# Patient Record
Sex: FEMALE | Race: ASIAN | NOT HISPANIC OR LATINO | Employment: UNEMPLOYED | URBAN - METROPOLITAN AREA
[De-identification: names, ages, dates, MRNs, and addresses within clinical notes are randomized per-mention and may not be internally consistent; named-entity substitution may affect disease eponyms.]

---

## 2017-02-14 ENCOUNTER — OFFICE VISIT - HEALTHEAST (OUTPATIENT)
Dept: FAMILY MEDICINE | Facility: CLINIC | Age: 72
End: 2017-02-14

## 2017-02-14 DIAGNOSIS — M81.0 OSTEOPOROSIS: ICD-10-CM

## 2017-02-14 DIAGNOSIS — E78.00 HYPERCHOLESTEROLEMIA: ICD-10-CM

## 2017-02-14 DIAGNOSIS — R91.8 MULTIPLE LUNG NODULES ON CT: ICD-10-CM

## 2017-02-14 DIAGNOSIS — I10 ESSENTIAL HYPERTENSION WITH GOAL BLOOD PRESSURE LESS THAN 140/90: ICD-10-CM

## 2017-02-14 LAB
CHOLEST SERPL-MCNC: 280 MG/DL
FASTING STATUS PATIENT QL REPORTED: YES
HDLC SERPL-MCNC: 40 MG/DL
LDLC SERPL CALC-MCNC: 194 MG/DL
TRIGL SERPL-MCNC: 230 MG/DL

## 2017-02-14 ASSESSMENT — MIFFLIN-ST. JEOR: SCORE: 890.51

## 2017-02-17 ENCOUNTER — COMMUNICATION - HEALTHEAST (OUTPATIENT)
Dept: FAMILY MEDICINE | Facility: CLINIC | Age: 72
End: 2017-02-17

## 2017-02-17 DIAGNOSIS — R74.01 ELEVATED ALANINE AMINOTRANSFERASE (ALT) LEVEL: ICD-10-CM

## 2017-02-20 ENCOUNTER — HOSPITAL ENCOUNTER (OUTPATIENT)
Dept: CT IMAGING | Facility: HOSPITAL | Age: 72
Discharge: HOME | End: 2017-02-20
Attending: FAMILY MEDICINE

## 2017-02-20 DIAGNOSIS — R91.8 MULTIPLE LUNG NODULES ON CT: ICD-10-CM

## 2017-02-27 ENCOUNTER — HOSPITAL ENCOUNTER (OUTPATIENT)
Dept: ULTRASOUND IMAGING | Facility: HOSPITAL | Age: 72
Discharge: HOME | End: 2017-02-27
Attending: FAMILY MEDICINE

## 2017-02-27 ENCOUNTER — COMMUNICATION - HEALTHEAST (OUTPATIENT)
Dept: FAMILY MEDICINE | Facility: CLINIC | Age: 72
End: 2017-02-27

## 2017-02-27 ENCOUNTER — AMBULATORY - HEALTHEAST (OUTPATIENT)
Dept: FAMILY MEDICINE | Facility: CLINIC | Age: 72
End: 2017-02-27

## 2017-02-27 DIAGNOSIS — R74.01 ELEVATION OF LEVEL OF TRANSAMINASE AND LACTIC ACID DEHYDROGENASE (LDH): ICD-10-CM

## 2017-02-27 DIAGNOSIS — K75.9 HEPATITIS: ICD-10-CM

## 2017-02-27 DIAGNOSIS — R74.02 ELEVATION OF LEVEL OF TRANSAMINASE AND LACTIC ACID DEHYDROGENASE (LDH): ICD-10-CM

## 2017-02-27 DIAGNOSIS — R74.01 ELEVATED ALANINE AMINOTRANSFERASE (ALT) LEVEL: ICD-10-CM

## 2017-03-06 ENCOUNTER — RECORDS - HEALTHEAST (OUTPATIENT)
Dept: ADMINISTRATIVE | Facility: OTHER | Age: 72
End: 2017-03-06

## 2017-03-07 ENCOUNTER — RECORDS - HEALTHEAST (OUTPATIENT)
Dept: BONE DENSITY | Facility: CLINIC | Age: 72
End: 2017-03-07

## 2017-03-07 ENCOUNTER — RECORDS - HEALTHEAST (OUTPATIENT)
Dept: ADMINISTRATIVE | Facility: OTHER | Age: 72
End: 2017-03-07

## 2017-03-07 ENCOUNTER — OFFICE VISIT - HEALTHEAST (OUTPATIENT)
Dept: FAMILY MEDICINE | Facility: CLINIC | Age: 72
End: 2017-03-07

## 2017-03-07 DIAGNOSIS — K75.9 HEPATITIS: ICD-10-CM

## 2017-03-07 DIAGNOSIS — M81.0 OSTEOPOROSIS: ICD-10-CM

## 2017-03-07 DIAGNOSIS — E87.6 HYPOPOTASSEMIA: ICD-10-CM

## 2017-03-07 DIAGNOSIS — E78.00 HYPERCHOLESTEROLEMIA: ICD-10-CM

## 2017-03-07 DIAGNOSIS — I10 ESSENTIAL HYPERTENSION WITH GOAL BLOOD PRESSURE LESS THAN 140/90: ICD-10-CM

## 2017-03-07 DIAGNOSIS — M81.0 AGE-RELATED OSTEOPOROSIS WITHOUT CURRENT PATHOLOGICAL FRACTURE: ICD-10-CM

## 2017-03-07 DIAGNOSIS — R91.8 MULTIPLE LUNG NODULES ON CT: ICD-10-CM

## 2017-03-07 ASSESSMENT — MIFFLIN-ST. JEOR: SCORE: 896.74

## 2017-03-07 NOTE — ASSESSMENT & PLAN NOTE
This is been an issue for her off and on for years.  She is now on potassium replacement again and I rechecked a level today.

## 2017-03-07 NOTE — ASSESSMENT & PLAN NOTE
Non-viral hepatitis per labs.  Status post cholecystectomy.  She saw [facility] yesterday and had additional labs drawn.  It sounds like they did get her previous labs and studies.  She will follow-up there in 1 month.  Asymptomatic at this time.

## 2017-03-07 NOTE — ASSESSMENT & PLAN NOTE
"Fosamax initiated in 2011, switched to ibandronate in 2012 for convenience.  Ibandronate added as allergy in 2014 for \"headache and shortness of breath\" - details are unclear. Has been on calcium and Vit D only since. DXA done today and results not yet available.  Depending on results, may need to have her see endo for a non-bisphosphonate medication.      "

## 2017-03-08 ENCOUNTER — COMMUNICATION - HEALTHEAST (OUTPATIENT)
Dept: FAMILY MEDICINE | Facility: CLINIC | Age: 72
End: 2017-03-08

## 2017-03-13 ENCOUNTER — COMMUNICATION - HEALTHEAST (OUTPATIENT)
Dept: FAMILY MEDICINE | Facility: CLINIC | Age: 72
End: 2017-03-13

## 2017-03-13 DIAGNOSIS — R42 DIZZINESS: ICD-10-CM

## 2017-04-26 ENCOUNTER — OFFICE VISIT - HEALTHEAST (OUTPATIENT)
Dept: FAMILY MEDICINE | Facility: CLINIC | Age: 72
End: 2017-04-26

## 2017-04-26 DIAGNOSIS — N39.0 RECURRENT UTI: ICD-10-CM

## 2017-04-26 DIAGNOSIS — M81.0 OSTEOPOROSIS: ICD-10-CM

## 2017-04-26 DIAGNOSIS — I10 ESSENTIAL HYPERTENSION WITH GOAL BLOOD PRESSURE LESS THAN 140/90: ICD-10-CM

## 2017-04-26 DIAGNOSIS — K75.9 HEPATITIS: ICD-10-CM

## 2017-04-26 ASSESSMENT — MIFFLIN-ST. JEOR: SCORE: 917.16

## 2017-04-26 NOTE — ASSESSMENT & PLAN NOTE
"  DXA 4/28/11:  T-score -3.2  Fosamax initiated in 2011, switched to ibandronate in 2012 for convenience.   DXA 11/13/13: T-score -2.8  Ibandronate added as allergy in 2014 for \"headache and shortness of breath\" - details are unclear. Has been on calcium and Vit D only since.  DXA 3/7/17:  \"No significant difference\".  T-score -3.2, moderate bone architecture.   Will start Prolia injections q6m on 5/10/17 (med not available in clinic today).    "

## 2017-04-26 NOTE — ASSESSMENT & PLAN NOTE
Most recently diagnosed with E coli UTI on 3/24/17 in the emergency room.  She has recovered completely from this at this time.

## 2017-04-26 NOTE — ASSESSMENT & PLAN NOTE
She had cholecystectomy 8/21/15.  The surgery had been delayed from initial diagnosis of gallstones 11/23/13 due to hypokalemia and difficulty with communication.    She then admitted for elevated LFTs 10/28/15. At that time, she had gotten 3 days of Septra for UTI, and it was thought that the Septra had caused the hepatitis.     Her LFTs came down after that, but upon recheck (asympomatic) 2/14/17 have returned elevated again (AST 80, , ). Liver ultrasound on 2/17/17 was normal.  She had not had any antibiotics or medication changes inthe last several months.  She is NOT on a statin medication.  She doesn't drink any alcohol and does not frequently use Tylenol.    Was seen at [facility] 3/6/17.  LFT's normal that day.  Also had normal viral hepatitis labs, iron studies, alpha-1 antitripsan, DEEPALI, actin (smooth muscle) antibody.  LFTs were also normal in the emergency room on 3/24/17.She has remained asymptomatic throughout.  It is not clear whether she has a follow-up appointment with [facility] already scheduled; we will have her meet with specialty  in this regard today.

## 2017-04-26 NOTE — ASSESSMENT & PLAN NOTE
Meds had been decreased in September because she was feeling dizzy.  She now has good blood pressure control and no symptoms of orthostatic hypotension.  Excellent control on current regimen. No changes. Recheck in 1 year.

## 2017-05-10 ENCOUNTER — AMBULATORY - HEALTHEAST (OUTPATIENT)
Dept: NURSING | Facility: CLINIC | Age: 72
End: 2017-05-10

## 2017-05-12 ENCOUNTER — COMMUNICATION - HEALTHEAST (OUTPATIENT)
Dept: FAMILY MEDICINE | Facility: CLINIC | Age: 72
End: 2017-05-12

## 2017-06-21 ENCOUNTER — COMMUNICATION - HEALTHEAST (OUTPATIENT)
Dept: FAMILY MEDICINE | Facility: CLINIC | Age: 72
End: 2017-06-21

## 2017-06-21 ENCOUNTER — RECORDS - HEALTHEAST (OUTPATIENT)
Dept: ADMINISTRATIVE | Facility: OTHER | Age: 72
End: 2017-06-21

## 2017-06-21 DIAGNOSIS — M81.0 OSTEOPOROSIS, UNSPECIFIED: ICD-10-CM

## 2017-07-26 ENCOUNTER — OFFICE VISIT - HEALTHEAST (OUTPATIENT)
Dept: FAMILY MEDICINE | Facility: CLINIC | Age: 72
End: 2017-07-26

## 2017-07-26 DIAGNOSIS — R42 DIZZINESS: ICD-10-CM

## 2017-07-26 ASSESSMENT — MIFFLIN-ST. JEOR: SCORE: 921.69

## 2017-09-12 ENCOUNTER — COMMUNICATION - HEALTHEAST (OUTPATIENT)
Dept: FAMILY MEDICINE | Facility: CLINIC | Age: 72
End: 2017-09-12

## 2017-09-12 ENCOUNTER — OFFICE VISIT - HEALTHEAST (OUTPATIENT)
Dept: FAMILY MEDICINE | Facility: CLINIC | Age: 72
End: 2017-09-12

## 2017-09-12 DIAGNOSIS — G51.0 BELL'S PALSY: ICD-10-CM

## 2017-09-12 DIAGNOSIS — Z23 NEED FOR IMMUNIZATION AGAINST INFLUENZA: ICD-10-CM

## 2017-09-12 ASSESSMENT — MIFFLIN-ST. JEOR: SCORE: 942.11

## 2017-09-29 ENCOUNTER — OFFICE VISIT - HEALTHEAST (OUTPATIENT)
Dept: FAMILY MEDICINE | Facility: CLINIC | Age: 72
End: 2017-09-29

## 2017-09-29 DIAGNOSIS — G51.0 BELL'S PALSY: ICD-10-CM

## 2017-09-29 DIAGNOSIS — R30.0 DYSURIA: ICD-10-CM

## 2017-09-29 ASSESSMENT — MIFFLIN-ST. JEOR: SCORE: 925.44

## 2017-11-14 ENCOUNTER — OFFICE VISIT - HEALTHEAST (OUTPATIENT)
Dept: FAMILY MEDICINE | Facility: CLINIC | Age: 72
End: 2017-11-14

## 2017-11-14 DIAGNOSIS — Z23 NEED FOR VACCINATION: ICD-10-CM

## 2017-11-14 DIAGNOSIS — M81.0 AGE-RELATED OSTEOPOROSIS WITHOUT CURRENT PATHOLOGICAL FRACTURE: ICD-10-CM

## 2017-11-14 DIAGNOSIS — N39.0 RECURRENT UTI: ICD-10-CM

## 2017-11-14 DIAGNOSIS — Z71.84 COUNSELING FOR TRAVEL: ICD-10-CM

## 2017-11-14 DIAGNOSIS — E87.6 HYPOPOTASSEMIA: ICD-10-CM

## 2017-11-14 DIAGNOSIS — I10 ESSENTIAL HYPERTENSION: ICD-10-CM

## 2017-11-30 ENCOUNTER — COMMUNICATION - HEALTHEAST (OUTPATIENT)
Dept: FAMILY MEDICINE | Facility: CLINIC | Age: 72
End: 2017-11-30

## 2017-11-30 DIAGNOSIS — H10.45 CHRONIC ALLERGIC CONJUNCTIVITIS: ICD-10-CM

## 2018-03-30 ENCOUNTER — COMMUNICATION - HEALTHEAST (OUTPATIENT)
Dept: FAMILY MEDICINE | Facility: CLINIC | Age: 73
End: 2018-03-30

## 2018-03-30 DIAGNOSIS — Z71.84 COUNSELING FOR TRAVEL: ICD-10-CM

## 2018-04-05 ENCOUNTER — COMMUNICATION - HEALTHEAST (OUTPATIENT)
Dept: FAMILY MEDICINE | Facility: CLINIC | Age: 73
End: 2018-04-05

## 2018-04-11 ENCOUNTER — COMMUNICATION - HEALTHEAST (OUTPATIENT)
Dept: FAMILY MEDICINE | Facility: CLINIC | Age: 73
End: 2018-04-11

## 2018-04-11 DIAGNOSIS — Z71.84 COUNSELING FOR TRAVEL: ICD-10-CM

## 2018-04-27 ENCOUNTER — OFFICE VISIT - HEALTHEAST (OUTPATIENT)
Dept: FAMILY MEDICINE | Facility: CLINIC | Age: 73
End: 2018-04-27

## 2018-04-27 DIAGNOSIS — E87.6 HYPOPOTASSEMIA: ICD-10-CM

## 2018-04-27 DIAGNOSIS — M81.0 AGE-RELATED OSTEOPOROSIS WITHOUT CURRENT PATHOLOGICAL FRACTURE: ICD-10-CM

## 2018-04-27 DIAGNOSIS — R42 DIZZINESS: ICD-10-CM

## 2018-04-27 DIAGNOSIS — K75.9 HEPATITIS: ICD-10-CM

## 2018-04-27 DIAGNOSIS — I10 ESSENTIAL HYPERTENSION: ICD-10-CM

## 2018-04-27 DIAGNOSIS — R07.9 RIGHT-SIDED CHEST PAIN: ICD-10-CM

## 2018-04-27 DIAGNOSIS — H10.45 CHRONIC ALLERGIC CONJUNCTIVITIS: ICD-10-CM

## 2018-04-27 LAB
25(OH)D3 SERPL-MCNC: 30.8 NG/ML (ref 30–80)
ALBUMIN SERPL-MCNC: 3.4 G/DL (ref 3.5–5)
ALBUMIN SERPL-MCNC: 3.4 G/DL (ref 3.5–5)
ALP SERPL-CCNC: 50 U/L (ref 45–120)
ALP SERPL-CCNC: 50 U/L (ref 45–120)
ALT SERPL W P-5'-P-CCNC: 23 U/L (ref 0–45)
ALT SERPL W P-5'-P-CCNC: 23 U/L (ref 0–45)
ANION GAP SERPL CALCULATED.3IONS-SCNC: 15 MMOL/L (ref 5–18)
AST SERPL W P-5'-P-CCNC: 19 U/L (ref 0–40)
AST SERPL W P-5'-P-CCNC: 19 U/L (ref 0–40)
BILIRUB DIRECT SERPL-MCNC: 0.2 MG/DL
BILIRUB SERPL-MCNC: 0.5 MG/DL (ref 0–1)
BILIRUB SERPL-MCNC: 0.5 MG/DL (ref 0–1)
BUN SERPL-MCNC: 25 MG/DL (ref 8–28)
CALCIUM SERPL-MCNC: 10.2 MG/DL (ref 8.5–10.5)
CHLORIDE BLD-SCNC: 102 MMOL/L (ref 98–107)
CO2 SERPL-SCNC: 25 MMOL/L (ref 22–31)
CREAT SERPL-MCNC: 0.91 MG/DL (ref 0.6–1.1)
GFR SERPL CREATININE-BSD FRML MDRD: >60 ML/MIN/1.73M2
GLUCOSE BLD-MCNC: 81 MG/DL (ref 70–125)
POTASSIUM BLD-SCNC: 4 MMOL/L (ref 3.5–5)
PROT SERPL-MCNC: 7.4 G/DL (ref 6–8)
PROT SERPL-MCNC: 7.4 G/DL (ref 6–8)
SODIUM SERPL-SCNC: 142 MMOL/L (ref 136–145)

## 2018-04-27 ASSESSMENT — MIFFLIN-ST. JEOR: SCORE: 934.51

## 2018-04-27 NOTE — ASSESSMENT & PLAN NOTE
"DXA 4/28/11:  T-score -3.2  Fosamax initiated in 2011, switched to ibandronate in 2012 for convenience.   DXA 11/13/13: T-score -2.8  Ibandronate added as allergy in 2014 for \"headache and shortness of breath\" - details are unclear. Has been on calcium and Vit D only since.  DXA 3/7/17:  \"No significant difference\".  T-score -3.2, moderate bone architecture.   Will start Prolia injections q6m on 5/10/17.    Is about due for Prolia injection, but we do not have it in stock today.  She will return between May 7-10 for injection, which will be covered under previous prior authorization.  Will need to pursue another PA for following injection.  "

## 2018-04-27 NOTE — ASSESSMENT & PLAN NOTE
Has been out of potassium supplement recently.  Will check labs and get her restarted on potassium supplementation.

## 2018-04-30 ENCOUNTER — COMMUNICATION - HEALTHEAST (OUTPATIENT)
Dept: FAMILY MEDICINE | Facility: CLINIC | Age: 73
End: 2018-04-30

## 2018-05-08 ENCOUNTER — COMMUNICATION - HEALTHEAST (OUTPATIENT)
Dept: FAMILY MEDICINE | Facility: CLINIC | Age: 73
End: 2018-05-08

## 2018-05-17 ENCOUNTER — AMBULATORY - HEALTHEAST (OUTPATIENT)
Dept: NURSING | Facility: CLINIC | Age: 73
End: 2018-05-17

## 2018-07-16 ENCOUNTER — COMMUNICATION - HEALTHEAST (OUTPATIENT)
Dept: FAMILY MEDICINE | Facility: CLINIC | Age: 73
End: 2018-07-16

## 2018-07-16 DIAGNOSIS — M81.0 OSTEOPOROSIS: ICD-10-CM

## 2018-09-06 ENCOUNTER — COMMUNICATION - HEALTHEAST (OUTPATIENT)
Dept: FAMILY MEDICINE | Facility: CLINIC | Age: 73
End: 2018-09-06

## 2018-09-06 DIAGNOSIS — K21.9 GERD (GASTROESOPHAGEAL REFLUX DISEASE): ICD-10-CM

## 2018-09-17 ENCOUNTER — OFFICE VISIT - HEALTHEAST (OUTPATIENT)
Dept: FAMILY MEDICINE | Facility: CLINIC | Age: 73
End: 2018-09-17

## 2018-09-17 DIAGNOSIS — K21.9 GERD (GASTROESOPHAGEAL REFLUX DISEASE): ICD-10-CM

## 2018-09-17 DIAGNOSIS — R10.11 ABDOMINAL PAIN, RIGHT UPPER QUADRANT: ICD-10-CM

## 2018-09-17 DIAGNOSIS — Z12.31 VISIT FOR SCREENING MAMMOGRAM: ICD-10-CM

## 2018-09-17 LAB
ALBUMIN SERPL-MCNC: 3.4 G/DL (ref 3.5–5)
ALBUMIN UR-MCNC: NEGATIVE MG/DL
ALP SERPL-CCNC: 38 U/L (ref 45–120)
ALT SERPL W P-5'-P-CCNC: 16 U/L (ref 0–45)
ANION GAP SERPL CALCULATED.3IONS-SCNC: 10 MMOL/L (ref 5–18)
APPEARANCE UR: CLEAR
AST SERPL W P-5'-P-CCNC: 16 U/L (ref 0–40)
BILIRUB SERPL-MCNC: 0.4 MG/DL (ref 0–1)
BILIRUB UR QL STRIP: NEGATIVE
BUN SERPL-MCNC: 17 MG/DL (ref 8–28)
CALCIUM SERPL-MCNC: 9.4 MG/DL (ref 8.5–10.5)
CHLORIDE BLD-SCNC: 105 MMOL/L (ref 98–107)
CO2 SERPL-SCNC: 26 MMOL/L (ref 22–31)
COLOR UR AUTO: YELLOW
CREAT SERPL-MCNC: 0.92 MG/DL (ref 0.6–1.1)
ERYTHROCYTE [DISTWIDTH] IN BLOOD BY AUTOMATED COUNT: 12.1 % (ref 11–14.5)
GFR SERPL CREATININE-BSD FRML MDRD: 60 ML/MIN/1.73M2
GLUCOSE BLD-MCNC: 114 MG/DL (ref 70–125)
GLUCOSE UR STRIP-MCNC: NEGATIVE MG/DL
HCT VFR BLD AUTO: 41.1 % (ref 35–47)
HGB BLD-MCNC: 13.9 G/DL (ref 12–16)
HGB UR QL STRIP: NEGATIVE
KETONES UR STRIP-MCNC: NEGATIVE MG/DL
LEUKOCYTE ESTERASE UR QL STRIP: NEGATIVE
MCH RBC QN AUTO: 30.9 PG (ref 27–34)
MCHC RBC AUTO-ENTMCNC: 33.7 G/DL (ref 32–36)
MCV RBC AUTO: 92 FL (ref 80–100)
NITRATE UR QL: NEGATIVE
PH UR STRIP: 7 [PH] (ref 5–8)
PLATELET # BLD AUTO: 273 THOU/UL (ref 140–440)
PMV BLD AUTO: 7.2 FL (ref 7–10)
POTASSIUM BLD-SCNC: 3.9 MMOL/L (ref 3.5–5)
PROT SERPL-MCNC: 6.9 G/DL (ref 6–8)
RBC # BLD AUTO: 4.48 MILL/UL (ref 3.8–5.4)
SODIUM SERPL-SCNC: 141 MMOL/L (ref 136–145)
SP GR UR STRIP: 1.01 (ref 1–1.03)
UROBILINOGEN UR STRIP-ACNC: NORMAL
WBC: 7.3 THOU/UL (ref 4–11)

## 2018-09-17 ASSESSMENT — MIFFLIN-ST. JEOR: SCORE: 943.58

## 2018-10-01 ENCOUNTER — COMMUNICATION - HEALTHEAST (OUTPATIENT)
Dept: ADMINISTRATIVE | Facility: CLINIC | Age: 73
End: 2018-10-01

## 2018-10-23 ENCOUNTER — OFFICE VISIT - HEALTHEAST (OUTPATIENT)
Dept: FAMILY MEDICINE | Facility: CLINIC | Age: 73
End: 2018-10-23

## 2018-10-23 ENCOUNTER — COMMUNICATION - HEALTHEAST (OUTPATIENT)
Dept: FAMILY MEDICINE | Facility: CLINIC | Age: 73
End: 2018-10-23

## 2018-10-23 ENCOUNTER — RECORDS - HEALTHEAST (OUTPATIENT)
Dept: MAMMOGRAPHY | Facility: CLINIC | Age: 73
End: 2018-10-23

## 2018-10-23 DIAGNOSIS — N89.8 VAGINAL ITCHING: ICD-10-CM

## 2018-10-23 DIAGNOSIS — Z12.31 ENCOUNTER FOR SCREENING MAMMOGRAM FOR MALIGNANT NEOPLASM OF BREAST: ICD-10-CM

## 2018-10-23 DIAGNOSIS — R30.0 DYSURIA: ICD-10-CM

## 2018-10-23 LAB
ALBUMIN UR-MCNC: NEGATIVE MG/DL
APPEARANCE UR: CLEAR
BILIRUB UR QL STRIP: NEGATIVE
CLUE CELLS: NORMAL
COLOR UR AUTO: YELLOW
GLUCOSE UR STRIP-MCNC: NEGATIVE MG/DL
HGB UR QL STRIP: NEGATIVE
KETONES UR STRIP-MCNC: NEGATIVE MG/DL
LEUKOCYTE ESTERASE UR QL STRIP: ABNORMAL
NITRATE UR QL: NEGATIVE
PH UR STRIP: 6 [PH] (ref 5–8)
SP GR UR STRIP: 1.02 (ref 1–1.03)
TRICHOMONAS, WET PREP: NORMAL
UROBILINOGEN UR STRIP-ACNC: ABNORMAL
YEAST, WET PREP: NORMAL

## 2018-10-24 ENCOUNTER — COMMUNICATION - HEALTHEAST (OUTPATIENT)
Dept: FAMILY MEDICINE | Facility: CLINIC | Age: 73
End: 2018-10-24

## 2018-10-25 LAB — BACTERIA SPEC CULT: ABNORMAL

## 2018-10-31 ENCOUNTER — COMMUNICATION - HEALTHEAST (OUTPATIENT)
Dept: FAMILY MEDICINE | Facility: CLINIC | Age: 73
End: 2018-10-31

## 2018-11-15 ENCOUNTER — COMMUNICATION - HEALTHEAST (OUTPATIENT)
Dept: FAMILY MEDICINE | Facility: CLINIC | Age: 73
End: 2018-11-15

## 2018-11-15 ENCOUNTER — AMBULATORY - HEALTHEAST (OUTPATIENT)
Dept: FAMILY MEDICINE | Facility: CLINIC | Age: 73
End: 2018-11-15

## 2018-11-15 DIAGNOSIS — M81.0 SENILE OSTEOPOROSIS: ICD-10-CM

## 2018-11-16 ENCOUNTER — AMBULATORY - HEALTHEAST (OUTPATIENT)
Dept: NURSING | Facility: CLINIC | Age: 73
End: 2018-11-16

## 2018-11-19 ENCOUNTER — COMMUNICATION - HEALTHEAST (OUTPATIENT)
Dept: FAMILY MEDICINE | Facility: CLINIC | Age: 73
End: 2018-11-19

## 2018-11-19 DIAGNOSIS — I10 ESSENTIAL HYPERTENSION: ICD-10-CM

## 2019-04-10 ENCOUNTER — OFFICE VISIT - HEALTHEAST (OUTPATIENT)
Dept: FAMILY MEDICINE | Facility: CLINIC | Age: 74
End: 2019-04-10

## 2019-04-10 DIAGNOSIS — Z76.0 ENCOUNTER FOR MEDICATION REFILL: ICD-10-CM

## 2019-04-10 DIAGNOSIS — R30.0 DYSURIA: ICD-10-CM

## 2019-04-10 LAB
ALBUMIN UR-MCNC: ABNORMAL MG/DL
APPEARANCE UR: ABNORMAL
BACTERIA #/AREA URNS HPF: ABNORMAL HPF
BILIRUB UR QL STRIP: NEGATIVE
COLOR UR AUTO: ABNORMAL
GLUCOSE UR STRIP-MCNC: NEGATIVE MG/DL
HGB UR QL STRIP: ABNORMAL
KETONES UR STRIP-MCNC: NEGATIVE MG/DL
LEUKOCYTE ESTERASE UR QL STRIP: ABNORMAL
NITRATE UR QL: POSITIVE
PH UR STRIP: 6 [PH] (ref 5–8)
RBC #/AREA URNS AUTO: ABNORMAL HPF
SP GR UR STRIP: 1.02 (ref 1–1.03)
SQUAMOUS #/AREA URNS AUTO: ABNORMAL LPF
TRANS CELLS #/AREA URNS HPF: ABNORMAL LPF
UROBILINOGEN UR STRIP-ACNC: ABNORMAL
WBC #/AREA URNS AUTO: ABNORMAL HPF

## 2019-04-10 ASSESSMENT — MIFFLIN-ST. JEOR: SCORE: 935.75

## 2019-04-12 LAB — BACTERIA SPEC CULT: ABNORMAL

## 2019-04-24 ENCOUNTER — COMMUNICATION - HEALTHEAST (OUTPATIENT)
Dept: FAMILY MEDICINE | Facility: CLINIC | Age: 74
End: 2019-04-24

## 2019-05-15 ENCOUNTER — COMMUNICATION - HEALTHEAST (OUTPATIENT)
Dept: FAMILY MEDICINE | Facility: CLINIC | Age: 74
End: 2019-05-15

## 2019-05-15 ENCOUNTER — AMBULATORY - HEALTHEAST (OUTPATIENT)
Dept: NURSING | Facility: CLINIC | Age: 74
End: 2019-05-15

## 2019-05-28 ENCOUNTER — COMMUNICATION - HEALTHEAST (OUTPATIENT)
Dept: FAMILY MEDICINE | Facility: CLINIC | Age: 74
End: 2019-05-28

## 2019-05-28 DIAGNOSIS — K21.9 GERD (GASTROESOPHAGEAL REFLUX DISEASE): ICD-10-CM

## 2019-05-28 DIAGNOSIS — R42 DIZZINESS: ICD-10-CM

## 2019-06-12 ENCOUNTER — COMMUNICATION - HEALTHEAST (OUTPATIENT)
Dept: GERIATRIC MEDICINE | Facility: CLINIC | Age: 74
End: 2019-06-12

## 2019-06-17 ENCOUNTER — COMMUNICATION - HEALTHEAST (OUTPATIENT)
Dept: GERIATRIC MEDICINE | Facility: CLINIC | Age: 74
End: 2019-06-17

## 2019-07-08 ENCOUNTER — COMMUNICATION - HEALTHEAST (OUTPATIENT)
Dept: FAMILY MEDICINE | Facility: CLINIC | Age: 74
End: 2019-07-08

## 2019-08-15 ENCOUNTER — COMMUNICATION - HEALTHEAST (OUTPATIENT)
Dept: FAMILY MEDICINE | Facility: CLINIC | Age: 74
End: 2019-08-15

## 2019-08-15 DIAGNOSIS — M81.0 OSTEOPOROSIS: ICD-10-CM

## 2019-09-05 ENCOUNTER — OFFICE VISIT - HEALTHEAST (OUTPATIENT)
Dept: FAMILY MEDICINE | Facility: CLINIC | Age: 74
End: 2019-09-05

## 2019-09-05 DIAGNOSIS — N89.8 VAGINAL ITCHING: ICD-10-CM

## 2019-09-05 DIAGNOSIS — R30.0 DYSURIA: ICD-10-CM

## 2019-09-05 LAB
ALBUMIN UR-MCNC: NEGATIVE MG/DL
APPEARANCE UR: CLEAR
BACTERIA #/AREA URNS HPF: ABNORMAL HPF
BILIRUB UR QL STRIP: NEGATIVE
CLUE CELLS: NORMAL
COLOR UR AUTO: ABNORMAL
GLUCOSE UR STRIP-MCNC: NEGATIVE MG/DL
HGB UR QL STRIP: NEGATIVE
KETONES UR STRIP-MCNC: NEGATIVE MG/DL
LEUKOCYTE ESTERASE UR QL STRIP: ABNORMAL
NITRATE UR QL: NEGATIVE
PH UR STRIP: 6.5 [PH] (ref 5–8)
RBC #/AREA URNS AUTO: ABNORMAL HPF
SP GR UR STRIP: <=1.005 (ref 1–1.03)
SQUAMOUS #/AREA URNS AUTO: ABNORMAL LPF
TRICHOMONAS, WET PREP: NORMAL
UROBILINOGEN UR STRIP-ACNC: ABNORMAL
WBC #/AREA URNS AUTO: ABNORMAL HPF
YEAST, WET PREP: NORMAL

## 2019-09-05 ASSESSMENT — MIFFLIN-ST. JEOR: SCORE: 943.24

## 2019-09-07 LAB — BACTERIA SPEC CULT: ABNORMAL

## 2019-09-17 ENCOUNTER — COMMUNICATION - HEALTHEAST (OUTPATIENT)
Dept: FAMILY MEDICINE | Facility: CLINIC | Age: 74
End: 2019-09-17

## 2019-09-17 DIAGNOSIS — I10 ESSENTIAL HYPERTENSION: ICD-10-CM

## 2019-10-23 ENCOUNTER — COMMUNICATION - HEALTHEAST (OUTPATIENT)
Dept: FAMILY MEDICINE | Facility: CLINIC | Age: 74
End: 2019-10-23

## 2019-11-01 ENCOUNTER — AMBULATORY - HEALTHEAST (OUTPATIENT)
Dept: FAMILY MEDICINE | Facility: CLINIC | Age: 74
End: 2019-11-01

## 2019-11-13 ENCOUNTER — AMBULATORY - HEALTHEAST (OUTPATIENT)
Dept: FAMILY MEDICINE | Facility: CLINIC | Age: 74
End: 2019-11-13

## 2019-11-13 DIAGNOSIS — M81.0 OSTEOPOROSIS: ICD-10-CM

## 2019-11-20 ENCOUNTER — AMBULATORY - HEALTHEAST (OUTPATIENT)
Dept: NURSING | Facility: CLINIC | Age: 74
End: 2019-11-20

## 2019-11-26 ENCOUNTER — OFFICE VISIT - HEALTHEAST (OUTPATIENT)
Dept: FAMILY MEDICINE | Facility: CLINIC | Age: 74
End: 2019-11-26

## 2019-11-26 DIAGNOSIS — I10 ESSENTIAL HYPERTENSION: ICD-10-CM

## 2019-11-26 DIAGNOSIS — L85.3 DRY SKIN: ICD-10-CM

## 2019-11-26 DIAGNOSIS — Z23 IMMUNIZATION DUE: ICD-10-CM

## 2019-11-26 ASSESSMENT — MIFFLIN-ST. JEOR: SCORE: 946.64

## 2019-12-27 ENCOUNTER — COMMUNICATION - HEALTHEAST (OUTPATIENT)
Dept: GERIATRIC MEDICINE | Facility: CLINIC | Age: 74
End: 2019-12-27

## 2020-03-29 ENCOUNTER — COMMUNICATION - HEALTHEAST (OUTPATIENT)
Dept: FAMILY MEDICINE | Facility: CLINIC | Age: 75
End: 2020-03-29

## 2020-03-29 DIAGNOSIS — K21.9 GERD (GASTROESOPHAGEAL REFLUX DISEASE): ICD-10-CM

## 2020-05-05 ENCOUNTER — COMMUNICATION - HEALTHEAST (OUTPATIENT)
Dept: FAMILY MEDICINE | Facility: CLINIC | Age: 75
End: 2020-05-05

## 2020-05-05 DIAGNOSIS — Z76.0 ENCOUNTER FOR MEDICATION REFILL: ICD-10-CM

## 2020-05-06 RX ORDER — AZELASTINE HYDROCHLORIDE 0.5 MG/ML
SOLUTION/ DROPS OPHTHALMIC
Qty: 6 ML | Refills: 11 | Status: SHIPPED | OUTPATIENT
Start: 2020-05-06 | End: 2021-08-05

## 2020-05-12 ENCOUNTER — AMBULATORY - HEALTHEAST (OUTPATIENT)
Dept: FAMILY MEDICINE | Facility: CLINIC | Age: 75
End: 2020-05-12

## 2020-05-12 DIAGNOSIS — K21.9 GERD (GASTROESOPHAGEAL REFLUX DISEASE): ICD-10-CM

## 2020-05-13 ENCOUNTER — COMMUNICATION - HEALTHEAST (OUTPATIENT)
Dept: GERIATRIC MEDICINE | Facility: CLINIC | Age: 75
End: 2020-05-13

## 2020-05-13 ASSESSMENT — ACTIVITIES OF DAILY LIVING (ADL)
DEPENDENT_IADLS:: CLEANING;COOKING;LAUNDRY;SHOPPING;MEAL PREPARATION;MEDICATION MANAGEMENT;MONEY MANAGEMENT;TRANSPORTATION;INCONTINENCE

## 2020-05-20 ENCOUNTER — AMBULATORY - HEALTHEAST (OUTPATIENT)
Dept: NURSING | Facility: CLINIC | Age: 75
End: 2020-05-20

## 2020-05-20 ENCOUNTER — COMMUNICATION - HEALTHEAST (OUTPATIENT)
Dept: FAMILY MEDICINE | Facility: CLINIC | Age: 75
End: 2020-05-20

## 2020-05-20 ENCOUNTER — OFFICE VISIT - HEALTHEAST (OUTPATIENT)
Dept: FAMILY MEDICINE | Facility: CLINIC | Age: 75
End: 2020-05-20

## 2020-05-20 DIAGNOSIS — R07.89 CHEST WALL PAIN: ICD-10-CM

## 2020-05-20 DIAGNOSIS — N30.90 BLADDER INFECTION: ICD-10-CM

## 2020-05-20 LAB
ALBUMIN UR-MCNC: NEGATIVE MG/DL
APPEARANCE UR: CLEAR
BACTERIA #/AREA URNS HPF: ABNORMAL HPF
BILIRUB UR QL STRIP: NEGATIVE
COLOR UR AUTO: YELLOW
GLUCOSE UR STRIP-MCNC: NEGATIVE MG/DL
HGB UR QL STRIP: NEGATIVE
KETONES UR STRIP-MCNC: NEGATIVE MG/DL
LEUKOCYTE ESTERASE UR QL STRIP: ABNORMAL
NITRATE UR QL: NEGATIVE
PH UR STRIP: 6 [PH] (ref 5–8)
RBC #/AREA URNS AUTO: ABNORMAL HPF
SP GR UR STRIP: 1.01 (ref 1–1.03)
SQUAMOUS #/AREA URNS AUTO: ABNORMAL LPF
UROBILINOGEN UR STRIP-ACNC: ABNORMAL
WBC #/AREA URNS AUTO: ABNORMAL HPF

## 2020-05-20 ASSESSMENT — MIFFLIN-ST. JEOR: SCORE: 945.79

## 2020-05-21 ENCOUNTER — COMMUNICATION - HEALTHEAST (OUTPATIENT)
Dept: GERIATRIC MEDICINE | Facility: CLINIC | Age: 75
End: 2020-05-21

## 2020-05-22 LAB — BACTERIA SPEC CULT: ABNORMAL

## 2020-06-08 ENCOUNTER — COMMUNICATION - HEALTHEAST (OUTPATIENT)
Dept: FAMILY MEDICINE | Facility: CLINIC | Age: 75
End: 2020-06-08

## 2020-06-08 DIAGNOSIS — R42 DIZZINESS: ICD-10-CM

## 2020-06-10 RX ORDER — MECLIZINE HYDROCHLORIDE 25 MG/1
TABLET ORAL
Qty: 30 TABLET | Refills: 11 | Status: SHIPPED | OUTPATIENT
Start: 2020-06-10 | End: 2021-08-05

## 2020-07-09 ENCOUNTER — COMMUNICATION - HEALTHEAST (OUTPATIENT)
Dept: FAMILY MEDICINE | Facility: CLINIC | Age: 75
End: 2020-07-09

## 2020-08-06 ENCOUNTER — AMBULATORY - HEALTHEAST (OUTPATIENT)
Dept: FAMILY MEDICINE | Facility: CLINIC | Age: 75
End: 2020-08-06

## 2020-08-06 ENCOUNTER — RECORDS - HEALTHEAST (OUTPATIENT)
Dept: MAMMOGRAPHY | Facility: CLINIC | Age: 75
End: 2020-08-06

## 2020-08-06 ENCOUNTER — OFFICE VISIT - HEALTHEAST (OUTPATIENT)
Dept: FAMILY MEDICINE | Facility: CLINIC | Age: 75
End: 2020-08-06

## 2020-08-06 DIAGNOSIS — Z12.31 ENCOUNTER FOR SCREENING MAMMOGRAM FOR MALIGNANT NEOPLASM OF BREAST: ICD-10-CM

## 2020-08-06 DIAGNOSIS — I10 ESSENTIAL HYPERTENSION: ICD-10-CM

## 2020-08-06 DIAGNOSIS — M15.0 PRIMARY OSTEOARTHRITIS INVOLVING MULTIPLE JOINTS: ICD-10-CM

## 2020-08-06 DIAGNOSIS — R30.0 DYSURIA: ICD-10-CM

## 2020-08-06 DIAGNOSIS — Z00.00 ROUTINE GENERAL MEDICAL EXAMINATION AT A HEALTH CARE FACILITY: ICD-10-CM

## 2020-08-06 DIAGNOSIS — Z12.31 VISIT FOR SCREENING MAMMOGRAM: ICD-10-CM

## 2020-08-06 DIAGNOSIS — M81.0 AGE-RELATED OSTEOPOROSIS WITHOUT CURRENT PATHOLOGICAL FRACTURE: ICD-10-CM

## 2020-08-06 LAB
ALBUMIN SERPL-MCNC: 3.5 G/DL (ref 3.5–5)
ALBUMIN UR-MCNC: NEGATIVE MG/DL
ALP SERPL-CCNC: 43 U/L (ref 45–120)
ALT SERPL W P-5'-P-CCNC: 29 U/L (ref 0–45)
ANION GAP SERPL CALCULATED.3IONS-SCNC: 12 MMOL/L (ref 5–18)
APPEARANCE UR: CLEAR
AST SERPL W P-5'-P-CCNC: 21 U/L (ref 0–40)
BACTERIA #/AREA URNS HPF: ABNORMAL HPF
BILIRUB SERPL-MCNC: 0.4 MG/DL (ref 0–1)
BILIRUB UR QL STRIP: NEGATIVE
BUN SERPL-MCNC: 14 MG/DL (ref 8–28)
CALCIUM SERPL-MCNC: 9.3 MG/DL (ref 8.5–10.5)
CHLORIDE BLD-SCNC: 103 MMOL/L (ref 98–107)
CHOLEST SERPL-MCNC: 299 MG/DL
CO2 SERPL-SCNC: 27 MMOL/L (ref 22–31)
COLOR UR AUTO: YELLOW
CREAT SERPL-MCNC: 0.96 MG/DL (ref 0.6–1.1)
FASTING STATUS PATIENT QL REPORTED: NO
GFR SERPL CREATININE-BSD FRML MDRD: 57 ML/MIN/1.73M2
GLUCOSE BLD-MCNC: 81 MG/DL (ref 70–125)
GLUCOSE UR STRIP-MCNC: NEGATIVE MG/DL
HDLC SERPL-MCNC: 53 MG/DL
HGB UR QL STRIP: NEGATIVE
KETONES UR STRIP-MCNC: NEGATIVE MG/DL
LDLC SERPL CALC-MCNC: 193 MG/DL
LEUKOCYTE ESTERASE UR QL STRIP: ABNORMAL
NITRATE UR QL: NEGATIVE
PH UR STRIP: 7 [PH] (ref 5–8)
POTASSIUM BLD-SCNC: 3.1 MMOL/L (ref 3.5–5)
PROT SERPL-MCNC: 6.9 G/DL (ref 6–8)
RBC #/AREA URNS AUTO: ABNORMAL HPF
SODIUM SERPL-SCNC: 142 MMOL/L (ref 136–145)
SP GR UR STRIP: 1.02 (ref 1–1.03)
SQUAMOUS #/AREA URNS AUTO: ABNORMAL LPF
TRIGL SERPL-MCNC: 266 MG/DL
UROBILINOGEN UR STRIP-ACNC: ABNORMAL
WBC #/AREA URNS AUTO: ABNORMAL HPF

## 2020-08-06 ASSESSMENT — MIFFLIN-ST. JEOR: SCORE: 951.71

## 2020-08-07 LAB
25(OH)D3 SERPL-MCNC: 20.1 NG/ML (ref 30–80)
BACTERIA SPEC CULT: NORMAL

## 2020-09-03 ENCOUNTER — COMMUNICATION - HEALTHEAST (OUTPATIENT)
Dept: FAMILY MEDICINE | Facility: CLINIC | Age: 75
End: 2020-09-03

## 2020-09-03 DIAGNOSIS — M81.0 OSTEOPOROSIS: ICD-10-CM

## 2020-10-04 ENCOUNTER — COMMUNICATION - HEALTHEAST (OUTPATIENT)
Dept: FAMILY MEDICINE | Facility: CLINIC | Age: 75
End: 2020-10-04

## 2020-10-04 DIAGNOSIS — I10 ESSENTIAL HYPERTENSION: ICD-10-CM

## 2020-10-22 ENCOUNTER — COMMUNICATION - HEALTHEAST (OUTPATIENT)
Dept: FAMILY MEDICINE | Facility: CLINIC | Age: 75
End: 2020-10-22

## 2020-10-22 DIAGNOSIS — M81.0 AGE-RELATED OSTEOPOROSIS WITHOUT CURRENT PATHOLOGICAL FRACTURE: ICD-10-CM

## 2020-10-22 DIAGNOSIS — E55.9 VITAMIN D DEFICIENCY: ICD-10-CM

## 2020-10-22 DIAGNOSIS — E87.6 HYPOPOTASSEMIA: ICD-10-CM

## 2020-10-22 DIAGNOSIS — E78.00 HYPERCHOLESTEROLEMIA: ICD-10-CM

## 2020-10-22 RX ORDER — ERGOCALCIFEROL 1.25 MG/1
1 CAPSULE ORAL WEEKLY
Qty: 12 CAPSULE | Refills: 3 | Status: SHIPPED | OUTPATIENT
Start: 2020-10-22 | End: 2021-09-24

## 2020-10-22 RX ORDER — ATORVASTATIN CALCIUM 40 MG/1
40 TABLET, FILM COATED ORAL AT BEDTIME
Qty: 90 TABLET | Refills: 3 | Status: SHIPPED | OUTPATIENT
Start: 2020-10-22 | End: 2021-11-26

## 2020-11-12 ENCOUNTER — COMMUNICATION - HEALTHEAST (OUTPATIENT)
Dept: FAMILY MEDICINE | Facility: CLINIC | Age: 75
End: 2020-11-12

## 2020-11-12 DIAGNOSIS — M81.0 OSTEOPOROSIS: ICD-10-CM

## 2020-11-12 RX ORDER — ASPIRIN 81 MG
1 TABLET, DELAYED RELEASE (ENTERIC COATED) ORAL DAILY
Qty: 30 TABLET | Refills: 11 | Status: SHIPPED | OUTPATIENT
Start: 2020-11-12 | End: 2022-03-30

## 2020-11-17 ENCOUNTER — COMMUNICATION - HEALTHEAST (OUTPATIENT)
Dept: GERIATRIC MEDICINE | Facility: CLINIC | Age: 75
End: 2020-11-17

## 2020-11-23 ENCOUNTER — OFFICE VISIT - HEALTHEAST (OUTPATIENT)
Dept: FAMILY MEDICINE | Facility: CLINIC | Age: 75
End: 2020-11-23

## 2020-11-23 DIAGNOSIS — Z23 NEED FOR IMMUNIZATION AGAINST INFLUENZA: ICD-10-CM

## 2020-11-23 DIAGNOSIS — R30.0 DYSURIA: ICD-10-CM

## 2020-11-23 LAB
BACTERIA #/AREA URNS HPF: ABNORMAL HPF
HYALINE CASTS #/AREA URNS LPF: ABNORMAL LPF
RBC #/AREA URNS AUTO: ABNORMAL HPF
SQUAMOUS #/AREA URNS AUTO: ABNORMAL LPF
WBC #/AREA URNS AUTO: ABNORMAL HPF
WBC CLUMPS #/AREA URNS HPF: PRESENT /[HPF]

## 2020-11-23 ASSESSMENT — MIFFLIN-ST. JEOR: SCORE: 925.9

## 2020-11-24 LAB — BACTERIA SPEC CULT: NORMAL

## 2020-12-04 ENCOUNTER — COMMUNICATION - HEALTHEAST (OUTPATIENT)
Dept: SCHEDULING | Facility: CLINIC | Age: 75
End: 2020-12-04

## 2021-02-10 ENCOUNTER — AMBULATORY - HEALTHEAST (OUTPATIENT)
Dept: NURSING | Facility: CLINIC | Age: 76
End: 2021-02-10

## 2021-02-10 ENCOUNTER — COMMUNICATION - HEALTHEAST (OUTPATIENT)
Dept: FAMILY MEDICINE | Facility: CLINIC | Age: 76
End: 2021-02-10

## 2021-02-11 ASSESSMENT — MIFFLIN-ST. JEOR: SCORE: 954.15

## 2021-02-12 ENCOUNTER — OFFICE VISIT - HEALTHEAST (OUTPATIENT)
Dept: FAMILY MEDICINE | Facility: CLINIC | Age: 76
End: 2021-02-12

## 2021-02-12 DIAGNOSIS — I10 ESSENTIAL HYPERTENSION: ICD-10-CM

## 2021-02-12 DIAGNOSIS — M81.0 AGE-RELATED OSTEOPOROSIS WITHOUT CURRENT PATHOLOGICAL FRACTURE: ICD-10-CM

## 2021-02-12 DIAGNOSIS — E87.6 HYPOPOTASSEMIA: ICD-10-CM

## 2021-02-12 DIAGNOSIS — E78.00 HYPERCHOLESTEROLEMIA: ICD-10-CM

## 2021-02-12 DIAGNOSIS — Z23 NEED FOR VACCINATION: ICD-10-CM

## 2021-02-12 DIAGNOSIS — E55.9 VITAMIN D DEFICIENCY: ICD-10-CM

## 2021-02-12 LAB
ALBUMIN SERPL-MCNC: 3.6 G/DL (ref 3.5–5)
ALP SERPL-CCNC: 52 U/L (ref 45–120)
ALT SERPL W P-5'-P-CCNC: 19 U/L (ref 0–45)
ANION GAP SERPL CALCULATED.3IONS-SCNC: 11 MMOL/L (ref 5–18)
AST SERPL W P-5'-P-CCNC: 19 U/L (ref 0–40)
BILIRUB SERPL-MCNC: 0.6 MG/DL (ref 0–1)
BUN SERPL-MCNC: 24 MG/DL (ref 8–28)
CALCIUM SERPL-MCNC: 9.8 MG/DL (ref 8.5–10.5)
CHLORIDE BLD-SCNC: 101 MMOL/L (ref 98–107)
CHOLEST SERPL-MCNC: 256 MG/DL
CO2 SERPL-SCNC: 26 MMOL/L (ref 22–31)
CREAT SERPL-MCNC: 0.93 MG/DL (ref 0.6–1.1)
FASTING STATUS PATIENT QL REPORTED: NO
GFR SERPL CREATININE-BSD FRML MDRD: 59 ML/MIN/1.73M2
GLUCOSE BLD-MCNC: 95 MG/DL (ref 70–125)
HDLC SERPL-MCNC: 55 MG/DL
LDLC SERPL CALC-MCNC: 163 MG/DL
POTASSIUM BLD-SCNC: 3.7 MMOL/L (ref 3.5–5)
PROT SERPL-MCNC: 6.8 G/DL (ref 6–8)
SODIUM SERPL-SCNC: 138 MMOL/L (ref 136–145)
TRIGL SERPL-MCNC: 188 MG/DL

## 2021-02-12 RX ORDER — IBUPROFEN 600 MG/1
TABLET, FILM COATED ORAL
Status: SHIPPED | COMMUNITY
Start: 2021-02-05 | End: 2021-09-24

## 2021-02-12 RX ORDER — LISINOPRIL/HYDROCHLOROTHIAZIDE 10-12.5 MG
1 TABLET ORAL DAILY
Qty: 90 TABLET | Refills: 3 | Status: SHIPPED | OUTPATIENT
Start: 2021-02-12 | End: 2021-08-05

## 2021-02-12 RX ORDER — BLOOD PRESSURE TEST KIT
KIT MISCELLANEOUS
Qty: 1 EACH | Refills: 0 | Status: SHIPPED | OUTPATIENT
Start: 2021-02-12 | End: 2024-01-26

## 2021-02-15 LAB — 25(OH)D3 SERPL-MCNC: 36 NG/ML (ref 30–80)

## 2021-03-18 ENCOUNTER — COMMUNICATION - HEALTHEAST (OUTPATIENT)
Dept: FAMILY MEDICINE | Facility: CLINIC | Age: 76
End: 2021-03-18

## 2021-04-08 ENCOUNTER — COMMUNICATION - HEALTHEAST (OUTPATIENT)
Dept: GERIATRIC MEDICINE | Facility: CLINIC | Age: 76
End: 2021-04-08

## 2021-04-16 ENCOUNTER — COMMUNICATION - HEALTHEAST (OUTPATIENT)
Dept: GERIATRIC MEDICINE | Facility: CLINIC | Age: 76
End: 2021-04-16

## 2021-04-30 ENCOUNTER — RECORDS - HEALTHEAST (OUTPATIENT)
Dept: ADMINISTRATIVE | Facility: OTHER | Age: 76
End: 2021-04-30

## 2021-05-26 VITALS — TEMPERATURE: 98.6 F

## 2021-05-27 NOTE — PROGRESS NOTES
"ASSESMENT AND PLAN:  1. Dysuria  - 4 days of Dysuria; Denies vaginal itching/discharge, fevers/chills, n/v, hematuria, flank pain, frequency.  - Decline  exam.   - Encourage drinking more water, washing and voiding after intercourse, may drink cranberry juice.   - No evidence of Pyelonephritis.   - UA-C: POSITIVE; Pt notified; Rx sent.    Orders:   - Urinalysis-UC if Indicated   - Culture, Urine   - cephalexin (KEFLEX) 500 MG capsule; Take 1 capsule (500 mg total) by mouth 2 (two) times a day for 10 days.  Dispense: 20 capsule; Refill: 0    2. Encounter for medication refill   Refill:   - azelastine (OPTIVAR) 0.05 % ophthalmic solution; INSTILL 1 DROP TO BOTH EYES DAILY.  Dispense: 6 mL; Refill: 11   - ibuprofen (ADVIL,MOTRIN) 600 MG tablet; Take 1 tablet (600 mg total) by mouth 3 (three) times a day with meals.  Dispense: 30 tablet; Refill: 11   - white petrolatum-mineral oil (SOOTHE NIGHT TIME) 80-20 % ophthalmic ointment; Apply to left eye at night  Dispense: 1 Tube; Refill: 1     3. Health Maintenance  -  Overdue for Annual physical.  -  F/u in 1 month.    Reviewed Medical/Social history and Medications.  See new changes above.   Discussed indications for emergent medical attention and routine F/u.  Patient/Parent/Guardian engaged in decision making process and verbalized understanding of the options discussed and agreed with the final treatment plan.     SUBJECTIVE:  Jennifer Huggins is a 74 y.o. female who presents  for evaluation of her Dysuria x 4 days.     Pt has \"burning and itching\" at urethra opening and some suprapubic pain.  Denies vaginal itching/discharge, fevers/chills, n/v, hematuria, flank pain, frequency.  Pt declines  exam.     ROS:  Comprehensive Review of Systems Negative except stated in HPI.     Past Medical History:   Diagnosis Date     Benign adenomatous polyp of large intestine      Biliary colic      Cervicalgia      Cholelithiasis      Chronic allergic conjunctivitis      Cystitis      " Dyspepsia      Gastritis      GERD (gastroesophageal reflux disease)      HLD (hyperlipidemia)      Hypertension      Hypokalemia      Meningioma (H)      Multiple lung nodules on CT     CT ABD 11/23/1013, mult nodules < 5mm in size, per Fleischner Society recommendations  repeat CT in 12 months CT Chest 2/20/17:  Stable pulmonary nodules suggesting incidental findings given long-term stability.      Osteoporosis      Positional vertigo      Pulmonary nodules      Recurrent UTI      Transaminitis 10/29/2015     Urinary incontinence      Xerosis cutis      Patient Active Problem List   Diagnosis     Neuritis     Meningioma     Joint Pain, Localized In The Shoulder     Cervicalgia     Hypercholesterolemia     Chronic Allergic Conjunctivitis     Osteoporosis     Dyspepsia     Benign Paroxysmal Positional Vertigo     Essential hypertension     Hypokalemia     Urinary Incontinence     Benign Adenomatous Polyp Of The Large Intestine     Xerosis Cutis     Recurrent UTI     GERD (gastroesophageal reflux disease)     Constipation     Hepatitis (non-viral, sporadic elevation of LFT's)     Dizziness     Current Outpatient Medications   Medication Sig Dispense Refill     azelastine (OPTIVAR) 0.05 % ophthalmic solution INSTILL 1 DROP TO BOTH EYES DAILY. 6 mL 11     blood pressure monitor Kit Use to measure blood pressure daily.  Contact clinic if persistently greater than 140/90 1 each 0     cephalexin (KEFLEX) 500 MG capsule Take 1 capsule (500 mg total) by mouth 2 (two) times a day for 10 days. 20 capsule 0     CERTA PLUS 18-0.4-250 mg-mg-mcg Tab TAKE ONE TABLET BY MOUTH ONCE DAILY 30 tablet 11     ibuprofen (ADVIL,MOTRIN) 600 MG tablet Take 1 tablet (600 mg total) by mouth every 8 (eight) hours as needed for pain. Take with food. 30 tablet 6     ibuprofen (ADVIL,MOTRIN) 600 MG tablet Take 1 tablet (600 mg total) by mouth 3 (three) times a day with meals. 30 tablet 11     lisinopril-hydrochlorothiazide (PRINZIDE,ZESTORETIC)  "10-12.5 mg per tablet TAKE 1 TABLET BY MOUTH DAILY. 90 tablet 2     meclizine (ANTIVERT) 25 mg tablet TAKE 1 TABLET BY MOUTH 3 TIMES A DAY AS NEEDED FOR DIZZINESS OR NAUSEA. 30 tablet 11     omeprazole (PRILOSEC) 20 MG capsule TAKE 1 CAPSULE BY MOUTH DAILY AS NEEDED FOR STOMACH PAIN 90 capsule 1     pantoprazole (PROTONIX) 20 MG tablet Take 1 tablet (20 mg total) by mouth daily. 20 tablet 0     phenazopyridine (PYRIDIUM) 100 MG tablet TAKE 1 TABLET TWICE DAILY AS NEEDED FOR PAINFUL URINATION. 8 tablet 0     potassium chloride (MICRO-K) 10 mEq CR capsule Take 1 capsule (10 mEq total) by mouth 2 (two) times a day. 60 capsule 11     white petrolatum-mineral oil (SOOTHE NIGHT TIME) 80-20 % ophthalmic ointment Apply to left eye at night 1 Tube 1     Current Facility-Administered Medications   Medication Dose Route Frequency Provider Last Rate Last Dose     denosumab 60 mg (PROLIA 60 mg/ml)  60 mg Subcutaneous Q6 Months Barb Elias MD   60 mg at 11/16/18 1151     denosumab 60 mg (PROLIA 60 mg/ml)  60 mg Subcutaneous Once Carlos Dean PA-C         Social History     Tobacco Use   Smoking Status Never Smoker   Smokeless Tobacco Never Used     OBJECTIVE: /68   Pulse 93   Temp 98  F (36.7  C) (Oral)   Resp 14   Ht 4' 9.24\" (1.454 m)   Wt 124 lb 1.9 oz (56.3 kg)   SpO2 98%   BMI 26.63 kg/m     Recent Results (from the past 24 hour(s))   Urinalysis-UC if Indicated    Collection Time: 04/10/19  9:45 AM   Result Value Ref Range    Color, UA Other (!) Colorless, Yellow, Straw, Light Yellow    Clarity, UA Cloudy (!) Clear    Glucose, UA Negative Negative    Bilirubin, UA Negative Negative    Ketones, UA Negative Negative    Specific Gravity, UA 1.020 1.005 - 1.030    Blood, UA Trace (!) Negative    pH, UA 6.0 5.0 - 8.0    Protein, UA Trace (!) Negative mg/dL    Urobilinogen, UA 0.2 E.U./dL 0.2 E.U./dL, 1.0 E.U./dL    Nitrite, UA Positive (!) Negative    Leukocytes, UA Moderate (!) Negative    Bacteria, UA Many " (!) None Seen hpf    RBC, UA 5-10 (!) None Seen, 0-2 hpf    WBC, UA 5-10 (!) None Seen, 0-5 hpf    Squam Epithel, UA 5-10 (!) None Seen, 0-5 lpf    Trans Epithel, UA 0-5 (!) None Seen lpf       PHYSICAL:  General Alert, awake, not in acute distress.   CV Normal S1 & S2. No murmurs.   RESP Non-labored, RRR, CTAB. No wheezes or crackles.    ABDOMEN Soft,non-tender. No rigidity, guarding.  Normal bowel sounds.    BACK No CVA tenderness.     Decline.        Carlos Dean PA-C

## 2021-05-28 NOTE — TELEPHONE ENCOUNTER
Called but pt was not home, daughter will call us back to make an AWV appt once pt is home.     Please assist with scheduling when call back. Thanks.

## 2021-05-28 NOTE — TELEPHONE ENCOUNTER
Please call patient and her family: I received a request today for prior authorization for Ensure (high-calorie canned milk), which she has been using for quite a while..  I do not want her to take it anymore because she has gained enough weight on it already, may be even a little bit too much.  She can go ahead and just stop it for now.

## 2021-05-29 NOTE — TELEPHONE ENCOUNTER
RN cannot approve Refill Request    RN can NOT refill this medication med is not covered by policy/route to provider       Olga Guajardo, Care Connection Triage/Med Refill 5/29/2019    Requested Prescriptions   Pending Prescriptions Disp Refills     meclizine (ANTIVERT) 25 mg tablet [Pharmacy Med Name: MECLIZINE 25 MG TABLET] 30 tablet 10     Sig: TAKE 1 TABLET BY MOUTH 3 TIMES A DAY AS NEEDED FOR DIZZINESS OR NAUSEA.       There is no refill protocol information for this order      Signed Prescriptions Disp Refills    omeprazole (PRILOSEC) 20 MG capsule 90 capsule 2     Sig: TAKE 1 CAPSULE BY MOUTH DAILY AS NEEDED FOR STOMACH PAIN       GI Medications Refill Protocol Passed - 5/28/2019  1:15 PM        Passed - PCP or prescribing provider visit in last 12 or next 3 months.     Last office visit with prescriber/PCP: 4/27/2018 Barb Elias MD OR same dept: 4/10/2019 Carlos Dean PA-C OR same specialty: 4/10/2019 Carlos Dean PA-C  Last physical: Visit date not found Last MTM visit: Visit date not found   Next visit within 3 mo: Visit date not found  Next physical within 3 mo: Visit date not found  Prescriber OR PCP: Barb Elias MD  Last diagnosis associated with med order: 1. Dizziness  - meclizine (ANTIVERT) 25 mg tablet [Pharmacy Med Name: MECLIZINE 25 MG TABLET]; TAKE 1 TABLET BY MOUTH 3 TIMES A DAY AS NEEDED FOR DIZZINESS OR NAUSEA.  Dispense: 30 tablet; Refill: 0    2. GERD (gastroesophageal reflux disease)  - omeprazole (PRILOSEC) 20 MG capsule; TAKE 1 CAPSULE BY MOUTH DAILY AS NEEDED FOR STOMACH PAIN  Dispense: 90 capsule; Refill: 2    If protocol passes may refill for 12 months if within 3 months of last provider visit (or a total of 15 months).

## 2021-05-29 NOTE — TELEPHONE ENCOUNTER
Refill Approved    Rx renewed per Medication Renewal Policy. Medication was last renewed on 9/17/18.    Olga Guajardo, Care Connection Triage/Med Refill 5/29/2019     Requested Prescriptions   Pending Prescriptions Disp Refills     meclizine (ANTIVERT) 25 mg tablet [Pharmacy Med Name: MECLIZINE 25 MG TABLET] 30 tablet 0     Sig: TAKE 1 TABLET BY MOUTH 3 TIMES A DAY AS NEEDED FOR DIZZINESS OR NAUSEA.       There is no refill protocol information for this order        omeprazole (PRILOSEC) 20 MG capsule 90 capsule 1     Sig: TAKE 1 CAPSULE BY MOUTH DAILY AS NEEDED FOR STOMACH PAIN       GI Medications Refill Protocol Passed - 5/28/2019  1:15 PM        Passed - PCP or prescribing provider visit in last 12 or next 3 months.     Last office visit with prescriber/PCP: 4/27/2018 Barb Elias MD OR same dept: 4/10/2019 Carlos Dean PA-C OR same specialty: 4/10/2019 Carlos Dean PA-C  Last physical: Visit date not found Last MTM visit: Visit date not found   Next visit within 3 mo: Visit date not found  Next physical within 3 mo: Visit date not found  Prescriber OR PCP: Barb Elias MD  Last diagnosis associated with med order: 1. Dizziness  - meclizine (ANTIVERT) 25 mg tablet [Pharmacy Med Name: MECLIZINE 25 MG TABLET]; TAKE 1 TABLET BY MOUTH 3 TIMES A DAY AS NEEDED FOR DIZZINESS OR NAUSEA.  Dispense: 30 tablet; Refill: 0    2. GERD (gastroesophageal reflux disease)  - omeprazole (PRILOSEC) 20 MG capsule; TAKE 1 CAPSULE BY MOUTH DAILY AS NEEDED FOR STOMACH PAIN  Dispense: 90 capsule; Refill: 1    If protocol passes may refill for 12 months if within 3 months of last provider visit (or a total of 15 months).

## 2021-05-29 NOTE — PROGRESS NOTES
[facility] Care Coordination Contact    Received after visit chart from care coordinator.  Completed following tasks: Mailed copy of care plan to client        [facility]:  Emailed completed PCA assessment to [facility].  Faxed copy of PCA assessment to PCA Agency and mailed copy to member.  Faxed MD Communication to PCP.     Leandro Hayward  Care Management Specialist  [facility]  [phone]

## 2021-05-29 NOTE — PROGRESS NOTES
[facility] Care Coordination Contact  [facility] Home Visit Assessment     Home visit for Health Risk Assessment with Jennifer Huggins completed on 6/12/2019    Type of residence:: Private home - no stairs  Current living arrangement:: I live in a private home with family     Assessment completed with:: Patient, Children,     Current Care Plan  Member currently receiving the following home care services:  none  Member currently receiving the following community resources: PCA      Medication Review  Medication reconciliation completed in Epic: YES  Medication set-up & administration: Family/informal caregiver sets up weekly  Family caregiver administers medications  Medication Risk Assessment Medication (1 or more, place referral to MTM):  N/A: No risk factors identified  MTM Referral Placed: No: No risk factors idenified    Mental/Behavioral Health   Depression Screening: See PHQ assessment flowsheet.   Mental health DX:: No      Mental Health Diagnosis: No  Mental Health Services: None: No further intervention needed at this time.    Falls Assessment:   Fallen 2 or more times in the past year?: No   Any fall with injury in the past year?: No    ADL/IADL Dependencies:   Dependent ADLs:: Ambulation-walker, Ambulation-cane, Incontinence, Dressing, Bathing, Toileting  Dependent IADLs:: Cleaning, Cooking, Laundry, Shopping, Meal Preparation, Incontinence, Transportation, Money Management, Medication Management    [facility] Plan sponsored benefits: Shared information re: Silver Sneakers/gym memberships, ASA, Calcium +D.    PCA Assessment completed at visit: Yes    Elderly Waiver Eligibility: No longer meet criteria - will close EW    Care Plan & Recommendations: will close EW; no EW services in place.  Will continue to reside with family.    [facility] for detailed assessment information.    Follow-Up Plan: Member informed of future contact, plan to f/u with member with a 6 month telephone  assessment.  Contact information shared with member and family, encouraged member to call with any questions or concerns at any time.    [facility] continuum providers: Please refer to Health Care Home on the [facility] Problem List to view this patient's [facility] Care Plan Summary.    Soraya Khoury RN  [facility]  [phone]

## 2021-05-29 NOTE — PROGRESS NOTES
Received a request to submit a DTR for the termination  of elderly waiver. Documentation completed and faxed to the health plan. Care Coordinator aware.    Candy Monzon RN  [facility]  [phone] fax  [email or web address]

## 2021-05-30 VITALS — HEIGHT: 58 IN | WEIGHT: 113.25 LBS | BODY MASS INDEX: 23.77 KG/M2

## 2021-05-30 VITALS — WEIGHT: 115.5 LBS | BODY MASS INDEX: 24.92 KG/M2 | HEIGHT: 57 IN

## 2021-05-30 VITALS — BODY MASS INDEX: 25.89 KG/M2 | HEIGHT: 57 IN | WEIGHT: 120 LBS

## 2021-05-30 NOTE — TELEPHONE ENCOUNTER
Central PA team  [phone]  Pool: HE PA MED ([id number])          PA has been initiated.       PA form completed and faxed insurance via Cover My Meds     Key:  LANIE BAI (Key: [id number])      Medication:  azelastine (OPTIVAR) 0.05 % ophthalmic solution    Insurance:  [facility]        Response will be received via fax and may take up to 5-10 business days depending on plan

## 2021-05-30 NOTE — TELEPHONE ENCOUNTER
Fax received from [facility] pharmacy requesting Prior Authorization    Medication Name:   azelastine (OPTIVAR) 0.05 % ophthalmic solution 6 mL 11 4/10/2019     Sig: INSTILL 1 DROP TO BOTH EYES DAILY.    Sent to pharmacy as: azelastine (OPTIVAR) 0.05 % ophthalmic solution    E-Prescribing Status: Receipt confirmed by pharmacy (4/10/2019  9:58 AM CDT)          Insurance Plan:    PBM: [employer]   Patient ID Number: [id number]    Please start PA process

## 2021-05-31 VITALS — WEIGHT: 121 LBS | HEIGHT: 57 IN | BODY MASS INDEX: 26.1 KG/M2

## 2021-05-31 VITALS — HEIGHT: 58 IN | BODY MASS INDEX: 25.98 KG/M2 | WEIGHT: 123.75 LBS

## 2021-05-31 VITALS — WEIGHT: 122 LBS | HEIGHT: 57 IN | BODY MASS INDEX: 26.32 KG/M2

## 2021-05-31 NOTE — TELEPHONE ENCOUNTER
Patient is out of medication . Please process as soon as possible.  Refill Request  Did you contact pharmacy: No  Medication name:   Requested Prescriptions     Pending Prescriptions Disp Refills     multivit-iron-min-FA-lutein (CERTA PLUS) 18-0.4-250 mg-mg-mcg Tab 30 tablet 11     Sig: Take 1 tablet by mouth daily.   Who prescribed the medication: Barb Elias MD  Pharmacy Name and Location: [facility]  Is patient out of medication: Yes  Patient notified refills processed in 72 hours:  yes  Okay to leave a detailed message: no

## 2021-05-31 NOTE — TELEPHONE ENCOUNTER
RN cannot approve Refill Request    RN can NOT refill this medication med is not covered by policy/route to provider     . Last office visit: 4/27/2018 Barb Elias MD Last Physical: Visit date not found Last MTM visit: Visit date not found Last visit same specialty: 4/10/2019 Carlos Dean PA-C.  Next visit within 3 mo: Visit date not found  Next physical within 3 mo: Visit date not found      Olga Guajardo, Care Connection Triage/Med Refill 8/15/2019    Requested Prescriptions   Pending Prescriptions Disp Refills     multivit-iron-min-FA-lutein (CERTA PLUS) 18-0.4-250 mg-mg-mcg Tab 30 tablet 11     Sig: Take 1 tablet by mouth daily.       There is no refill protocol information for this order

## 2021-06-01 ENCOUNTER — COMMUNICATION - HEALTHEAST (OUTPATIENT)
Dept: FAMILY MEDICINE | Facility: CLINIC | Age: 76
End: 2021-06-01

## 2021-06-01 VITALS — BODY MASS INDEX: 26.75 KG/M2 | WEIGHT: 124 LBS | HEIGHT: 57 IN

## 2021-06-01 DIAGNOSIS — K21.9 GERD (GASTROESOPHAGEAL REFLUX DISEASE): ICD-10-CM

## 2021-06-01 NOTE — TELEPHONE ENCOUNTER
Refill Request  Did you contact pharmacy: Yes  Medication name:   Requested Prescriptions     Pending Prescriptions Disp Refills     lisinopril-hydrochlorothiazide (PRINZIDE,ZESTORETIC) 10-12.5 mg per tablet 90 tablet 2     Sig: Take 1 tablet by mouth daily.     Who prescribed the medication: Barb Elias MD   Pharmacy Name and Location: [facility]  Is patient out of medication: Yes  Patient notified refills processed in 72 hours:  yes  Okay to leave a detailed message: no

## 2021-06-01 NOTE — TELEPHONE ENCOUNTER
Due for labs    Rx renewed per Medication Renewal Policy. Medication was last renewed on 11/21//18.    Olga Guajardo, Care Connection Triage/Med Refill 9/17/2019     Requested Prescriptions   Pending Prescriptions Disp Refills     lisinopril-hydrochlorothiazide (PRINZIDE,ZESTORETIC) 10-12.5 mg per tablet 90 tablet 2     Sig: Take 1 tablet by mouth daily.       Diuretics/Combination Diuretics Refill Protocol  Passed - 9/17/2019  3:55 PM        Passed - Visit with PCP or prescribing provider visit in past 12 months     Last office visit with prescriber/PCP: 4/27/2018 Barb Elias MD OR same dept: 9/5/2019 Carlos Dean PA-C OR same specialty: 9/5/2019 Carlos Dean PA-C  Last physical: Visit date not found Last MTM visit: Visit date not found   Next visit within 3 mo: Visit date not found  Next physical within 3 mo: Visit date not found  Prescriber OR PCP: Barb Elias MD  Last diagnosis associated with med order: 1. Essential hypertension  - lisinopril-hydrochlorothiazide (PRINZIDE,ZESTORETIC) 10-12.5 mg per tablet; Take 1 tablet by mouth daily.  Dispense: 90 tablet; Refill: 2    If protocol passes may refill for 12 months if within 3 months of last provider visit (or a total of 15 months).             Passed - Serum Potassium in past 12 months      Lab Results   Component Value Date    Potassium 3.9 09/17/2018             Passed - Serum Sodium in past 12 months      Lab Results   Component Value Date    Sodium 141 09/17/2018             Passed - Blood pressure on file in past 12 months     BP Readings from Last 1 Encounters:   09/05/19 148/80             Passed - Serum Creatinine in past 12 months      Creatinine   Date Value Ref Range Status   09/17/2018 0.92 0.60 - 1.10 mg/dL Final

## 2021-06-01 NOTE — PROGRESS NOTES
ASSESMENT AND PLAN:  1. Dysuria  -  Dysuria and right flank pain x 3 days.   -  Denies fevers/chills, n/v, hematuria.  Will treat empirically given Pt is symptomatic.  -  Pending Culture.    Orders:   - Urinalysis-UC if Indicated   - Culture, Urine   - ciprofloxacin HCl (CIPRO) 500 MG tablet; Take 1 tablet (500 mg total) by mouth 2 (two) times a day for 10 days.  Dispense: 20 tablet; Refill: 0    2. Vaginal itching  -  3 days of vaginal itching. Denies discharge.  -  Tx pending results.    Orders:   - Wet Prep, Vaginal     Pt is present with professional , Benito.   Reviewed Medical/Social history and Medications.  See new changes above.   Discussed indications for emergent medical attention and routine F/u.  Patient/Parent/Guardian engaged in decision making process and verbalized understanding of the options discussed and agreed with the final treatment plan.     SUBJECTIVE:  Jennifer Huggins is a 74 y.o. female who presents  for evaluation of her Dysuria and Right flank pain x 3 days.      Pt also reports vaginal itching x 3 days.  Denies fever/chills, vaginal discharge. n/v, flank pain, abdominal pain, diarrhea/constipation, hematuria.     ROS:  Comprehensive Review of Systems Negative except stated in HPI.     Past Medical History:   Diagnosis Date     Benign adenomatous polyp of large intestine      Biliary colic      Cervicalgia      Cholelithiasis      Chronic allergic conjunctivitis      Cystitis      Dyspepsia      Gastritis      GERD (gastroesophageal reflux disease)      HLD (hyperlipidemia)      Hypertension      Hypokalemia      Meningioma (H)      Multiple lung nodules on CT     CT ABD 11/23/1013, mult nodules < 5mm in size, per Fleischner Society recommendations  repeat CT in 12 months CT Chest 2/20/17:  Stable pulmonary nodules suggesting incidental findings given long-term stability.      Osteoporosis      Positional vertigo      Pulmonary nodules      Recurrent UTI      Transaminitis  10/29/2015     Urinary incontinence      Xerosis cutis      Patient Active Problem List   Diagnosis     Neuritis     Meningioma     Joint Pain, Localized In The Shoulder     Cervicalgia     Hypercholesterolemia     Chronic Allergic Conjunctivitis     Osteoporosis     Dyspepsia     Benign Paroxysmal Positional Vertigo     Essential hypertension     Hypokalemia     Urinary Incontinence     Benign Adenomatous Polyp Of The Large Intestine     Xerosis Cutis     Recurrent UTI     GERD (gastroesophageal reflux disease)     Constipation     Hepatitis (non-viral, sporadic elevation of LFT's)     Dizziness     Current Outpatient Medications   Medication Sig Dispense Refill     azelastine (OPTIVAR) 0.05 % ophthalmic solution INSTILL 1 DROP TO BOTH EYES DAILY. 6 mL 11     blood pressure monitor Kit Use to measure blood pressure daily.  Contact clinic if persistently greater than 140/90 1 each 0     ibuprofen (ADVIL,MOTRIN) 600 MG tablet Take 1 tablet (600 mg total) by mouth every 8 (eight) hours as needed for pain. Take with food. 30 tablet 6     ibuprofen (ADVIL,MOTRIN) 600 MG tablet Take 1 tablet (600 mg total) by mouth 3 (three) times a day with meals. 30 tablet 11     lisinopril-hydrochlorothiazide (PRINZIDE,ZESTORETIC) 10-12.5 mg per tablet TAKE 1 TABLET BY MOUTH DAILY. 90 tablet 2     meclizine (ANTIVERT) 25 mg tablet TAKE 1 TABLET BY MOUTH 3 TIMES A DAY AS NEEDED FOR DIZZINESS OR NAUSEA. 30 tablet 10     multivit-iron-min-FA-lutein (CERTA PLUS) 18-0.4-250 mg-mg-mcg Tab Take 1 tablet by mouth daily. 30 tablet 11     omeprazole (PRILOSEC) 20 MG capsule TAKE 1 CAPSULE BY MOUTH DAILY AS NEEDED FOR STOMACH PAIN 90 capsule 2     pantoprazole (PROTONIX) 20 MG tablet Take 1 tablet (20 mg total) by mouth daily. 20 tablet 0     potassium chloride (MICRO-K) 10 mEq CR capsule Take 1 capsule (10 mEq total) by mouth 2 (two) times a day. 60 capsule 11     white petrolatum-mineral oil (SOOTHE NIGHT TIME) 80-20 % ophthalmic ointment  "Apply to left eye at night 1 Tube 1     ciprofloxacin HCl (CIPRO) 500 MG tablet Take 1 tablet (500 mg total) by mouth 2 (two) times a day for 10 days. 20 tablet 0     phenazopyridine (PYRIDIUM) 100 MG tablet TAKE 1 TABLET TWICE DAILY AS NEEDED FOR PAINFUL URINATION. (Patient not taking: Reported on 9/5/2019      ) 8 tablet 0     Current Facility-Administered Medications   Medication Dose Route Frequency Provider Last Rate Last Dose     denosumab 60 mg (PROLIA 60 mg/ml)  60 mg Subcutaneous Q6 Months Barb Elias MD   60 mg at 05/15/19 1006     Social History     Tobacco Use   Smoking Status Never Smoker   Smokeless Tobacco Never Used     OBJECTIVE: /80   Pulse 66   Temp 98.6  F (37  C) (Other)   Resp 18   Ht 4' 9.25\" (1.454 m)   Wt 125 lb 12 oz (57 kg)   SpO2 99%   BMI 26.97 kg/m     Recent Results (from the past 24 hour(s))   Urinalysis-UC if Indicated    Collection Time: 09/05/19 11:12 AM   Result Value Ref Range    Color, UA Light Yellow Colorless, Yellow, Straw, Light Yellow    Clarity, UA Clear Clear    Glucose, UA Negative Negative    Bilirubin, UA Negative Negative    Ketones, UA Negative Negative    Specific Gravity, UA <=1.005 1.005 - 1.030    Blood, UA Negative Negative    pH, UA 6.5 5.0 - 8.0    Protein, UA Negative Negative mg/dL    Urobilinogen, UA 0.2 E.U./dL 0.2 E.U./dL, 1.0 E.U./dL    Nitrite, UA Negative Negative    Leukocytes, UA Moderate (!) Negative    Bacteria, UA None Seen None Seen hpf    RBC, UA None Seen None Seen, 0-2 hpf    WBC, UA 5-10 (!) None Seen, 0-5 hpf    Squam Epithel, UA 5-10 (!) None Seen, 0-5 lpf   Wet Prep, Vaginal    Collection Time: 09/05/19 11:35 AM   Result Value Ref Range    Yeast Result No yeast seen No yeast seen    Trichomonas No Trichomonas seen No Trichomonas seen    Clue Cells, Wet Prep No Clue cells seen No Clue cells seen     PHYSICAL:  General Alert, awake, not in acute distress.   CV Normal S1 & S2. No murmurs.   RESP Non-labored, RRR, CTAB. No " wheezes or crackles.    ABDOMEN Soft,non-tender. No rigidity, guarding.  Normal bowel sounds.    BACK No flank pain.    No lesions, masses, discharge.        Carlso Dean PA-C

## 2021-06-02 VITALS — BODY MASS INDEX: 26.78 KG/M2 | WEIGHT: 124.12 LBS | HEIGHT: 57 IN

## 2021-06-02 VITALS — BODY MASS INDEX: 27.18 KG/M2 | HEIGHT: 57 IN | WEIGHT: 126 LBS

## 2021-06-02 VITALS — BODY MASS INDEX: 26.65 KG/M2 | WEIGHT: 124 LBS

## 2021-06-02 NOTE — TELEPHONE ENCOUNTER
Central PA team  [phone]  Pool: HE PA MED ([id number])          PA has been initiated.       PA form completed and faxed insurance via Cover My Meds     Key:   [id number] - PA Case ID: [id number]     Medication:  Azelastine HCl 0.05% solution    Insurance:  [facility]        Response will be received via fax and may take up to 5-10 business days depending on plan

## 2021-06-02 NOTE — TELEPHONE ENCOUNTER
Prior Authorization Request  Who s requesting:  Pharmacy  Pharmacy Name and Location: [facility]   Medication Name: azelastine   Insurance Plan: [employer]  Insurance Member ID Number:  [id number]  Informed patient that prior authorizations can take up to 10 business days for response:   No  Okay to leave a detailed message: No        Previous Prior Auth  on 10/09/2019

## 2021-06-03 VITALS
HEART RATE: 66 BPM | DIASTOLIC BLOOD PRESSURE: 80 MMHG | BODY MASS INDEX: 27.13 KG/M2 | HEIGHT: 57 IN | TEMPERATURE: 98.6 F | SYSTOLIC BLOOD PRESSURE: 148 MMHG | OXYGEN SATURATION: 99 % | WEIGHT: 125.75 LBS | RESPIRATION RATE: 18 BRPM

## 2021-06-03 NOTE — PROGRESS NOTES
ASSESMENT AND PLAN:  1. Dry skin  - Dry skin with mild erythema on face, neck,and upper back. No edema, papules, hives, lesions.   Most consistent with dry skin dermatitis.  - Denies fevers/chills.   - Encourage bathing/showering daily and apply moisturizing cream immediately after.  Use moisturizing cream 3x/day, and avoid using too much soap which can wash and strip away natural oils from skin.     - Follow up if symptoms not better or worsens.    Orders:   - white petrolatum (AQUAPHOR ORIGINAL) 41 % Oint; Apply on affected area three times a day, as needed.  Dispense: 85 g; Refill: 3    2. HTN  - Controlled on current tx.  Endorses med compliance and denies SE.    - F/u in 3 months.  BP Readings from Last 3 Encounters:   11/26/19 124/62   09/05/19 148/80   04/10/19 120/68     3. Immunization due  - Discussed risks and benefits.    Orders:   - Influenza High Dose, Seasonal 65+ yrs     Pt is present with professional , Kirill.  Reviewed Medical/Social history and Medications.  See new changes above.   Discussed indications for emergent medical attention and routine F/u.  Patient/Parent/Guardian engaged in decision making process and verbalized understanding of the options discussed and agreed with the final treatment plan.     SUBJECTIVE:  Jennifer Huggins is a 74 y.o. female who presents  for evaluation of her Rash.     Pt complaining of itchy skin on face, neck, and upper back x 1 week.  Exam shows no papules, hives, or eczematous lesions.  Skin is dry and most likely cause.  No one is family has this rash. Denies fever/chills, recent illness, abx.    ROS:  Comprehensive Review of Systems Negative except stated in HPI.     Past Medical History:   Diagnosis Date     Benign adenomatous polyp of large intestine      Biliary colic      Cervicalgia      Cholelithiasis      Chronic allergic conjunctivitis      Cystitis      Dyspepsia      Gastritis      GERD (gastroesophageal reflux disease)      HLD  (hyperlipidemia)      Hypertension      Hypokalemia      Meningioma (H)      Multiple lung nodules on CT     CT ABD 11/23/1013, mult nodules < 5mm in size, per Fleischner Society recommendations  repeat CT in 12 months CT Chest 2/20/17:  Stable pulmonary nodules suggesting incidental findings given long-term stability.      Osteoporosis      Positional vertigo      Pulmonary nodules      Recurrent UTI      Transaminitis 10/29/2015     Urinary incontinence      Xerosis cutis      Patient Active Problem List   Diagnosis     Neuritis     Meningioma     Joint Pain, Localized In The Shoulder     Cervicalgia     Hypercholesterolemia     Chronic Allergic Conjunctivitis     Osteoporosis     Dyspepsia     Benign Paroxysmal Positional Vertigo     Essential hypertension     Hypokalemia     Urinary Incontinence     Benign Adenomatous Polyp Of The Large Intestine     Xerosis Cutis     Recurrent UTI     GERD (gastroesophageal reflux disease)     Constipation     Hepatitis (non-viral, sporadic elevation of LFT's)     Dizziness     Current Outpatient Medications   Medication Sig Dispense Refill     azelastine (OPTIVAR) 0.05 % ophthalmic solution INSTILL 1 DROP TO BOTH EYES DAILY. 6 mL 11     blood pressure monitor Kit Use to measure blood pressure daily.  Contact clinic if persistently greater than 140/90 1 each 0     ibuprofen (ADVIL,MOTRIN) 600 MG tablet Take 1 tablet (600 mg total) by mouth every 8 (eight) hours as needed for pain. Take with food. 30 tablet 6     ibuprofen (ADVIL,MOTRIN) 600 MG tablet Take 1 tablet (600 mg total) by mouth 3 (three) times a day with meals. 30 tablet 11     lisinopril-hydrochlorothiazide (PRINZIDE,ZESTORETIC) 10-12.5 mg per tablet Take 1 tablet by mouth daily. 90 tablet 3     meclizine (ANTIVERT) 25 mg tablet TAKE 1 TABLET BY MOUTH 3 TIMES A DAY AS NEEDED FOR DIZZINESS OR NAUSEA. 30 tablet 10     multivit-iron-min-FA-lutein (CERTA PLUS) 18-0.4-250 mg-mg-mcg Tab Take 1 tablet by mouth daily. 30  "tablet 11     omeprazole (PRILOSEC) 20 MG capsule TAKE 1 CAPSULE BY MOUTH DAILY AS NEEDED FOR STOMACH PAIN 90 capsule 2     potassium chloride (MICRO-K) 10 mEq CR capsule Take 1 capsule (10 mEq total) by mouth 2 (two) times a day. 60 capsule 11     white petrolatum-mineral oil (SOOTHE NIGHT TIME) 80-20 % ophthalmic ointment Apply to left eye at night 1 Tube 1     pantoprazole (PROTONIX) 20 MG tablet Take 1 tablet (20 mg total) by mouth daily. 20 tablet 0     phenazopyridine (PYRIDIUM) 100 MG tablet TAKE 1 TABLET TWICE DAILY AS NEEDED FOR PAINFUL URINATION. (Patient not taking: Reported on 9/5/2019      ) 8 tablet 0     white petrolatum (AQUAPHOR ORIGINAL) 41 % Oint Apply on affected area three times a day, as needed. 85 g 3     Current Facility-Administered Medications   Medication Dose Route Frequency Provider Last Rate Last Dose     denosumab 60 mg (PROLIA 60 mg/ml)  60 mg Subcutaneous Q6 Months Barb Elias MD   60 mg at 11/20/19 1039     Social History     Tobacco Use   Smoking Status Never Smoker   Smokeless Tobacco Never Used     OBJECTIVE: /62   Pulse 80   Temp 98.4  F (36.9  C) (Oral)   Resp 24   Ht 4' 9.25\" (1.454 m)   Wt 126 lb 8 oz (57.4 kg)   SpO2 97%   BMI 27.14 kg/m     No results found for this or any previous visit (from the past 24 hour(s)).    PHYSICAL:  General Alert, awake, not in acute distress.   HEENT:             -Head Atraumatic, normocephalic.            -Eyes PERRL, no erythema, discharge, conjunctiva clear.             -Ears TMs intact, no drainage, no erythema or edema.            -Nose    Nostrils patent,  no edema.            -Throat Oropharynx without edema, erythema.  Uvula midline, no deviation.             -Neck Neck FROM, no adenopathy.  Thyroid not visibly enlarged.   CV Normal S1 & S2. No murmurs.   RESP Non-labored, RRR, CTAB. No wheezes or crackles.    SKIN Mild erythema on face, neck, and upper back. No hives, papules, lesions.    EXTREMITY No pedal edema. "        Carlos Dean PA-C

## 2021-06-03 NOTE — PROGRESS NOTES
Patient here for Prolia injection which she received.    Requesting medication for itchy face.  Informed her I am unable to prescribe, but that if she wishes to schedule Provider appt, she can do so when at  scheduling next Prolia injection (q6mos).

## 2021-06-04 VITALS
WEIGHT: 126.5 LBS | OXYGEN SATURATION: 97 % | RESPIRATION RATE: 24 BRPM | HEART RATE: 80 BPM | TEMPERATURE: 98.4 F | DIASTOLIC BLOOD PRESSURE: 62 MMHG | HEIGHT: 57 IN | BODY MASS INDEX: 27.29 KG/M2 | SYSTOLIC BLOOD PRESSURE: 124 MMHG

## 2021-06-04 VITALS
BODY MASS INDEX: 27.51 KG/M2 | RESPIRATION RATE: 20 BRPM | DIASTOLIC BLOOD PRESSURE: 68 MMHG | HEART RATE: 72 BPM | SYSTOLIC BLOOD PRESSURE: 130 MMHG | OXYGEN SATURATION: 98 % | TEMPERATURE: 97.8 F | WEIGHT: 127.5 LBS | HEIGHT: 57 IN

## 2021-06-04 VITALS
RESPIRATION RATE: 20 BRPM | OXYGEN SATURATION: 98 % | DIASTOLIC BLOOD PRESSURE: 86 MMHG | HEIGHT: 57 IN | SYSTOLIC BLOOD PRESSURE: 130 MMHG | BODY MASS INDEX: 27.25 KG/M2 | WEIGHT: 126.31 LBS | TEMPERATURE: 98.1 F | HEART RATE: 89 BPM

## 2021-06-04 NOTE — PROGRESS NOTES
[facility] Care Coordination Contact      [facility] Six-Month Telephone Assessment    6 month telephone assessment completed on 12/27/19..    ER visits: No  Hospitalizations: No  TCU stays: No  Significant health status changes: no changes.  Falls/Injuries: No falls reported.  ADL/IADL changes: No, continues with formal PCA services provided by family.  Changes in services: No    Caregiver Assessment follow up:  Daughter continues to provide formal PCA services.  No changes; is managing well.    Goals: See POC in chart for goal progress documentation.  Goals are on track.    Will see member in 6 months for an annual health risk assessment.   Encouraged member to call CC with any questions or concerns in the meantime.     Soraya Khoury RN  [facility]  [phone]

## 2021-06-05 VITALS
WEIGHT: 127 LBS | SYSTOLIC BLOOD PRESSURE: 130 MMHG | RESPIRATION RATE: 20 BRPM | HEART RATE: 80 BPM | HEIGHT: 58 IN | OXYGEN SATURATION: 99 % | BODY MASS INDEX: 26.66 KG/M2 | TEMPERATURE: 97.4 F | DIASTOLIC BLOOD PRESSURE: 72 MMHG

## 2021-06-05 VITALS
HEART RATE: 106 BPM | DIASTOLIC BLOOD PRESSURE: 80 MMHG | RESPIRATION RATE: 16 BRPM | TEMPERATURE: 98.1 F | HEIGHT: 57 IN | SYSTOLIC BLOOD PRESSURE: 132 MMHG | WEIGHT: 121.12 LBS | BODY MASS INDEX: 26.13 KG/M2 | OXYGEN SATURATION: 97 %

## 2021-06-08 NOTE — TELEPHONE ENCOUNTER
RN cannot approve Refill Request    RN can NOT refill this medication med is not covered by policy/route to provider     . Last office visit: 4/27/2018 Barb Elias MD Last Physical: Visit date not found Last MTM visit: Visit date not found Last visit same specialty: 5/20/2020 Azeem Lema MD.  Next visit within 3 mo: Visit date not found  Next physical within 3 mo: Visit date not found      Olga Guajardo, [facility] Triage/Med Refill 6/10/2020    Requested Prescriptions   Pending Prescriptions Disp Refills     meclizine (ANTIVERT) 25 mg tablet [Pharmacy Med Name: MECLIZINE 25 MG TABLET] 30 tablet 11     Sig: TAKE 1 TABLET BY MOUTH 3 TIMES A DAY AS NEEDED FOR DIZZINESS OR NAUSEA.       There is no refill protocol information for this order

## 2021-06-08 NOTE — PROGRESS NOTES
Patient reports 4 days of vaginal itching mamadou when urinating.   Dr Lema agreed to see today.

## 2021-06-08 NOTE — PROGRESS NOTES
[facility] Care Coordination Contact    Received after visit chart from care coordinator.  Completed following tasks: Mailed copy of care plan to client       [facility]:  Emailed completed PCA assessment to [facility].  Faxed copy of PCA assessment to [facility] Agency and mailed copy to member.  Faxed MD Communication to PCP.     Mailed POC signature page and  PCA signature page to member to sign and return asap.    Leandro Hayward  Care Management Specialist  [facility]  [phone]

## 2021-06-08 NOTE — PROGRESS NOTES
Chief Complaint   Patient presents with     Dysuria     X 2 weeks      Vaginal Itching     X 2 weeks          HPI:   Jennifer Huggins is a 75 y.o. female with phone  c/o burning with urination for over a week.  No fever. No stomach pain.    No external itching.  No vaginal discharge.  Last UTI 9/2020    She also c/o pain in her right side--upper part of abdomen and right side of chest. This started about 8 days ago.  Somewhat improved.  Has been using heat on it.  No coughing, shortness of breath or fever.  No other upper respiratory symptoms.  Has had pain like this in the past.  No h/o trauma.    ROS:  A 10 point comprehensive review of systems was negative except as noted.     Medications:  Current Outpatient Medications on File Prior to Visit   Medication Sig Dispense Refill     azelastine (OPTIVAR) 0.05 % ophthalmic solution INSTILL 1 DROP TO BOTH EYES DAILY. 6 mL 11     blood pressure monitor Kit Use to measure blood pressure daily.  Contact clinic if persistently greater than 140/90 1 each 0     ibuprofen (ADVIL,MOTRIN) 600 MG tablet Take 1 tablet (600 mg total) by mouth every 8 (eight) hours as needed for pain. Take with food. 30 tablet 6     ibuprofen (ADVIL,MOTRIN) 600 MG tablet Take 1 tablet (600 mg total) by mouth 3 (three) times a day with meals. 30 tablet 11     lisinopril-hydrochlorothiazide (PRINZIDE,ZESTORETIC) 10-12.5 mg per tablet Take 1 tablet by mouth daily. 90 tablet 3     meclizine (ANTIVERT) 25 mg tablet TAKE 1 TABLET BY MOUTH 3 TIMES A DAY AS NEEDED FOR DIZZINESS OR NAUSEA. 30 tablet 10     omeprazole (PRILOSEC) 20 MG capsule Take 1 capsule (20 mg total) by mouth daily before breakfast. 90 capsule 2     multivit-iron-min-FA-lutein (CERTA PLUS) 18-0.4-250 mg-mg-mcg Tab Take 1 tablet by mouth daily. 30 tablet 11     pantoprazole (PROTONIX) 20 MG tablet Take 1 tablet (20 mg total) by mouth daily. 20 tablet 0     phenazopyridine (PYRIDIUM) 100 MG tablet TAKE 1 TABLET TWICE DAILY AS  "NEEDED FOR PAINFUL URINATION. (Patient not taking: Reported on 9/5/2019      ) 8 tablet 0     potassium chloride (MICRO-K) 10 mEq CR capsule Take 1 capsule (10 mEq total) by mouth 2 (two) times a day. 60 capsule 11     white petrolatum (AQUAPHOR ORIGINAL) 41 % Oint Apply on affected area three times a day, as needed. (Patient not taking: Reported on 5/20/2020) 85 g 3     white petrolatum-mineral oil (SOOTHE NIGHT TIME) 80-20 % ophthalmic ointment Apply to left eye at night (Patient not taking: Reported on 5/20/2020) 1 Tube 1     Current Facility-Administered Medications on File Prior to Visit   Medication Dose Route Frequency Provider Last Rate Last Dose     denosumab 60 mg (PROLIA 60 mg/ml)  60 mg Subcutaneous Q6 Months Barb Elias MD   60 mg at 05/20/20 1057         Social History:  Social History     Tobacco Use     Smoking status: Never Smoker     Smokeless tobacco: Never Used   Substance Use Topics     Alcohol use: No         Physical Exam:   Vitals:    05/20/20 1109   BP: 130/86   Pulse: 89   Resp: 20   Temp: 98.1  F (36.7  C)   TempSrc: Oral   SpO2: 98%   Weight: 126 lb 5 oz (57.3 kg)   Height: 4' 9.25\" (1.454 m)       GEN:  NAD  LUNGS:  Clear to auscultation without wheezing.  Normal effort.  HEART:  RRR without murmur, rub or gallop   MS:  Mild tenderness to palpation over side of right rib cage and anterior right rib cage.  ABDOMEN:  +BS. No tenderness. Soft, no guarding, rebound, rigidity,mass, or organomegaly. No CVA tenderness      LABS:  Results for orders placed or performed in visit on 05/20/20   Urinalysis-UC if Indicated   Result Value Ref Range    Color, UA Yellow Colorless, Yellow, Straw, Light Yellow    Clarity, UA Clear Clear    Glucose, UA Negative Negative    Bilirubin, UA Negative Negative    Ketones, UA Negative Negative    Specific Gravity, UA 1.015 1.005 - 1.030    Blood, UA Negative Negative    pH, UA 6.0 5.0 - 8.0    Protein, UA Negative Negative mg/dL    Urobilinogen, UA 0.2 " E.U./dL 0.2 E.U./dL, 1.0 E.U./dL    Nitrite, UA Negative Negative    Leukocytes, UA Small (!) Negative    Bacteria, UA Few (!) None Seen hpf    RBC, UA None Seen None Seen, 0-2 hpf    WBC, UA 0-5 None Seen, 0-5 hpf    Squam Epithel, UA 0-5 None Seen, 0-5 lpf        Assessment/Plan:    1. Bladder infection  She states symptoms are similar to what she experienced last September--wet prep at that time was negative.  Symptoms not really consistent with vaginitis.  Send urine for culture.  Treat with nitrofurantoin as noted.  Recheck if symptoms don't resolve.  Will need pelvic exam in clinic.  - Urinalysis-UC if Indicated  - nitrofurantoin, macrocrystal-monohydrate, (MACROBID) 100 MG capsule; Take 1 capsule (100 mg total) by mouth 2 (two) times a day for 5 days. Take 1 capsule po two times a day until gone.  Dispense: 10 capsule; Refill: 0  - Culture, Urine    2. Chest wall pain  Likely musculoskeletal.  Advised trying some ice.  Stretching.  Recheck for problems.           Azeem Lema MD      5/20/2020    The following portions of the patient's history were reviewed and updated as appropriate: allergies, current medications, past family history, past medical history, past social history, past surgical history and problem list.

## 2021-06-08 NOTE — PROGRESS NOTES
ASSESSMENT/PLAN:    Diagnoses and all orders for this visit:    Essential hypertension with goal blood pressure less than 140/90  Excellent control on current medications (lisinopril plus hydrochlorothiazide).  Continue current medications.  Follow-up in 6 months.  -     Basic Metabolic Panel  -     Hepatic Profile    Hypercholesterolemia  Not currently on any active cholesterol treatment.  Will check levels.  -     Lipid Profile  -     Hepatic Profile    Multiple lung nodules on CT (in 2013) overdue for follow-up to ensure stability; ordered today  -     CT Chest With and Without Contrast; Future; Expected date: 2/14/17    Osteoporosis  -     HM2(CBC w/o Differential)  -     Parathyroid Hormone Intact  -     DXA Bone Density Scan; Future; Expected date: 2/14/17  -     Ca-D3-mag#11-zinc-cupr-man-bor (CALTRATE 600+D PLUS MINERALS) 600 mg calcium- 800 unit-50 mg Tab; Take 1 tablet by mouth 2 (two) times a day.  Dispense: 60 tablet; Refill: 11  Depending on results of DEXA, may need to consider polio or other active treatments.  Would avoid bisphosphonates given history of allergy to ibandronate    Other orders  -     ibuprofen (ADVIL,MOTRIN) 600 MG tablet; Take 1 tablet (600 mg total) by mouth 3 (three) times a day with meals.  Dispense: 30 tablet; Refill: 11          SUBJECTIVE:  Jennifer Huggins is a 72 y.o. female here for that indication follow-up.    Patient is doing well.  She has a history of hypertension and is taking her medications regularly.  No chest pain or shortness of breath.  No abdominal pain.  She is a history of recurrent urinary tract infections, but no recent dysuria or urinary problems.  She requests ibuprofen for intermittent musculoskeletal pain.  No concerns or complaints.    Per chart review, she is overdue for repeat CT scan for several pulmonary nodules seen coincidentally on chest CT in 2013.    She has osteoporosis and is not currently on any treatment for this.  She been on ibandronate in the  "past and had a mild allergic reaction.  She is not currently taking any calcium or vitamin D either.    PHQ-2 Total Score: 0 (11/11/2016 10:00 AM)     The following portions of the patient's history were reviewed and updated as appropriate: allergies, current medications and problem list  Current Outpatient Prescriptions on File Prior to Visit   Medication Sig Dispense Refill     azelastine (OPTIVAR) 0.05 % ophthalmic solution Administer 1 drop to both eyes daily. 6 mL 11     blood pressure monitor Kit Use to measure blood pressure daily.  Contact clinic if persistently greater than 140/90 1 each 0     CERTA PLUS 18-0.4-250 mg-mg-mcg Tab TAKE ONE TABLET BY MOUTH DAILY 30 tablet 11     lisinopril-hydrochlorothiazide (ZESTORETIC) 10-12.5 mg per tablet Take 1 tablet by mouth daily. 30 tablet 11     meclizine (ANTIVERT) 25 mg tablet Take 1 tablet (25 mg total) by mouth 3 (three) times a day as needed for dizziness or nausea. 30 tablet 1     omeprazole (PRILOSEC) 20 MG capsule TAKE 1 CAPSULE ORALLY DAILY AS NEEDED FOR STOMACH PAIN 90 capsule 3     phenazopyridine (PYRIDIUM) 100 MG tablet Take 1 tablet (100 mg total) by mouth 2 (two) times a day as needed (painful urination). 10 tablet 5     [DISCONTINUED] ibuprofen (ADVIL,MOTRIN) 600 MG tablet Take 1 tablet (600 mg total) by mouth 3 (three) times a day with meals. 30 tablet 11     No current facility-administered medications on file prior to visit.          History   Smoking Status     Never Smoker   Smokeless Tobacco     Never Used       OBJECTIVE:  :    Visit Vitals     /76 (Patient Site: Right Arm, Patient Position: Sitting, Cuff Size: Adult Regular)     Pulse 82     Temp 98.3  F (36.8  C) (Oral)     Resp 20     Ht 4' 9.5\" (1.461 m)     Wt 113 lb 4 oz (51.4 kg)     BMI 24.08 kg/m2     Wt Readings from Last 3 Encounters:   02/14/17 113 lb 4 oz (51.4 kg)   12/05/16 115 lb (52.2 kg)   11/11/16 115 lb 8 oz (52.4 kg)         Gen:  A&A, NAD  CV:  HRRR, no M/R/G  Resp: "  CTAB  Abd:  S&NT, no mass  Ext:  W&D, no edema

## 2021-06-08 NOTE — PROGRESS NOTES
[facility] Care Coordination Contact  [facility] Home Visit Assessment     Health Risk Assessment with Jennifer Verona Huggins completed on 5/13/2020 via phone due to covid-19.    Type of residence:: Private home - no stairs  Current living arrangement:: I live in a private home with family     Assessment completed with:: Children,  Jonathan Ceballos.    Current Care Plan  Member currently receiving the following home care services:     Member currently receiving the following community resources: PCA    Medication Review  Medication reconciliation completed in Epic: IF NO, PLEASE EXPLAIN due to phone assessment and unable to view medications.  Medication set-up & administration: Family/informal caregiver sets up weekly  Family caregiver administers medications  Medication Risk Assessment Medication (1 or more, place referral to MTM):  N/A: No risk factors identified  MTM Referral Placed: No: No risk factors idenified    Mental/Behavioral Health   Depression Screening:  Unable due to member being [facility].      Mental health DX:: No        Falls Assessment:   Fallen 2 or more times in the past year?: No   Any fall with injury in the past year?: No    ADL/IADL Dependencies:   Dependent ADLs:: Ambulation-cane, Ambulation-walker, Bathing, Dressing, Incontinence, Transfers, Toileting    Dependent IADLs:: Cleaning, Cooking, Laundry, Shopping, Meal Preparation, Medication Management, Money Management, Transportation, Incontinence    [facility] sponsored benefits: Shared information re: Silver Sneakers/gym memberships, ASA, Calcium +D.    PCA Assessment completed at visit:  Yes, resulted in 5.5 hrs/day; this is the same as her last assessment.    Elderly Waiver Eligibility: Yes, but member declines EW service; will not open to EW    Care Plan & Recommendations: will continue to have assistance with all mobility to prevent falling.    See CC for detailed assessment information.    Member reports that she is staying  home to prevent getting covid-19.    Follow-Up Plan: Member informed of future contact, plan to f/u with member with a 6 month telephone assessment.  Contact information shared with member and family, encouraged member to call with any questions or concerns at any time.    Soraya Khoury RN  [facility]  [phone]

## 2021-06-08 NOTE — PROGRESS NOTES
When was last injection? 11/20/19    Has insurance for this injection been verified?  Unknown    Did you experience any new onset achiness or rashes that lasted for over a month with your previous Prolia injection?  No    Do you have a fever over 101F or a new deep cough that is unusual for you today?    No cough. Temp 98.6 oral.    Have you started any new medications in the last 6 months that you were told could affect your immune system?  These may have been prescribed by oncologist, transplant, rheumatology, or dematology.  No    In the last 6 months have you had gastric bypass or parathyroid surgery?  No    Do you plan dental work requiring drilling into the bone such as implants/extractions or oral surgery in the next 2-3 months?  No

## 2021-06-08 NOTE — PROGRESS NOTES
[facility] Care Coordination Contact    Faxed signed pca sig page to [employer] and provider.     Leandro Hayward  Care Management Specialist  [facility]  [phone]

## 2021-06-09 NOTE — PROGRESS NOTES
"ASSESSMENT/PLAN:    Problem List Items Addressed This Visit     Hypercholesterolemia     Unable to initiate statin at this time due to elevated LFT's.         Osteoporosis     Fosamax initiated in 2011, switched to ibandronate in 2012 for convenience.  Ibandronate added as allergy in 2014 for \"headache and shortness of breath\" - details are unclear. Has been on calcium and Vit D only since. DXA done today and results not yet available.  Depending on results, may need to have her see endo for a non-bisphosphonate medication.             Essential hypertension     Good control on current regimen.  Follow-up yearly.         Hypokalemia - Primary     This is been an issue for her off and on for years.  She is now on potassium replacement again and I rechecked a level today.         Relevant Orders    Potassium    Hepatitis     Non-viral hepatitis per labs.  Status post cholecystectomy.  She saw [facility] yesterday and had additional labs drawn.  It sounds like they did get her previous labs and studies.  She will follow-up there in 1 month.  Asymptomatic at this time.         RESOLVED: Multiple lung nodules on CT     CT ABD 11/23/1013, mult nodules < 5mm in size, per Fleischner Society recommendations   repeat CT in 12 months  CT Chest 2/20/17:  Stable pulmonary nodules suggesting incidental findings given long-term stability.  NO FURTHER F/U needed                Of note, repeat LFTs were obtained at [facility] today and so I did not recheck them here.  Just checking potassium today, as that was not checked yesterday.    SUBJECTIVE:  Jennifer Huggins is a 72 y.o. female here for follow-up of multiple issues.    #1 hypokalemia.  This is been a problem off and on for the last few years.  Last potassium was low, so potassium supplementation restarted.  At first when they went to the pharmacy to pick it up, the co-pay was high and they did not take it.  Then a friend went to go get it and there " was no co-pay so now she is taking it.    #2 elevated liver function tests   Were quite high last visit.  Saw [facility] yesterday. They jeanette more blood and said to come back in 1 month.  She does not have any right upper quadrant pain or jaundice or any other problems.  She has been ruled out for viral hepatitis with previous labs and does not have a gallbladder.  Recent right upper quadrant ultrasound was negative for obstruction.    #3 hypercholesterolemia  She had been on a statin in the past, but that has been held recently due to elevated LFTs    #4 osteoporosis.  I meant to follow up with her on her bone density testing, but she just had it today and results are not yet available.    #5 hypertension.  At one point recently, blood pressure had been overly aggressively controlled and she was dizzy.  This continues to be good on her current regimen and she has no complaints.        The following portions of the patient's history were reviewed and updated as appropriate: allergies, current medications and problem list  Current Outpatient Prescriptions on File Prior to Visit   Medication Sig Dispense Refill     azelastine (OPTIVAR) 0.05 % ophthalmic solution Administer 1 drop to both eyes daily. 6 mL 11     blood pressure monitor Kit Use to measure blood pressure daily.  Contact clinic if persistently greater than 140/90 1 each 0     CERTA PLUS 18-0.4-250 mg-mg-mcg Tab TAKE ONE TABLET BY MOUTH DAILY 30 tablet 11     ibuprofen (ADVIL,MOTRIN) 600 MG tablet Take 1 tablet (600 mg total) by mouth 3 (three) times a day with meals. 30 tablet 11     lisinopril-hydrochlorothiazide (ZESTORETIC) 10-12.5 mg per tablet Take 1 tablet by mouth daily. 30 tablet 11     meclizine (ANTIVERT) 25 mg tablet Take 1 tablet (25 mg total) by mouth 3 (three) times a day as needed for dizziness or nausea. 30 tablet 1     omeprazole (PRILOSEC) 20 MG capsule TAKE 1 CAPSULE ORALLY DAILY AS NEEDED FOR STOMACH PAIN 90 capsule 3     potassium chloride  "(MICRO-K) 10 mEq CR capsule Take 1 capsule (10 mEq total) by mouth 2 (two) times a day. 60 capsule 11     Ca-D3-mag#11-zinc-cupr-man-bor (CALTRATE 600+D PLUS MINERALS) 600 mg calcium- 800 unit-50 mg Tab Take 1 tablet by mouth 2 (two) times a day. 60 tablet 11     phenazopyridine (PYRIDIUM) 100 MG tablet Take 1 tablet (100 mg total) by mouth 2 (two) times a day as needed (painful urination). 10 tablet 5     No current facility-administered medications on file prior to visit.         History   Smoking Status     Never Smoker   Smokeless Tobacco     Never Used       OBJECTIVE:  :    Visit Vitals     /72 (Patient Site: Right Arm, Patient Position: Sitting, Cuff Size: Adult Regular)     Pulse 84     Temp 97.7  F (36.5  C) (Oral)     Resp 20     Ht 4' 9.25\" (1.454 m)     Wt 115 lb 8 oz (52.4 kg)     BMI 24.78 kg/m2       Gen:  A&A, NAD  Neck:  supple, no sig LAD or thyromegally  CV:  HRRR, no M/R/G  Resp:  CTAB  Abd:  S&NT, no mass  Ext:  W&D, no edema      Lab results:    Results for orders placed or performed in visit on 02/14/17   Lipid Profile   Result Value Ref Range    Triglycerides 230 (H) <=149 mg/dL    Cholesterol 280 (H) <=199 mg/dL    LDL Calculated 194 (H) <=129 mg/dL    HDL Cholesterol 40 (L) >=50 mg/dL    Patient Fasting > 8hrs? Yes    Basic Metabolic Panel   Result Value Ref Range    Sodium 143 136 - 145 mmol/L    Potassium 3.0 (L) 3.5 - 5.0 mmol/L    Chloride 104 98 - 107 mmol/L    CO2 28 22 - 31 mmol/L    Anion Gap, Calculation 11 5 - 18 mmol/L    Glucose 107 70 - 125 mg/dL    Calcium 9.4 8.5 - 10.5 mg/dL    BUN 19 8 - 28 mg/dL    Creatinine 0.83 0.60 - 1.10 mg/dL    GFR MDRD Af Amer >60 >60 mL/min/1.73m2    GFR MDRD Non Af Amer >60 >60 mL/min/1.73m2   Hepatic Profile   Result Value Ref Range    Bilirubin, Total 0.4 0.0 - 1.0 mg/dL    Bilirubin, Direct 0.2 <=0.5 mg/dL    Protein, Total 7.5 6.0 - 8.0 g/dL    Albumin 3.5 3.5 - 5.0 g/dL    Alkaline Phosphatase 160 (H) 45 - 120 U/L    AST 80 (H) 0 - 40 " U/L     (H) 0 - 45 U/L   HM2(CBC w/o Differential)   Result Value Ref Range    WBC 4.8 4.0 - 11.0 thou/uL    RBC 4.90 3.80 - 5.40 mill/uL    Hemoglobin 15.5 12.0 - 16.0 g/dL    Hematocrit 44.6 35.0 - 47.0 %    MCV 91 80 - 100 fL    MCH 31.6 27.0 - 34.0 pg    MCHC 34.7 32.0 - 36.0 g/dL    RDW 12.1 11.0 - 14.5 %    Platelets 299 140 - 440 thou/uL    MPV 7.3 7.0 - 10.0 fL   Parathyroid Hormone Intact   Result Value Ref Range    PTH 58 10 - 86 pg/mL

## 2021-06-10 NOTE — PROGRESS NOTES
"ASSESSMENT/PLAN:    Problem List Items Addressed This Visit     Osteoporosis - Primary       DXA 4/28/11:  T-score -3.2  Fosamax initiated in 2011, switched to ibandronate in 2012 for convenience.   DXA 11/13/13: T-score -2.8  Ibandronate added as allergy in 2014 for \"headache and shortness of breath\" - details are unclear. Has been on calcium and Vit D only since.  DXA 3/7/17:  \"No significant difference\".  T-score -3.2, moderate bone architecture.   Will start Prolia injections q6m on 5/10/17 (med not available in clinic today).           Relevant Medications    denosumab 60 mg (PROLIA 60 mg/ml)    Essential hypertension     Meds had been decreased in September because she was feeling dizzy.  She now has good blood pressure control and no symptoms of orthostatic hypotension.  Excellent control on current regimen. No changes. Recheck in 1 year.         Recurrent UTI     Most recently diagnosed with E coli UTI on 3/24/17 in the emergency room.  She has recovered completely from this at this time.         Hepatitis (non-viral, sporadic elevation of LFT's)     She had cholecystectomy 8/21/15.  The surgery had been delayed from initial diagnosis of gallstones 11/23/13 due to hypokalemia and difficulty with communication.    She then admitted for elevated LFTs 10/28/15. At that time, she had gotten 3 days of Septra for UTI, and it was thought that the Septra had caused the hepatitis.     Her LFTs came down after that, but upon recheck (asympomatic) 2/14/17 have returned elevated again (AST 80, , ). Liver ultrasound on 2/17/17 was normal.  She had not had any antibiotics or medication changes in the last several months.  She is NOT on a statin medication.  She doesn't drink any alcohol and does not frequently use Tylenol.    Was seen at [facility] 3/6/17.  LFT's normal that day.  Also had normal viral hepatitis labs, iron studies, alpha-1 antitripsan, DEEPALI, actin (smooth muscle) antibody.  LFTs were also normal in " the emergency room on 3/24/17.  She has remained asymptomatic throughout.  It is not clear whether she has a follow-up appointment with [facility] already scheduled; we will have her meet with specialty  in this regard today.                      SUBJECTIVE:  Jennifer Huggins is a 72 y.o. female here scheduled for HTN, LFT, and DXA.  She also happened to have had an emergency room visit between the last appointment and today.    Osteoporosis.  Had DEXA after last visit which showed T score of -3.2.  She is taking calcium and vitamin D.  Had been on bisphosphonates in the past but it had bisphosphonate allergy and has not been on them now for several years.  No insufficiency fractures.    BP: She had her medications decreased back in September due to dizziness and has been doing well since that time.  He denies any symptoms of orthostatic hypotension this time.  No problems with her medications.    Non-viral hepatitis: saw [facility] x1 and labs were rechecked and ultimately found to be normal.  They are not sure if she needs to go back for follow-up.  She had LFTs done in the emergency room as well recently and those were normal too.  No right upper quadrant pain or jaundice.    ER March 24.  Dx'd with pyelonephritis nOw totally back normal after taking antibiotics.        The following portions of the patient's history were reviewed and updated as appropriate: allergies, current medications and problem list  Current Outpatient Prescriptions on File Prior to Visit   Medication Sig Dispense Refill     azelastine (OPTIVAR) 0.05 % ophthalmic solution Administer 1 drop to both eyes daily. 6 mL 11     CERTA PLUS 18-0.4-250 mg-mg-mcg Tab TAKE ONE TABLET BY MOUTH DAILY 30 tablet 11     ibuprofen (ADVIL,MOTRIN) 600 MG tablet Take 1 tablet (600 mg total) by mouth 3 (three) times a day with meals. 30 tablet 11     lisinopril-hydrochlorothiazide (ZESTORETIC) 10-12.5 mg per tablet Take 1 tablet by mouth daily. 30  "tablet 11     meclizine (ANTIVERT) 25 mg tablet TAKE 1 TABLET BY MOUTH 3 TIMES A DAY AS NEEDED FOR DIZZINESS OR NAUSEA. 30 tablet 11     omeprazole (PRILOSEC) 20 MG capsule TAKE 1 CAPSULE ORALLY DAILY AS NEEDED FOR STOMACH PAIN 90 capsule 3     potassium chloride (MICRO-K) 10 mEq CR capsule Take 1 capsule (10 mEq total) by mouth 2 (two) times a day. 60 capsule 11     blood pressure monitor Kit Use to measure blood pressure daily.  Contact clinic if persistently greater than 140/90 1 each 0     Ca-D3-mag#11-zinc-cupr-man-bor (CALTRATE 600+D PLUS MINERALS) 600 mg calcium- 800 unit-50 mg Tab Take 1 tablet by mouth 2 (two) times a day. 60 tablet 11     HYDROcodone-acetaminophen 5-325 mg per tablet Take 1 tablet by mouth every 6 (six) hours as needed for pain. 12 tablet 0     phenazopyridine (PYRIDIUM) 100 MG tablet Take 1 tablet (100 mg total) by mouth 2 (two) times a day as needed (painful urination). 10 tablet 5     No current facility-administered medications on file prior to visit.         History   Smoking Status     Never Smoker   Smokeless Tobacco     Never Used       OBJECTIVE:  :  /72 (Patient Site: Right Arm, Patient Position: Sitting, Cuff Size: Adult Regular)  Pulse 76  Temp 98.1  F (36.7  C) (Oral)   Resp 20  Ht 4' 9.25\" (1.454 m)  Wt 120 lb (54.4 kg)  BMI 25.74 kg/m2    Gen:  A&A, NAD  Neck:  supple, no sig LAD or thyromegally  CV:  HRRR, no M/R/G  Resp:  CTAB  Abd:  S&NT, no mass  Ext:  W&D, no edema    Lab results:       RECENT LFT's  ALT   Date Value   03/24/2017 28 U/L   02/14/2017 374 U/L (H)   11/18/2015 20 U/L   11/04/2015 149 U/L (H)   10/29/2015 588 U/L (H)   10/28/2015 742 U/L (H)   10/25/2015 14 U/L   08/22/2015 65 U/L (H)   08/22/2015 65 U/L (H)   08/21/2015 116 U/L (H)   12/30/2013 19 IU/L   11/24/2013 15 IU/L   11/23/2013 15 IU/L   04/01/2013 32 IU/L   01/07/2011 34 IU/L    AST   Date Value   03/24/2017 27 U/L   02/14/2017 80 U/L (H)   11/18/2015 18 U/L   11/04/2015 32 U/L "   10/29/2015 250 U/L (H)   10/28/2015 359 U/L (H)   10/25/2015 19 U/L   08/22/2015 23 U/L   08/22/2015 23 U/L   08/21/2015 49 U/L (H)   12/30/2013 18 IU/L   11/24/2013 18 IU/L   11/23/2013 20 IU/L   04/01/2013 22 IU/L   01/07/2011 18 IU/L     Alkaline Phosphatase   Date Value   03/24/2017 71 U/L   02/14/2017 160 U/L (H)   11/18/2015 87 U/L   11/04/2015 141 U/L (H)   10/29/2015 130 U/L (H)   10/28/2015 132 U/L (H)   10/25/2015 60 U/L   08/22/2015 83 U/L   08/22/2015 83 U/L   08/21/2015 115 U/L   12/30/2013 54 IU/L   11/24/2013 45 IU/L   11/23/2013 45 IU/L   04/01/2013 58 IU/L   01/07/2011 47 IU/L

## 2021-06-10 NOTE — PROGRESS NOTES
Assessment and Plan:     1. Routine general medical examination at a health care facility  Declines shingrix    2. Visit for screening mammogram  - Mammo Screening Bilateral; Future    3. Dysuria  UA was positive only for trace leukocyte esterase today, but given that she is had multiple UTIs in the past I did send urine for culture.  No treatment initiated culture available.  - Urinalysis-UC if Indicated  - Culture, Urine    4. Primary osteoarthritis involving multiple joints  Patient had steroid injection into the right shoulder back in 2012 that was helpful, but the pain is not too bad now and is more diffuse into multiple joints and so I do not think joint injection currently makes sense.  Will try naproxen.  If she has more focal joint pain in a particular joint it may be an option to pursue a steroid injection in the future.  - naproxen (NAPROSYN) 375 MG tablet; Take 1 tablet (375 mg total) by mouth 2 (two) times a day as needed (pain. Take with food.).  Dispense: 60 tablet; Refill: 3    5. Age-related osteoporosis without current pathological fracture  Is currently on Prolia injections.  Will check bone density.  - DXA Bone Density Scan; Future  - Vitamin D, Total (25-Hydroxy)    6. Essential hypertension  Adequate control on current regimen; no changes  - Lipid Cascade RANDOM  - Comprehensive Metabolic Panel     The patient's current medical problems were reviewed.    I have had an Advance Directives discussion with the patient.   Declines Honoring Choices document.      The following health maintenance schedule was reviewed with the patient and provided in printed form in the after visit summary:   Health Maintenance   Topic Date Due     HEPATITIS B VACCINES (2 of 3 - Risk 3-dose series) 09/21/2006     ZOSTER VACCINES (2 of 3) 10/29/2008     ADVANCE CARE PLANNING  12/04/2017     DXA SCAN  03/07/2019     INFLUENZA VACCINE RULE BASED (1) 08/01/2020     MAMMOGRAM  10/23/2020     FALL RISK ASSESSMENT   08/05/2021     MEDICARE ANNUAL WELLNESS VISIT  08/06/2021     LIPID  02/14/2022     COLORECTAL CANCER SCREENING  04/17/2024     TD 18+ HE  11/14/2027     HEPATITIS C SCREENING  Completed     PNEUMOCOCCAL IMMUNIZATION 65+ LOW/MEDIUM RISK  Completed        Subjective:   Chief Complaint: Jennifer Huggins is an 75 y.o. female here for an Annual Wellness visit.   HPI:      Concentrated urine. Some dysuria.  Side stomach pain sometimes  Leg pain sometimes  Numbness sometimes.  Going on for awhile.  Hand/finger    Had shots for it x3 in past. (review or records; steroid injection right shoulder x2 around 2012).  Got rx for ibuprofen in May.    Review of Systems: Please see above.  The rest of the review of systems are negative for all systems.    Patient Care Team:  Barb Elias MD as PCP - Soraya Bey RN as Lead Care Coordinator (Primary Care - CC)  Babr Elias MD as Assigned PCP     Patient Active Problem List   Diagnosis     Neuritis     Meningioma     Joint Pain, Localized In The Shoulder     Cervicalgia     Hypercholesterolemia     Chronic Allergic Conjunctivitis     Osteoporosis     Dyspepsia     Benign Paroxysmal Positional Vertigo     Essential hypertension     Hypokalemia     Urinary Incontinence     Benign Adenomatous Polyp Of The Large Intestine     Xerosis Cutis     Recurrent UTI     GERD (gastroesophageal reflux disease)     Constipation     Hepatitis (non-viral, sporadic elevation of LFT's)     Dizziness     Osteoarthritis of multiple joints     Past Medical History:   Diagnosis Date     Benign adenomatous polyp of large intestine      Biliary colic      Cervicalgia      Cholelithiasis      Chronic allergic conjunctivitis      Cystitis      Dyspepsia      Gastritis      GERD (gastroesophageal reflux disease)      HLD (hyperlipidemia)      Hypertension      Hypokalemia      Meningioma (H)      Multiple lung nodules on CT     CT ABD 11/23/1013, mult nodules < 5mm in size, per [employer] recommendations  repeat CT in 12 months CT Chest 2/20/17:  Stable pulmonary nodules suggesting incidental findings given long-term stability.      Osteoporosis      Positional vertigo      Pulmonary nodules      Recurrent UTI      Transaminitis 10/29/2015     Urinary incontinence      Xerosis cutis       Past Surgical History:   Procedure Laterality Date     CHOLECYSTECTOMY  08/21/2015      Family History   Problem Relation Age of Onset     No Medical Problems Mother      No Medical Problems Father       Social History     Socioeconomic History     Marital status:      Spouse name: Not on file     Number of children: Not on file     Years of education: Not on file     Highest education level: Not on file   Occupational History     Not on file   Social Needs     Financial resource strain: Not on file     Food insecurity     Worry: Not on file     Inability: Not on file     Transportation needs     Medical: Not on file     Non-medical: Not on file   Tobacco Use     Smoking status: Never Smoker     Smokeless tobacco: Never Used   Substance and Sexual Activity     Alcohol use: No     Drug use: No     Sexual activity: Never     Birth control/protection: Post-menopausal   Lifestyle     Physical activity     Days per week: Not on file     Minutes per session: Not on file     Stress: Not on file   Relationships     Social connections     Talks on phone: Not on file     Gets together: Not on file     Attends Orthodoxy service: Not on file     Active member of club or organization: Not on file     Attends meetings of clubs or organizations: Not on file     Relationship status: Not on file     Intimate partner violence     Fear of current or ex partner: Not on file     Emotionally abused: Not on file     Physically abused: Not on file     Forced sexual activity: Not on file   Other Topics Concern     Not on file   Social History Narrative     Not on file      Medication Sig     azelastine (OPTIVAR) 0.05 %  "ophthalmic solution INSTILL 1 DROP TO BOTH EYES DAILY.     blood pressure monitor Kit Use to measure blood pressure daily.  Contact clinic if persistently greater than 140/90     lisinopril-hydrochlorothiazide (PRINZIDE,ZESTORETIC) 10-12.5 mg per tablet Take 1 tablet by mouth daily.     meclizine (ANTIVERT) 25 mg tablet TAKE 1 TABLET BY MOUTH 3 TIMES A DAY AS NEEDED FOR DIZZINESS OR NAUSEA.     multivit-iron-min-FA-lutein (CERTA PLUS) 18-0.4-250 mg-mg-mcg Tab Take 1 tablet by mouth daily.     omeprazole (PRILOSEC) 20 MG capsule Take 1 capsule (20 mg total) by mouth daily before breakfast.     potassium chloride (MICRO-K) 10 mEq CR capsule Take 1 capsule (10 mEq total) by mouth 2 (two) times a day.     naproxen (NAPROSYN) 375 MG tablet Take 1 tablet (375 mg total) by mouth 2 (two) times a day as needed (pain. Take with food.).     pantoprazole (PROTONIX) 20 MG tablet Take 1 tablet (20 mg total) by mouth daily.     white petrolatum (AQUAPHOR ORIGINAL) 41 % Oint Apply on affected area three times a day, as needed. (Patient not taking: Reported on 5/20/2020)     white petrolatum-mineral oil (SOOTHE NIGHT TIME) 80-20 % ophthalmic ointment Apply to left eye at night (Patient not taking: Reported on 5/20/2020)     Current Facility-Administered Medications   Medication Dose Route Frequency Provider Last Rate Last Dose     denosumab 60 mg (PROLIA 60 mg/ml)  60 mg Subcutaneous Q6 Months Barb Elias MD   60 mg at 05/20/20 1057      Objective:   Vital Signs:   Visit Vitals  /68   Pulse 72   Temp 97.8  F (36.6  C) (Oral)   Resp 20   Ht 4' 9.28\" (1.455 m)   Wt 127 lb 8 oz (57.8 kg)   SpO2 98%   BMI 27.32 kg/m           VisionScreening:  No exam data present     PHYSICAL EXAM  General Appearance: Alert, cooperative, no distress, appears stated age  Head: Normocephalic, without obvious abnormality, atraumatic  Eyes: PERRL, conjunctiva/corneas clear, EOM's intact  Ears: Normal TM's and external ear canals, both " ears  Throat: Lips, mucosa, and tongue normal; teeth and gums normal  Neck: Supple, symmetrical, trachea midline, no adenopathy;  thyroid: not enlarged, symmetric, no tenderness/mass/nodules  Back: Symmetric, no curvature, ROM normal, no CVA tenderness  Lungs: Clear to auscultation bilaterally, respirations unlabored  Breasts: breasts appear normal, no suspicious masses, no skin or nipple changes or axillary nodes  Heart: Regular rate and rhythm, S1 and S2 normal, no murmur, rub, or gallop,   Abdomen: Soft, non-tender, no masses, no organomegaly  Pelvic:  not indicated; post-menopausal, no abnormal Pap smears in past  Extremities: Extremities normal, atraumatic, no cyanosis or edema  Skin: Skin color, texture, turgor normal, no rashes or lesions  Lymph nodes: Cervical and axillary nodes normal  Neurologic: Normal patellar DTR's, normal gait       Assessment Results 8/5/2020   Activities of Daily Living 2-4 - Moderate impairment   Instrumental Activities of Daily Living 5-6 - Severe functional impairment   Mini Cog Total Score 2   Some recent data might be hidden     A Mini-Cog score of 0-2 suggests the possibility of dementia, score of 3-5 suggests no dementia        Identified Health Risks:     She is at risk for lack of exercise and has been provided with information to increase physical activity for the benefit of her well-being.  The patient reports that she has difficulty with activities of daily living. I have asked that the patient make a follow up appointment in n/a weeks where this issue will be further evaluated and addressed.  The patient reports that she has difficulty with instrumental activities of daily living.  She was provided with a list of local organizations that provide support services and advised to make a follow up appointment in n/a weeks to address this further.   Information on urinary incontinence and treatment options given to patient.  She is at risk for falling and has been provided  with information to reduce the risk of falling at home.  Information regarding advance directives (living hernadez), including where she can download the appropriate form, was provided to the patient via the AVS.       The patient was provided with appropriate referrals to address her memory problem.

## 2021-06-11 NOTE — TELEPHONE ENCOUNTER
RN cannot approve Refill Request    RN can NOT refill this medication med is not covered by policy/route to provider. Last office visit: 4/27/2018 Barb Elias MD Last Physical: 8/6/2020 Last MTM visit: Visit date not found Last visit same specialty: 5/20/2020 Azeem Lema MD.  Next visit within 3 mo: Visit date not found  Next physical within 3 mo: Visit date not found      Olga Guajardo, [facility] Triage/Med Refill 9/4/2020    Requested Prescriptions   Pending Prescriptions Disp Refills     multivit-iron-min-FA-lutein (CERTA PLUS) 18-0.4-250 mg-mg-mcg Tab 30 tablet 11     Sig: Take 1 tablet by mouth daily.       There is no refill protocol information for this order

## 2021-06-12 NOTE — TELEPHONE ENCOUNTER
Please let pt know that her labs back in August showed a few minor abnormalities for which she needs to take medications/supplements:  1. Vitamin D was low.  She should start a prescription Vitamin D capsule once per week.  2.  Her potassium was low.  Should restart  potassium pill 2 per day.  3.  Her cholesterol was high.  Should start a cholesterol medication.    She should have her labs rechecked in February when she comes in for her osteoporosis shot.

## 2021-06-12 NOTE — TELEPHONE ENCOUNTER
CMT attempted to reach the patient x 3. CMT unable to leave voicemail, unable to reach the patient. Letter sent.

## 2021-06-12 NOTE — TELEPHONE ENCOUNTER
Refill Approved    Rx renewed per Medication Renewal Policy. Medication was last renewed on 9/17/119.    Olga Guajardo, [facility] Triage/Med Refill 10/6/2020     Requested Prescriptions   Pending Prescriptions Disp Refills     lisinopriL-hydrochlorothiazide (PRINZIDE,ZESTORETIC) 10-12.5 mg per tablet [Pharmacy Med Name: LISINOPRIL-HCTZ 10-12.5 MG 10-12.5 Tablet] 30 tablet 11     Sig: TAKE 1 TABLET BY MOUTH DAILY.       Diuretics/Combination Diuretics Refill Protocol  Passed - 10/4/2020  1:32 PM        Passed - Visit with PCP or prescribing provider visit in past 12 months     Last office visit with prescriber/PCP: 4/27/2018 Barb Elias MD OR same dept: 5/20/2020 Azeem Lema MD OR same specialty: 5/20/2020 Azeem Lema MD  Last physical: 8/6/2020 Last MTM visit: Visit date not found   Next visit within 3 mo: Visit date not found  Next physical within 3 mo: Visit date not found  Prescriber OR PCP: Barb Elias MD  Last diagnosis associated with med order: 1. Essential hypertension  - lisinopriL-hydrochlorothiazide (PRINZIDE,ZESTORETIC) 10-12.5 mg per tablet [Pharmacy Med Name: LISINOPRIL-HCTZ 10-12.5 MG 10-12.5 Tablet]; Take 1 tablet by mouth daily.  Dispense: 30 tablet; Refill: 11    If protocol passes may refill for 12 months if within 3 months of last provider visit (or a total of 15 months).             Passed - Serum Potassium in past 12 months      Lab Results   Component Value Date    Potassium 3.1 (L) 08/06/2020             Passed - Serum Sodium in past 12 months      Lab Results   Component Value Date    Sodium 142 08/06/2020             Passed - Blood pressure on file in past 12 months     BP Readings from Last 1 Encounters:   08/06/20 130/68             Passed - Serum Creatinine in past 12 months      Creatinine   Date Value Ref Range Status   08/06/2020 0.96 0.60 - 1.10 mg/dL Final

## 2021-06-12 NOTE — TELEPHONE ENCOUNTER
CMT attempted to reach the patient x 1. CMT unable to leave voicemail, unable to reach the patient. Please review message thread below and advise the patient as indicated. Please schedule if necessary or indicated in message thread.    CMT could not leave message because the call never forwarded to voicemail but only played music.

## 2021-06-12 NOTE — PROGRESS NOTES
Assessment: /    Plan:    1. Dizziness         Continue meclizine, avoid rapid head turning.  Recheck if not improving.  Patient was seen with Kelli , Allan Cronin.      Subjective:    HPI:  Jennifer Huggins is a 72-year-old female presenting for follow-up of ER visit 7/24/17 at [facility].  She had dizziness.  When she turns her head, her symptoms are worse.  She had been taking meclizine once per day, is now taking it twice per day.  This helps.  CT of the head demonstrated stable meningioma.  Hemogram and BMP were normal.      Review of Systems: No fever, vomiting, cough.      Current Outpatient Prescriptions   Medication Sig Dispense Refill     azelastine (OPTIVAR) 0.05 % ophthalmic solution Administer 1 drop to both eyes daily. 6 mL 11     blood pressure monitor Kit Use to measure blood pressure daily.  Contact clinic if persistently greater than 140/90 1 each 0     Ca-D3-mag#11-zinc-cupr-man-bor (CALTRATE 600+D PLUS MINERALS) 600 mg calcium- 800 unit-50 mg Tab Take 1 tablet by mouth 2 (two) times a day. 60 tablet 11     CERTA PLUS 18-0.4-250 mg-mg-mcg Tab TAKE ONE TABLET BY MOUTH DAILY 90 tablet 3     HYDROcodone-acetaminophen 5-325 mg per tablet Take 1 tablet by mouth every 6 (six) hours as needed for pain. 12 tablet 0     ibuprofen (ADVIL,MOTRIN) 600 MG tablet Take 1 tablet (600 mg total) by mouth 3 (three) times a day with meals. 30 tablet 11     lisinopril-hydrochlorothiazide (ZESTORETIC) 10-12.5 mg per tablet Take 1 tablet by mouth daily. 30 tablet 11     meclizine (ANTIVERT) 25 mg tablet TAKE 1 TABLET BY MOUTH 3 TIMES A DAY AS NEEDED FOR DIZZINESS OR NAUSEA. 30 tablet 11     omeprazole (PRILOSEC) 20 MG capsule TAKE 1 CAPSULE ORALLY DAILY AS NEEDED FOR STOMACH PAIN 90 capsule 3     phenazopyridine (PYRIDIUM) 100 MG tablet Take 1 tablet (100 mg total) by mouth 2 (two) times a day as needed (painful urination). 10 tablet 5     potassium chloride (MICRO-K) 10 mEq CR capsule Take 1 capsule (10 mEq total) by  mouth 2 (two) times a day. 60 capsule 11     Current Facility-Administered Medications   Medication Dose Route Frequency Provider Last Rate Last Dose     denosumab 60 mg (PROLIA 60 mg/ml)  60 mg Subcutaneous Q6 Months Barb Elias MD   60 mg at 05/10/17 1045         Objective:    Vitals:    07/26/17 1108   BP: 124/84   Pulse: 94   Resp: 16   Temp: 97.7  F (36.5  C)   SpO2: 97%       Gen:  NAD, VSS.  She is using a cane  Ears normal  Lungs:  normal  Heart:  normal  Abdomen:  No HSM, mass or tenderness        ADDITIONAL HISTORY SUMMARIZED (2): Reviewed ER note.  DECISION TO OBTAIN EXTRA INFORMATION (1): None.   RADIOLOGY TESTS (1): Reviewed head CT.  LABS (1): Reviewed hemogram and BMP.  MEDICINE TESTS (1): None.  INDEPENDENT REVIEW (2 each): None.     Total Data Points: 4

## 2021-06-13 NOTE — TELEPHONE ENCOUNTER
Called and inform daughter that we ([facility]) did not send anybody to collect blood. Per PCP's advise asked pt not to open the door if he comes back. If we do send anybody in the future, we will call and let them know ahead of time. Daughter understood and noted. Thanks.

## 2021-06-13 NOTE — PROGRESS NOTES
ASSESSMENT/PLAN:    1. Bell's palsy  Etiology is uncertain.  Given the severity of her symptoms, will have her take prednisone and Valtrex, in addition to lubricating eyedrops and eye ointment  - predniSONE (DELTASONE) 20 MG tablet; Take 1 tablet (20 mg total) by mouth 3 (three) times a day for 7 days. Take two tablets po now and then two tablets po q am until gone  Dispense: 21 tablet; Refill: 0  - valACYclovir (VALTREX) 1000 MG tablet; Take 1 tablet (1,000 mg total) by mouth 3 (three) times a day for 7 days.  Dispense: 21 tablet; Refill: 0    2. Need for immunization against influenza  - Influenza High Dose, Seasonal 65+ yrs         SUBJECTIVE:  Jennifer Huggins is a 72 y.o. female here with her adult daughter and grandson seen as an add-on patient for left facial drooping.  This is been ongoing for the last several days.  Had never had it before.  Is not having any pain.  Her eye is irritated and she does not think it is closing all the way.  She notes when she eats that some of the food is not sticking in her mouth well.  She had not been ill recently.  No tick bites recently.  She is not having any symptoms in her extremities.    PHQ-2 Total Score: 0 (11/11/2016 10:00 AM)     The following portions of the patient's history were reviewed and updated as appropriate: allergies, current medications and problem list  Current Outpatient Prescriptions on File Prior to Visit   Medication Sig Dispense Refill     azelastine (OPTIVAR) 0.05 % ophthalmic solution Administer 1 drop to both eyes daily. 6 mL 11     CERTA PLUS 18-0.4-250 mg-mg-mcg Tab TAKE ONE TABLET BY MOUTH DAILY 90 tablet 3     ibuprofen (ADVIL,MOTRIN) 600 MG tablet Take 1 tablet (600 mg total) by mouth 3 (three) times a day with meals. 30 tablet 11     lisinopril-hydrochlorothiazide (ZESTORETIC) 10-12.5 mg per tablet Take 1 tablet by mouth daily. 30 tablet 11     potassium chloride (MICRO-K) 10 mEq CR capsule Take 1 capsule (10 mEq total) by mouth 2 (two) times a  "day. 60 capsule 11     blood pressure monitor Kit Use to measure blood pressure daily.  Contact clinic if persistently greater than 140/90 1 each 0     Ca-D3-mag#11-zinc-cupr-man-bor (CALTRATE 600+D PLUS MINERALS) 600 mg calcium- 800 unit-50 mg Tab Take 1 tablet by mouth 2 (two) times a day. 60 tablet 11     HYDROcodone-acetaminophen 5-325 mg per tablet Take 1 tablet by mouth every 6 (six) hours as needed for pain. 12 tablet 0     meclizine (ANTIVERT) 25 mg tablet TAKE 1 TABLET BY MOUTH 3 TIMES A DAY AS NEEDED FOR DIZZINESS OR NAUSEA. 30 tablet 11     Current Facility-Administered Medications on File Prior to Visit   Medication Dose Route Frequency Provider Last Rate Last Dose     denosumab 60 mg (PROLIA 60 mg/ml)  60 mg Subcutaneous Q6 Months Barb Elias MD   60 mg at 05/10/17 1045         History   Smoking Status     Never Smoker   Smokeless Tobacco     Never Used       OBJECTIVE:  :  /82 (Patient Site: Right Arm, Patient Position: Sitting, Cuff Size: Adult Regular)  Pulse 92  Temp 98.2  F (36.8  C) (Oral)   Resp 20  Ht 4' 9.75\" (1.467 m)  Wt 123 lb 12 oz (56.1 kg)  BMI 26.09 kg/m2  Wt Readings from Last 3 Encounters:   09/29/17 122 lb (55.3 kg)   09/12/17 123 lb 12 oz (56.1 kg)   07/26/17 121 lb (54.9 kg)         Gen:  A&A, NAD  Neck:  supple, no sig LAD or thyromegally  CV:  HRRR, no M/R/G  Resp:  CTAB  Abd:  S&NT, no mass  Ext:  W&D, no edema  Neuro: She is unable to raise her eyebrow on the left or smile.  Eye closure is weak on the left.  Deep tendon reflexes the extremities are symmetric 2+ bilaterally.  Gait is normal.  Good strength and sensation throughout the upper and lower extremities.      "

## 2021-06-13 NOTE — PROGRESS NOTES
ASSESSMENT/PLAN:    Problem List Items Addressed This Visit     None      Visit Diagnoses     Dysuria    -  Primary    Likely recurrent UTI.  Will treat with Keflex, as last UTI was sensitive to cephalosporins.    Relevant Orders    Urinalysis-UC if Indicated (Completed)    Culture, Urine    Cat's palsy        Improving in general.  I did refill eyedrops and eye ointment to be used as needed.    Relevant Medications    hypromellose (ARTIFICIAL TEARS,[id number],) Drop    white petrolatum-mineral oil 80-20 % Oint               SUBJECTIVE:  Jennifer Huggins is a 72 y.o. female here for bladder problems for the last 3-4 days.  Dysuria, frequency, nocturia x3-4 days.  No nausea, no fever, no hematuria, possibly a little back pain.  Has history of recurrent UTIs, last was in March of this year.    Also of note, patient recently had Bell's palsy, most about 3 weeks ago.  It is in general getting better.  She has been trying to use the eyedrops every hour while awake and the eye ointment at night for eye lubrication.  Her eye is closing better lately and she is not sure she needs to keep using it.    The following portions of the patient's history were reviewed and updated as appropriate: allergies, current medications and problem list  Current Outpatient Prescriptions on File Prior to Visit   Medication Sig Dispense Refill     azelastine (OPTIVAR) 0.05 % ophthalmic solution Administer 1 drop to both eyes daily. 6 mL 11     Ca-D3-mag#11-zinc-cupr-man-bor (CALTRATE 600+D PLUS MINERALS) 600 mg calcium- 800 unit-50 mg Tab Take 1 tablet by mouth 2 (two) times a day. 60 tablet 11     CERTA PLUS 18-0.4-250 mg-mg-mcg Tab TAKE ONE TABLET BY MOUTH DAILY 90 tablet 3     ibuprofen (ADVIL,MOTRIN) 600 MG tablet Take 1 tablet (600 mg total) by mouth 3 (three) times a day with meals. 30 tablet 11     lisinopril-hydrochlorothiazide (ZESTORETIC) 10-12.5 mg per tablet Take 1 tablet by mouth daily. 30 tablet 11     meclizine (ANTIVERT) 25 mg  "tablet TAKE 1 TABLET BY MOUTH 3 TIMES A DAY AS NEEDED FOR DIZZINESS OR NAUSEA. 30 tablet 11     omeprazole (PRILOSEC) 20 MG capsule TAKE 1 CAPSULE ORALLY DAILY AS NEEDED FOR STOMACH PAIN 30 capsule 0     phenazopyridine (PYRIDIUM) 100 MG tablet TAKE 1 TABLET TWICE DAILY AS NEEDED FOR PAINFUL URINATION. 10 tablet 0     blood pressure monitor Kit Use to measure blood pressure daily.  Contact clinic if persistently greater than 140/90 1 each 0     HYDROcodone-acetaminophen 5-325 mg per tablet Take 1 tablet by mouth every 6 (six) hours as needed for pain. 12 tablet 0     potassium chloride (MICRO-K) 10 mEq CR capsule Take 1 capsule (10 mEq total) by mouth 2 (two) times a day. 60 capsule 11     [DISCONTINUED] hypromellose (ARTIFICIAL TEARS,[id number],) Drop Administer 2 drops to both eyes every hour while awake. 30 mL 1     [DISCONTINUED] phenazopyridine (PYRIDIUM) 100 MG tablet Take 1 tablet (100 mg total) by mouth 2 (two) times a day as needed (painful urination). 10 tablet 5     [DISCONTINUED] white petrolatum-mineral oil 80-20 % Oint Apply to left eye at night 1 Tube 1     Current Facility-Administered Medications on File Prior to Visit   Medication Dose Route Frequency Provider Last Rate Last Dose     denosumab 60 mg (PROLIA 60 mg/ml)  60 mg Subcutaneous Q6 Months Barb Elias MD   60 mg at 05/10/17 1045        History   Smoking Status     Never Smoker   Smokeless Tobacco     Never Used       OBJECTIVE:  :  /66 (Patient Site: Left Arm, Patient Position: Sitting, Cuff Size: Adult Regular)  Pulse 88  Temp 98.3  F (36.8  C) (Oral)   Resp 20  Ht 4' 9.2\" (1.453 m)  Wt 122 lb (55.3 kg)  BMI 26.22 kg/m2    Gen:  A&A, NAD  HEENT: There is some left-sided facial droop but she is able to close her eye fully.  No obvious scleral injury.  CV:  HRRR, no M/R/G  Resp:  CTAB  Abd:  S&NT, no mass  Ext:  W&D, no edema  Back: Without flank tenderness to percussion    Lab results:    Results for orders placed or " performed in visit on 09/29/17   Urinalysis-UC if Indicated   Result Value Ref Range    Color, UA Yellow Colorless, Yellow, Straw, Light Yellow    Clarity, UA Clear Clear    Glucose, UA Negative Negative    Bilirubin, UA Negative Negative    Ketones, UA Negative Negative    Specific Gravity, UA 1.015 1.005 - 1.030    Blood, UA Trace (!) Negative    pH, UA 7.0 5.0 - 8.0    Protein, UA Negative Negative mg/dL    Urobilinogen, UA 0.2 E.U./dL 0.2 E.U./dL, 1.0 E.U./dL    Nitrite, UA Negative Negative    Leukocytes, UA Moderate (!) Negative    Bacteria, UA Few (!) None Seen hpf    RBC, UA 3-5 (!) None Seen, 0-2 hpf    WBC, UA 10-25 (!) None Seen, 0-5 hpf    Squam Epithel, UA 0-5 None Seen, 0-5 lpf    WBC Clumps Present (!) None Seen    Mucus, UA Moderate (!) None Seen lpf

## 2021-06-13 NOTE — TELEPHONE ENCOUNTER
Who is calling:  Granddaughter Baylee, patient gave a one time authorizatio to speak with the granddaughter, as patient's daughter is not home.  Reason for Call:  Caller states that a  arrived at their home yesterday to  blood work.  Family is confused as no one was there for any lab draws.  Please advise.  Writer does see future lab orders I patient's chart, was this to have been arranged for a home draw?  Date of last appointment with primary care: 11/23/20  Okay to leave a detailed message: Yes

## 2021-06-13 NOTE — PROGRESS NOTES
[facility] Care Coordination Contact      [facility] Six-Month Telephone Assessment    6 month telephone assessment completed on 11/17/20.    ER visits: No  Hospitalizations: No  TCU stays: No  Significant health status changes: none  Falls/Injuries: No  ADL/IADL changes: No  Changes in services: No, will continue with formal PCA services provided by daughter.    Caregiver Assessment follow up:  na    Goals: See POC in chart for goal progress documentation.  No falls reported at this time.    Will see member in 6 months for an annual health risk assessment.   Encouraged member to call CC with any questions or concerns in the meantime.     Soraya Khoury RN  [facility]  [phone]

## 2021-06-13 NOTE — PROGRESS NOTES
ASSESMENT AND PLAN:  Diagnoses and all orders for this visit:    Dysuria  UA could not be done because of the coloration of her urine.  Based on the microscopic results detailed below we are sending for culture and starting her on nitrofurantoin and as prescribed below.  -     nitrofurantoin, macrocrystal-monohydrate, (MACROBID) 100 MG capsule; Take 1 capsule (100 mg total) by mouth 2 (two) times a day for 5 days.  Dispense: 10 capsule; Refill: 0  -     Urine,Microscopic shows elevated white cells and clumped white cells  -     Culture, Urine    Need for immunization against influenza  -     Influenza,Quad,High Dose,PF 65 YR+        Reviewed the risks and benefits of the treatment plan with the patient and/or caregiver and we discussed indications for routine and emergent follow-up.        SUBJECTIVE: 75-year-old female with a 3-day history of increased frequency of urination and burning with urination.  No visible blood in the urine.  She has had some mild right flank discomfort-achy pain.  No fevers or chills or nausea or vomiting.    Past Medical History:   Diagnosis Date     Benign adenomatous polyp of large intestine      Biliary colic      Cervicalgia      Cholelithiasis      Chronic allergic conjunctivitis      Cystitis      Dyspepsia      Gastritis      GERD (gastroesophageal reflux disease)      HLD (hyperlipidemia)      Hypertension      Hypokalemia      Meningioma (H)      Multiple lung nodules on CT     CT ABD 11/23/1013, mult nodules < 5mm in size, per Fleischner Society recommendations  repeat CT in 12 months CT Chest 2/20/17:  Stable pulmonary nodules suggesting incidental findings given long-term stability.      Osteoporosis      Positional vertigo      Pulmonary nodules      Recurrent UTI      Transaminitis 10/29/2015     Urinary incontinence      Xerosis cutis      Patient Active Problem List   Diagnosis     Neuritis     Meningioma     Joint Pain, Localized In The Shoulder     Cervicalgia      Hypercholesterolemia     Chronic Allergic Conjunctivitis     Osteoporosis     Dyspepsia     Benign Paroxysmal Positional Vertigo     Essential hypertension     Hypokalemia     Urinary Incontinence     Benign Adenomatous Polyp Of The Large Intestine     Xerosis Cutis     Recurrent UTI     GERD (gastroesophageal reflux disease)     Constipation     Hepatitis (non-viral, sporadic elevation of LFT's)     Dizziness     Osteoarthritis of multiple joints     Current Outpatient Medications   Medication Sig Dispense Refill     atorvastatin (LIPITOR) 40 MG tablet Take 1 tablet (40 mg total) by mouth at bedtime. 90 tablet 3     azelastine (OPTIVAR) 0.05 % ophthalmic solution INSTILL 1 DROP TO BOTH EYES DAILY. 6 mL 11     blood pressure monitor Kit Use to measure blood pressure daily.  Contact clinic if persistently greater than 140/90 1 each 0     ergocalciferol (ERGOCALCIFEROL) 1,250 mcg (50,000 unit) capsule Take 1 capsule (50,000 Units total) by mouth once a week. 12 capsule 3     lisinopriL-hydrochlorothiazide (PRINZIDE,ZESTORETIC) 10-12.5 mg per tablet TAKE 1 TABLET BY MOUTH DAILY. 90 tablet 3     meclizine (ANTIVERT) 25 mg tablet TAKE 1 TABLET BY MOUTH 3 TIMES A DAY AS NEEDED FOR DIZZINESS OR NAUSEA. 30 tablet 11     multivit-iron-min-FA-lutein (CERTA PLUS) 18-0.4-250 mg-mg-mcg Tab Take 1 tablet by mouth daily. 30 tablet 11     naproxen (NAPROSYN) 375 MG tablet Take 1 tablet (375 mg total) by mouth 2 (two) times a day as needed (pain. Take with food.). 60 tablet 3     nitrofurantoin, macrocrystal-monohydrate, (MACROBID) 100 MG capsule Take 1 capsule (100 mg total) by mouth 2 (two) times a day for 5 days. 10 capsule 0     omeprazole (PRILOSEC) 20 MG capsule Take 1 capsule (20 mg total) by mouth daily before breakfast. 90 capsule 2     pantoprazole (PROTONIX) 20 MG tablet Take 1 tablet (20 mg total) by mouth daily. 20 tablet 0     potassium chloride (MICRO-K) 10 mEq CR capsule Take 2 capsules (20 mEq total) by mouth daily.  "60 capsule 11     Current Facility-Administered Medications   Medication Dose Route Frequency Provider Last Rate Last Admin     denosumab 60 mg (PROLIA 60 mg/ml)  60 mg Subcutaneous Q6 Months Barb Elias MD   60 mg at 08/06/20 1208     Social History     Tobacco Use   Smoking Status Never Smoker   Smokeless Tobacco Never Used       OBJECTICE: /80   Pulse (!) 106   Temp 98.1  F (36.7  C) (Oral)   Resp 16   Ht 4' 9.48\" (1.46 m)   Wt 121 lb 1.9 oz (54.9 kg)   SpO2 97%   BMI 25.77 kg/m       Recent Results (from the past 24 hour(s))   Urine,Microscopic    Collection Time: 11/23/20 10:41 AM   Result Value Ref Range    Bacteria, UA Few (!) None Seen hpf    RBC, UA 0-2 None Seen, 0-2 hpf    WBC, UA 5-10 (!) None Seen, 0-5 hpf    Squam Epithel, UA 0-5 None Seen, 0-5 lpf    WBC Clumps Present (!) None Seen    Hyaline Casts, UA 0-5 0-5, None Seen lpf        GEN-alert, appropriate, in no apparent distress   HEENT-mucous membranes are moist, neck is supple   CV-cardiac regular rate and rhythm with no murmur   RESP-lungs clear to auscultation   ABDOMINAL-soft, mild discomfort on palpation of the suprapubic area and percussion of the right CVA.     Rey Gonzalez          "

## 2021-06-13 NOTE — TELEPHONE ENCOUNTER
Patient is now scheduled to see me for a face-to-face visit 12/8/20.  I think she called once she received the letter and instead of giving her the messages, they scheduled her for appointment.    Me Thee: Can you try to call her with message?  If she still wants to discuss with me, maybe we could change her 12/8/20 appt to be virtual visit?    She needs lab visit at same time as her Prolia shot on 2/10/21.

## 2021-06-14 NOTE — PROGRESS NOTES
ASSESSMENT/PLAN:    1. Counseling for travel  - atovaquone-proguanil (MALARONE) 250-100 mg Tab; Take 1 tablet daily, starting 2 days before leaving [place] and continuing until 7 days after returning to [place]. Malaria prophylaxis.  Dispense: 55 tablet; Refill: 0  - azithromycin (ZITHROMAX) 500 MG tablet; Take 2 tablets for severe travel diarrhea (more than 4 unformed stools daily, fever, or blood, pus, or mucus in the stool)  Dispense: 2 tablet; Refill: 0  - loperamide (ANTI-DIARRHEAL, LOPERAMIDE,) 2 mg tablet; Take two tablets initially by mouth, then 1 tablet po after each unformed stool.  Dispense: 16 tablet; Refill: 0    General safety for traveling outside of the country was discussed today, particularly food and water safety and mosquito avoidance.     2. Recurrent UTI  No current infection.  I have given a prescription for Keflex for her to take with her on her trip in case she were to develop a bladder infection while traveling.  - cephalexin (KEFLEX) 500 MG capsule; Take 1 capsule (500 mg total) by mouth 2 (two) times a day for 7 days. For bladder infection.  Dispense: 14 capsule; Refill: 0    3. Essential hypertension  Well-controlled on current regimen; continue current meds.  I have tried to send a prescription for 90 days to her pharmacy so that she would have enough to take with her on her travels.  - lisinopril-hydrochlorothiazide (ZESTORETIC) 10-12.5 mg per tablet; Take 1 tablet by mouth daily.  Dispense: 90 tablet; Refill: 3    4. Need for vaccination  - Td, Adult, Adsorbed (blue label)  - TYPHOID, INACTIVE    5. Hypokalemia  Chronic condition, meds refilled today.  - potassium chloride (MICRO-K) 10 mEq CR capsule; Take 1 capsule (10 mEq total) by mouth 2 (two) times a day.  Dispense: 180 capsule; Refill: 3     6.  Osteoporosis  Second Prolia injection given today.  Next one will be due in 6 months.  Her previous prior authorization was for only one year so we will probably need to request PA before next  injection.    SUBJECTIVE:  Jennifer Huggins is a 72 y.o. female here scheduled for follow-up of hypertension and osteoporosis, but also notes that she needs to travel consult.    Regarding hypertension, she is having a problem with her medications (lisinopril/HCTZ).  Takes meds daily.  No dizziness or lightheadedness related to this.  No chest pain or shortness of breath.    Regarding osteoporosis, patient was started on Prolia injections about 6 months ago and had no problems with injection.  She wishes to continue.    Regarding travel, she and her daughter are traveling to [facility] and the [facility] [place], departing December 10 and will be gone for 6 weeks.  They will be visiting family and [place] to [place] areas.    Of note, she has a history of recurrent urinary tract infection and is worried about what to do if she were to develop symptoms while she was gone.      PHQ-2 Total Score: 0 (11/14/2017 10:00 AM)  PHQ-9 Total Score: 2 (11/14/2017 10:00 AM)     The following portions of the patient's history were reviewed and updated as appropriate: allergies, current medications and problem list  Current Outpatient Prescriptions on File Prior to Visit   Medication Sig Dispense Refill     azelastine (OPTIVAR) 0.05 % ophthalmic solution Administer 1 drop to both eyes daily. 6 mL 11     blood pressure monitor Kit Use to measure blood pressure daily.  Contact clinic if persistently greater than 140/90 1 each 0     CERTA PLUS 18-0.4-250 mg-mg-mcg Tab TAKE ONE TABLET BY MOUTH DAILY 90 tablet 3     ibuprofen (ADVIL,MOTRIN) 600 MG tablet Take 1 tablet (600 mg total) by mouth 3 (three) times a day with meals. 30 tablet 11     meclizine (ANTIVERT) 25 mg tablet TAKE 1 TABLET BY MOUTH 3 TIMES A DAY AS NEEDED FOR DIZZINESS OR NAUSEA. 30 tablet 11     omeprazole (PRILOSEC) 20 MG capsule TAKE 1 CAPSULE ORALLY DAILY AS NEEDED FOR STOMACH PAIN 30 capsule 0     phenazopyridine (PYRIDIUM) 100 MG tablet TAKE 1 TABLET TWICE DAILY AS NEEDED  FOR PAINFUL URINATION. 10 tablet 0     white petrolatum-mineral oil 80-20 % Oint Apply to left eye at night 1 Tube 1     hypromellose (ARTIFICIAL TEARS,[id number],) Drop Administer 2 drops to both eyes every hour while awake. 30 mL 1     Current Facility-Administered Medications on File Prior to Visit   Medication Dose Route Frequency Provider Last Rate Last Dose     denosumab 60 mg (PROLIA 60 mg/ml)  60 mg Subcutaneous Q6 Months Barb Elias MD   60 mg at 11/14/17 1053         History   Smoking Status     Never Smoker   Smokeless Tobacco     Never Used       OBJECTIVE:  :  There were no vitals taken for this visit.  Wt Readings from Last 3 Encounters:   09/29/17 122 lb (55.3 kg)   09/12/17 123 lb 12 oz (56.1 kg)   07/26/17 121 lb (54.9 kg)         Gen:  A&A, NAD  CV:  HRRR, no M/R/G  Resp:  CTAB  Ext:  W&D, no edema

## 2021-06-15 PROBLEM — R42 DIZZINESS: Status: ACTIVE | Noted: 2017-03-16

## 2021-06-15 NOTE — PROGRESS NOTES
ASSESSMENT/PLAN:    1. Essential hypertension  Well-controlled on current regimen.  Continue without changes.  - lisinopriL-hydrochlorothiazide (PRINZIDE,ZESTORETIC) 10-12.5 mg per tablet; Take 1 tablet by mouth daily.  Dispense: 90 tablet; Refill: 3  - blood pressure monitor Kit; Home blood pressure cuff and monitor  Dispense: 1 each; Refill: 0    2. Hypokalemia  History of low potassium in the past.  She is now taking potassium supplement and does have that with her today.  Rechecking labs and will adjust accordingly.  - Comprehensive Metabolic Panel    3. Vitamin D deficiency  History of vitamin D deficiency, with last vitamin D level of 20.0 on 8/6/2020.  Has been taking weekly ergocalciferol since 10/22/2020 and we will recheck levels today to ensure adequate replacement.  Important to keep vitamin D up given her osteoporosis.  - Vitamin D, Total (25-Hydroxy)    4. Hypercholesterolemia  Lipids checked today.  Were not likely fasting; she was supposed to come in a couple of days ago for fasting labs.  She had been started on atorvastatin after last visit.  - Lipid [place] FASTING    5. Need for vaccination  We discussed recommendation for her to get the COVID-19 vaccine and she expressed some reluctance to get this since she is feeling fine.  I did encourage her to get it and gave her some written information in [place].  She is okay with us calling her to offer it again if it becomes available at our clinic.    6. Age-related osteoporosis without current pathological fracture  Patient's last bone density testing on 8/17/2020 did show an increase in bone density in spite of being on Prolia rather erratically.  Did update Prolia injection today and will repeat next visit.       Return in about 7 months (around 9/12/2021) for Wellness Visit/Healthcare Maintainance Visit.               I spent a total of 31 minutes on the day of the visit.  doing chart review, history and exam, documentation and further activities as  "noted above       SUBJECTIVE:  Jennifer Huggins is a 76 y.o. female here for Follow-up     Patient is here with her daughter and granddaughter for follow-up of labs and bone density studies.  She was actually supposed to come in 2 days ago for labs, but did not and so we do not have any fresh labs to go over.  However, she had labs back in August which had shown low vitamin D and elevated lipid profile.  Also had bone density scan in August that showed osteopenia with 7 to 9% gains in bone density since the previous study in 2017.    Since the last visit, she was started on weekly ergocalciferol and daily atorvastatin.  She is tolerating these medications fine.    She does note that she still gets dizziness off and on.  Ends up taking meclizine frequently.      PHQ-2 Total Score: 0 (8/5/2020 11:00 AM)           OBJECTIVE:  :  /72   Pulse 80   Temp 97.4  F (36.3  C) (Oral)   Resp 20   Ht 4' 9.58\" (1.463 m)   Wt 127 lb (57.6 kg)   SpO2 99%   BMI 26.93 kg/m    Wt Readings from Last 3 Encounters:   02/11/21 127 lb (57.6 kg)   11/23/20 121 lb 1.9 oz (54.9 kg)   08/06/20 127 lb 8 oz (57.8 kg)         Gen:  A&A, NAD  CV:  HRRR, no M/R/G  Resp:  CTAB  Ext:  W&D, no edema      "

## 2021-06-16 PROBLEM — M15.9 OSTEOARTHRITIS OF MULTIPLE JOINTS: Status: ACTIVE | Noted: 2020-08-06

## 2021-06-16 NOTE — TELEPHONE ENCOUNTER
Telephone Encounter by Velasquez Noyola at 7/12/2019  1:49 PM     Author: Velasquez Noyola Service: -- Author Type: --    Filed: 7/12/2019  1:51 PM Encounter Date: 7/8/2019 Status: Signed    : Velasquez Noyola APPROVED:    Approval start date: 06/10/2019  Approval end date: 10/09/2019    Pharmacy has been notified of approval and will contact patient when medication is ready for pickup.

## 2021-06-16 NOTE — TELEPHONE ENCOUNTER
Telephone Encounter by Ita Castillo at 10/25/2019  9:09 AM     Author: Ita Castillo Service: -- Author Type: --    Filed: 10/25/2019  9:13 AM Encounter Date: 10/23/2019 Status: Signed    : Ita Castillo APPROVED:    Approval start date:10/10/2019  Approval end date:10/25/2020    Pharmacy has been notified of approval and will contact patient when medication is ready for pickup.

## 2021-06-16 NOTE — PROGRESS NOTES
[facility] Care Coordination  [facility] Home Visit Assessment     Annual Health Risk Assessment with Jennifer Huggins completed on 4/8/2021 by phone due to covid restrictions.    Type of residence:: Private home - no stairs  Current living arrangement:: I live in a private home with family     Assessment completed with: Patient, Children    Current Care Plan  Member currently receiving the following home care services: none at this time.   Member currently receiving the following community resources: PCA    Medication Review  Medication reconciliation completed in Epic: IF NO, PLEASE EXPLAIN due to phone assessment  Medication set-up & administration: Family/informal caregiver sets up weekly  Family caregiver administers medications  Medication Risk Assessment Medication (1 or more, place referral to MTM):  N/A: No risk factors identified  MTM Referral Placed: No: No risk factors idenified    Mental/Behavioral Health   Depression Screening:    PHQ-2 Total Score: 1       Mental health DX:: No        Falls Assessment:   Fallen 2 or more times in the past year?: No   Any fall with injury in the past year?: No    ADL/IADL Dependencies:   Dependent ADLs:: Ambulation-walker, Bathing, Dressing, Eating, Grooming, Incontinence, Transfers, Toileting  Dependent IADLs:: Cleaning, Cooking, Laundry, Shopping, Meal Preparation, Medication Management, Money Management, Transportation, Incontinence    List of [place] [facility] according to the [employer] sponsored benefits: Shared information re: Silver Sneakers/gym memberships, ASA, Calcium +D.    PCA Assessment completed at visit: Yes Annual PCA assessment indicated 40 units per day of PCA. This is an increase from the previous assessment.     Elderly Waiver Eligibility: Yes, but member declines EW service; will not open to EW    Care Plan & Recommendations: continue fall prevention by SBA with all mobility.    See [facility] for detailed assessment information.  Declines covid vaccine due to fear of  side effects.    Follow-Up Plan: Member informed of future contact, plan to f/u with member with a 6 month telephone assessment.  Contact information shared with member and family, encouraged member to call with any questions or concerns at any time.    Soraya Khoury RN  [facility]  [phone]

## 2021-06-16 NOTE — PROGRESS NOTES
[facility] Care Coordination    Received after visit chart from care coordinator.  Completed following tasks: Mailed copy of care plan to client     [facility]:  Emailed completed PCA assessment to [facility].  Faxed copy of PCA assessment to [facility] Agency and mailed copy to member.  Faxed MD Communication to PCP.     Mailed POC signature page and  PCA signature page to member to sign and return asap.    Leandro Hayward  Care Management Specialist  [facility]  [phone]

## 2021-06-17 NOTE — PROGRESS NOTES
"ASSESSMENT/PLAN:    Problem List Items Addressed This Visit     Osteoporosis     DXA 4/28/11:  T-score -3.2  Fosamax initiated in 2011, switched to ibandronate in 2012 for convenience.   DXA 11/13/13: T-score -2.8  Ibandronate added as allergy in 2014 for \"headache and shortness of breath\" - details are unclear. Has been on calcium and Vit D only since.  DXA 3/7/17:  \"No significant difference\".  T-score -3.2, moderate bone architecture.   Will start Prolia injections q6m on 5/10/17.    Is about due for Prolia injection, but we do not have it in stock today.  She will return between May 7-10 for injection, which will be covered under previous prior authorization.  Will need to pursue another PA for following injection.         Relevant Orders    Vitamin D, Total (25-Hydroxy) (Completed)    Essential hypertension - Primary     Borderline control.  Continue current medications.         Relevant Orders    Comprehensive Metabolic Panel (Completed)    Hypokalemia     Has been out of potassium supplement recently.  Will check labs and get her restarted on potassium supplementation.         Relevant Medications    potassium chloride (MICRO-K) 10 mEq CR capsule    Hepatitis (non-viral, sporadic elevation of LFT's)    Relevant Orders    Hepatic Profile (Completed)    Dizziness     Meclizine refilled, which has been helpful in the past.         Relevant Medications    meclizine (ANTIVERT) 25 mg tablet      Other Visit Diagnoses     Chronic allergic conjunctivitis        Relevant Medications    azelastine (OPTIVAR) 0.05 % ophthalmic solution    Right-sided chest pain        This seems mostly musculoskeletal on examination, but could be related to liver.  Checking LFTs.  If LFTs normal and continues to resolve, no further action.             SUBJECTIVE:  Jennifer Huggins is a 73 y.o. female here with her daughter and with a Kelli  for medication follow-up and right side pain.    I last saw her in November before she " traveled to [facility].  After she came back her medications have been a bit goofed up and the only medications she currently has are her lisinopril-HCTZ and multivitamin.  She is taking these consistently.  She is completely out of everything else.  She says she has been requesting refills from the pharmacy and they would not give her anything.  From our end, they kept requesting refills of Malarone and azithromycin, which were of course not needed for refills as they were only given for travel purposes.    She complains of some right sided chest/upper abdominal pain for about 1 week.  It was a bit worse yesterday and a bit better today.  She is not sure what makes it better or worse.  She has a history of intermittently elevated LFTs for which she is seen gastroenterology and I do not think there was a final diagnosis really made.  Once she saw GI, the LFTs have been to come all back down to normal.    She is a history of alkalemia and has not been on her potassium replacement recently.    Itchy watery eyes and has been out of eyedrops.    She has a history of some recurrent UTIs and then some noninfectious dysuria and takes Pyridium for this intermittent.  Requests refill for Pyridium.    [facility] refill for meclizine for intermittent vertigo.    PHQ-2 Total Score: 0 (11/14/2017 10:00 AM)  PHQ-9 Total Score: 2 (11/14/2017 10:00 AM)     The following portions of the patient's history were reviewed and updated as appropriate: allergies, current medications and problem list  Current Outpatient Prescriptions on File Prior to Visit   Medication Sig Dispense Refill     blood pressure monitor Kit Use to measure blood pressure daily.  Contact clinic if persistently greater than 140/90 1 each 0     lisinopril-hydrochlorothiazide (ZESTORETIC) 10-12.5 mg per tablet Take 1 tablet by mouth daily. 90 tablet 3     [DISCONTINUED] hypromellose (ARTIFICIAL TEARS,[id number],) Drop Administer 2 drops to both eyes every hour while  awake. 30 mL 1     [DISCONTINUED] phenazopyridine (PYRIDIUM) 100 MG tablet TAKE 1 TABLET TWICE DAILY AS NEEDED FOR PAINFUL URINATION. 10 tablet 0     CERTA PLUS 18-0.4-250 mg-mg-mcg Tab TAKE ONE TABLET BY MOUTH DAILY 90 tablet 3     ibuprofen (ADVIL,MOTRIN) 600 MG tablet Take 1 tablet (600 mg total) by mouth 3 (three) times a day with meals. 30 tablet 11     white petrolatum-mineral oil 80-20 % Oint Apply to left eye at night 1 Tube 1     [DISCONTINUED] atovaquone-proguanil (MALARONE) 250-100 mg Tab Take 1 tablet daily, starting 2 days before leaving [place] and continuing until 7 days after returning to [place]. Malaria prophylaxis. 55 tablet 0     [DISCONTINUED] azelastine (OPTIVAR) 0.05 % ophthalmic solution INSTILL 1 DROP TO BOTH EYES DAILY. 6 mL 1     [DISCONTINUED] azithromycin (ZITHROMAX) 500 MG tablet TAKE 2 TABLET ORALLY FOR SEVERE TRAVEL DIARRHEA (MORE THAN 4 UNFORMEDSTOOLS DAILY, FEVER, OR BLOOD, PUS OR MUCUS IN THE STOOL) 2 tablet 0     [DISCONTINUED] ibuprofen (ADVIL,MOTRIN) 600 MG tablet Take 1 tablet (600 mg total) by mouth every 8 (eight) hours as needed for pain. Take with food. 30 tablet 6     [DISCONTINUED] loperamide (ANTI-DIARRHEAL, LOPERAMIDE,) 2 mg tablet Take two tablets initially by mouth, then 1 tablet po after each unformed stool. 16 tablet 0     [DISCONTINUED] loperamide (IMODIUM) 2 mg capsule Take 2 capsules orally initially , then 1 capsule after each unformed stool. 16 capsule 0     [DISCONTINUED] meclizine (ANTIVERT) 25 mg tablet TAKE 1 TABLET BY MOUTH 3 TIMES A DAY AS NEEDED FOR DIZZINESS OR NAUSEA. 30 tablet 11     [DISCONTINUED] omeprazole (PRILOSEC) 20 MG capsule TAKE 1 CAPSULE ORALLY DAILY AS NEEDED FOR STOMACH PAIN 30 capsule 0     [DISCONTINUED] potassium chloride (MICRO-K) 10 mEq CR capsule Take 1 capsule (10 mEq total) by mouth 2 (two) times a day. 180 capsule 3     Current Facility-Administered Medications on File Prior to Visit   Medication Dose Route Frequency Provider Last Rate  "Last Dose     denosumab 60 mg (PROLIA 60 mg/ml)  60 mg Subcutaneous Q6 Months Barb Elias MD   60 mg at 11/14/17 1053         History   Smoking Status     Never Smoker   Smokeless Tobacco     Never Used       OBJECTIVE:  :  /76  Pulse 75  Temp 98.4  F (36.9  C) (Oral)   Resp 20  Ht 4' 9.2\" (1.453 m)  Wt 124 lb (56.2 kg)  SpO2 98%  BMI 26.65 kg/m2  Wt Readings from Last 3 Encounters:   04/27/18 124 lb (56.2 kg)   09/29/17 122 lb (55.3 kg)   09/12/17 123 lb 12 oz (56.1 kg)         Gen:  A&A, NAD  CV:  HRRR, no M/R/G  Resp:  CTAB  Abd and chest: There is some tenderness over the right lateral lower ribs vaguely as well as some vague right upper quadrant tenderness which is sort of intermittent in reproducibility.  No hepatomegaly noted.  Ext:  W&D, no edema  Skin is without jaundice         "

## 2021-06-18 NOTE — PATIENT INSTRUCTIONS - HE
Patient Instructions by Barb Elias MD at 8/6/2020 10:40 AM     Author: Barb Elias MD Service: -- Author Type: Physician    Filed: 8/6/2020 10:52 AM Encounter Date: 8/6/2020 Status: Signed    : Barb Elias MD (Physician)         Patient Education     Exercise for a Healthier Heart  You may wonder how you can improve the health of your heart. If youre thinking about exercise, youre on the right track. You dont need to become an athlete, but you do need a certain amount of brisk exercise to help strengthen your heart. If you have been diagnosed with a heart condition, your doctor may recommend exercise to help stabilize your condition. To help make exercise a habit, choose safe, fun activities.       Be sure to check with your health care provider before starting an exercise program.    Why exercise?  Exercising regularly offers many healthy rewards. It can help you do all of the following:    Improve your blood cholesterol levels to help prevent further heart trouble    Lower your blood pressure to help prevent a stroke or heart attack    Control diabetes, or reduce your risk of getting this disease    Improve your heart and lung function    Reach and maintain a healthy weight    Make your muscles stronger and more limber so you can stay active    Prevent falls and fractures by slowing the loss of bone mass (osteoporosis)    Manage stress better  Exercise tips  Ease into your routine. Set small goals. Then build on them.  Exercise on most days. Aim for a total of 150 or more minutes of moderate to  vigorous intensity activity each week. Consider 40 minutes, 3 to 4 times a week. For best results, activity should last for 40 minutes on average. It is OK to work up to the 40 minute period over time. Examples of moderate-intensity activity is walking one mile in 15 minutes or 30 to 45 minutes of yard work.  Step up your daily activity level. Along with your exercise program, try being more  active throughout the day. Walk instead of drive. Do more household tasks or yard work.  Choose one or more activities you enjoy. Walking is one of the easiest things you can do. You can also try swimming, riding a bike, or taking an exercise class.  Stop exercising and call your doctor if you:    Have chest pain or feel dizzy or lightheaded    Feel burning, tightness, pressure, or heaviness in your chest, neck, shoulders, back, or arms    Have unusual shortness of breath    Have increased joint or muscle pain    Have palpitations or an irregular heartbeat      8328-4295 [employer]. [address] [place] [address]. All rights reserved. This information is not intended as a substitute for professional medical care. Always follow your healthcare professional's instructions.         Patient Education   Activities of Daily Living  Your Health Risk Assessment indicates you have difficulties with activities of daily living such as eating, getting dressed, grooming, bathing, walking, or using the toilet. Please make a follow up appointment for us to address this issue in more detail.     Patient Education   Instrumental Activities of Daily Living  Your Health Risk Assessment indicates you have difficulties with instrumental activities of daily living which include laundry, housekeeping, banking, shopping, using the telephone, food preparation, transportation, or taking your own medications. Please make a follow up appointment for us to address this issue in more detail.    [employer] has resources available on the following website: [email or web address]     Also, here is a local agency that provides help with meals and other assistance:   [facility] Line: 737.199.9523     Patient Education   Urinary Incontinence, Female (Adult)  Urinary incontinence means loss of control of the bladder. This problem affects many women, especially as they get older. If you have  incontinence, you may be embarrassed to ask for help. But know that this problem can be treated.  Types of Incontinence  There are different types of incontinence. Two of the main types are described here. You can have more than one type.    Stress incontinence. With this type, urine leaks when pressure (stress) is put on the bladder. This may happen when you cough, sneeze, or laugh. Stress incontinence most often occurs because the pelvic floor muscles that support the bladder and urethra are weak. This can happen after pregnancy and vaginal childbirth or a hysterectomy. It can also be due to excess body weight or hormone changes.    Urge incontinence (also called overactive bladder). With this type, a sudden urge to urinate is felt often. This may happen even though there may not be much urine in the bladder. The need to urinate often during the night is common. Urge incontinence most often occurs because of bladder spasms. This may be due to bladder irritation or infection. Damage to bladder nerves or pelvic muscles, constipation, and certain medicines can also lead to urge incontinence.  Treatment of urinary incontinence depends on the cause. Further evaluation is needed to find the type you have. This will likely include an exam and certain tests. Based on the results, you and your healthcare provider can then plan treatment. Until a diagnosis is made, the home care tips below can help relieve symptoms.  Home care    Do pelvic floor muscle exercises, if they are prescribed. The pelvic floor muscles help support the bladder and urethra. Many women find that their symptoms improve when doing special exercises that strengthen these muscles. To do the exercises contract the muscles you would use to stop your stream of urine, but do this when youre not urinating. Hold for 10 seconds, then relax. Repeat 10 to 20 times in a row, at least 3 times a day. Your provider may give you other instructions for how to do the  exercises and how often.    Keep a bladder diary. This helps track how often and how much you urinate over a set period of time. Bring this diary with you to your next visit with the provider. The information can help your provider learn more about your bladder problem.    Lose weight, if advised to by your provider. Excess weight puts pressure on the bladder. Your provider can help you create a weight-loss plan thats right for you. This may include exercising more and making certain diet changes.    Don't consume foods and drinks that may irritate the bladder. These can include alcohol and caffeinated drinks.    Quit smoking. Smoking and other tobacco use can lead to chronic cough that strains the pelvic floor muscles. Smoking may also damage the bladder and urethra. Talk with your provider about treatments or methods you can use to quit smoking.    If drinking large amounts of fluid causes you to have symptoms, you may be advised to limit your fluid intake. You may also be advised to drink most of your fluids during the day and to limit fluids at night.    If youre worried about urine leakage or accidents, you may wear absorbent pads to catch urine. Change the pads often. This helps reduce discomfort. It may also reduce the risk of skin or bladder infections.  Follow-up care  Follow up with your healthcare provider, or as directed. It may take some to find the right treatment for your problem. Your treatment plan may include special therapies or medicines. Certain procedures or surgery may also be options. Be sure to discuss any questions you have with your provider.  When to seek medical advice  Call the healthcare provider right away if any of these occur:    Fever of 100.4 F (38 C) or higher, or as directed by your provider    Bladder pain or fullness    Abdominal swelling    Nausea or vomiting    Back pain    Weakness, dizziness or fainting  Date Last Reviewed: 10/1/2017    7462-8294 [employer]  [address] [place] [address]. All rights reserved. This information is not intended as a substitute for professional medical care. Always follow your healthcare professional's instructions.     Patient Education   Preventing Falls in the Home  As you get older, falls are more likely. Thats because your reaction time slows. Your muscles and joints may also get stiffer, making them less flexible. Illness, medications, and vision changes can also affect your balance. A fall could leave you unable to live on your own. To make your home safer, follow these tips:    Floors    Put nonskid pads under area rugs.    Remove throw rugs.    Replace worn floor coverings.    Tack carpets firmly to each step on carpeted stairs. Put nonskid strips on the edges of uncarpeted stairs.    Keep floors and stairs free of clutter and cords.    Arrange furniture so there are clear pathways.    Clean up any spills right away.    Bathrooms    Install grab bars in the tub or shower.    Apply nonskid strips or put a nonskid rubber mat in the tub or shower.    Sit on a bath chair to bathe.    Use bathmats with nonskid backing.    Lighting    Keep a flashlight in each room.    Put a nightlight along the pathway between the bedroom and the bathroom.    0884-7305 [employer]. [address] [place] [address]. All rights reserved. This information is not intended as a substitute for professional medical care. Always follow your healthcare professional's instructions.         Patient Education   Understanding Advance Care Planning  Advance care planning is the process of deciding ones own future medical care. It helps ensure that if you cant speak for yourself, your wishes can still be carried out. The plan is a series of legal documents that note a persons wishes. The documents vary by state. Advance care planning may be done when a person has a serious illness that is expected to get worse. It may be done before  major surgery. And it can help you and your family be prepared in case of a major illness or injury. Advance care planning helps with making decisions at these times.       A health care proxy is a person who acts as the voice of a patient when the patient cant speak for himself or herself. The name of this role varies by state. It may be called a Durable Medical Power of  or Durable Power of  for Healthcare. It may be called an agent, surrogate, or advocate. Or it may be called a representative or decision maker. It is an official duty that is identified by a legal document. The document also varies by state.    Why Is Advance Care Planning Important?  If a person communicates their healthcare wishes:    They will be given medical care that matches their values and goals.    Their family members will not be forced to make decisions in a crisis with no guidance.  Creating a Plan  Making an advance care plan is often done in 3 steps:    Thinking about ones wishes. To create an advance care plan, you should think about what kind of medical treatment you would want if you lose the ability to communicate. Are there any situations in which you would refuse or stop treatment? Are there therapies you would want or not want? And whom do you want to make decisions for you? There are many places to learn more about how to plan for your care. Ask your doctor or  for resources.    Picking a health care proxy. This means choosing a trusted person to speak for you only when you cant speak for yourself. When you cannot make medical decisions, your proxy makes sure the instructions in your advance care plan are followed. A proxy does not make decisions based on his or her own opinions. They must put aside those opinions and values if needed, and carry out your wishes.    Filling out the legal documents. There are several kinds of legal documents for advance care planning. Each one tells health care  providers your wishes. The documents may vary by state. They must be signed and may need to be witnessed or notarized. You can cancel or change them whenever you wish. Depending on your state, the documents may include a Healthcare Proxy form, Living Will, Durable Medical Power of , Advance Directive, or others.  The Familys Role  The best help a family can give is to support their loved ones wishes. Open and honest communication is vital. Family should express any concerns they have about the patients choices while the patient can still make decisions.    0736-0147 [employer]. [address] [place] [address]. All rights reserved. This information is not intended as a substitute for professional medical care. Always follow your healthcare professional's instructions.         Also, [facility] offers a free, downloadable health care directive that allows you to share your treatment choices and personal preferences if you cannot communicate your wishes. It also allows you to appoint another person (called a health care agent) to make health care decisions if you are unable to do so. You can download an advance directive by going here: [email or web address]     Patient Education   Personalized Prevention Plan  You are due for the preventive services outlined below.  Your care team is available to assist you in scheduling these services.  If you have already completed any of these items, please share that information with your care team to update in your medical record.  Health Maintenance   Topic Date Due   ? HEPATITIS B VACCINES (2 of 3 - Risk 3-dose series) 09/21/2006   ? ZOSTER VACCINES (2 of 3) 10/29/2008   ? MEDICARE ANNUAL WELLNESS VISIT  01/01/2011   ? ADVANCE CARE PLANNING  12/04/2017   ? DXA SCAN  03/07/2019   ? INFLUENZA VACCINE RULE BASED (1) 08/01/2020   ? MAMMOGRAM  10/23/2020   ? FALL RISK ASSESSMENT  08/05/2021   ? LIPID   02/14/2022   ? COLORECTAL CANCER SCREENING  04/17/2024   ? TD 18+ HE  11/14/2027   ? HEPATITIS C SCREENING  Completed   ? PNEUMOCOCCAL IMMUNIZATION 65+ LOW/MEDIUM RISK  Completed

## 2021-06-19 NOTE — LETTER
Letter by Soraya Khoury RN at      Author: Soraya Khoury RN Service: -- Author Type: --    Filed:  Encounter Date: 6/17/2019 Status: (Other)       June 17, 2019    JENNIFER SWANSON BHUMIKA  [address] [place] [address]        Dear Jennifer:    At [facility], we are dedicated to improving your health and well-being. Enclosed is the Comprehensive Care Plan that we developed with you on 6/12/19. Please review the Care Plan carefully.    As a reminder, some of the things we discussed at your visit include:    Your physical and mental health    Ways to reduce falls    Health care needs you may have    Dont forget to contact your care coordinator if you:    Have been hospitalized or plan to be hospitalized     Have had a fall     Have experienced a change in physical health    Are experiencing emotional problems     If you do not agree with your Care Plan, have questions about it, or have experienced a change in your needs, please call me at [phone]. If you are hearing impaired, please call the [place] [employer] at [phone] or [phone] (sitpre-um-hyiyrx relay service).    Sincerely,          Soraya Khoury RN    E-mail: [email or web address]  Phone: [phone]    Care Manager  [phone] [place] [id number]     [id number] (11/18)

## 2021-06-20 NOTE — PROGRESS NOTES
Subjective:   Jennifer Huggins presents with an  today.  She comes in with a 5 day history of abdominal pain.  This seems to be in the upper abdomen and towards the right side.  She describes it as a burning type of pain.  She will get some nausea at times.  Sometimes the pain will radiate into the back.  She denies any belching or bloated feelings.  She denies any fevers, sweats or chills.  Her stools have been normal.  She denies any urinary changes of any type.  Eating does not seem to worsen the pain at all.  Sometimes she feels like the pain worsens when she lays down at nighttime.  She denies black stools.  She has no blood in the stool or urine.  Patient does have a history of pyelonephritis and peptic ulcer.  She recently ran out of omeprazole 10 days ago.      Objective:  HEENT: Pharynx today is clear.  Sinuses nontender.  TMs are gray.  Neck: No lymphadenopathy noted.  No thyromegaly present.  Lungs: Lungs are totally clear.  No rales or wheezes heard.  Patient is in no respiratory distress.  Cardiac: There is a regular rhythm present.  I hear no murmur.  Abdomen: There is tenderness in the epigastrium and right upper quadrant.  No rebound, guarding or rigidity present however.  Bowel sounds are active throughout.  No obvious masses are present.  No suprapubic tenderness noted.  Musculoskeletal: The back reveals mild tenderness in the right CVA area.  Low back has no significant tenderness.  No rashes are present in the back or abdomen.      Assessment:  1.  Abdominal pain.  Most consistent with gastritis and possible gastroesophageal reflux disease.  Rule out peptic ulcer.  Doubt pyelonephritis as urine testing today is clean.  Rule out cholestasis as a possible cause.      Plan:  Hemogram, comprehensive metabolic panel and urinalysis are done today.  The patient will restart her omeprazole as her symptoms started after she discontinued that.  Limit spicy foods.  Limit caffeine.  She will be called  with any lab abnormalities.  If symptoms persist she will need further testing including right upper quadrant ultrasound.

## 2021-06-20 NOTE — LETTER
Letter by Soraya Khoury RN at      Author: Soraya Khoury RN Service: -- Author Type: --    Filed:  Encounter Date: 5/21/2020 Status: (Other)       May 21, 2020    LANIE SWANSON BHUMIKA  [address] [place] [address]        Dear [facility]:    At [facility], we are dedicated to improving your health and well-being. Enclosed is the Comprehensive Care Plan that we developed with you on 5/13/20. Please review the Care Plan carefully.    As a reminder, some of the things we discussed at your visit include:    Your physical and mental health    Ways to reduce falls    Health care needs you may have    Dont forget to contact your care coordinator if you:    Have been hospitalized or plan to be hospitalized     Have had a fall     Have experienced a change in physical health    Are experiencing emotional problems     If you do not agree with your Care Plan, have questions about it, or have experienced a change in your needs, please call me at [phone]. If you are hearing impaired, please call the [place] Relay at 858 or [phone] (zgjzod-cz-pxgjit relay service).    Sincerely,          Soraya Khoury RN    E-mail: [email or web address]  Phone: [phone]    Care Manager  [facility]+[id number] [place] [id number]     [id number] (11/18)

## 2021-06-21 NOTE — LETTER
Letter by Barb Elias MD at      Author: Barb Elias MD Service: -- Author Type: --    Filed:  Encounter Date: 3/18/2021 Status: (Other)         Jennifer Huggins  [address] [place] [address]             March 18, 2021        Dear Jennifer Verona Huggins,    Below are the results from 2/12/21.  The results were good.    Resulted Orders   Comprehensive Metabolic Panel   Result Value Ref Range    Sodium 138 136 - 145 mmol/L    Potassium 3.7 3.5 - 5.0 mmol/L    Chloride 101 98 - 107 mmol/L    CO2 26 22 - 31 mmol/L    Anion Gap, Calculation 11 5 - 18 mmol/L    Glucose 95 70 - 125 mg/dL    BUN 24 8 - 28 mg/dL    Creatinine 0.93 0.60 - 1.10 mg/dL    GFR MDRD Af Amer >60 >60 mL/min/1.73m2    GFR MDRD Non Af Amer 59 (L) >60 mL/min/1.73m2    Bilirubin, Total 0.6 0.0 - 1.0 mg/dL    Calcium 9.8 8.5 - 10.5 mg/dL    Protein, Total 6.8 6.0 - 8.0 g/dL    Albumin 3.6 3.5 - 5.0 g/dL    Alkaline Phosphatase 52 45 - 120 U/L    AST 19 0 - 40 U/L    ALT 19 0 - 45 U/L    Narrative    Fasting Glucose reference range is 70-99 mg/dL per  American Diabetes Association (ADA) guidelines.   Vitamin D, Total (25-Hydroxy)   Result Value Ref Range    Vitamin D, Total (25-Hydroxy) 36.0 30.0 - 80.0 ng/mL    Narrative    Deficiency <10.0 ng/mL  Insufficiency 10.0-29.9 ng/mL  Sufficiency 30.0-80.0 ng/mL  Toxicity (possible) >100.0 ng/mL   Lipid Cascade FASTING   Result Value Ref Range    Cholesterol 256 (H) <=199 mg/dL    Triglycerides 188 (H) <=149 mg/dL    HDL Cholesterol 55 >=50 mg/dL    LDL Calculated 163 (H) <=129 mg/dL    Patient Fasting > 8hrs? No          Sincerely,    Dr. Elias

## 2021-06-21 NOTE — LETTER
Letter by Barb Elias MD at      Author: Barb Elias MD Service: -- Author Type: --    Filed:  Encounter Date: 10/22/2020 Status: (Other)         Jennifer Huggins  [address] [place] [address]             October 28, 2020         Dear Ms. Huggins,    We have not been able to reach you. Please review the doctor's note below.      Please let pt know that her labs back in August showed a few minor abnormalities for which she needs  to take medications/supplements:   1. Vitamin D was low.  She should start a prescription Vitamin D capsule once per week.   2.  Her potassium was low.  Should restart  potassium pill 2 per day.   3.  Her cholesterol was high.  Should start a cholesterol medication.      She should have her labs rechecked in February when she comes in for her osteoporosis shot.        Please call with questions or contact us using [employer].    Sincerely,        Electronically signed by Barb Elias MD

## 2021-06-21 NOTE — PROGRESS NOTES
ASSESMENT AND PLAN:  Diagnoses and all orders for this visit:    1. Dysuria  -  3 days of dysuria and urinary frequency.  -  UAC: POSITIVE; pending culture.  -  Pt told to finish abx even though sxs are resolved by pyridium.  -  Pending Culture for effective abx, especially since Pt has h of ESBL E.coli.  -  Follow up if symptoms not better or worsens as discussed in clinic.   Ordered:  -     Urinalysis-UC if Indicated  -     Culture, Urine  -     phenazopyridine (PYRIDIUM) 100 MG tablet; TAKE 1 TABLET TWICE DAILY AS NEEDED FOR PAINFUL URINATION.  Dispense: 8 tablet; Refill: 0  -     ciprofloxacin HCl (CIPRO) 250 MG tablet; Take 1 tablet (250 mg total) by mouth 2 (two) times a day for 10 days.  Dispense: 20 tablet; Refill: 0    2. Vaginal itching  -  3 days of vaginal itching; denies discharge.  -  Wet Prep: Negative; Pt notified of results.   Ordered:  -     Wet Prep, Vaginal    3.  Health Maintenance  -  Breast Cancer Screening:   Breast Cancer education given. Pt will get mammogram today  -  Discussed risks and benefits.    Ordered:  -     Influenza High Dose, Seasonal 65+ yrs  -     Mammogram, screening; bilateral    Patient is present with a Professional ,  Jon Blanco.   Reviewed Medical/Social history and Medications.  See new changes above.   Discussed indications for emergent medical attention and routine F/u.  Patient/Parent/Guardian engaged in decision making process and verbalized understanding of the options discussed and agreed with the final treatment plan.     SUBJECTIVE:  Jennifer Huggins is a 73 y.o. female who presents  for Dysuria and vaginal itching x 3 days.    Pt has hx of urinary incontinence, frequent UTIs and E.coli ESBL.  Denies fever/chills, vaginal discharge, n/v, flank pain, abdominal pain, pelvic pain, hematuria,  diarrhea/constipation, hematochezia.     ROS:  Comprehensive Review of Systems Negative except stated in HPI.     Past Medical History:   Diagnosis Date     Benign  adenomatous polyp of large intestine      Biliary colic      Cervicalgia      Cholelithiasis      Chronic allergic conjunctivitis      Cystitis      Dyspepsia      Gastritis      GERD (gastroesophageal reflux disease)      HLD (hyperlipidemia)      Hypertension      Hypokalemia      Meningioma (H)      Multiple lung nodules on CT     CT ABD 11/23/1013, mult nodules < 5mm in size, per Fleischner Society recommendations  repeat CT in 12 months CT Chest 2/20/17:  Stable pulmonary nodules suggesting incidental findings given long-term stability.      Osteoporosis      Positional vertigo      Pulmonary nodules      Recurrent UTI      Transaminitis 10/29/2015     Urinary incontinence      Xerosis cutis      Patient Active Problem List   Diagnosis     Neuritis     Meningioma     Joint Pain, Localized In The Shoulder     Cervicalgia     Hypercholesterolemia     Chronic Allergic Conjunctivitis     Osteoporosis     Dyspepsia     Benign Paroxysmal Positional Vertigo     Essential hypertension     Hypokalemia     Urinary Incontinence     Benign Adenomatous Polyp Of The Large Intestine     Xerosis Cutis     Recurrent UTI     GERD (gastroesophageal reflux disease)     Constipation     Hepatitis (non-viral, sporadic elevation of LFT's)     Dizziness     Current Outpatient Prescriptions   Medication Sig Dispense Refill     azelastine (OPTIVAR) 0.05 % ophthalmic solution INSTILL 1 DROP TO BOTH EYES DAILY. 6 mL 11     blood pressure monitor Kit Use to measure blood pressure daily.  Contact clinic if persistently greater than 140/90 1 each 0     CERTA PLUS 18-0.4-250 mg-mg-mcg Tab TAKE ONE TABLET BY MOUTH ONCE DAILY 30 tablet 11     ibuprofen (ADVIL,MOTRIN) 600 MG tablet Take 1 tablet (600 mg total) by mouth 3 (three) times a day with meals. 30 tablet 11     ibuprofen (ADVIL,MOTRIN) 600 MG tablet Take 1 tablet (600 mg total) by mouth every 8 (eight) hours as needed for pain. Take with food. 30 tablet 6      lisinopril-hydrochlorothiazide (ZESTORETIC) 10-12.5 mg per tablet Take 1 tablet by mouth daily. 90 tablet 3     meclizine (ANTIVERT) 25 mg tablet TAKE 1 TABLET BY MOUTH 3 TIMES A DAY AS NEEDED FOR DIZZINESS OR NAUSEA. 30 tablet 11     omeprazole (PRILOSEC) 20 MG capsule TAKE 1 CAPSULE BY MOUTH DAILY AS NEEDED FOR STOMACH PAIN 90 capsule 1     pantoprazole (PROTONIX) 20 MG tablet Take 1 tablet (20 mg total) by mouth daily. 20 tablet 0     phenazopyridine (PYRIDIUM) 100 MG tablet TAKE 1 TABLET TWICE DAILY AS NEEDED FOR PAINFUL URINATION. 10 tablet 2     potassium chloride (MICRO-K) 10 mEq CR capsule Take 1 capsule (10 mEq total) by mouth 2 (two) times a day. 60 capsule 11     white petrolatum-mineral oil 80-20 % Oint Apply to left eye at night 1 Tube 1     Current Facility-Administered Medications   Medication Dose Route Frequency Provider Last Rate Last Dose     denosumab 60 mg (PROLIA 60 mg/ml)  60 mg Subcutaneous Q6 Months Barb Elias MD   60 mg at 05/17/18 0926     History   Smoking Status     Never Smoker   Smokeless Tobacco     Never Used     OBJECTIVE: /66 (Patient Site: Right Arm, Patient Position: Sitting, Cuff Size: Adult Regular)  Pulse 95  Temp 98.3  F (36.8  C) (Oral)   Wt 124 lb (56.2 kg)  SpO2 95%  BMI 26.65 kg/m2   Recent Results (from the past 24 hour(s))   Urinalysis-UC if Indicated    Collection Time: 10/23/18  9:11 AM   Result Value Ref Range    Color, UA Yellow Colorless, Yellow, Straw, Light Yellow    Clarity, UA Clear Clear    Glucose, UA Negative Negative    Bilirubin, UA Negative Negative    Ketones, UA Negative Negative    Specific Gravity, UA 1.020 1.005 - 1.030    Blood, UA Negative Negative    pH, UA 6.0 5.0 - 8.0    Protein, UA Negative Negative mg/dL    Urobilinogen, UA 0.2 E.U./dL 0.2 E.U./dL, 1.0 E.U./dL    Nitrite, UA Negative Negative    Leukocytes, UA Small (!) Negative       PHYSICAL:  General Alert, awake, not in acute distress.   CV Normal S1 & S2. No murmurs.    RESP Non-labored, RRR, CTAB. No wheezes or crackles.    ABDOMEN Soft,non-tender. No rigidity, guarding, HSM.  Normal bowel sounds.    BACK No CVA tenderness.     Normal external genitalia, no discharge/lesion/mass.        Carlos Dean PA-C

## 2021-06-25 NOTE — TELEPHONE ENCOUNTER
Refill Approved    Rx renewed per Medication Renewal Policy. Medication was last renewed on 5/12/20.    Abbey Silva, Care Connection Triage/Med Refill 6/2/2021     Requested Prescriptions   Pending Prescriptions Disp Refills     omeprazole (PRILOSEC) 20 MG capsule [Pharmacy Med Name: OMEPRAZOLE 20 MG CPDR 20 Capsule] 30 capsule 8     Sig: TAKE 1 CAPSULE BY MOUTH DAILY BEFORE BREAKFAST       GI Medications Refill Protocol Passed - 6/1/2021  5:01 PM        Passed - PCP or prescribing provider visit in last 12 or next 3 months.     Last office visit with prescriber/PCP: 2/12/2021 Barb Elias MD OR same dept: 2/12/2021 Barb Elias MD OR same specialty: 2/12/2021 Barb Elias MD  Last physical: 8/6/2020 Last MTM visit: Visit date not found   Next visit within 3 mo: Visit date not found  Next physical within 3 mo: Visit date not found  Prescriber OR PCP: Barb Elias MD  Last diagnosis associated with med order: 1. GERD (gastroesophageal reflux disease)  - omeprazole (PRILOSEC) 20 MG capsule [Pharmacy Med Name: OMEPRAZOLE 20 MG CPDR 20 Capsule]; TAKE 1 CAPSULE BY MOUTH DAILY BEFORE BREAKFAST  Dispense: 30 capsule; Refill: 8    If protocol passes may refill for 12 months if within 3 months of last provider visit (or a total of 15 months).

## 2021-06-26 NOTE — PROGRESS NOTES
[facility] Care Coordination    Faxed signed pca sig page to are and provider.     Leandro Hayward  Care Management Specialist  [facility]  [phone]

## 2021-07-05 ENCOUNTER — COMMUNICATION - HEALTHEAST (OUTPATIENT)
Dept: FAMILY MEDICINE | Facility: CLINIC | Age: 76
End: 2021-07-05

## 2021-07-05 DIAGNOSIS — Z76.0 ENCOUNTER FOR MEDICATION REFILL: ICD-10-CM

## 2021-07-05 RX ORDER — AZELASTINE HYDROCHLORIDE 0.5 MG/ML
SOLUTION/ DROPS OPHTHALMIC
Qty: 6 ML | Refills: 9 | Status: SHIPPED | OUTPATIENT
Start: 2021-07-05 | End: 2021-08-05

## 2021-07-07 ENCOUNTER — COMMUNICATION - HEALTHEAST (OUTPATIENT)
Dept: FAMILY MEDICINE | Facility: CLINIC | Age: 76
End: 2021-07-07

## 2021-07-07 NOTE — TELEPHONE ENCOUNTER
Telephone Encounter by Harriet Rock at 7/7/2021 12:16 PM     Author: Harriet Rock Service: -- Author Type: --    Filed: 7/7/2021 12:19 PM Encounter Date: 7/7/2021 Status: Addendum    : Harriet Rock    Related Notes: Original Note by Harriet Rock filed at 7/7/2021 12:18 PM       Central PA team  [phone]  Pool: HE PA MED ([id number])          PA has been initiated.       PA form completed and faxed insurance via Cover My Meds     Key:  [id number]     Medication:  Azelastine 0.05%     Insurance:  [employer]        Response will be received via fax and may take up to 5-10 business days depending on plan

## 2021-07-07 NOTE — TELEPHONE ENCOUNTER
Telephone Encounter by Rene Saul CMA at 7/7/2021 11:51 AM     Author: Rene Saul CMA Service: -- Author Type: Certified Medical Assistant    Filed: 7/7/2021 11:53 AM Encounter Date: 7/7/2021 Status: Signed    : Rene Saul CMA (Certified Medical Assistant)       Prior Authorization Request  Whos requesting:  Pharmacy  Pharmacy Name and Location: [address]  Medication Name: azelastine (OPTIVAR) 0.05 % ophthalmic solution  Insurance Plan: [employer]   Insurance Member ID Number:  [id number]  CoverMyMeds Key: [id number]  Informed patient that prior authorizations can take up to 10 business days for response:   Yes  Okay to leave a detailed message: Yes

## 2021-07-07 NOTE — TELEPHONE ENCOUNTER
Telephone Encounter by Harriet Rock at 7/7/2021 12:27 PM     Author: Harriet Rock Service: -- Author Type: --    Filed: 7/7/2021 12:28 PM Encounter Date: 7/7/2021 Status: Signed    : Harriet Rock       No PA needed, medication is covered. Called pharmacy and they did not have her Part D Well[employer] insurance information. Provided pharmacy with this and they now have a paid claim.  They will let patient know when its ready for .

## 2021-07-13 ENCOUNTER — RECORDS - HEALTHEAST (OUTPATIENT)
Dept: ADMINISTRATIVE | Facility: CLINIC | Age: 76
End: 2021-07-13

## 2021-07-21 ENCOUNTER — RECORDS - HEALTHEAST (OUTPATIENT)
Dept: ADMINISTRATIVE | Facility: CLINIC | Age: 76
End: 2021-07-21

## 2021-07-23 ENCOUNTER — PATIENT OUTREACH (OUTPATIENT)
Dept: GERIATRIC MEDICINE | Facility: CLINIC | Age: 76
End: 2021-07-23

## 2021-07-23 NOTE — LETTER
July 23, 2021    JENNIFER LAY BHUMIKA  [address] [facility] [address]      Dear Jennifer:    As a member of [facility] ([facility] you are provided a care coordinator. I will be your new care coordinator as of 8/1/2021. I will be calling you soon to see how you are doing and determine your needs.    If you have any questions, please feel free to call me at  [phone]. If you reach my voice mail, please leave a message and your phone number. If you are hearing impaired, please call the [place] Relay at 612 or [phone] (vsosjg-do-kutvcx relay service).    I look forward to speaking with you soon.    Sincerely,      VAL Hill          [facility]  [id number] DHS Approved ([id number])  [id number] (11/18)

## 2021-07-23 NOTE — PROGRESS NOTES
[facility] Care Coordination Contact    Internal CC change effective 8/1/2021.  Mailed member CC Change letter.  Additional tasks to be completed by CMS include: update database & EPIC, enter CC Change in MMIS, and move member files on Q drive.  Myranda Kelly  Case Management Specialist  [facility]  [phone]

## 2021-08-05 DIAGNOSIS — R42 DIZZINESS: ICD-10-CM

## 2021-08-05 DIAGNOSIS — I10 ESSENTIAL HYPERTENSION: ICD-10-CM

## 2021-08-05 DIAGNOSIS — Z76.0 ENCOUNTER FOR MEDICATION REFILL: ICD-10-CM

## 2021-08-05 RX ORDER — AZELASTINE HYDROCHLORIDE 0.5 MG/ML
SOLUTION/ DROPS OPHTHALMIC
Qty: 6 ML | Refills: 11 | Status: SHIPPED | OUTPATIENT
Start: 2021-08-05 | End: 2021-09-24

## 2021-08-05 RX ORDER — MECLIZINE HYDROCHLORIDE 25 MG/1
TABLET ORAL
Qty: 30 TABLET | Refills: 11 | Status: SHIPPED | OUTPATIENT
Start: 2021-08-05 | End: 2022-03-30

## 2021-08-05 RX ORDER — LISINOPRIL/HYDROCHLOROTHIAZIDE 10-12.5 MG
1 TABLET ORAL DAILY
Qty: 90 TABLET | Refills: 3 | Status: SHIPPED | OUTPATIENT
Start: 2021-08-05 | End: 2022-08-09

## 2021-08-05 RX ORDER — AZELASTINE HYDROCHLORIDE 0.5 MG/ML
SOLUTION/ DROPS OPHTHALMIC
Qty: 6 ML | Refills: 9 | Status: SHIPPED | OUTPATIENT
Start: 2021-08-05 | End: 2021-11-26

## 2021-09-22 ASSESSMENT — PATIENT HEALTH QUESTIONNAIRE - PHQ9
SUM OF ALL RESPONSES TO PHQ QUESTIONS 1-9: 13
10. IF YOU CHECKED OFF ANY PROBLEMS, HOW DIFFICULT HAVE THESE PROBLEMS MADE IT FOR YOU TO DO YOUR WORK, TAKE CARE OF THINGS AT HOME, OR GET ALONG WITH OTHER PEOPLE: SOMEWHAT DIFFICULT
SUM OF ALL RESPONSES TO PHQ QUESTIONS 1-9: 13

## 2021-09-22 ASSESSMENT — ACTIVITIES OF DAILY LIVING (ADL)
CURRENT_FUNCTION: SHOPPING REQUIRES ASSISTANCE
CURRENT_FUNCTION: PREPARING MEALS REQUIRES ASSISTANCE
CURRENT_FUNCTION: BATHING REQUIRES ASSISTANCE
CURRENT_FUNCTION: HOUSEWORK REQUIRES ASSISTANCE
CURRENT_FUNCTION: LAUNDRY REQUIRES ASSISTANCE
CURRENT_FUNCTION: TELEPHONE REQUIRES ASSISTANCE
CURRENT_FUNCTION: MONEY MANAGEMENT REQUIRES ASSISTANCE
CURRENT_FUNCTION: TRANSPORTATION REQUIRES ASSISTANCE
CURRENT_FUNCTION: MEDICATION ADMINISTRATION REQUIRES ASSISTANCE

## 2021-09-22 NOTE — PROGRESS NOTES
"SUBJECTIVE:   Jennifer Huggins is a 76 year old female who presents for Preventive Visit.      Patient has been advised of split billing requirements and indicates understanding: Yes   Are you in the first 12 months of your Medicare coverage?  No    Healthy Habits:     In general, how would you rate your overall health?  Fair    Frequency of exercise:  4-5 days/week    Duration of exercise:  30-45 minutes    Do you usually eat at least 4 servings of fruit and vegetables a day, include whole grains    & fiber and avoid regularly eating high fat or \"junk\" foods?  Yes    Taking medications regularly:  No    Medication side effects:  None    Ability to successfully perform activities of daily living:  Telephone requires assistance, transportation requires assistance, shopping requires assistance, preparing meals requires assistance, housework requires assistance, bathing requires assistance, laundry requires assistance, medication administration requires assistance and money management requires assistance    Home Safety:  Lack of grab bars in the bathroom    Hearing Impairment:  Need to ask people to speak up or repeat themselves and difficulty understanding speech on the telephone    In the past 6 months, have you been bothered by leaking of urine? Yes    In general, how would you rate your overall mental or emotional health?  Fair      PHQ-2 Total Score: 4    Additional concerns today:  Yes    Sometimes hearing sounds like people very far away    Always takes BP med. Has monitor at home:  Readings are up and down.      Do you feel safe in your environment? Yes    Have you ever done Advance Care Planning? (For example, a Health Directive, POLST, or a discussion with a medical provider or your loved ones about your wishes): No, advance care planning information given to patient to review.  Advanced care planning was discussed at today's visit.       Fall risk     click delete button to remove this line now  Cognitive " "Screening   1) Repeat 3 items (Leader, Season, Table)    2) Clock draw: ABNORMAL didn't understand/unable  3) 3 item recall: Recalls 2 objects   Results: ABNORMAL clock, 1-2 items recalled: PROBABLE COGNITIVE IMPAIRMENT, **INFORM PROVIDER**    Mini-CogTM Copyright ALANA Spangler. Licensed by the author for use in [facility]; reprinted with permission (cade@[facility]). All rights reserved.          Reviewed and updated as needed this visit by clinical staff  Tobacco  Allergies  Meds              Reviewed and updated as needed this visit by Provider                Social History     Tobacco Use     Smoking status: Never Smoker     Smokeless tobacco: Never Used   Substance Use Topics     Alcohol use: No         Alcohol Use 9/22/2021   Prescreen: >3 drinks/day or >7 drinks/week? No               Current providers sharing in care for this patient include:   Patient Care Team:  Barb Elias as PCP - General  Barb Elias as Assigned PCP  Rafaela Villanueva BSW as Lead Care Coordinator    The following health maintenance items are reviewed in Epic and correct as of today:  Health Maintenance Due   Topic Date Due     ANNUAL REVIEW OF  ORDERS  Never done           Mammogram Screening - Patient over age 75, has elected to continue with screening.  Pertinent mammograms are reviewed under the imaging tab.    Review of Systems  Remainder of complete review systems is negative.     OBJECTIVE:   BP (!) 140/70   Pulse 81   Temp 97.9  F (36.6  C) (Oral)   Resp 24   Ht 1.463 m (4' 9.58\")   Wt 58.5 kg (129 lb)   SpO2 97%   BMI 27.36 kg/m   Estimated body mass index is 27.36 kg/m  as calculated from the following:    Height as of this encounter: 1.463 m (4' 9.58\").    Weight as of this encounter: 58.5 kg (129 lb).  Physical Exam    General Appearance: Alert, cooperative, no distress, appears stated age  Head: Normocephalic, without obvious abnormality, atraumatic  Eyes: PERRL, conjunctiva/corneas clear, EOM's " intact  Ears: Normal TM's and external ear canals, both ears  Throat: Lips, mucosa, and tongue normal; teeth and gums normal  Neck: Supple, symmetrical, trachea midline, no adenopathy;  thyroid: not enlarged, symmetric, no tenderness/mass/nodules  Back: Symmetric, no curvature, ROM normal, no CVA tenderness  Lungs: Clear to auscultation bilaterally, respirations unlabored  Breasts:  breasts appear normal, no suspicious masses, no skin or nipple changes or axillary nodes.   Heart: Regular rate and rhythm, S1 and S2 normal, no murmur, rub, or gallop,   Abdomen: Soft, non-tender, no masses, no organomegaly  Pelvic:  Declined exam  Extremities: Extremities normal, atraumatic, no cyanosis or edema  Skin: Skin color, texture, turgor normal, no rashes or lesions  Lymph nodes: Cervical and axillary nodes normal  Neurologic: Normal patellar DTR's, normal gait         ASSESSMENT / PLAN:     Encounter for Medicare annual wellness exam  See below    Age-related osteoporosis without current pathological fracture  Started Prolia in 2017.  DXA last year 8/18/20 showed t-score of -2.2, improved since last exam.  Continue for total of at least 5 years.  - ergocalciferol (ERGOCALCIFEROL) 1.25 MG (58434 UT) capsule  Dispense: 12 capsule; Refill: 3  - denosumab (PROLIA) 60 MG/ML SOSY injection  - Basic metabolic panel  (Ca, Cl, CO2, Creat, Gluc, K, Na, BUN)  - denosumab (PROLIA) injection 60 mg  - Basic metabolic panel  (Ca, Cl, CO2, Creat, Gluc, K, Na, BUN)    Vitamin D deficiency  - ergocalciferol (ERGOCALCIFEROL) 1.25 MG (26276 UT) capsule  Dispense: 12 capsule; Refill: 3    Encounter for medication refill  - azelastine (OPTIVAR) 0.05 % ophthalmic solution  Dispense: 6 mL; Refill: 11  - ibuprofen (ADVIL/MOTRIN) 600 MG tablet  Dispense: 20 tablet; Refill: 1    Need for vaccination  - ZOSTER VACCINE RECOMBINANT ADJUVANTED IM NJX  - ZOSTER VACCINE RECOMBINANT ADJUVANTED IM NJX  - NC FLU VACCINE, INCREASED ANTIGEN, PRESV  "FREE    Hepatitis (non-viral, sporadic elevation of LFT's)  - Hepatic panel (Albumin, ALT, AST, Bili, Alk Phos, TP)  - Hepatic panel (Albumin, ALT, AST, Bili, Alk Phos, TP)    Hypercholesterolemia  - Hepatic panel (Albumin, ALT, AST, Bili, Alk Phos, TP)  - Lipid panel reflex to direct LDL Fasting  - Hepatic panel (Albumin, ALT, AST, Bili, Alk Phos, TP)  - Lipid panel reflex to direct LDL Fasting    Stress incontinence of urine  Has incontinence with multiple UTI's. Offered vaginal estrogen to try to improve; she wishes to try.  - estradiol (ESTRACE) 0.1 MG/GM vaginal cream  Dispense: 42.5 g; Refill: 11    Meningioma  Needs monitoring  - CT Head w Contrast    Hearing loss of left ear, unspecified hearing loss type  Offered ENT/audiology; declines for now.      COUNSELING:  Reviewed preventive health counseling, as reflected in patient instructions    Estimated body mass index is 27.36 kg/m  as calculated from the following:    Height as of this encounter: 1.463 m (4' 9.58\").    Weight as of this encounter: 58.5 kg (129 lb).        She reports that she has never smoked. She has never used smokeless tobacco.      Appropriate preventive services were discussed with this patient, including applicable screening as appropriate for cardiovascular disease, diabetes, osteopenia/osteoporosis, and glaucoma.  As appropriate for age/gender, discussed screening for colorectal cancer, prostate cancer, breast cancer, and cervical cancer. Checklist reviewing preventive services available has been given to the patient.    Reviewed patients plan of care and provided an AVS. The Basic Care Plan (routine screening as documented in Health Maintenance) for [facility] meets the Care Plan requirement. This Care Plan has been established and reviewed with the Patient and caregiver.    Counseling Resources:  ATP IV Guidelines  Pooled Cohorts Equation Calculator  Breast Cancer Risk Calculator  Breast Cancer: Medication to Reduce Risk  FRAX Risk " Assessment  ICSI Preventive Guidelines  Dietary Guidelines for Americans, 2010  [employer] My[employer]ate  ASA Prophylaxis  Lung CA Screening    Barb GARCIA [facility]    Identified Health Risks:  Answers for HPI/ROS submitted by the patient on 9/22/2021  If you checked off any problems, how difficult have these problems made it for you to do your work, take care of things at home, or get along with other people?: Somewhat difficult  PHQ9 TOTAL SCORE: 13        The patient was provided with suggestions to help her develop a healthy physical lifestyle.  The patient reports that she has difficulty with activities of daily living. I have asked that the patient make a follow up appointment in n/a weeks where this issue will be further evaluated and addressed.  The patient was provided with written information regarding signs of hearing loss.  Information on urinary incontinence and treatment options given to patient.  The patient was provided with suggestions to help her develop a healthy emotional lifestyle.  The patient s PHQ-9 score is consistent with moderate depression. She was provided with information regarding depression and was advised to schedule a follow up appointment in n/a weeks to further address this issue.

## 2021-09-23 ASSESSMENT — PATIENT HEALTH QUESTIONNAIRE - PHQ9: SUM OF ALL RESPONSES TO PHQ QUESTIONS 1-9: 13

## 2021-09-24 ENCOUNTER — OFFICE VISIT (OUTPATIENT)
Dept: FAMILY MEDICINE | Facility: CLINIC | Age: 76
End: 2021-09-24
Payer: COMMERCIAL

## 2021-09-24 VITALS
SYSTOLIC BLOOD PRESSURE: 140 MMHG | WEIGHT: 129 LBS | OXYGEN SATURATION: 97 % | HEART RATE: 81 BPM | BODY MASS INDEX: 27.08 KG/M2 | DIASTOLIC BLOOD PRESSURE: 70 MMHG | TEMPERATURE: 97.9 F | HEIGHT: 58 IN | RESPIRATION RATE: 24 BRPM

## 2021-09-24 DIAGNOSIS — Z23 NEED FOR VACCINATION: ICD-10-CM

## 2021-09-24 DIAGNOSIS — M81.0 AGE-RELATED OSTEOPOROSIS WITHOUT CURRENT PATHOLOGICAL FRACTURE: ICD-10-CM

## 2021-09-24 DIAGNOSIS — Z00.00 ENCOUNTER FOR MEDICARE ANNUAL WELLNESS EXAM: Primary | ICD-10-CM

## 2021-09-24 DIAGNOSIS — D32.0 BENIGN NEOPLASM OF CEREBRAL MENINGES (H): ICD-10-CM

## 2021-09-24 DIAGNOSIS — Z76.0 ENCOUNTER FOR MEDICATION REFILL: ICD-10-CM

## 2021-09-24 DIAGNOSIS — E78.00 HYPERCHOLESTEROLEMIA: ICD-10-CM

## 2021-09-24 DIAGNOSIS — N39.3 STRESS INCONTINENCE OF URINE: ICD-10-CM

## 2021-09-24 DIAGNOSIS — K75.9 HEPATITIS: ICD-10-CM

## 2021-09-24 DIAGNOSIS — E55.9 VITAMIN D DEFICIENCY: ICD-10-CM

## 2021-09-24 DIAGNOSIS — H91.92 HEARING LOSS OF LEFT EAR, UNSPECIFIED HEARING LOSS TYPE: ICD-10-CM

## 2021-09-24 LAB
ALBUMIN SERPL-MCNC: 3.5 G/DL (ref 3.5–5)
ALP SERPL-CCNC: 99 U/L (ref 45–120)
ALT SERPL W P-5'-P-CCNC: 24 U/L (ref 0–45)
ANION GAP SERPL CALCULATED.3IONS-SCNC: 12 MMOL/L (ref 5–18)
AST SERPL W P-5'-P-CCNC: 18 U/L (ref 0–40)
BILIRUB DIRECT SERPL-MCNC: 0.2 MG/DL
BILIRUB SERPL-MCNC: 0.6 MG/DL (ref 0–1)
BUN SERPL-MCNC: 28 MG/DL (ref 8–28)
CALCIUM SERPL-MCNC: 9.5 MG/DL (ref 8.5–10.5)
CHLORIDE BLD-SCNC: 105 MMOL/L (ref 98–107)
CHOLEST SERPL-MCNC: 201 MG/DL
CO2 SERPL-SCNC: 25 MMOL/L (ref 22–31)
CREAT SERPL-MCNC: 0.93 MG/DL (ref 0.6–1.1)
FASTING STATUS PATIENT QL REPORTED: NO
GFR SERPL CREATININE-BSD FRML MDRD: 60 ML/MIN/1.73M2
GLUCOSE BLD-MCNC: 116 MG/DL (ref 70–125)
HDLC SERPL-MCNC: 59 MG/DL
LDLC SERPL CALC-MCNC: 108 MG/DL
POTASSIUM BLD-SCNC: 4 MMOL/L (ref 3.5–5)
PROT SERPL-MCNC: 7.2 G/DL (ref 6–8)
SODIUM SERPL-SCNC: 142 MMOL/L (ref 136–145)
TRIGL SERPL-MCNC: 169 MG/DL

## 2021-09-24 PROCEDURE — 90472 IMMUNIZATION ADMIN EACH ADD: CPT | Performed by: FAMILY MEDICINE

## 2021-09-24 PROCEDURE — 82248 BILIRUBIN DIRECT: CPT | Performed by: FAMILY MEDICINE

## 2021-09-24 PROCEDURE — 90471 IMMUNIZATION ADMIN: CPT | Performed by: FAMILY MEDICINE

## 2021-09-24 PROCEDURE — 80061 LIPID PANEL: CPT | Performed by: FAMILY MEDICINE

## 2021-09-24 PROCEDURE — 99397 PER PM REEVAL EST PAT 65+ YR: CPT | Mod: 25 | Performed by: FAMILY MEDICINE

## 2021-09-24 PROCEDURE — 90662 IIV NO PRSV INCREASED AG IM: CPT | Performed by: FAMILY MEDICINE

## 2021-09-24 PROCEDURE — 96372 THER/PROPH/DIAG INJ SC/IM: CPT | Performed by: FAMILY MEDICINE

## 2021-09-24 PROCEDURE — 36415 COLL VENOUS BLD VENIPUNCTURE: CPT | Performed by: FAMILY MEDICINE

## 2021-09-24 PROCEDURE — 80053 COMPREHEN METABOLIC PANEL: CPT | Performed by: FAMILY MEDICINE

## 2021-09-24 PROCEDURE — 90750 HZV VACC RECOMBINANT IM: CPT | Performed by: FAMILY MEDICINE

## 2021-09-24 RX ORDER — IBUPROFEN 600 MG/1
TABLET, FILM COATED ORAL
Status: CANCELLED | OUTPATIENT
Start: 2021-09-24

## 2021-09-24 RX ORDER — ERGOCALCIFEROL 1.25 MG/1
1 CAPSULE ORAL WEEKLY
Qty: 12 CAPSULE | Refills: 3 | Status: SHIPPED | OUTPATIENT
Start: 2021-09-24 | End: 2022-03-30

## 2021-09-24 RX ORDER — AZELASTINE HYDROCHLORIDE 0.5 MG/ML
SOLUTION/ DROPS OPHTHALMIC
Qty: 6 ML | Refills: 11 | Status: SHIPPED | OUTPATIENT
Start: 2021-09-24 | End: 2021-11-26

## 2021-09-24 RX ORDER — ESTRADIOL 0.1 MG/G
CREAM VAGINAL
Qty: 42.5 G | Refills: 11 | Status: SHIPPED | OUTPATIENT
Start: 2021-09-24 | End: 2022-06-18

## 2021-09-24 RX ORDER — IBUPROFEN 600 MG/1
600 TABLET, FILM COATED ORAL 2 TIMES DAILY PRN
Qty: 20 TABLET | Refills: 1 | Status: SHIPPED | OUTPATIENT
Start: 2021-09-24 | End: 2021-11-08

## 2021-09-24 ASSESSMENT — ACTIVITIES OF DAILY LIVING (ADL)
CURRENT_FUNCTION: BATHING REQUIRES ASSISTANCE
CURRENT_FUNCTION: HOUSEWORK REQUIRES ASSISTANCE
CURRENT_FUNCTION: MEDICATION ADMINISTRATION REQUIRES ASSISTANCE
CURRENT_FUNCTION: TRANSPORTATION REQUIRES ASSISTANCE
CURRENT_FUNCTION: PREPARING MEALS REQUIRES ASSISTANCE
CURRENT_FUNCTION: TELEPHONE REQUIRES ASSISTANCE
CURRENT_FUNCTION: LAUNDRY REQUIRES ASSISTANCE
CURRENT_FUNCTION: SHOPPING REQUIRES ASSISTANCE
CURRENT_FUNCTION: MONEY MANAGEMENT REQUIRES ASSISTANCE

## 2021-09-24 ASSESSMENT — MIFFLIN-ST. JEOR: SCORE: 963.22

## 2021-09-24 NOTE — PATIENT INSTRUCTIONS
Patient Education   Personalized Prevention Plan  You are due for the preventive services outlined below.  Your care team is available to assist you in scheduling these services.  If you have already completed any of these items, please share that information with your care team to update in your medical record.  Health Maintenance Due   Topic Date Due     ANNUAL REVIEW OF  ORDERS  Never done     Zoster (Shingles) Vaccine (2 of 3) 10/29/2008     Flu Vaccine (1) 09/01/2021     Your Health Risk Assessment indicates you feel you are not in good health    A healthy lifestyle helps keep the body fit and the mind alert. It helps protect you from disease, helps you fight disease, and helps prevent chronic disease (disease that doesn't go away) from getting worse. This is important as you get older and begin to notice twinges in muscles and joints and a decline in the strength and stamina you once took for granted. A healthy lifestyle includes good healthcare, good nutrition, weight control, recreation, and regular exercise. Avoid harmful substances and do what you can to keep safe. Another part of a healthy lifestyle is stay mentally active and socially involved.    Good healthcare     Have a wellness visit every year.     If you have new symptoms, let us know right away. Don't wait until the next checkup.     Take medicines exactly as prescribed and keep your medicines in a safe place. Tell us if your medicine causes problems.   Healthy diet and weight control     Eat 3 or 4 small, nutritious, low-fat, high-fiber meals a day. Include a variety of fruits, vegetables, and whole-grain foods.     Make sure you get enough calcium in your diet. Calcium, vitamin D, and exercise help prevent osteoporosis (bone thinning).     If you live alone, try eating with others when you can. That way you get a good meal and have company while you eat it.     Try to keep a healthy weight. If you eat more calories than your body uses for  energy, it will be stored as fat and you will gain weight.     Recreation   Recreation is not limited to sports and team events. It includes any activity that provides relaxation, interest, enjoyment, and exercise. Recreation provides an outlet for physical, mental, and social energy. It can give a sense of worth and achievement. It can help you stay healthy.    Mental Exercise and Social Involvement  Mental and emotional health is as important as physical health. Keep in touch with friends and family. Stay as active as possible. Continue to learn and challenge yourself.   Things you can do to stay mentally active are:    Learn something new, like a foreign language or musical instrument.     Play SCRABBLE or do crossword puzzles. If you cannot find people to play these games with you at home, you can play them with others on your computer through the Internet.     Join a games club--anything from card games to chess or checkers or lawn bowling.     Start a new hobby.     Go back to school.     Volunteer.     Read.   Keep up with world events.  Activities of Daily Living    Your Health Risk Assessment indicates you have difficulties with activities of daily living such as housework, bathing, preparing meals, taking medication, etc. Please make a follow up appointment for us to address this issue in more detail.    Signs of Hearing Loss      Hearing much better with one ear can be a sign of hearing loss.   Hearing loss is a problem shared by many people. In fact, it is one of the most common health problems, particularly as people age. Most people age 65 and older have some hearing loss. By age 80, almost everyone does. Hearing loss often occurs slowly over the years. So you may not realize your hearing has gotten worse.  Have your hearing checked  Call your healthcare provider if you:    Have to strain to hear normal conversation    Have to watch other people s faces very carefully to follow what they re  saying    Need to ask people to repeat what they ve said    Often misunderstand what people are saying    Turn the volume of the television or radio up so high that others complain    Feel that people are mumbling when they re talking to you    Find that the effort to hear leaves you feeling tired and irritated    Notice, when using the phone, that you hear better with one ear than the other  [place] last reviewed this educational content on 1/1/2020 2000-2021 [employer]. All rights reserved. This information is not intended as a substitute for professional medical care. Always follow your healthcare professional's instructions.          Urinary Incontinence, Female (Adult)   Urinary incontinence means loss of bladder control. This problem affects many women, especially as they get older. If you have incontinence, you may be embarrassed to ask for help. But know that this problem can be treated.   Types of Incontinence  There are different types of incontinence. Two of the main types are described here. You can have more than one type.     Stress incontinence. With this type, urine leaks when pressure (stress) is put on the bladder. This may happen when you cough, sneeze, or laugh. Stress incontinence most often occurs because the pelvic floor muscles that support the bladder and urethra are weak. This can happen after pregnancy and vaginal childbirth or a hysterectomy. It can also be due to excess body weight or hormone changes.    Urge incontinence (also called overactive bladder). With this type, a sudden urge to urinate is felt often. This may happen even though there may not be much urine in the bladder. The need to urinate often during the night is common. Urge incontinence most often occurs because of bladder spasms. This may be due to bladder irritation or infection. Damage to bladder nerves or pelvic muscles, constipation, and certain medicines can also lead to urge  incontinence.  Treatment depends on the cause. Further evaluation is needed to find the type you have. This will likely include an exam and certain tests. Based on the results, you and your healthcare provider can then plan treatment. Until a diagnosis is made, the home care tips below can help ease symptoms.   Home care    Do pelvic floor muscle exercises, if they are prescribed. The pelvic floor muscles help support the bladder and urethra. Many women find that their symptoms improve when doing special exercises that strengthen these muscles. To do the exercises, contract the muscles you would use to stop your stream of urine. But do this when you re not urinating. Hold for 10 seconds, then relax. Repeat 10 to 20 times in a row, at least 3 times a day. Your healthcare provider may give you other instructions for how to do the exercises and how often.    Keep a bladder diary. This helps track how often and how much you urinate over a set period of time. Bring this diary with you to your next visit with the provider. The information can help your provider learn more about your bladder problem.    Lose weight, if advised to by your provider. Extra weight puts pressure on the bladder. Your provider can help you create a weight-loss plan that s right for you. This may include exercising more and making certain diet changes.    Don't have foods and drinks that may irritate the bladder. These can include alcohol and caffeinated drinks.    Quit smoking. Smoking and other tobacco use can lead to a long-term (chronic) cough that strains the pelvic floor muscles. Smoking may also damage the bladder and urethra. Talk with your provider about treatments or methods you can use to quit smoking.    If drinking large amounts of fluid makes you have symptoms, you may be advised to limit your fluid intake. You may also be advised to drink most of your fluids during the day and to limit fluids at night.    If you re worried about  urine leakage or accidents, you may wear absorbent pads to catch urine. Change the pads often. This helps reduce discomfort. It may also reduce the risk of skin or bladder infections.    Follow-up care  Follow up with your healthcare provider, or as directed. It may take some to find the right treatment for your problem. But healthy lifestyle changes can be made right away. These include such things as exercising on a regular basis, eating a healthy diet, losing weight (if needed), and quitting smoking. Your treatment plan may include special therapies or medicines. Certain procedures or surgery may also be options. Talk about any questions you have with your provider.   When to seek medical advice  Call the healthcare provider right away if any of these occur:    Fever of 100.4 F (38 C) or higher, or as directed by your provider    Bladder pain or fullness    Belly swelling    Nausea or vomiting    Back pain    Weakness, dizziness, or fainting  Kendy last reviewed this educational content on 1/1/2020 2000-2021 [employer]. All rights reserved. This information is not intended as a substitute for professional medical care. Always follow your healthcare professional's instructions.        Your Health Risk Assessment indicates you feel you are not in good emotional health.    Recreation   Recreation is not limited to sports and team events. It includes any activity that provides relaxation, interest, enjoyment, and exercise. Recreation provides an outlet for physical, mental, and social energy. It can give a sense of worth and achievement. It can help you stay healthy.    Mental Exercise and Social Involvement  Mental and emotional health is as important as physical health. Keep in touch with friends and family. Stay as active as possible. Continue to learn and challenge yourself.   Things you can do to stay mentally active are:    Learn something new, like a foreign language or musical instrument.  "    Play SCRABBLE or do crossword puzzles. If you cannot find people to play these games with you at home, you can play them with others on your computer through the Internet.     Join a games club--anything from card games to chess or checkers or lawn bowling.     Start a new hobby.     Go back to school.     Volunteer.     Read.   Keep up with world events.    Depression and Suicide in Older Adults    Nearly 2 million older Americans have some type of depression. Some of them even take their own lives. Yet depression among older adults is often ignored. Learn the warning signs. You may help spare a loved one needless pain. You may also save a life.   What is depression?  Depression is a common and serious illness that affects the way you think and feel. It is not a normal part of aging, nor is it a sign of weakness, a character flaw, or something you can snap out of. Most people with depression need treatment to get better. The most common symptom is a feeling of deep sadness. People who are depressed also may seem tired and listless. And nothing seems to give them pleasure. It s normal to grieve or be sad sometimes. But sadness lessens or passes with time. Depression rarely goes away or improves on its own. A person with clinical depression can't \"snap out of it.\" Other symptoms of depression are:     Sleeping more or less than normal    Eating more or less than normal    Having headaches, stomachaches, or other pains that don t go away    Feeling nervous,  empty,  or worthless    Crying a great deal    Thinking or talking about suicide or death    Loss of interest in activities previously enjoyed    Social isolation    Feeling confused or forgetful  What causes it?  The causes of depression aren t fully known. But it is thought to result from a complex blend of these factors:     Biochemistry. Certain chemicals in the brain play a role.    Genes. Depression does run in families.    Life stress. Life stresses can " also trigger depression in some people. Older adults often face many stressors, such as death of friends or a spouse, health problems, and financial concerns.    Chronic conditions. This includes conditions such as diabetes, heart disease, or cancer. These can cause symptoms of depression. Medicine side effects can cause changes in thoughts and behaviors.  How you can help  Often, depressed people may not want to ask for help. When they do, they may be ignored. Or, they may receive the wrong treatment. You can help by showing parents and older friends love and support. If they seem depressed, don t lecture the person, ignore the symptoms, or discount the symptoms as a  normal  part of aging -which they are not. Get involved, listen, and show interest and support.   Help them understand that depression is a treatable illness. Tell them you can help them find the right treatment. Offer to go to their healthcare provider's appointment with them for support when the symptoms are discussed. With their approval, contact a local mental health center, social service agency, or hospital about services.   You can be an advocate for him or her at healthcare appointments. Many older adults have chronic illnesses that can cause symptoms of depression. Medicine side effects can change thoughts and behaviors. You can help make sure that the healthcare provider looks at all of these factors. He or she should refer your family member or friend to a mental healthcare provider when needed. in some cases, untreated depression can lead to a misdiagnosis. A person may be diagnosed with a brain disorder such as dementia. If the healthcare provider does not take the issue of depression seriously, help your family member or friend to find another provider.   Don't be afraid to ask  If you think an older person you care about could be suicidal, ask,  Have you thought about suicide?  Most people will tell you the truth. If they say  yes,   they may already have a plan for how and when they will attempt it. Find out as much as you can. The more detailed the plan, and the easier it is to carry out, the more danger the person is in right now. Tell the person you are there for them and do not want them to harm him or herself. Don't wait to get help for the person. Call the person's healthcare provider, local hospital, or emergency services.   To learn more    National Suicide Prevention Lifeline (crisis hotline) 328-682-VAAE (245-095-8652)    National [place] of Mental Rxmteh623-088-7056jyc.[facility].nih.gov    National [place] on Mental Azulevo156-252-1817uks.nichole.org    Mental Health Zebybqi391-577-7031wqd.[facility].org    National Suicide Vqnsgtl841-YSWLHVI (614-281-6403)    Call 911  Never leave the person alone. A person who is actively suicidal needs psychiatric care right away. They will need constant supervision. Never leave the person out of sight. Call 911 or the national 24-hour suicide crisis hotline at 892-965-EHKX (922-340-8282). You can also take the person to the closest emergency room.   StayWell last reviewed this educational content on 5/1/2020 2000-2021 [employer]. All rights reserved. This information is not intended as a substitute for professional medical care. Always follow your healthcare professional's instructions.

## 2021-10-11 ENCOUNTER — PATIENT OUTREACH (OUTPATIENT)
Dept: GERIATRIC MEDICINE | Facility: CLINIC | Age: 76
End: 2021-10-11

## 2021-10-11 NOTE — PROGRESS NOTES
[facility] Care Coordination Contact    Called member to complete six month assessment and left a message requesting a return call.  VAL Hill  [facility]  Cell: [phone]

## 2021-10-27 ENCOUNTER — PATIENT OUTREACH (OUTPATIENT)
Dept: GERIATRIC MEDICINE | Facility: CLINIC | Age: 76
End: 2021-10-27

## 2021-10-27 NOTE — PROGRESS NOTES
[facility] Care Coordination Contact      [facility] Six-Month Telephone Assessment    6 month telephone assessment completed on 10/27/2021    ER visits: No  Hospitalizations: No  TCU stays: No  Significant health status changes: None to report. Member reports her health is stable at this time.   Falls/Injuries: No  ADL/IADL changes: No  Changes in services: No    Caregiver Assessment follow up:  NA    Goals: See POC in chart for goal progress documentation.  Member reports she continues to receive daily PCA services and family continues to assist her with mobility. Goals continued at this time.     Will see member in 6 months for an annual health risk assessment.   Encouraged member to call CC with any questions or concerns in the meantime.   VAL Hill  [facility]  Cell: [phone]

## 2021-11-06 DIAGNOSIS — Z76.0 ENCOUNTER FOR MEDICATION REFILL: ICD-10-CM

## 2021-11-08 RX ORDER — IBUPROFEN 600 MG/1
600 TABLET, FILM COATED ORAL 2 TIMES DAILY PRN
Qty: 20 TABLET | Refills: 1 | Status: SHIPPED | OUTPATIENT
Start: 2021-11-08 | End: 2022-03-30

## 2021-11-26 ENCOUNTER — OFFICE VISIT (OUTPATIENT)
Dept: FAMILY MEDICINE | Facility: CLINIC | Age: 76
End: 2021-11-26
Payer: MEDICARE

## 2021-11-26 VITALS
WEIGHT: 130.5 LBS | RESPIRATION RATE: 20 BRPM | TEMPERATURE: 97.9 F | HEART RATE: 91 BPM | BODY MASS INDEX: 28.15 KG/M2 | SYSTOLIC BLOOD PRESSURE: 138 MMHG | HEIGHT: 57 IN | DIASTOLIC BLOOD PRESSURE: 78 MMHG | OXYGEN SATURATION: 98 %

## 2021-11-26 DIAGNOSIS — Z23 NEED FOR VACCINATION: ICD-10-CM

## 2021-11-26 DIAGNOSIS — I10 ESSENTIAL HYPERTENSION: Primary | ICD-10-CM

## 2021-11-26 DIAGNOSIS — H57.9 PRURITUS OF BOTH EYES: ICD-10-CM

## 2021-11-26 DIAGNOSIS — E78.00 HYPERCHOLESTEROLEMIA: ICD-10-CM

## 2021-11-26 PROCEDURE — 90750 HZV VACC RECOMBINANT IM: CPT | Performed by: FAMILY MEDICINE

## 2021-11-26 PROCEDURE — 90471 IMMUNIZATION ADMIN: CPT | Performed by: FAMILY MEDICINE

## 2021-11-26 PROCEDURE — 99214 OFFICE O/P EST MOD 30 MIN: CPT | Mod: 25 | Performed by: FAMILY MEDICINE

## 2021-11-26 RX ORDER — AZELASTINE HYDROCHLORIDE 0.5 MG/ML
SOLUTION/ DROPS OPHTHALMIC
Qty: 6 ML | Refills: 11 | Status: SHIPPED | OUTPATIENT
Start: 2021-11-26 | End: 2022-03-30 | Stop reason: ALTCHOICE

## 2021-11-26 RX ORDER — ATORVASTATIN CALCIUM 40 MG/1
40 TABLET, FILM COATED ORAL AT BEDTIME
Qty: 90 TABLET | Refills: 3 | Status: SHIPPED | OUTPATIENT
Start: 2021-11-26 | End: 2022-03-30

## 2021-11-26 RX ORDER — GLIPIZIDE 10 MG/1
2 TABLET ORAL 4 TIMES DAILY PRN
Qty: 15 ML | Refills: 11 | Status: SHIPPED | OUTPATIENT
Start: 2021-11-26 | End: 2022-03-30

## 2021-11-26 ASSESSMENT — MIFFLIN-ST. JEOR: SCORE: 960.82

## 2021-11-26 NOTE — PROGRESS NOTES
"ASSESSMENT/PLAN:    Essential hypertension  Borderline control on current regimen.  Blood pressure here was normal after setting it sounds like most blood pressure readings are normal at home.  She is having some systolic readings down to 98, some reluctant to increase her medications for fear of causing syncope.  Will continue to keep her on her current regimen and she should call if having persistently higher than 140/90.    Hypercholesterolemia  Refilled atorvastatin  - atorvastatin (LIPITOR) 40 MG tablet  Dispense: 90 tablet; Refill: 3    Need for vaccination  Second Shingrix shot given  - ZOSTER VACCINE RECOMBINANT ADJUVANTED IM NJX    Pruritus of both eyes  Okay as needed eyedrops  - azelastine (OPTIVAR) 0.05 % ophthalmic solution  Dispense: 6 mL; Refill: 11  - artificial tears (GENTEAL) 0.1-0.2-0.3 % ophthalmic solution  Dispense: 15 mL; Refill: 11       Return in about 6 months (around 5/26/2022) for blood pressure follow-up.          SUBJECTIVE:  Jennifer Huggins is a 76 year old female here for Hypertension and Imm/Inj (shingrix #2)    Records reviewed:  Her last clinic visit was 2 months ago for annual wellness visit and her blood pressure was borderline at that visit.  Asked her to check some home blood pressure readings and bring them with her.  She also needs her second Shingrix shot.    Checks home blood pressures:  Some high, some low.  Nothing higher than 150.  Lowest .  Confirmed that she means systolic.  Feels dizzy, unbalanced like it's hard to walk when it's 90.      OBJECTIVE:  :  /78   Pulse 91   Temp 97.9  F (36.6  C) (Oral)   Resp 20   Ht 1.448 m (4' 9\")   Wt 59.2 kg (130 lb 8 oz)   SpO2 98%   BMI 28.24 kg/m    Wt Readings from Last 3 Encounters:   11/26/21 59.2 kg (130 lb 8 oz)   09/24/21 58.5 kg (129 lb)   02/11/21 57.6 kg (127 lb)         Gen:  A&A, NAD  CV:  HRRR, no M/R/G  Resp:  CTAB  Ext:  W&D, no edema          "

## 2021-12-21 DIAGNOSIS — Z76.0 ENCOUNTER FOR MEDICATION REFILL: Primary | ICD-10-CM

## 2021-12-21 DIAGNOSIS — E87.6 HYPOPOTASSEMIA: ICD-10-CM

## 2021-12-21 RX ORDER — POTASSIUM CHLORIDE 750 MG/1
CAPSULE, EXTENDED RELEASE ORAL
Qty: 60 CAPSULE | Refills: 11 | Status: SHIPPED | OUTPATIENT
Start: 2021-12-21 | End: 2022-06-18

## 2021-12-21 RX ORDER — MULTIVITAMIN
TABLET ORAL
Qty: 30 TABLET | Refills: 11 | Status: SHIPPED | OUTPATIENT
Start: 2021-12-21 | End: 2022-03-30

## 2022-03-09 ENCOUNTER — PATIENT OUTREACH (OUTPATIENT)
Dept: GERIATRIC MEDICINE | Facility: CLINIC | Age: 77
End: 2022-03-09
Payer: MEDICARE

## 2022-03-09 NOTE — PROGRESS NOTES
[facility] Care Coordination Contact    Called  Jonathan Ceballos to schedule annual HRA home visit. Left a message requesting a return call to schedule HRA.   VAL Hill  [facility]  Cell: [phone]

## 2022-03-11 ENCOUNTER — PATIENT OUTREACH (OUTPATIENT)
Dept: GERIATRIC MEDICINE | Facility: CLINIC | Age: 77
End: 2022-03-11
Payer: MEDICARE

## 2022-03-11 NOTE — PROGRESS NOTES
[facility] Care Coordination Contact    [facility] Home Visit Assessment     Home visit for Health Risk Assessment with Jennifer Huggins completed on March 11, 2022    Type of residence:: Private home - no stairs  Current living arrangement:: I live in a private home with family      Assessment completed with: Patient, Children     Current Care Plan  Member currently receiving the following home care services: none at this time.   Member currently receiving the following community resources: PCA     Medication Review  Medication reconciliation completed in Epic: IF NO, PLEASE EXPLAIN due to phone assessment  Medication set-up & administration: Family/informal caregiver sets up weekly  Family caregiver administers medications  Medication Risk Assessment Medication (1 or more, place referral to MTM):  N/A: No risk factors identified  MTM Referral Placed: No: No risk factors idenified     Mental/Behavioral Health   Depression Screening:    PHQ-2 Total Score: 1        Mental health DX:: No         Falls Assessment:   Fallen 2 or more times in the past year?: No   Any fall with injury in the past year?: No     ADL/IADL Dependencies:   Dependent ADLs:: Ambulation-walker, Bathing, Dressing, Eating, Grooming, Incontinence, Transfers, Toileting  Dependent IADLs:: Cleaning, Cooking, Laundry, Shopping, Meal Preparation, Medication Management, Money Management, Transportation, Incontinence     [facility] sponsored benefits: Shared information re: Silver Sneakers/gym memberships, ASA, Calcium +D.     PCA Assessment completed at visit: Yes Annual PCA assessment indicated 40 units per day of PCA. This is the same as previous assessment.      Elderly Waiver Eligibility: Yes, but member declines EW service; will not open to EW     Care Plan & Recommendations: continue fall prevention by SBA with all mobility.     See [facility] for detailed assessment information.  Declines covid vaccine due to fear of side  Problem: Patient Care Overview  Goal: Plan of Care Review  Outcome: Ongoing (interventions implemented as appropriate)  Ambulated in hallway , once with pt , twice with RN and with mom, tolerated well c/o fatigue, no symptoms noted. Currently 02 sat is 97% on room air. No fall related injury. Denies c/o pain. IS with encouragement, reaches goal of 1000       effects.     Follow-Up Plan: Member informed of future contact, plan to f/u with member with a 6 month telephone assessment.  Contact information shared with member and family, encouraged member to call with any questions or concerns at any time.  VAL Hill  [facility]  Cell: [phone]

## 2022-03-17 ENCOUNTER — PATIENT OUTREACH (OUTPATIENT)
Dept: GERIATRIC MEDICINE | Facility: CLINIC | Age: 77
End: 2022-03-17
Payer: MEDICARE

## 2022-03-17 ASSESSMENT — LIFESTYLE VARIABLES
HOW OFTEN DO YOU HAVE A DRINK CONTAINING ALCOHOL: NEVER
HOW OFTEN DO YOU HAVE SIX OR MORE DRINKS ON ONE OCCASION: NEVER

## 2022-03-17 NOTE — PROGRESS NOTES
[facility] Care Coordination Contact    Received after visit chart from care coordinator.  Completed following tasks: Mailed copy of care plan to client and Updated services in access  , Provider Signature - No POC Shared:  Member indicates that they do not want their POC shared with any EW providers.  [facility]:  Emailed completed PCA assessment to [facility].  Faxed copy of PCA assessment to PCA Agency and mailed copy to member.  Faxed MD Communication to PCP.   Mailed POC & PCA sig pages to member to sign and return in SASE.     Ellyn Sanchez  Care Management Specialist   [facility]   [phone]

## 2022-03-17 NOTE — LETTER
March 17, 2022    JENNIFER SWANSON BHUMIKA  [address] [address]        Dear Jennifer:    At [facility], we are dedicated to improving your health and well-being. Enclosed is the Comprehensive Care Plan that we developed with you on 3/11/2022. Please review the Care Plan carefully.    As a reminder, some of the things we discussed at your visit include:    Your physical and mental health    Ways to reduce falls    Health care needs you may have    Don t forget to contact your care coordinator if you:    Have been hospitalized or plan to be hospitalized     Have had a fall     Have experienced a change in physical health    Are experiencing emotional problems     If you do not agree with your Care Plan, have questions about it, or have experienced a change in your needs, please call me at [phone]. If you are hearing impaired, please call the [place] [employer]ay at 472 or [phone] (asglme-ot-aqygvp relay service).    Sincerely,        VAL Hill    E-mail: [email or web address]  Phone: [phone]      [id number] [phone] ([id number])     [id number] (11/18)

## 2022-03-30 DIAGNOSIS — E78.00 HYPERCHOLESTEROLEMIA: ICD-10-CM

## 2022-03-30 DIAGNOSIS — M81.0 AGE-RELATED OSTEOPOROSIS WITHOUT CURRENT PATHOLOGICAL FRACTURE: ICD-10-CM

## 2022-03-30 DIAGNOSIS — K21.9 GERD (GASTROESOPHAGEAL REFLUX DISEASE): ICD-10-CM

## 2022-03-30 DIAGNOSIS — M81.0 OSTEOPOROSIS: ICD-10-CM

## 2022-03-30 DIAGNOSIS — H57.9 PRURITUS OF BOTH EYES: ICD-10-CM

## 2022-03-30 DIAGNOSIS — E55.9 VITAMIN D DEFICIENCY: ICD-10-CM

## 2022-03-30 DIAGNOSIS — Z76.0 ENCOUNTER FOR MEDICATION REFILL: ICD-10-CM

## 2022-03-30 DIAGNOSIS — R42 DIZZINESS: ICD-10-CM

## 2022-03-30 RX ORDER — ASPIRIN 81 MG
1 TABLET, DELAYED RELEASE (ENTERIC COATED) ORAL DAILY
Qty: 30 TABLET | Refills: 11 | Status: CANCELLED | OUTPATIENT
Start: 2022-03-30

## 2022-03-30 RX ORDER — ERGOCALCIFEROL 1.25 MG/1
1 CAPSULE ORAL WEEKLY
Qty: 12 CAPSULE | Refills: 3 | Status: SHIPPED | OUTPATIENT
Start: 2022-03-30 | End: 2023-01-10

## 2022-03-30 RX ORDER — MULTIVITAMIN
1 TABLET ORAL DAILY
Qty: 30 TABLET | Refills: 11 | Status: ON HOLD | OUTPATIENT
Start: 2022-03-30 | End: 2022-11-11

## 2022-03-30 RX ORDER — MECLIZINE HYDROCHLORIDE 25 MG/1
TABLET ORAL
Qty: 30 TABLET | Refills: 11 | Status: SHIPPED | OUTPATIENT
Start: 2022-03-30 | End: 2023-04-17

## 2022-03-30 RX ORDER — MULTIVITAMIN
1 TABLET ORAL DAILY
Qty: 30 TABLET | Refills: 11 | Status: CANCELLED | OUTPATIENT
Start: 2022-03-30

## 2022-03-30 RX ORDER — GLIPIZIDE 10 MG/1
2 TABLET ORAL 4 TIMES DAILY PRN
Qty: 15 ML | Refills: 11 | Status: SHIPPED | OUTPATIENT
Start: 2022-03-30 | End: 2023-07-18

## 2022-03-30 RX ORDER — AZELASTINE HYDROCHLORIDE 0.5 MG/ML
SOLUTION/ DROPS OPHTHALMIC
Qty: 6 ML | Refills: 11 | Status: CANCELLED | OUTPATIENT
Start: 2022-03-30

## 2022-03-30 RX ORDER — IBUPROFEN 600 MG/1
600 TABLET, FILM COATED ORAL 2 TIMES DAILY PRN
Qty: 20 TABLET | Refills: 1 | Status: ON HOLD | OUTPATIENT
Start: 2022-03-30 | End: 2022-11-11

## 2022-03-30 RX ORDER — ATORVASTATIN CALCIUM 40 MG/1
40 TABLET, FILM COATED ORAL AT BEDTIME
Qty: 90 TABLET | Refills: 3 | Status: SHIPPED | OUTPATIENT
Start: 2022-03-30 | End: 2022-05-27

## 2022-03-31 ENCOUNTER — TELEPHONE (OUTPATIENT)
Dept: FAMILY MEDICINE | Facility: CLINIC | Age: 77
End: 2022-03-31
Payer: MEDICARE

## 2022-03-31 ENCOUNTER — ALLIED HEALTH/NURSE VISIT (OUTPATIENT)
Dept: FAMILY MEDICINE | Facility: CLINIC | Age: 77
End: 2022-03-31
Payer: MEDICARE

## 2022-03-31 DIAGNOSIS — M81.0 OSTEOPOROSIS: Primary | ICD-10-CM

## 2022-03-31 PROCEDURE — 99207 PR NO CHARGE NURSE ONLY: CPT

## 2022-03-31 PROCEDURE — 96372 THER/PROPH/DIAG INJ SC/IM: CPT | Performed by: FAMILY MEDICINE

## 2022-03-31 NOTE — PROGRESS NOTES
The following steps were completed to comply with the REMS program for Prolia:  1. Ordering provider has previously reviewed information in the Medication Guide and Patient Counseling Chart, including the serious risks of Prolia  and the symptoms of each risk and have been advised to seek prompt medical attention if they have signs or symptoms of any of the serious risks.  2. Provided each patient a copy of the Medication Guide and Patient Brochure.  See MAR for administration details.   Indication: Prolia  (denosumab) is a prescription medicine used to treat osteoporosis in patients who:   Are at high risk for fracture, meaning patients who have had a fracture related to osteoporosis, or who have multiple risk factors for fracture; Cannot use another osteoporosis medicine or other osteoporosis medicines did not work well.   The timeline for early/late injections would be 4 weeks early and any time after the 6 month lynette. If a patient receives their injection late, then the subsequent injection would be 6 months from the date that they actually received the injection    Have the screening questions been asked prior to this administration? Yes

## 2022-03-31 NOTE — TELEPHONE ENCOUNTER
"Prolia Injection Phone Screen      Screening questions have been asked 2-3 days prior to administration visit for Prolia. If any questions are answered with \"Yes,\" this phone encounter were will routed to ordering provider for further evaluation.     1.  When was the last injection?  09/24/2021    2.  Has insurance for this injection been verified?  Patient unsure but believes it is covered.    3.  Did you experience any new onset achiness or rashes that lasted for over a month with your previous Prolia injection?   No    4.  Do you have a fever over 101?F or a new deep cough that is unusual for you today? No    5.  Have you started any new medications in the last 6 months that you were told could affect your immune system? These may have been prescribed by oncologist, transplant, rheumatology, or dermatology.   No    6.  In the last 6 months have you have gastric bypass or parathyroid surgery?   No    7.  Do you plan dental work requiring drilling into the bone such as implants/extractions or oral surgery in the next 2-3 months?   No    Patient informed if symptoms discussed above present prior to their administration appointment, they are to notify clinic immediately.     Confirmed appointment date and time with patient.    SOLIS PINEDA            "

## 2022-05-27 ENCOUNTER — ANCILLARY PROCEDURE (OUTPATIENT)
Dept: GENERAL RADIOLOGY | Facility: CLINIC | Age: 77
End: 2022-05-27
Attending: FAMILY MEDICINE
Payer: MEDICARE

## 2022-05-27 ENCOUNTER — OFFICE VISIT (OUTPATIENT)
Dept: FAMILY MEDICINE | Facility: CLINIC | Age: 77
End: 2022-05-27

## 2022-05-27 VITALS
HEIGHT: 57 IN | HEART RATE: 91 BPM | RESPIRATION RATE: 12 BRPM | WEIGHT: 129.75 LBS | DIASTOLIC BLOOD PRESSURE: 74 MMHG | BODY MASS INDEX: 27.99 KG/M2 | OXYGEN SATURATION: 95 % | TEMPERATURE: 98.5 F | SYSTOLIC BLOOD PRESSURE: 110 MMHG

## 2022-05-27 DIAGNOSIS — G89.29 CHRONIC PAIN OF BOTH KNEES: ICD-10-CM

## 2022-05-27 DIAGNOSIS — M25.561 CHRONIC PAIN OF BOTH KNEES: ICD-10-CM

## 2022-05-27 DIAGNOSIS — I10 ESSENTIAL HYPERTENSION: Primary | ICD-10-CM

## 2022-05-27 DIAGNOSIS — E78.00 HYPERCHOLESTEROLEMIA: ICD-10-CM

## 2022-05-27 DIAGNOSIS — Z28.21 COVID-19 VACCINATION DECLINED: ICD-10-CM

## 2022-05-27 DIAGNOSIS — M25.562 CHRONIC PAIN OF BOTH KNEES: ICD-10-CM

## 2022-05-27 PROCEDURE — 99214 OFFICE O/P EST MOD 30 MIN: CPT | Performed by: FAMILY MEDICINE

## 2022-05-27 PROCEDURE — 73560 X-RAY EXAM OF KNEE 1 OR 2: CPT | Mod: TC | Performed by: RADIOLOGY

## 2022-05-27 RX ORDER — SENNOSIDES 8.6 MG
650 CAPSULE ORAL EVERY 8 HOURS PRN
Qty: 50 TABLET | Refills: 4 | Status: SHIPPED | OUTPATIENT
Start: 2022-05-27 | End: 2024-01-26

## 2022-05-27 RX ORDER — ATORVASTATIN CALCIUM 80 MG/1
80 TABLET, FILM COATED ORAL AT BEDTIME
Qty: 90 TABLET | Refills: 3 | Status: SHIPPED | OUTPATIENT
Start: 2022-05-27 | End: 2023-04-27

## 2022-05-27 ASSESSMENT — PATIENT HEALTH QUESTIONNAIRE - PHQ9: SUM OF ALL RESPONSES TO PHQ QUESTIONS 1-9: 3

## 2022-05-27 NOTE — PROGRESS NOTES
"ASSESSMENT/PLAN:    Essential hypertension  Well-controlled, continue medications without changes.    Hypercholesterolemia  Continue atorvastatin.  She did not have this with her again today so I refilled that with a year of refills.  - atorvastatin (LIPITOR) 80 MG tablet  Dispense: 90 tablet; Refill: 3    Chronic pain of both knees  X-ray was benign today per my read and radiology over read.  Will try symptomatic care as noted.  Depending on clinical course, could consider steroid injection and/or referral to sports medicine.  - XR Knee Bilateral 1/2 Views  - acetaminophen (TYLENOL) 650 MG CR tablet  Dispense: 50 tablet; Refill: 4  - diclofenac (VOLTAREN) 1 % topical gel  Dispense: 150 g; Refill: 3    COVID-19 vaccination declined  Recommended COVID-19 vaccine booster today but she declined it.       Return in about 4 months (around 9/27/2022) for blood pressure follow-up.          SUBJECTIVE:  Jennifer Huggins is a 77 year old female here for F/U BP     HPI     BP Check.  Confirms taking Zestoretic without any problems.  Last time she been here her blood pressure was a bit borderline.    Last Prolia 3/31/22    \"joint pain\" Knee pain x1m.  Bilateral.  Applied tiger balm, which helps temporarily.  No swelling.  More pain with standing/moving.  Sometimes feels like knees could give out  No trauma  No swelling    Right more than left        OBJECTIVE:  :  /74   Pulse 91   Temp 98.5  F (36.9  C) (Oral)   Resp 12   Ht 1.448 m (4' 9\")   Wt 58.9 kg (129 lb 12 oz)   SpO2 95%   BMI 28.08 kg/m    Wt Readings from Last 3 Encounters:   05/27/22 58.9 kg (129 lb 12 oz)   11/26/21 59.2 kg (130 lb 8 oz)   09/24/21 58.5 kg (129 lb)         Gen:  A&A, NAD  CV:  HRRR, no M/R/G  Resp:  CTAB  Ext:  W&D, no edema.  Some vague tenderness over the medial joint line bilaterally.  Range of motion is full.  No joint laxity noted.    Radiology:  Images reviewed by me personally and interpreted prior to radiology overread.     XR Knee " Bilateral 1/2 Views    Result Date: 5/27/2022  EXAM: XR KNEE BILATERAL 1/2 VW LOCATION: [facility] DATE/TIME: 5/27/2022 5:04 PM INDICATION:  Chronic pain of both knees, Chronic pain of both knees, Chronic pain of both knees COMPARISON: None.     IMPRESSION: RIGHT KNEE: Normal joint spaces and alignment. No fracture or joint effusion. LEFT KNEE: Normal joint spaces and alignment. No fracture or joint effusion.

## 2022-06-30 ENCOUNTER — TELEPHONE (OUTPATIENT)
Dept: FAMILY MEDICINE | Facility: CLINIC | Age: 77
End: 2022-06-30

## 2022-06-30 NOTE — TELEPHONE ENCOUNTER
Reviewed as covering provider- patient is not due for prolia as she had this last in March. Will call to cancel appointment. Not due for any other HM except Covid-19 booster.

## 2022-06-30 NOTE — TELEPHONE ENCOUNTER
Calling to inform patient that since last Prolia injection was 03/31/2022 and she is scheduled to receive it on 7/1/22.  It is usually given q6m so will check with covering Provider.  Daughter reports she helps mother with appointments and is authorized to answer on her behalf.    Patient not available to answer questions now.

## 2022-08-09 DIAGNOSIS — Z76.0 ENCOUNTER FOR MEDICATION REFILL: Primary | ICD-10-CM

## 2022-08-09 DIAGNOSIS — I10 ESSENTIAL HYPERTENSION: ICD-10-CM

## 2022-08-09 RX ORDER — LISINOPRIL/HYDROCHLOROTHIAZIDE 10-12.5 MG
1 TABLET ORAL DAILY
Qty: 90 TABLET | Refills: 3 | Status: ON HOLD | OUTPATIENT
Start: 2022-08-09 | End: 2022-11-11

## 2022-08-23 ENCOUNTER — PATIENT OUTREACH (OUTPATIENT)
Dept: GERIATRIC MEDICINE | Facility: CLINIC | Age: 77
End: 2022-08-23

## 2022-08-23 NOTE — PROGRESS NOTES
CC updated program tasks and targets for [facility] Michelle launch.  VAL Hill  [facility]  Cell: [phone]

## 2022-09-27 ENCOUNTER — PATIENT OUTREACH (OUTPATIENT)
Dept: GERIATRIC MEDICINE | Facility: CLINIC | Age: 77
End: 2022-09-27

## 2022-09-27 NOTE — PROGRESS NOTES
[facility] Care Coordination Contact      [facility] Six-Month Telephone Assessment    6 month telephone assessment completed on 9/27/2022.    ER visits: No  Hospitalizations: No  TCU stays: No  Significant health status changes: No changes to report.   Falls/Injuries: No  ADL/IADL changes: No  Changes in services: No    Caregiver Assessment follow up:  NA    Goals: See POC in chart for goal progress documentation. Goals continued.     Will see member in 6 months for an annual health risk assessment.   Encouraged member to call CC with any questions or concerns in the meantime.   VAL Hill  [facility]  Cell: [phone]

## 2022-09-30 ENCOUNTER — OFFICE VISIT (OUTPATIENT)
Dept: FAMILY MEDICINE | Facility: CLINIC | Age: 77
End: 2022-09-30
Payer: MEDICARE

## 2022-09-30 VITALS
SYSTOLIC BLOOD PRESSURE: 134 MMHG | TEMPERATURE: 97.8 F | DIASTOLIC BLOOD PRESSURE: 68 MMHG | WEIGHT: 132 LBS | OXYGEN SATURATION: 98 % | HEART RATE: 84 BPM | BODY MASS INDEX: 28.48 KG/M2 | HEIGHT: 57 IN

## 2022-09-30 DIAGNOSIS — K21.9 GASTROESOPHAGEAL REFLUX DISEASE WITHOUT ESOPHAGITIS: ICD-10-CM

## 2022-09-30 DIAGNOSIS — M81.0 AGE-RELATED OSTEOPOROSIS WITHOUT CURRENT PATHOLOGICAL FRACTURE: ICD-10-CM

## 2022-09-30 DIAGNOSIS — I10 ESSENTIAL HYPERTENSION: Primary | ICD-10-CM

## 2022-09-30 DIAGNOSIS — E78.00 HYPERCHOLESTEROLEMIA: ICD-10-CM

## 2022-09-30 DIAGNOSIS — N18.2 STAGE 2 CHRONIC KIDNEY DISEASE: ICD-10-CM

## 2022-09-30 LAB
ALBUMIN SERPL BCG-MCNC: 3.8 G/DL (ref 3.5–5.2)
ALP SERPL-CCNC: 46 U/L (ref 35–104)
ALT SERPL W P-5'-P-CCNC: 18 U/L (ref 10–35)
ANION GAP SERPL CALCULATED.3IONS-SCNC: 12 MMOL/L (ref 7–15)
AST SERPL W P-5'-P-CCNC: 28 U/L (ref 10–35)
BILIRUB SERPL-MCNC: 0.4 MG/DL
BUN SERPL-MCNC: 13.8 MG/DL (ref 8–23)
CALCIUM SERPL-MCNC: 9.6 MG/DL (ref 8.8–10.2)
CHLORIDE SERPL-SCNC: 105 MMOL/L (ref 98–107)
CHOLEST SERPL-MCNC: 196 MG/DL
CREAT SERPL-MCNC: 1.05 MG/DL (ref 0.51–0.95)
DEPRECATED HCO3 PLAS-SCNC: 27 MMOL/L (ref 22–29)
GFR SERPL CREATININE-BSD FRML MDRD: 55 ML/MIN/1.73M2
GLUCOSE SERPL-MCNC: 146 MG/DL (ref 70–99)
HDLC SERPL-MCNC: 49 MG/DL
LDLC SERPL CALC-MCNC: 119 MG/DL
NONHDLC SERPL-MCNC: 147 MG/DL
POTASSIUM SERPL-SCNC: 2.9 MMOL/L (ref 3.4–5.3)
PROT SERPL-MCNC: 6.7 G/DL (ref 6.4–8.3)
SODIUM SERPL-SCNC: 144 MMOL/L (ref 136–145)
TRIGL SERPL-MCNC: 139 MG/DL

## 2022-09-30 PROCEDURE — 90662 IIV NO PRSV INCREASED AG IM: CPT | Performed by: FAMILY MEDICINE

## 2022-09-30 PROCEDURE — 36415 COLL VENOUS BLD VENIPUNCTURE: CPT | Performed by: FAMILY MEDICINE

## 2022-09-30 PROCEDURE — G0008 ADMIN INFLUENZA VIRUS VAC: HCPCS | Performed by: FAMILY MEDICINE

## 2022-09-30 PROCEDURE — 96372 THER/PROPH/DIAG INJ SC/IM: CPT | Performed by: FAMILY MEDICINE

## 2022-09-30 PROCEDURE — 80061 LIPID PANEL: CPT | Performed by: FAMILY MEDICINE

## 2022-09-30 PROCEDURE — 99214 OFFICE O/P EST MOD 30 MIN: CPT | Mod: 25 | Performed by: FAMILY MEDICINE

## 2022-09-30 PROCEDURE — 80053 COMPREHEN METABOLIC PANEL: CPT | Performed by: FAMILY MEDICINE

## 2022-09-30 NOTE — PROGRESS NOTES
"ASSESSMENT/PLAN:    Jennifer was seen today for hypertension.    Diagnoses and all orders for this visit:    Essential hypertension  Well-controlled on lisinopril-hydrochlorothiazide.  Continue unless sig lab abnl  -     Comprehensive metabolic panel (BMP + Alb, Alk Phos, ALT, AST, Total. Bili, TP)    Hypercholesterolemia  -     Lipid panel reflex to direct LDL Non-fasting    Age-related osteoporosis without current pathological fracture  Stage 2 chronic kidney disease  Gastroesophageal reflux disease without esophagitis  Unable to tolerate alendronate due to CKD and GERD  Continue Prolia.  Has been about 2 years since last DXA; will update.  -     denosumab (PROLIA) injection 60 mg    Other orders  -     INFLUENZA, QUAD, HIGH DOSE, PF, 65YR + (FLUZONE HD)          Return in about 6 months (around 3/30/2023) for Annual Wellness Visit AND Prolia.          SUBJECTIVE:  Jennifer Huggins is a 77 year old female here for Hypertension    BP check:  130's.  NO probs with medicine.    History of Present Illness       CKD: She is not using over the counter pain medicine.     Hypertension: She presents for follow up of hypertension.  She does check blood pressure  regularly outside of the clinic. Outpatient blood pressures have not been over 140/90. She does not follow a low salt diet.         Has known osteoporosis and has been getting Prolia shots; last injection 6 months ago. Has been on since 2017, but was only getting sporadically for first few years due to misunderstanding with scheduling.    OBJECTIVE:  :  /68 (BP Location: Left arm, Patient Position: Sitting, Cuff Size: Adult Regular)   Pulse 84   Temp 97.8  F (36.6  C) (Oral)   Ht 1.448 m (4' 9\")   Wt 59.9 kg (132 lb)   SpO2 98%   BMI 28.56 kg/m    Wt Readings from Last 3 Encounters:   09/30/22 59.9 kg (132 lb)   05/27/22 58.9 kg (129 lb 12 oz)   11/26/21 59.2 kg (130 lb 8 oz)         Gen:  A&A, NAD  Neck:  supple, no sig LAD or thyromegally  CV:  HRRR, no " "M/R/G  Resp:  CTAB  Ext:  W&D, no edema          Prolia Injection Phone Screen (Done in person during office visit)    Screening questions have been asked 2-3 days prior to administration visit for Prolia. If any questions are answered with \"Yes,\" this phone encounter were will routed to ordering provider for further evaluation.     1.  When was the last injection?  Within the last 6 months    2.  Has insurance for this injection been verified?  Yes    3.  Did you experience any new onset achiness or rashes that lasted for over a month with your previous Prolia injection?   No    4.  Do you have a fever over 101?F or a new deep cough that is unusual for you today? No    5.  Have you started any new medications in the last 6 months that you were told could affect your immune system? These may have been prescribed by oncologist, transplant, rheumatology, or dermatology.   No    6.  In the last 6 months have you have gastric bypass or parathyroid surgery?   No    7.  Do you plan dental work requiring drilling into the bone such as implants/extractions or oral surgery in the next 2-3 months?   No    Patient informed if symptoms discussed above present prior to their administration appointment, they are to notify clinic immediately.  The following steps were completed to comply with the REMS program for Prolia:  1. Ordering provider has previously reviewed information in the Medication Guide and Patient Counseling Chart, including the serious risks of Prolia  and the symptoms of each risk and have been advised to seek prompt medical attention if they have signs or symptoms of any of the serious risks.  2. Provided each patient a copy of the Medication Guide and Patient Brochure.  See MAR for administration details.   Indication: Prolia  (denosumab) is a prescription medicine used to treat osteoporosis in patients who:   Are at high risk for fracture, meaning patients who have had a fracture related to osteoporosis, or " who have multiple risk factors for fracture; Cannot use another osteoporosis medicine or other osteoporosis medicines did not work well.   The timeline for early/late injections would be 4 weeks early and any time after the 6 month lynette. If a patient receives their injection late, then the subsequent injection would be 6 months from the date that they actually received the injection    Have the screening questions been asked prior to this administration? Yes  Results for orders placed or performed in visit on 09/30/22   Comprehensive metabolic panel (BMP + Alb, Alk Phos, ALT, AST, Total. Bili, TP)   Result Value Ref Range    Sodium 144 136 - 145 mmol/L    Potassium 2.9 (L) 3.4 - 5.3 mmol/L    Chloride 105 98 - 107 mmol/L    Carbon Dioxide (CO2) 27 22 - 29 mmol/L    Anion Gap 12 7 - 15 mmol/L    Urea Nitrogen 13.8 8.0 - 23.0 mg/dL    Creatinine 1.05 (H) 0.51 - 0.95 mg/dL    Calcium 9.6 8.8 - 10.2 mg/dL    Glucose 146 (H) 70 - 99 mg/dL    Alkaline Phosphatase 46 35 - 104 U/L    AST 28 10 - 35 U/L    ALT 18 10 - 35 U/L    Protein Total 6.7 6.4 - 8.3 g/dL    Albumin 3.8 3.5 - 5.2 g/dL    Bilirubin Total 0.4 <=1.2 mg/dL    GFR Estimate 55 (L) >60 mL/min/1.73m2   Lipid panel reflex to direct LDL Non-fasting   Result Value Ref Range    Cholesterol 196 <200 mg/dL    Triglycerides 139 <150 mg/dL    Direct Measure HDL 49 (L) >=50 mg/dL    LDL Cholesterol Calculated 119 (H) <=100 mg/dL    Non HDL Cholesterol 147 (H) <130 mg/dL

## 2022-11-10 ENCOUNTER — APPOINTMENT (OUTPATIENT)
Dept: MRI IMAGING | Facility: HOSPITAL | Age: 77
End: 2022-11-10
Attending: STUDENT IN AN ORGANIZED HEALTH CARE EDUCATION/TRAINING PROGRAM
Payer: MEDICARE

## 2022-11-10 ENCOUNTER — APPOINTMENT (OUTPATIENT)
Dept: CT IMAGING | Facility: HOSPITAL | Age: 77
End: 2022-11-10
Attending: STUDENT IN AN ORGANIZED HEALTH CARE EDUCATION/TRAINING PROGRAM
Payer: MEDICARE

## 2022-11-10 ENCOUNTER — HOSPITAL ENCOUNTER (OUTPATIENT)
Facility: HOSPITAL | Age: 77
Setting detail: OBSERVATION
Discharge: HOME | End: 2022-11-11
Attending: STUDENT IN AN ORGANIZED HEALTH CARE EDUCATION/TRAINING PROGRAM | Admitting: STUDENT IN AN ORGANIZED HEALTH CARE EDUCATION/TRAINING PROGRAM
Payer: MEDICARE

## 2022-11-10 DIAGNOSIS — I48.91 NEW ONSET A-FIB (H): ICD-10-CM

## 2022-11-10 DIAGNOSIS — I48.0 PAROXYSMAL ATRIAL FIBRILLATION (H): Primary | ICD-10-CM

## 2022-11-10 LAB
ALBUMIN SERPL BCG-MCNC: 4 G/DL (ref 3.5–5.2)
ALP SERPL-CCNC: 64 U/L (ref 35–104)
ALT SERPL W P-5'-P-CCNC: 25 U/L (ref 10–35)
ANION GAP SERPL CALCULATED.3IONS-SCNC: 12 MMOL/L (ref 7–15)
APTT PPP: 33 SECONDS (ref 22–38)
AST SERPL W P-5'-P-CCNC: 27 U/L (ref 10–35)
BASOPHILS # BLD AUTO: 0 10E3/UL (ref 0–0.2)
BASOPHILS NFR BLD AUTO: 0 %
BILIRUB SERPL-MCNC: 0.6 MG/DL
BUN SERPL-MCNC: 7.2 MG/DL (ref 8–23)
CALCIUM SERPL-MCNC: 8.9 MG/DL (ref 8.8–10.2)
CHLORIDE SERPL-SCNC: 104 MMOL/L (ref 98–107)
CREAT SERPL-MCNC: 0.83 MG/DL (ref 0.51–0.95)
DEPRECATED HCO3 PLAS-SCNC: 27 MMOL/L (ref 22–29)
EOSINOPHIL # BLD AUTO: 0.4 10E3/UL (ref 0–0.7)
EOSINOPHIL NFR BLD AUTO: 4 %
ERYTHROCYTE [DISTWIDTH] IN BLOOD BY AUTOMATED COUNT: 12.8 % (ref 10–15)
GFR SERPL CREATININE-BSD FRML MDRD: 73 ML/MIN/1.73M2
GLUCOSE SERPL-MCNC: 102 MG/DL (ref 70–99)
HCT VFR BLD AUTO: 45.5 % (ref 35–47)
HGB BLD-MCNC: 15.3 G/DL (ref 11.7–15.7)
HOLD SPECIMEN: NORMAL
HOLD SPECIMEN: NORMAL
IMM GRANULOCYTES # BLD: 0 10E3/UL
IMM GRANULOCYTES NFR BLD: 0 %
INR PPP: 0.94 (ref 0.85–1.15)
LYMPHOCYTES # BLD AUTO: 2.7 10E3/UL (ref 0.8–5.3)
LYMPHOCYTES NFR BLD AUTO: 30 %
MAGNESIUM SERPL-MCNC: 1.9 MG/DL (ref 1.7–2.3)
MCH RBC QN AUTO: 31 PG (ref 26.5–33)
MCHC RBC AUTO-ENTMCNC: 33.6 G/DL (ref 31.5–36.5)
MCV RBC AUTO: 92 FL (ref 78–100)
MONOCYTES # BLD AUTO: 0.6 10E3/UL (ref 0–1.3)
MONOCYTES NFR BLD AUTO: 7 %
NEUTROPHILS # BLD AUTO: 5.2 10E3/UL (ref 1.6–8.3)
NEUTROPHILS NFR BLD AUTO: 59 %
NRBC # BLD AUTO: 0 10E3/UL
NRBC BLD AUTO-RTO: 0 /100
NT-PROBNP SERPL-MCNC: 576 PG/ML (ref 0–1800)
PLATELET # BLD AUTO: 309 10E3/UL (ref 150–450)
POTASSIUM SERPL-SCNC: 2.8 MMOL/L (ref 3.4–5.3)
POTASSIUM SERPL-SCNC: 4.1 MMOL/L (ref 3.4–5.3)
PROT SERPL-MCNC: 7.2 G/DL (ref 6.4–8.3)
RBC # BLD AUTO: 4.94 10E6/UL (ref 3.8–5.2)
SARS-COV-2 RNA RESP QL NAA+PROBE: NEGATIVE
SODIUM SERPL-SCNC: 143 MMOL/L (ref 136–145)
TROPONIN T SERPL HS-MCNC: 7 NG/L
TROPONIN T SERPL HS-MCNC: 8 NG/L
TSH SERPL DL<=0.005 MIU/L-ACNC: 3.51 UIU/ML (ref 0.3–4.2)
WBC # BLD AUTO: 9 10E3/UL (ref 4–11)

## 2022-11-10 PROCEDURE — 93005 ELECTROCARDIOGRAM TRACING: CPT

## 2022-11-10 PROCEDURE — 250N000011 HC RX IP 250 OP 636: Performed by: STUDENT IN AN ORGANIZED HEALTH CARE EDUCATION/TRAINING PROGRAM

## 2022-11-10 PROCEDURE — U0003 INFECTIOUS AGENT DETECTION BY NUCLEIC ACID (DNA OR RNA); SEVERE ACUTE RESPIRATORY SYNDROME CORONAVIRUS 2 (SARS-COV-2) (CORONAVIRUS DISEASE [COVID-19]), AMPLIFIED PROBE TECHNIQUE, MAKING USE OF HIGH THROUGHPUT TECHNOLOGIES AS DESCRIBED BY CMS-2020-01-R: HCPCS | Performed by: STUDENT IN AN ORGANIZED HEALTH CARE EDUCATION/TRAINING PROGRAM

## 2022-11-10 PROCEDURE — G1010 CDSM STANSON: HCPCS

## 2022-11-10 PROCEDURE — 85610 PROTHROMBIN TIME: CPT | Performed by: STUDENT IN AN ORGANIZED HEALTH CARE EDUCATION/TRAINING PROGRAM

## 2022-11-10 PROCEDURE — 99285 EMERGENCY DEPT VISIT HI MDM: CPT | Mod: 25

## 2022-11-10 PROCEDURE — 83880 ASSAY OF NATRIURETIC PEPTIDE: CPT | Performed by: STUDENT IN AN ORGANIZED HEALTH CARE EDUCATION/TRAINING PROGRAM

## 2022-11-10 PROCEDURE — 84484 ASSAY OF TROPONIN QUANT: CPT | Performed by: FAMILY MEDICINE

## 2022-11-10 PROCEDURE — 255N000002 HC RX 255 OP 636: Performed by: STUDENT IN AN ORGANIZED HEALTH CARE EDUCATION/TRAINING PROGRAM

## 2022-11-10 PROCEDURE — 96365 THER/PROPH/DIAG IV INF INIT: CPT | Mod: 59

## 2022-11-10 PROCEDURE — 258N000003 HC RX IP 258 OP 636: Performed by: STUDENT IN AN ORGANIZED HEALTH CARE EDUCATION/TRAINING PROGRAM

## 2022-11-10 PROCEDURE — 250N000013 HC RX MED GY IP 250 OP 250 PS 637: Performed by: STUDENT IN AN ORGANIZED HEALTH CARE EDUCATION/TRAINING PROGRAM

## 2022-11-10 PROCEDURE — 84484 ASSAY OF TROPONIN QUANT: CPT | Performed by: STUDENT IN AN ORGANIZED HEALTH CARE EDUCATION/TRAINING PROGRAM

## 2022-11-10 PROCEDURE — G0378 HOSPITAL OBSERVATION PER HR: HCPCS

## 2022-11-10 PROCEDURE — 99204 OFFICE O/P NEW MOD 45 MIN: CPT | Performed by: INTERNAL MEDICINE

## 2022-11-10 PROCEDURE — A9585 GADOBUTROL INJECTION: HCPCS | Performed by: STUDENT IN AN ORGANIZED HEALTH CARE EDUCATION/TRAINING PROGRAM

## 2022-11-10 PROCEDURE — 93010 ELECTROCARDIOGRAM REPORT: CPT | Mod: HOP | Performed by: GENERAL ACUTE CARE HOSPITAL

## 2022-11-10 PROCEDURE — 80053 COMPREHEN METABOLIC PANEL: CPT | Performed by: STUDENT IN AN ORGANIZED HEALTH CARE EDUCATION/TRAINING PROGRAM

## 2022-11-10 PROCEDURE — 36415 COLL VENOUS BLD VENIPUNCTURE: CPT | Performed by: STUDENT IN AN ORGANIZED HEALTH CARE EDUCATION/TRAINING PROGRAM

## 2022-11-10 PROCEDURE — 99220 PR INITIAL OBSERVATION CARE,LEVEL III: CPT | Performed by: FAMILY MEDICINE

## 2022-11-10 PROCEDURE — 36415 COLL VENOUS BLD VENIPUNCTURE: CPT | Performed by: FAMILY MEDICINE

## 2022-11-10 PROCEDURE — 85730 THROMBOPLASTIN TIME PARTIAL: CPT | Performed by: STUDENT IN AN ORGANIZED HEALTH CARE EDUCATION/TRAINING PROGRAM

## 2022-11-10 PROCEDURE — 84443 ASSAY THYROID STIM HORMONE: CPT | Performed by: STUDENT IN AN ORGANIZED HEALTH CARE EDUCATION/TRAINING PROGRAM

## 2022-11-10 PROCEDURE — 83735 ASSAY OF MAGNESIUM: CPT | Performed by: STUDENT IN AN ORGANIZED HEALTH CARE EDUCATION/TRAINING PROGRAM

## 2022-11-10 PROCEDURE — 96375 TX/PRO/DX INJ NEW DRUG ADDON: CPT

## 2022-11-10 PROCEDURE — C9803 HOPD COVID-19 SPEC COLLECT: HCPCS

## 2022-11-10 PROCEDURE — 85025 COMPLETE CBC W/AUTO DIFF WBC: CPT | Performed by: STUDENT IN AN ORGANIZED HEALTH CARE EDUCATION/TRAINING PROGRAM

## 2022-11-10 PROCEDURE — 250N000013 HC RX MED GY IP 250 OP 250 PS 637: Performed by: FAMILY MEDICINE

## 2022-11-10 PROCEDURE — 84132 ASSAY OF SERUM POTASSIUM: CPT | Mod: 91 | Performed by: FAMILY MEDICINE

## 2022-11-10 RX ORDER — PANTOPRAZOLE SODIUM 40 MG/1
40 TABLET, DELAYED RELEASE ORAL
Status: DISCONTINUED | OUTPATIENT
Start: 2022-11-10 | End: 2022-11-11 | Stop reason: HOSPADM

## 2022-11-10 RX ORDER — ONDANSETRON 4 MG/1
4 TABLET, ORALLY DISINTEGRATING ORAL EVERY 6 HOURS PRN
Status: DISCONTINUED | OUTPATIENT
Start: 2022-11-10 | End: 2022-11-11 | Stop reason: HOSPADM

## 2022-11-10 RX ORDER — POTASSIUM CHLORIDE 1500 MG/1
40 TABLET, EXTENDED RELEASE ORAL ONCE
Status: COMPLETED | OUTPATIENT
Start: 2022-11-10 | End: 2022-11-10

## 2022-11-10 RX ORDER — POTASSIUM CHLORIDE 7.45 MG/ML
10 INJECTION INTRAVENOUS ONCE
Status: COMPLETED | OUTPATIENT
Start: 2022-11-10 | End: 2022-11-10

## 2022-11-10 RX ORDER — LISINOPRIL/HYDROCHLOROTHIAZIDE 10-12.5 MG
1 TABLET ORAL DAILY
Status: DISCONTINUED | OUTPATIENT
Start: 2022-11-11 | End: 2022-11-10

## 2022-11-10 RX ORDER — GADOBUTROL 604.72 MG/ML
6 INJECTION INTRAVENOUS ONCE
Status: COMPLETED | OUTPATIENT
Start: 2022-11-10 | End: 2022-11-10

## 2022-11-10 RX ORDER — ONDANSETRON 2 MG/ML
4 INJECTION INTRAMUSCULAR; INTRAVENOUS EVERY 6 HOURS PRN
Status: DISCONTINUED | OUTPATIENT
Start: 2022-11-10 | End: 2022-11-11 | Stop reason: HOSPADM

## 2022-11-10 RX ORDER — ATORVASTATIN CALCIUM 40 MG/1
80 TABLET, FILM COATED ORAL AT BEDTIME
Status: DISCONTINUED | OUTPATIENT
Start: 2022-11-10 | End: 2022-11-11 | Stop reason: HOSPADM

## 2022-11-10 RX ORDER — DILTIAZEM HYDROCHLORIDE 30 MG/1
30 TABLET, FILM COATED ORAL EVERY 6 HOURS SCHEDULED
Status: DISCONTINUED | OUTPATIENT
Start: 2022-11-10 | End: 2022-11-11 | Stop reason: HOSPADM

## 2022-11-10 RX ORDER — SENNOSIDES 8.6 MG
650 CAPSULE ORAL EVERY 8 HOURS PRN
Status: DISCONTINUED | OUTPATIENT
Start: 2022-11-10 | End: 2022-11-11 | Stop reason: HOSPADM

## 2022-11-10 RX ORDER — IOPAMIDOL 755 MG/ML
100 INJECTION, SOLUTION INTRAVASCULAR ONCE
Status: COMPLETED | OUTPATIENT
Start: 2022-11-10 | End: 2022-11-10

## 2022-11-10 RX ORDER — DILTIAZEM HYDROCHLORIDE 5 MG/ML
10 INJECTION INTRAVENOUS ONCE
Status: COMPLETED | OUTPATIENT
Start: 2022-11-10 | End: 2022-11-10

## 2022-11-10 RX ORDER — ONDANSETRON 2 MG/ML
4 INJECTION INTRAMUSCULAR; INTRAVENOUS ONCE
Status: COMPLETED | OUTPATIENT
Start: 2022-11-10 | End: 2022-11-10

## 2022-11-10 RX ORDER — MECLIZINE HYDROCHLORIDE 25 MG/1
25 TABLET ORAL DAILY
Status: DISCONTINUED | OUTPATIENT
Start: 2022-11-10 | End: 2022-11-11 | Stop reason: HOSPADM

## 2022-11-10 RX ORDER — GLIPIZIDE 10 MG/1
2 TABLET ORAL 4 TIMES DAILY PRN
Status: DISCONTINUED | OUTPATIENT
Start: 2022-11-10 | End: 2022-11-11 | Stop reason: HOSPADM

## 2022-11-10 RX ORDER — DILTIAZEM HYDROCHLORIDE 5 MG/ML
15 INJECTION INTRAVENOUS ONCE
Status: COMPLETED | OUTPATIENT
Start: 2022-11-10 | End: 2022-11-10

## 2022-11-10 RX ADMIN — MECLIZINE HYDROCHLORIDE 25 MG: 25 TABLET ORAL at 18:22

## 2022-11-10 RX ADMIN — ATORVASTATIN CALCIUM 80 MG: 40 TABLET, FILM COATED ORAL at 21:22

## 2022-11-10 RX ADMIN — ONDANSETRON 4 MG: 2 INJECTION INTRAMUSCULAR; INTRAVENOUS at 14:45

## 2022-11-10 RX ADMIN — DILTIAZEM HYDROCHLORIDE 15 MG: 5 INJECTION INTRAVENOUS at 13:35

## 2022-11-10 RX ADMIN — GADOBUTROL 6 ML: 604.72 INJECTION INTRAVENOUS at 14:51

## 2022-11-10 RX ADMIN — POTASSIUM CHLORIDE 10 MEQ: 7.46 INJECTION, SOLUTION INTRAVENOUS at 11:26

## 2022-11-10 RX ADMIN — PANTOPRAZOLE SODIUM 40 MG: 40 TABLET, DELAYED RELEASE ORAL at 18:25

## 2022-11-10 RX ADMIN — KETOTIFEN FUMARATE 1 DROP: 0.35 SOLUTION/ DROPS OPHTHALMIC at 21:22

## 2022-11-10 RX ADMIN — DILTIAZEM HYDROCHLORIDE 10 MG: 5 INJECTION INTRAVENOUS at 11:22

## 2022-11-10 RX ADMIN — SODIUM CHLORIDE, POTASSIUM CHLORIDE, SODIUM LACTATE AND CALCIUM CHLORIDE 1000 ML: 600; 310; 30; 20 INJECTION, SOLUTION INTRAVENOUS at 11:25

## 2022-11-10 RX ADMIN — IOPAMIDOL 100 ML: 755 INJECTION, SOLUTION INTRAVENOUS at 12:47

## 2022-11-10 RX ADMIN — POTASSIUM CHLORIDE 40 MEQ: 1500 TABLET, EXTENDED RELEASE ORAL at 11:22

## 2022-11-10 ASSESSMENT — ACTIVITIES OF DAILY LIVING (ADL)
ADLS_ACUITY_SCORE: 35
ADLS_ACUITY_SCORE: 33
ADLS_ACUITY_SCORE: 33
ADLS_ACUITY_SCORE: 35
ADLS_ACUITY_SCORE: 35
ADLS_ACUITY_SCORE: 33
ADLS_ACUITY_SCORE: 33

## 2022-11-10 NOTE — ED NOTES
Nursing assessment--  Patient brought in by her granddaughter. Using language line to speak with patient.  She says that she has been having high BP at home, reports 170s systolic, despite taking hr medication.  She also says she is having a headache.  On arrival ECG showing afib with HR up to 150s. Patient caden any chest pain or SOB.  Is also not having palpitations. Does not have a hx of afib or other cardiac hx.

## 2022-11-10 NOTE — PHARMACY-ADMISSION MEDICATION HISTORY
Pharmacy Note - Admission Medication History    Pertinent Provider Information: Spoke to patient and patient's grand daughter.     Ergocalciferol is scheduled to be taken every Monday, but patient's last fill date is 8/8 for 28 days, multivit also last filled on 8/8 for 30 days. Patients stated these two vitamins have been out for awhile.     Patient stated that she only uses Ketotifen not artificial tears.     ______________________________________________________________________    Prior To Admission (PTA) med list completed and updated in EMR.       Current Facility-Administered Medications for the 11/10/22 encounter (Hospital Encounter)   Medication     denosumab (PROLIA) injection 60 mg     denosumab (PROLIA) injection 60 mg     PTA Med List   Medication Sig Note Last Dose     acetaminophen (TYLENOL) 650 MG CR tablet Take 1 tablet (650 mg) by mouth every 8 hours as needed for pain  More than a month     atorvastatin (LIPITOR) 80 MG tablet Take 1 tablet (80 mg) by mouth At Bedtime  11/9/2022     diclofenac (VOLTAREN) 1 % topical gel Apply 2 g topically 4 times daily  More than a month     ergocalciferol (ERGOCALCIFEROL) 1.25 MG (20239 UT) capsule Take 1 capsule (50,000 Units) by mouth once a week 11/10/2022: Patient is supposed to be taking every monday Unknown     ibuprofen (ADVIL/MOTRIN) 600 MG tablet Take 1 tablet (600 mg) by mouth 2 times daily as needed for moderate pain (Patient taking differently: Take 600 mg by mouth daily)  11/9/2022     ketotifen (ZADITOR) 0.025 % ophthalmic solution Place 1 drop into both eyes 2 times daily  11/9/2022     lisinopril-hydrochlorothiazide (ZESTORETIC) 10-12.5 MG tablet Take 1 tablet by mouth daily  11/10/2022 at am     meclizine (ANTIVERT) 25 MG tablet [MECLIZINE (ANTIVERT) 25 MG TABLET] TAKE 1 TABLET BY MOUTH 3 TIMES A DAY AS NEEDED FOR DIZZINESS OR NAUSEA. (Patient taking differently: Take 25 mg by mouth daily [MECLIZINE (ANTIVERT) 25 MG TABLET] TAKE 1 TABLET BY MOUTH  3 TIMES A DAY AS NEEDED FOR DIZZINESS OR NAUSEA.)  11/9/2022     Multiple Vitamin (ONE-DAILY MULTI-VITAMIN) TABS Take 1 tablet by mouth daily  Unknown     omeprazole (PRILOSEC) 20 MG DR capsule [OMEPRAZOLE (PRILOSEC) 20 MG CAPSULE] TAKE 1 CAPSULE BY MOUTH DAILY BEFORE BREAKFAST  11/9/2022       Information source(s): Patient, Family member and CareEverywhere/SureScripts  Method of interview communication: in-person    Summary of Changes to PTA Med List  New: None      Patient was asked about OTC/herbal products specifically.  PTA med list reflects this.    In the past week, patient estimated taking medication this percent of the time:  50-90% due to running out.    Allergies were reviewed, assessed, and updated with the patient.      Patient did not bring any medications to the hospital and can't retrieve from home. No multi-dose medications are available for use during hospital stay.     The information provided in this note is only as accurate as the sources available at the time of the update(s).    Thank you for the opportunity to participate in the care of this patient.    Sade peoples RPH  11/10/2022 1:21 PM

## 2022-11-10 NOTE — ED TRIAGE NOTES
Pt complaining of hypertension 's/90's.  Constant dizziness x 3 days and head discomfort.  Denies CP and SOB     Triage Assessment     Row Name 11/10/22 1003       Triage Assessment (Adult)    Airway WDL WDL       Respiratory WDL    Respiratory WDL WDL       Skin Circulation/Temperature WDL    Skin Circulation/Temperature WDL WDL       Cardiac WDL    Cardiac WDL WDL;rhythm    Cardiac Rhythm Atrial fibrillation       Peripheral/Neurovascular WDL    Peripheral Neurovascular WDL WDL       Cognitive/Neuro/Behavioral WDL    Cognitive/Neuro/Behavioral WDL WDL

## 2022-11-10 NOTE — CONSULTS
[facility] Heart Care team is asked to see Jennifer Huggins in consultation to evaluate new atrial fibrillation with rapid ventricular response.      Assessment:     1. Paroxysmal atrial fibrillation with rapid ventricular response.  New onset, associated with hypokalemia.  Potassium now supplemented, responded to 2 doses of IV diltiazem.  With [id number] score of 3, would suggest long-term anticoagulation unless contraindications present.  Will start Eliquis 5 mg twice daily.  Check echocardiogram  2. History of hypertension with hypokalemia on lisinopril HCTZ.  Will discontinue this agent, substituting diltiazem 30 mg p.o. every 6 hours.  Would suggest switching to once daily dosing, likely diltiazem 120 mg CD preparation at hospital discharge.       Plan:     1. Diltiazem 30 mg p.o. every 6 hours  2. Discontinue lisinopril/HCTZ  3. Eliquis 5 mg twice daily  4. Echocardiogram in a.m.  5. Progressive ambulation with likely discharge in a.m.  Suggest follow-up 2 to 4 weeks in A. fib clinic.       Current History:     Jennifer Huggins is a 77-year-old woman who speaks Kelli with a history of hypertension, hyperlipidemia, meningioma.  She presents to the emergency room with a 3-day history of shortness of breath and dizziness, along with a sense of chest heaviness, found to be in atrial fibrillation with rapid ventricular response.  Initial blood pressure is markedly elevated, potassium on admission is 2.8.  She noted the racing heartbeat mostly at rest, but not necessarily with activities.  She had no syncope or falls.  In the emergency room she was given 2 boluses of diltiazem with slowing of the heart rate.  Upon arrival in her telemetry bed, she is in sinus rhythm at 72 bpm.  She feels slightly improved.    Grand daughter interprets  She has no history of stroke or lower extremity edema.  He has had no bleeding or bruising problems.  She has never had similar symptoms before.    [id number] score is  at least 3, suggesting long-term benefit from anticoagulation.    Past Medical History:     Past Medical History:   Diagnosis Date     Benign adenomatous polyp of large intestine      Biliary colic      Cervicalgia      Cholelithiasis      Chronic allergic conjunctivitis      Cystitis      Dyspepsia      Gastritis      GERD (gastroesophageal reflux disease)      HLD (hyperlipidemia)      Hypertension      Hypokalemia      Meningioma (H)      Multiple lung nodules on CT     CT ABD 11/23/1013, mult nodules < 5mm in size, per Fleischner Society recommendations  repeat CT in 12 months CT Chest 2/20/17:  Stable pulmonary nodules suggesting incidental findings given long-term stability.      Osteoporosis      Positional vertigo      Pulmonary nodules      Recurrent UTI      Transaminitis 10/29/2015     Urinary incontinence      Xerosis cutis      Sleep history: Comfortable supine    Past Surgical History:     Past Surgical History:   Procedure Laterality Date     CHOLECYSTECTOMY  08/21/2015       Family History:     Family History   Problem Relation Age of Onset     No Known Problems Mother      No Known Problems Father      Family history reviewed and is not pertinent to the chief complaint or presenting problem    Social History:    reports that she has never smoked. She has never used smokeless tobacco. She reports that she does not drink alcohol and does not use drugs.  Exercise history:    Meds:     Current Outpatient Medications   Medication Sig Dispense Refill     acetaminophen (TYLENOL) 650 MG CR tablet Take 1 tablet (650 mg) by mouth every 8 hours as needed for pain 50 tablet 4     atorvastatin (LIPITOR) 80 MG tablet Take 1 tablet (80 mg) by mouth At Bedtime 90 tablet 3     diclofenac (VOLTAREN) 1 % topical gel Apply 2 g topically 4 times daily 150 g 3     ergocalciferol (ERGOCALCIFEROL) 1.25 MG (41822 UT) capsule Take 1 capsule (50,000 Units) by mouth once a week 12 capsule 3     ibuprofen (ADVIL/MOTRIN) 600 MG  "tablet Take 1 tablet (600 mg) by mouth 2 times daily as needed for moderate pain (Patient taking differently: Take 600 mg by mouth daily) 20 tablet 1     ketotifen (ZADITOR) 0.025 % ophthalmic solution Place 1 drop into both eyes 2 times daily 10 mL 11     lisinopril-hydrochlorothiazide (ZESTORETIC) 10-12.5 MG tablet Take 1 tablet by mouth daily 90 tablet 3     meclizine (ANTIVERT) 25 MG tablet [MECLIZINE (ANTIVERT) 25 MG TABLET] TAKE 1 TABLET BY MOUTH 3 TIMES A DAY AS NEEDED FOR DIZZINESS OR NAUSEA. (Patient taking differently: Take 25 mg by mouth daily [MECLIZINE (ANTIVERT) 25 MG TABLET] TAKE 1 TABLET BY MOUTH 3 TIMES A DAY AS NEEDED FOR DIZZINESS OR NAUSEA.) 30 tablet 11     Multiple Vitamin (ONE-DAILY MULTI-VITAMIN) TABS Take 1 tablet by mouth daily 30 tablet 11     omeprazole (PRILOSEC) 20 MG DR capsule [OMEPRAZOLE (PRILOSEC) 20 MG CAPSULE] TAKE 1 CAPSULE BY MOUTH DAILY BEFORE BREAKFAST 90 capsule 2     artificial tears (GENTEAL) 0.1-0.2-0.3 % ophthalmic solution Place 2 drops into both eyes 4 times daily as needed (dry or irritated eyes) 15 mL 11     blood pressure monitor Kit [BLOOD PRESSURE MONITOR KIT] Home blood pressure cuff and monitor 1 each 0     denosumab (PROLIA) 60 MG/ML SOSY injection Inject 60 mg Subcutaneous         Allergies:   Ibandronate [ibandronic acid] and Septra [sulfamethoxazole w/trimethoprim]    Review of Systems:   A 12 point comprehensive review of systems was negative except as noted in the current history.    Objective:      Physical Exam  Wt Readings from Last 3 Encounters:   11/10/22 56.7 kg (125 lb)   09/30/22 59.9 kg (132 lb)   05/27/22 58.9 kg (129 lb 12 oz)     Body mass index is 26.13 kg/m .  /70   Pulse 77   Temp 98.2  F (36.8  C) (Oral)   Resp 20   Ht 1.473 m (4' 10\")   Wt 56.7 kg (125 lb)   SpO2 98%   BMI 26.13 kg/m      General Appearance:  No apparent distress.  Comfortable supine  HEENT: No scleral icterus; the mucous membranes were pink and moist.  " Conjunctiva not injected  Neck:  No cervical bruits, jugular venous distention, or thyromegaly.  Chest:The spine was straight. The chest was symmetric.  Lungs:  Respirations unlabored; the lungs are clear to auscultation.  Cardiovascular:    Normal point of maximal impulse. Auscultation reveals regular first and second heart sounds with no murmurs, rubs, or gallops. Carotid, radial, and dorsalis pedal pulses are intact and symmetric.  Abdomen:   No organomegaly, masses, bruits, or tenderness. Bowels sounds are present  Extremities: No cyanosis, clubbing, or edema.  Skin:  No xanthelasma.  Neurologic: Mood and affect appear appropriate.       EKG (personally reviewed): Atrial fibrillation at 151 bpm with intermittent aberrant conduction.  New versus 2017  Follow-up study shows sinus rhythm 72 bpm.  Anterior T wave inversions.    Imaging   CT chest PE run today:CORONARY ARTERY CALCIFICATION: Mild.  1.  No pulmonary artery embolism.  2.  Findings suggesting mild air trapping which may reflect bronchiolitis. No pneumonic infiltrate or pleural effusion.  3.  Stable benign pulmonary nodules. No follow-up is indicated.      Cardiac diagnostics    Echocardiogram pending        Lab Review     Lab Results   Component Value Date    HGB 15.3 11/10/2022     Lab Results   Component Value Date     11/10/2022     Lab Results   Component Value Date    POTASSIUM 2.8 11/10/2022    POTASSIUM 4.0 09/24/2021     Lab Results   Component Value Date    BUN 7.2 11/10/2022    BUN 28 09/24/2021     Lab Results   Component Value Date    CR 0.83 11/10/2022     Lab Results   Component Value Date    CHOL 196 09/30/2022    HDL 49 09/30/2022     09/30/2022     01/07/2011    TRIG 139 09/30/2022           Frederic Rivera MD  [facility]  11/10/2022    Note created using Dragon voice recognition software.  Sound alike errors may have escaped editing.    Clinically Significant Risk Factors Present on Admission        # Hypokalemia:  Lowest K = 2.8 mmol/L (Ref range: 3.4-5.3) in last 2 days, will replace as needed

## 2022-11-10 NOTE — ED NOTES
"[facility] ED Handoff Report    ED Chief Complaint: High blood pressure and dizziness    ED Diagnosis:  (I48.91) New onset a-fib (H)  Comment:   Plan:        PMH:    Past Medical History:   Diagnosis Date    Benign adenomatous polyp of large intestine     Biliary colic     Cervicalgia     Cholelithiasis     Chronic allergic conjunctivitis     Cystitis     Dyspepsia     Gastritis     GERD (gastroesophageal reflux disease)     HLD (hyperlipidemia)     Hypertension     Hypokalemia     Meningioma (H)     Multiple lung nodules on CT     CT ABD 11/23/1013, mult nodules < 5mm in size, per Fleischner Society recommendations  repeat CT in 12 months CT Chest 2/20/17:  Stable pulmonary nodules suggesting incidental findings given long-term stability.     Osteoporosis     Positional vertigo     Pulmonary nodules     Recurrent UTI     Transaminitis 10/29/2015    Urinary incontinence     Xerosis cutis         Code Status:  No Order     Falls Risk: No Band: Not applicable    Current Living Situation/Residence: live with family      Elimination Status: Continent: purewhick in place      Activity Level: SBA    Patients Preferred Language:  Other: Kelli- family at bedside speak english     Needed: Yes    Vital Signs:  /70   Pulse 77   Temp 98.2  F (36.8  C) (Oral)   Resp 20   SpO2 98%      Cardiac Rhythm: Afib with RVR    Pain Score: 0/10    Is the Patient Confused:  No    Last Food or Drink: 11/10/22 at breakfast    Focused Assessment:  Patient presented to the ER with complaints of \"not feeling right\" for the past couple days, in addition to a headache and increased BP. Found to have new onset of Afib with RVR. K was low at 2.8 and was replaced orally and IV. There will need to be a recheck. Dilt was given and rate controlled around 70's. CT there was a meningoma found and MRI was recently done. Results pending.     Tests Performed: Done: Labs and Imaging    Treatments Provided:  labs and imaging and K " replaced    Family Dynamics/Concerns: No    Family Updated On Visitor Policy: Yes    Plan of Care Communicated to Family: Yes    Who Was Updated about Plan of Care: family at bedside     Belongings Checklist Done and Signed by Patient: No    Medications sent with patient: none    Covid: asymptomatic , pending    Additional Information:     RN: Haylie Gann RN  11/10/2022 3:44 PM

## 2022-11-10 NOTE — ED PROVIDER NOTES
Emergency Department Encounter         FINAL IMPRESSION:  afib RVR        ED COURSE AND MEDICAL DECISION MAKING       ED Course as of 11/10/22 1203   Thu Nov 10, 2022   1052 77-year-old female with no chronic medical problems, here with tachycardia, pretension, headache for the past few days.  No syncope.  mild dizziness.  No fevers or vomiting.  No abdominal pain.  Used bedside .  Evaluation otherwise unremarkable other than tachycardic heart rhythm.  Lungs are clear.  No leg swelling.  Abdomen benign.  Neurologically grossly intact.  Labs showing hypokalemia.  Pleated p.o. and IV.  BNP normal.  Troponin normal.  Plan for diltiazem as well as imaging of patient's head and chest for PE     -Labs overall reassuring, meningioma found on CT of recommending MRI.  CT PE otherwise normal.  Plan for second round of diltiazem as patient still is mildly tachycardic.  - Repeat evaluation showing improved heart rate.  Plan for admission      Critical Care     Performed by: Mainor Miller or    Authorized by: Mainor Miller  Total critical care time: 60 minutes  Critical care was necessary to treat or prevent imminent or life-threatening deterioration of the following conditions: afib with RVR  Critical care was time spent personally by me on the following activities: development of treatment plan with patient or surrogate, discussions with consultants, examination of patient, evaluation of patient's response to treatment, obtaining history from patient or surrogate, ordering and performing treatments and interventions, ordering and review of laboratory studies, ordering and review of radiographic studies, re-evaluation of patient's condition and monitoring for potential decompensation.  Critical care time was exclusive of separately billable procedures and treating other patients.      10:00 AM I performed my initial interview and exam.    Medical Decision Making    Supplemental history from: Caregiver    External Record(s)  Reviewed: Inpatient Record    Differential Diagnosis: See MDM charting for differential considered.     I performed an independent interpretation of the: EKG: See my documentation.    Discussed with radiology regarding test interpretation: Other: see ed course    Discussion of management with another provider: See ED Course    The following testing was considered but ultimately not selected: None    I considered prescription management with: N/A    The patient's care impacted: None    Consideration of Admission/Observation: Yes    Care significantly affected by Social Determinants of Health including: N/A      At the conclusion of the encounter I discussed the results of all the tests and the disposition. The questions were answered. The patient or family acknowledged understanding and was agreeable with the care plan.      MEDICATIONS GIVEN IN THE EMERGENCY DEPARTMENT:  Medications - No data to display    NEW PRESCRIPTIONS STARTED AT TODAY'S ED VISIT:  New Prescriptions    No medications on file       HPI     Patient information obtained from: Patient    Use of Interpretor: Yes (In Person) - Language - Kelli Huggins is a 77 year old female with a pertinent history of neuritis, meningioma, cervicalgia, dyspepsia, essential hypertension, GERD, and recurrent UTI, who presents to this ED by walk in for evaluation of chest pain.    Patient complains of hypertension with pressures of 170s/90s. Patient also endorses shortness of breath and 3 days of dizziness. Patient denies chest pain, fever, vomiting, abdominal pain, or any other concerns at this time.    REVIEW OF SYSTEMS:  Review of Systems   Constitutional: Negative for fever, malaise  HEENT: Negative runny nose, sore throat, ear pain, neck pain  Respiratory: Positive for shortness of breath. Negative for cough, congestion  Cardiovascular: Negative for chest pain, leg edema  Gastrointestinal: Negative for abdominal distention, abdominal pain, constipation,  vomiting, nausea, diarrhea  Genitourinary: Negative for dysuria and hematuria.   Integument: Negative for rash, skin breakdown  Neurological: Positive for dizziness. Negative for paresthesias, weakness, headache.  Musculoskeletal: Negative for joint pain, joint swelling      All other systems reviewed and are negative.      MEDICAL HISTORY     Past Medical History:   Diagnosis Date     Benign adenomatous polyp of large intestine      Biliary colic      Cervicalgia      Cholelithiasis      Chronic allergic conjunctivitis      Cystitis      Dyspepsia      Gastritis      GERD (gastroesophageal reflux disease)      HLD (hyperlipidemia)      Hypertension      Hypokalemia      Meningioma (H)      Multiple lung nodules on CT      Osteoporosis      Positional vertigo      Pulmonary nodules      Recurrent UTI      Transaminitis 10/29/2015     Urinary incontinence      Xerosis cutis        Past Surgical History:   Procedure Laterality Date     CHOLECYSTECTOMY  08/21/2015       Social History     Tobacco Use     Smoking status: Never     Smokeless tobacco: Never   Substance Use Topics     Alcohol use: No     Drug use: No       acetaminophen (TYLENOL) 650 MG CR tablet  artificial tears (GENTEAL) 0.1-0.2-0.3 % ophthalmic solution  atorvastatin (LIPITOR) 80 MG tablet  blood pressure monitor Kit  denosumab (PROLIA) 60 MG/ML SOSY injection  diclofenac (VOLTAREN) 1 % topical gel  ergocalciferol (ERGOCALCIFEROL) 1.25 MG (17271 UT) capsule  ibuprofen (ADVIL/MOTRIN) 600 MG tablet  ketotifen (ZADITOR) 0.025 % ophthalmic solution  lisinopril-hydrochlorothiazide (ZESTORETIC) 10-12.5 MG tablet  meclizine (ANTIVERT) 25 MG tablet  Multiple Vitamin (ONE-DAILY MULTI-VITAMIN) TABS  omeprazole (PRILOSEC) 20 MG DR capsule        PHYSICAL EXAM     There were no vitals taken for this visit.      PHYSICAL EXAM:     General: Patient appears well, nontoxic, comfortable  HEENT: Moist mucous membranes,  No head trauma.  No midline neck  pain.  Cardiovascular: Tachycardic rate, normal rhythm, no extremity edema.  No appreciable murmur.  Respiratory: No signs of respiratory distress, lungs are clear to auscultation bilaterally with no wheezes rhonchi or rales.  Abdominal: Soft, nontender, nondistended, no palpable masses, no guarding, no rebound  Musculoskeletal: Full range of motion of joints, no deformities appreciated.  Neurological: Alert and oriented, grossly neurologically intact.  Psychological: Normal affect and mood.  Integument: No rashes appreciated        RESULTS       Labs Ordered and Resulted from Time of ED Arrival to Time of ED Departure - No data to display    No orders to display       PROCEDURES:  Procedures:  Procedures       IPierre am serving as a scribe to document services personally performed by Mainor Miller DO, based on my observations and the provider's statements to me.  I, Mainor Miller DO, attest that Pierre Nuno is acting in a scribe capacity, has observed my performance of the services and has documented them in accordance with my direction.    Mainor Miller DO  Emergency Medicine  [facility] EMERGENCY DEPARTMENT       Mainor Miller DO  11/10/22 [id number]

## 2022-11-10 NOTE — H&P
"[facility]    History and Physical - Hospitalist Service       Date of Admission:  11/10/2022    Assessment & Plan      Jennifer Huggins is a 77 year old female admitted on 11/10/2022. She has hx HTN, vertigo, meningioma, HLD, hypokalemia who presented with not feeling well.  Noted elevated BP with Afib with RVR.    Afib with RVR  --diltiazem IV bolus dose x 2, now appears might be in NSR with HR 70s----obtain repeat EKG  --no prior hx, ECHO, second troponin and cardiology consultation  --metoprolol 12.5mg BID hold for SBP<110 HR<60 unless cards prefers diltiazem---ECHO pending to assess EF    Profound hypokalemia  -- Initial potassium 2.8 she is given a 10 mill equivalent IV dose and a 40 mill equivalent oral dose with repeat potassium now at 4.1    HTN  --given marked elevation on presentation and reports not missing dose, discharge home Lisinopril/HCTZ and add of metoprolol and follow    Suspected meningioma  --noted on imaging 2015 and relatively stable 2017 but now larger in imaging today  --MRI pending  --neurosurgery consultation  --reports \"head heavy\" but no \"headache\"  --known vertigo and takes Meclizine    HLD  --continue statin    COVID screening pending--has receive 2 doses of Moderna with last 8/21.       Diet: Combination Diet Low Saturated Fat Na <2400mg Diet, No Caffeine Diet    DVT Prophylaxis: hold as may need for Afib but also need to sort head mass  Mei Catheter: Not present  Central Lines: None  Cardiac Monitoring: ACTIVE order. Indication: Tachyarrhythmias, acute (48 hours)  Code Status: Full Code      Clinically Significant Risk Factors Present on Admission        # Hypokalemia: Lowest K = 2.8 mmol/L (Ref range: 3.4-5.3) in last 2 days, will replace as needed           # Hypertension: home medication list includes antihypertensive(s)     # Overweight: Estimated body mass index is 26.13 kg/m  as calculated from the following:    Height as of this encounter: 1.473 m (4' " "10\").    Weight as of this encounter: 56.7 kg (125 lb).           Disposition Plan      Expected Discharge Date: 11/11/2022    Discharge Delays: Echo Result Pending (enter specific test & time in comments)  Specialist Consult (enter specialist & decision needed in comments)            The patient's care was discussed with the Patient and Patient's Family.    Alondra Arauz MD  Hospitalist Service  [facility]  Securely message with the Vocera Web Console (learn more here)  Text page via [employer] Paging/Directory         ______________________________________________________________________    Chief Complaint   General unwell with weakness and head heaviness with chest heaviness.    History is obtained from the patient with use of [employer] .    History of Present Illness   Jennifer Huggins is a 77 year old female who has HTN, HLD, meningioma, vertigo who presented to ED with chest pain and general feeling of fatigue and unwell.  Noted some mild SOB and dizziness but about same as baseline and head heaviness but not necessarily a headache.  Has been taking her BP meds.     Found to A. fib with rapid ventricular response.  She was treated with diltiazem x2 with finally eventually heart rate slowing to the 70s and what looks like conversion to normal sinus rhythm based on monitor in the ED.  She also had marked hypertension which improved with the 2 doses of the diltiazem.    She currently denies any further chest pain with me no palpitations no fever or vomiting no abdominal pain and reports that her dizziness is baseline.  She denies any headache.  Denies any focal numbness or tingling or weakness.  Is mention during my visit she appears to be in sinus rhythm on the telemetry monitor but no formal EKG has been repeated    Review of Systems    The 10 point Review of Systems is negative other than noted in the HPI or here.     Past Medical History    I have reviewed this patient's " medical history and updated it with pertinent information if needed.   Past Medical History:   Diagnosis Date     Benign adenomatous polyp of large intestine      Biliary colic      Cervicalgia      Cholelithiasis      Chronic allergic conjunctivitis      Cystitis      Dyspepsia      Gastritis      GERD (gastroesophageal reflux disease)      HLD (hyperlipidemia)      Hypertension      Hypokalemia      Meningioma (H)      Multiple lung nodules on CT     CT ABD 11/23/1013, mult nodules < 5mm in size, per Fleischner Society recommendations  repeat CT in 12 months CT Chest 2/20/17:  Stable pulmonary nodules suggesting incidental findings given long-term stability.      Osteoporosis      Positional vertigo      Pulmonary nodules      Recurrent UTI      Transaminitis 10/29/2015     Urinary incontinence      Xerosis cutis        Past Surgical History   I have reviewed this patient's surgical history and updated it with pertinent information if needed.  Past Surgical History:   Procedure Laterality Date     CHOLECYSTECTOMY  08/21/2015       Social History   I have reviewed this patient's social history and updated it with pertinent information if needed.  Social History     Tobacco Use     Smoking status: Never     Smokeless tobacco: Never   Substance Use Topics     Alcohol use: No     Drug use: No       Family History   I have reviewed this patient's family history and updated it with pertinent information if needed.  Family History   Problem Relation Age of Onset     No Known Problems Mother      No Known Problems Father        Prior to Admission Medications   Prior to Admission Medications   Prescriptions Last Dose Informant Patient Reported? Taking?   Multiple Vitamin (ONE-DAILY MULTI-VITAMIN) TABS Unknown  No Yes   Sig: Take 1 tablet by mouth daily   acetaminophen (TYLENOL) 650 MG CR tablet More than a month  No Yes   Sig: Take 1 tablet (650 mg) by mouth every 8 hours as needed for pain   artificial tears (GENTEAL)  0.1-0.2-0.3 % ophthalmic solution   No No   Sig: Place 2 drops into both eyes 4 times daily as needed (dry or irritated eyes)   atorvastatin (LIPITOR) 80 MG tablet 11/9/2022  No Yes   Sig: Take 1 tablet (80 mg) by mouth At Bedtime   blood pressure monitor Kit   No No   Sig: [BLOOD PRESSURE MONITOR KIT] Home blood pressure cuff and monitor   denosumab (PROLIA) 60 MG/ML SOSY injection   Yes No   Sig: Inject 60 mg Subcutaneous   diclofenac (VOLTAREN) 1 % topical gel More than a month  No Yes   Sig: Apply 2 g topically 4 times daily   ergocalciferol (ERGOCALCIFEROL) 1.25 MG (43366 UT) capsule Unknown  No Yes   Sig: Take 1 capsule (50,000 Units) by mouth once a week   ibuprofen (ADVIL/MOTRIN) 600 MG tablet 11/9/2022  No Yes   Sig: Take 1 tablet (600 mg) by mouth 2 times daily as needed for moderate pain   Patient taking differently: Take 600 mg by mouth daily   ketotifen (ZADITOR) 0.025 % ophthalmic solution 11/9/2022  No Yes   Sig: Place 1 drop into both eyes 2 times daily   lisinopril-hydrochlorothiazide (ZESTORETIC) 10-12.5 MG tablet 11/10/2022 at am  No Yes   Sig: Take 1 tablet by mouth daily   meclizine (ANTIVERT) 25 MG tablet 11/9/2022  No Yes   Sig: [MECLIZINE (ANTIVERT) 25 MG TABLET] TAKE 1 TABLET BY MOUTH 3 TIMES A DAY AS NEEDED FOR DIZZINESS OR NAUSEA.   Patient taking differently: Take 25 mg by mouth daily [MECLIZINE (ANTIVERT) 25 MG TABLET] TAKE 1 TABLET BY MOUTH 3 TIMES A DAY AS NEEDED FOR DIZZINESS OR NAUSEA.   omeprazole (PRILOSEC) 20 MG DR capsule 11/9/2022  No Yes   Sig: [OMEPRAZOLE (PRILOSEC) 20 MG CAPSULE] TAKE 1 CAPSULE BY MOUTH DAILY BEFORE BREAKFAST      Facility-Administered Medications Last Administration Doses Remaining   denosumab (PROLIA) injection 60 mg None recorded 6   denosumab (PROLIA) injection 60 mg 9/30/2022  4:48 PM 1        Allergies   Allergies   Allergen Reactions     Ibandronate [Ibandronic Acid] Headache and Shortness Of Breath     Septra [Sulfamethoxazole W/Trimethoprim] Other  (See Comments)     Acute hepatitis        Physical Exam   Vital Signs: Temp: 98.2  F (36.8  C) Temp src: Oral BP: 121/70 Pulse: 77   Resp: 20 SpO2: 98 % O2 Device: None (Room air)    Weight: 125 lbs 0 oz    General: Alert, cooperative elderly female, No apparent distress  Head: Normocephalic, atraumatic  Eyes: Pupils equal, round and reactive, Extra-ocular movements intact  Mouth: Mucus membranes moist  Neck: Supple  Cardiovascular: Regular rate and rhythm, Normal S1, S2, normal rate  Lungs: Clear to auscultation bilaterally  Abdomen: Soft, Non-tender, not distended, Bowel sounds present  Extremities: No edema, no clubbing  Skin: Warm and well-perfused without lesions.  Neurologic: Alert and oriented. Face is symmetric, Moves all extremities equally      Data   Data reviewed today: I reviewed all medications, new labs and imaging results over the last 24 hours. I personally reviewed the EKG tracing showing Atrial fibrillation with rapid ventricular response.  Initial EKG rate of 133-second EKG confirming atrial fibrillation is 151.  Repeat EKG at 1631-hour reads sinus at rate of 82    Recent Labs   Lab 11/10/22  1005   WBC 9.0   HGB 15.3   MCV 92      INR 0.94      POTASSIUM 2.8*   CHLORIDE 104   CO2 27   BUN 7.2*   CR 0.83   ANIONGAP 12   JONATHAN 8.9   *   ALBUMIN 4.0   PROTTOTAL 7.2   BILITOTAL 0.6   ALKPHOS 64   ALT 25   AST 27     Recent Results (from the past 24 hour(s))   Head CT w/o contrast    Narrative    EXAM: CT HEAD W/O CONTRAST  LOCATION: [facility]  DATE/TIME: 11/10/2022 12:45 PM    INDICATION: headache  COMPARISON: None.  TECHNIQUE: Routine CT Head without IV contrast. Multiplanar reformats. Dose reduction techniques were used.    FINDINGS:  INTRACRANIAL CONTENTS: No intracranial hemorrhage. 1.8 x 2.9 x 2.7 cm (transverse times AP times craniocaudal) slightly hyperdense extra-axial mass with internal calcifications over the left cerebellar convexity. Mild  diffuse cerebral parenchymal volume   loss. No midline shift. The basilar cisterns are patent. Mild periventricular and scattered foci of deep white matter hypodensities involving both cerebral hemispheres. No CT evidence for an acute infarct. No cerebellar tonsillar ectopia.    VISUALIZED ORBITS/SINUSES/MASTOIDS: No intraorbital abnormality. Moderate opacification of the right ethmoid air cells. No middle ear or mastoid effusion.    BONES/SOFT TISSUES: No acute abnormality.      Impression    IMPRESSION:  1.  1.8 x 2.9 x 2.7 cm slightly hyperdense extra-axial mass with internal calcifications over the left cerebellar convexity. This is likely meningioma. It can be be confirmed with contrast enhanced brain MRI.  2.  Mild diffuse cerebral parenchymal volume loss. Presumed chronic hypertensive/microvascular ischemic white matter changes.      Discussed the findings with Dr. Miller at 12:52 PM, 11/10/2022.   CT Chest Pulmonary Embolism w Contrast    Narrative    EXAM: CT CHEST PULMONARY EMBOLISM W CONTRAST  LOCATION: [facility]  DATE/TIME: 11/10/2022 12:46 PM    INDICATION: Shortness of breath, chest pain  COMPARISON: CT exams 03/24/2017 02/20/2017  TECHNIQUE: CT chest pulmonary angiogram during arterial phase injection of IV contrast. Multiplanar reformats and MIP reconstructions were performed. Dose reduction techniques were used.   CONTRAST: IsoVue 370 100mL    FINDINGS:  ANGIOGRAM CHEST: Mildly limited due to respiratory motion. No central or peripheral pulmonary artery embolism. Thoracic aorta is negative for dissection. No CT evidence of right heart strain.    LUNGS AND PLEURA: The central airways are clear. Mild mosaic lung attenuation. Mild bibasilar atelectasis/scarring. No focal pneumonic consolidation or pleural effusion. Stable benign pulmonary nodules including a dominant 5 mm right middle lobe nodule   image 138 series 9 and 2 mm right apical nodule image 59.    MEDIASTINUM/AXILLAE:  Prominent thyroid gland. No thoracic adenopathy. Normal heart size and no pericardial effusion.    CORONARY ARTERY CALCIFICATION: Mild.    UPPER ABDOMEN: Stable right renal atrophy.    MUSCULOSKELETAL: Spinal degenerative changes.      Impression    IMPRESSION:  1.  No pulmonary artery embolism.  2.  Findings suggesting mild air trapping which may reflect bronchiolitis. No pneumonic infiltrate or pleural effusion.  3.  Stable benign pulmonary nodules. No follow-up is indicated.

## 2022-11-11 ENCOUNTER — APPOINTMENT (OUTPATIENT)
Dept: CARDIOLOGY | Facility: HOSPITAL | Age: 77
End: 2022-11-11
Attending: INTERNAL MEDICINE
Payer: MEDICARE

## 2022-11-11 ENCOUNTER — TELEPHONE (OUTPATIENT)
Dept: FAMILY MEDICINE | Facility: CLINIC | Age: 77
End: 2022-11-11

## 2022-11-11 VITALS
HEART RATE: 80 BPM | DIASTOLIC BLOOD PRESSURE: 70 MMHG | WEIGHT: 124.2 LBS | HEIGHT: 58 IN | RESPIRATION RATE: 18 BRPM | BODY MASS INDEX: 26.07 KG/M2 | SYSTOLIC BLOOD PRESSURE: 129 MMHG | TEMPERATURE: 98.2 F | OXYGEN SATURATION: 93 %

## 2022-11-11 DIAGNOSIS — D32.0 BENIGN NEOPLASM OF CEREBRAL MENINGES (H): Primary | ICD-10-CM

## 2022-11-11 LAB
ATRIAL RATE - MUSE: 81 BPM
DIASTOLIC BLOOD PRESSURE - MUSE: NORMAL MMHG
HOLD SPECIMEN: NORMAL
INTERPRETATION ECG - MUSE: NORMAL
LVEF ECHO: NORMAL
P AXIS - MUSE: 50 DEGREES
POTASSIUM SERPL-SCNC: 3.7 MMOL/L (ref 3.4–5.3)
PR INTERVAL - MUSE: 150 MS
QRS DURATION - MUSE: 66 MS
QT - MUSE: 388 MS
QTC - MUSE: 450 MS
R AXIS - MUSE: 19 DEGREES
SYSTOLIC BLOOD PRESSURE - MUSE: NORMAL MMHG
T AXIS - MUSE: 45 DEGREES
VENTRICULAR RATE- MUSE: 81 BPM

## 2022-11-11 PROCEDURE — 99207 PR NO CHARGE NURSE ONLY: CPT | Performed by: PSYCHIATRY & NEUROLOGY

## 2022-11-11 PROCEDURE — 36415 COLL VENOUS BLD VENIPUNCTURE: CPT | Performed by: FAMILY MEDICINE

## 2022-11-11 PROCEDURE — 93306 TTE W/DOPPLER COMPLETE: CPT | Mod: 26 | Performed by: INTERNAL MEDICINE

## 2022-11-11 PROCEDURE — 93306 TTE W/DOPPLER COMPLETE: CPT

## 2022-11-11 PROCEDURE — 99207 PR NO CHARGE LOS: CPT | Performed by: INTERNAL MEDICINE

## 2022-11-11 PROCEDURE — 250N000013 HC RX MED GY IP 250 OP 250 PS 637: Performed by: FAMILY MEDICINE

## 2022-11-11 PROCEDURE — G0378 HOSPITAL OBSERVATION PER HR: HCPCS

## 2022-11-11 PROCEDURE — 84132 ASSAY OF SERUM POTASSIUM: CPT | Performed by: FAMILY MEDICINE

## 2022-11-11 PROCEDURE — 99217 PR OBSERVATION CARE DISCHARGE: CPT | Performed by: FAMILY MEDICINE

## 2022-11-11 PROCEDURE — 250N000013 HC RX MED GY IP 250 OP 250 PS 637: Performed by: INTERNAL MEDICINE

## 2022-11-11 RX ORDER — DILTIAZEM HYDROCHLORIDE 30 MG/1
30 TABLET, FILM COATED ORAL 2 TIMES DAILY
Qty: 60 TABLET | Refills: 0 | Status: SHIPPED | OUTPATIENT
Start: 2022-11-11 | End: 2022-12-12

## 2022-11-11 RX ADMIN — DILTIAZEM HYDROCHLORIDE 30 MG: 30 TABLET, FILM COATED ORAL at 00:22

## 2022-11-11 RX ADMIN — KETOTIFEN FUMARATE 1 DROP: 0.35 SOLUTION/ DROPS OPHTHALMIC at 08:13

## 2022-11-11 RX ADMIN — PANTOPRAZOLE SODIUM 40 MG: 40 TABLET, DELAYED RELEASE ORAL at 06:50

## 2022-11-11 RX ADMIN — MECLIZINE HYDROCHLORIDE 25 MG: 25 TABLET ORAL at 08:12

## 2022-11-11 ASSESSMENT — ACTIVITIES OF DAILY LIVING (ADL)
ADLS_ACUITY_SCORE: 35
ADLS_ACUITY_SCORE: 33
ADLS_ACUITY_SCORE: 35
ADLS_ACUITY_SCORE: 33
ADLS_ACUITY_SCORE: 35

## 2022-11-11 NOTE — PROGRESS NOTES
After discussion with [facility] team, MRA and MRV with and without ordered for best possible visualization and surgical planning. Pt will be contacted by scheduling Monday morning 11/14/22. Her case has been added to Neuro Tumor Board on 11/21/22.     Glory Ulrich RN

## 2022-11-11 NOTE — DISCHARGE SUMMARY
[facility]  Hospitalist Discharge Summary      Date of Admission:  11/10/2022  Date of Discharge:  11/11/2022  Discharging Provider: Kenna Gorman MD  Discharge Service: Hospitalist Service    Discharge Diagnoses       A. fib with RVR; converted.  Started on medications and Eliquis    Meningioma; planning outpatient neurosurgical follow-up    Hypokalemia, contributing to above.  Diuretics discontinued and now on diltiazem    Hypertension; occasions adjusted per below    Hyperlipidemia    Follow-ups Needed After Discharge   Follow-up Appointments     Follow Up Care      Our office will call you in 2-3 business days after discharge to schedule   your follow-up appointments. You should have a provider visit scheduled.   If you have not received a call in 2-3 business days, please call our   office at [phone].    Your follow up appointment will be with the surgeon         Follow-up and recommended labs and tests       Follow up with primary care provider, Barb Elias, within 7 days to   evaluate medication change and for hospital follow- up.  Follow up with Neurosurgery in two weeks.  They will call you to schedule   the appointment                 Discharge Disposition   Discharged to home  Condition at discharge: Stable      Hospital Course     77-year-old female with a known history of hypertension and meningioma came into the emergency room department complaining of malaise, fatigue, shortness of breath and dizziness and head heaviness.  She was found to be in atrial fibrillation with rapid ventricular response with hypokalemia.  Patient was treated with IV diltiazem x2 and potassium replaced.  Magnesium was normal.  Eventually converted to sinus rhythm.  Echocardiogram normal.  Remained in sinus rhythm and seen by cardiology who agreed with discontinuation of previous antihypertensives and start of diltiazem    As part of ED work-up CT obtained showing meningioma which have changed  slightly since previously imaged.  MRI also showed meningioma.  Seen by neurosurgery with plans for outpatient follow-up.  Due to venous sinus invasion request was for neurology opinion on anticoagulation.  Spoke with neurology who was in agreement with Joaquin.    Communication done with patient through the [facility] .  She remained stable while here.  We will follow-up with primary care for atrial fibrillation and neurosurgery as advised.  Discharged in good condition.      Consultations This Hospital Stay   CARDIOLOGY IP CONSULT  NEUROSURGERY IP CONSULT  NEUROLOGY IP CONSULT  NEUROLOGY IP CONSULT    Code Status   Full Code    Time Spent on this Encounter   I, Kenna Gorman MD, personally saw the patient today and spent greater than 30 minutes discharging this patient.       Kenna Gorman MD  [facility]  [address] [place] [id number]  Phone: [phone]  Fax: [phone]  ______________________________________________________________________    Physical Exam   Vital Signs: Temp: 98.6  F (37  C) Temp src: Oral BP: 99/64 Pulse: 77   Resp: 18 SpO2: 94 % O2 Device: None (Room air)    Weight: 124 lbs 3.2 oz  General Appearance: Pleasant, no apparent distress  Respiratory: Clear to auscultation bilaterally  Cardiovascular: Regular rate and rhythm without murmurs rubs or gallops  GI: Soft nontender without hepatosplenomegaly  Skin: No significant lower extremity edema  Other: Neurological gross intact without focal deficits appreciate       Primary Care Physician   Barb Elias    Discharge Orders      Follow Up Care    Our office will call you in 2-3 business days after discharge to schedule your follow-up appointments. You should have a provider visit scheduled. If you have not received a call in 2-3 business days, please call our office at [phone].    Your follow up appointment will be with the surgeon     Reason for your hospital stay    Atrial  fibrillation  Meningioma     Follow-up and recommended labs and tests     Follow up with primary care provider, Barb Elias, within 7 days to evaluate medication change and for hospital follow- up.  Follow up with Neurosurgery in two weeks.  They will call you to schedule the appointment     Activity    Your activity upon discharge: activity as tolerated     Diet    Follow this diet upon discharge: Orders Placed This Encounter      Room Service      Combination Diet Low Saturated Fat Na <2400mg Diet, No Caffeine Diet       Significant Results and Procedures   Most Recent 3 CBC's:Recent Labs   Lab Test 11/10/22  1005 09/17/18  1710 06/23/18  0840   WBC 9.0 7.3 8.1   HGB 15.3 13.9 14.8   MCV 92 92 92    273 303     Most Recent 3 BMP's:Recent Labs   Lab Test 11/11/22  0453 11/10/22  1559 11/10/22  1005 09/30/22  1657 09/30/22  1657 09/24/21  1704   NA  --   --  143  --  144 142   POTASSIUM 3.7 4.1 2.8*   < > 2.9* 4.0   CHLORIDE  --   --  104  --  105 105   CO2  --   --  27  --  27 25   BUN  --   --  7.2*  --  13.8 28   CR  --   --  0.83  --  1.05* 0.93   ANIONGAP  --   --  12  --  12 12   JONATHAN  --   --  8.9  --  9.6 9.5   GLC  --   --  102*  --  146* 116    < > = values in this interval not displayed.     7-Day Micro Results     Collected Updated Procedure Result Status      11/10/2022 1545 11/10/2022 1629 Asymptomatic COVID-19 Virus (Coronavirus) by PCR Nasopharyngeal [[id number]]    Swab from Nasopharyngeal    Final result Component Value   SARS CoV2 PCR Negative   NEGATIVE: SARS-CoV-2 (COVID-19) RNA not detected, presumed negative.              ,   Results for orders placed or performed during the hospital encounter of 11/10/22   Head CT w/o contrast    Narrative    EXAM: CT HEAD W/O CONTRAST  LOCATION: [facility]  DATE/TIME: 11/10/2022 12:45 PM    INDICATION: headache  COMPARISON: None.  TECHNIQUE: Routine CT Head without IV contrast. Multiplanar reformats. Dose reduction  techniques were used.    FINDINGS:  INTRACRANIAL CONTENTS: No intracranial hemorrhage. 1.8 x 2.9 x 2.7 cm (transverse times AP times craniocaudal) slightly hyperdense extra-axial mass with internal calcifications over the left cerebellar convexity. Mild diffuse cerebral parenchymal volume   loss. No midline shift. The basilar cisterns are patent. Mild periventricular and scattered foci of deep white matter hypodensities involving both cerebral hemispheres. No CT evidence for an acute infarct. No cerebellar tonsillar ectopia.    VISUALIZED ORBITS/SINUSES/MASTOIDS: No intraorbital abnormality. Moderate opacification of the right ethmoid air cells. No middle ear or mastoid effusion.    BONES/SOFT TISSUES: No acute abnormality.      Impression    IMPRESSION:  1.  1.8 x 2.9 x 2.7 cm slightly hyperdense extra-axial mass with internal calcifications over the left cerebellar convexity. This is likely meningioma. It can be be confirmed with contrast enhanced brain MRI.  2.  Mild diffuse cerebral parenchymal volume loss. Presumed chronic hypertensive/microvascular ischemic white matter changes.      Discussed the findings with Dr. Miller at 12:52 PM, 11/10/2022.   CT Chest Pulmonary Embolism w Contrast    Narrative    EXAM: CT CHEST PULMONARY EMBOLISM W CONTRAST  LOCATION: [facility]  DATE/TIME: 11/10/2022 12:46 PM    INDICATION: Shortness of breath, chest pain  COMPARISON: CT exams 03/24/2017 02/20/2017  TECHNIQUE: CT chest pulmonary angiogram during arterial phase injection of IV contrast. Multiplanar reformats and MIP reconstructions were performed. Dose reduction techniques were used.   CONTRAST: IsoVue 370 100mL    FINDINGS:  ANGIOGRAM CHEST: Mildly limited due to respiratory motion. No central or peripheral pulmonary artery embolism. Thoracic aorta is negative for dissection. No CT evidence of right heart strain.    LUNGS AND PLEURA: The central airways are clear. Mild mosaic lung attenuation. Mild  bibasilar atelectasis/scarring. No focal pneumonic consolidation or pleural effusion. Stable benign pulmonary nodules including a dominant 5 mm right middle lobe nodule   image 138 series 9 and 2 mm right apical nodule image 59.    MEDIASTINUM/AXILLAE: Prominent thyroid gland. No thoracic adenopathy. Normal heart size and no pericardial effusion.    CORONARY ARTERY CALCIFICATION: Mild.    UPPER ABDOMEN: Stable right renal atrophy.    MUSCULOSKELETAL: Spinal degenerative changes.      Impression    IMPRESSION:  1.  No pulmonary artery embolism.  2.  Findings suggesting mild air trapping which may reflect bronchiolitis. No pneumonic infiltrate or pleural effusion.  3.  Stable benign pulmonary nodules. No follow-up is indicated.   MR Brain w/o & w Contrast    Narrative    EXAM: MR BRAIN W/O and W CONTRAST  LOCATION: [facility]  DATE/TIME: 11/10/2022 3:34 PM    INDICATION: L SIDED MENINGIOMA  COMPARISON: None.  CONTRAST: 6ml gadavist   TECHNIQUE: Routine multiplanar multisequence head MRI without and with intravenous contrast.    FINDINGS:  INTRACRANIAL CONTENTS: No acute/subacute infarct, acute intracranial hemorrhage, or extra-axial fluid collection. A 2.9 x 3.1 x 2.3 cm homogeneously enhancing partially calcified extra-axial predominantly left infratentorial lesion with supratentorial   extension and invasion of the left transverse dural venous sinus. Partial loss of flow void within the left sigmoid sinus with maintained left internal jugular vein flow void. Mild regional mass effect with mild edema in the adjacent left cerebellar   hemisphere. No brain herniation, hydrocephalus, or midline shift. No significant increased cerebral blood volume within the mass. Mild generalized parenchymal volume loss. Patchy and confluent nonspecific T2/FLAIR hyperintensities within the cerebral   white matter, most consistent with moderate chronic microvascular ischemic change.     SELLA: No abnormality  accounting for technique.    OSSEOUS STRUCTURES/SOFT TISSUES: Normal marrow signal. The major intracranial arterial flow voids are maintained.     ORBITS: No abnormality accounting for technique.     SINUSES/MASTOIDS: No significant paranasal sinus or mastoid mucosal disease.       Impression    IMPRESSION:  1.  A predominantly left infratentorial homogeneously enhancing extra-axial lesion with supratentorial extension and invasion of the left transverse dural venous sinus, most consistent with a meningioma. Mild regional mass effect and edema within the   left cerebellar hemisphere without midline shift, herniation, or hydrocephalus.  2.  Additional, chronic findings, as above.   Echocardiogram Complete     Value    LVEF  > 65%    [facility]    [id number]  [id number]  [id number]  [id number]^EDUARDO^FRANCISCO^[place] [address]     Name: LANIE BAI  MRN: [id number]  : 1945  Study Date: 2022 08:27 AM  Age: 77 yrs  Gender: Female  Patient Location: [facility]  Reason For Study: Atrial Fibrillation  Ordering Physician: FRANCISCO CLARK  Performed By:      BSA: 1.5 m2  Height: 58 in  Weight: 124 lb  HR: 72  ______________________________________________________________________________  Procedure  Complete Portable Echo Adult.  ______________________________________________________________________________  Interpretation Summary     1. The left ventricle is normal in size. Left ventricular function is  normal.The ejection fraction is > 65%.There is moderate concentric left  ventricular hypertrophy. Left ventricular diastolic function is abnormal. No  regional wall motion abnormalities noted.  2. Normal right ventricle size and systolic function.  3. The left atrium is mildly dilated.  4. There is mild trileaflet aortic sclerosis. No hemodynamically significant  valvular aortic stenosis.     ______________________________________________________________________________  Left  Ventricle  The left ventricle is normal in size. Left ventricular function is normal.The  ejection fraction is > 65%. There is moderate concentric left ventricular  hypertrophy. Left ventricular diastolic function is abnormal. No regional wall  motion abnormalities noted.     Right Ventricle  Normal right ventricle size and systolic function.     Atria  The left atrium is mildly dilated. Right atrial size is normal. There is no  color Doppler evidence of an atrial shunt.     Mitral Valve  Mitral valve leaflets appear normal. There is no evidence of mitral stenosis  or clinically significant mitral regurgitation. There is trace mitral  regurgitation.     Tricuspid Valve  Tricuspid valve leaflets appear normal. There is no evidence of tricuspid  stenosis or clinically significant tricuspid regurgitation. The right  ventricular systolic pressure is approximated at 20.8 mmHg plus the right  atrial pressure. IVC diameter <2.1 cm collapsing >50% with sniff suggests a  normal RA pressure of 3 mmHg.     Aortic Valve  There is mild trileaflet aortic sclerosis. There is trace aortic  regurgitation. No hemodynamically significant valvular aortic stenosis.     Pulmonic Valve  The pulmonic valve is not well seen, but is grossly normal. This degree of  valvular regurgitation is within normal limits. There is trace pulmonic  valvular regurgitation.     Vessels  The aorta root is normal. Normal size ascending aorta. IVC diameter <2.1 cm  collapsing >50% with sniff suggests a normal RA pressure of 3 mmHg.     Pericardium  There is no pericardial effusion.     ______________________________________________________________________________  MMode/2D Measurements & Calculations  IVSd: 1.5 cm  LVIDd: 3.7 cm  LVIDs: 2.2 cm  LVPWd: 1.1 cm     FS: 41.1 %  LV mass(C)d: 165.5 grams  LV mass(C)dI: 111.3 grams/m2  Ao root diam: 2.8 cm  LA dimension: 3.0 cm  asc Aorta Diam: 2.8 cm  LA/Ao: 1.1  LVOT diam: 1.9 cm  LVOT area: 2.8 cm2  LA Volume  Indexed (AL/bp): 30.4 ml/m2  RWT: 0.60     Doppler Measurements & Calculations  MV E max tien: 48.0 cm/sec  MV A max tien: 76.3 cm/sec  MV E/A: 0.63     MV dec slope: 223.0 cm/sec2  MV dec time: 0.21 sec  Ao V2 max: 160.0 cm/sec  Ao max PG: 10.0 mmHg  Ao V2 mean: 108.0 cm/sec  Ao mean P.0 mmHg  Ao V2 VTI: 33.7 cm  ESTEFANIA(I,D): 1.9 cm2  ESTEFANIA(V,D): 1.8 cm2  LV V1 max P.2 mmHg  LV V1 max: 102.0 cm/sec  LV V1 VTI: 23.1 cm  SV(LVOT): 65.5 ml  SI(LVOT): 44.1 ml/m2  PA acc time: 0.12 sec  PI end-d tien: 106.0 cm/sec  TR max tien: 228.0 cm/sec  TR max P.8 mmHg  AV Tien Ratio (DI): 0.64  ESTEFANIA Index (cm2/m2): 1.3  E/E' av.8  Lateral E/e': 9.2  Medial E/e': 10.5     ______________________________________________________________________________  Report approved by: Hailey Pickens 2022 11:01 AM               Discharge Medications   Current Discharge Medication List      START taking these medications    Details   apixaban ANTICOAGULANT (ELIQUIS) 5 MG tablet Take 1 tablet (5 mg) by mouth 2 times daily for 30 days  Qty: 60 tablet, Refills: 0    Associated Diagnoses: Paroxysmal atrial fibrillation (H)      diltiazem (CARDIZEM) 30 MG tablet Take 1 tablet (30 mg) by mouth 2 times daily for 30 days  Qty: 60 tablet, Refills: 0    Associated Diagnoses: Paroxysmal atrial fibrillation (H)         CONTINUE these medications which have NOT CHANGED    Details   acetaminophen (TYLENOL) 650 MG CR tablet Take 1 tablet (650 mg) by mouth every 8 hours as needed for pain  Qty: 50 tablet, Refills: 4    Associated Diagnoses: Chronic pain of both knees      atorvastatin (LIPITOR) 80 MG tablet Take 1 tablet (80 mg) by mouth At Bedtime  Qty: 90 tablet, Refills: 3    Comments: (increased dose)  Associated Diagnoses: Hypercholesterolemia      diclofenac (VOLTAREN) 1 % topical gel Apply 2 g topically 4 times daily  Qty: 150 g, Refills: 3    Comments: Applying to bilateral knees.  Associated Diagnoses: Chronic pain of both knees       ergocalciferol (ERGOCALCIFEROL) 1.25 MG (00875 UT) capsule Take 1 capsule (50,000 Units) by mouth once a week  Qty: 12 capsule, Refills: 3    Associated Diagnoses: Age-related osteoporosis without current pathological fracture; Vitamin D deficiency      ketotifen (ZADITOR) 0.025 % ophthalmic solution Place 1 drop into both eyes 2 times daily  Qty: 10 mL, Refills: 11    Associated Diagnoses: Pruritus of both eyes      meclizine (ANTIVERT) 25 MG tablet [MECLIZINE (ANTIVERT) 25 MG TABLET] TAKE 1 TABLET BY MOUTH 3 TIMES A DAY AS NEEDED FOR DIZZINESS OR NAUSEA.  Qty: 30 tablet, Refills: 11    Associated Diagnoses: Dizziness      omeprazole (PRILOSEC) 20 MG DR capsule [OMEPRAZOLE (PRILOSEC) 20 MG CAPSULE] TAKE 1 CAPSULE BY MOUTH DAILY BEFORE BREAKFAST  Qty: 90 capsule, Refills: 2    Associated Diagnoses: GERD (gastroesophageal reflux disease)      artificial tears (GENTEAL) 0.1-0.2-0.3 % ophthalmic solution Place 2 drops into both eyes 4 times daily as needed (dry or irritated eyes)  Qty: 15 mL, Refills: 11    Associated Diagnoses: Pruritus of both eyes      blood pressure monitor Kit [BLOOD PRESSURE MONITOR KIT] Home blood pressure cuff and monitor  Qty: 1 each, Refills: 0    Comments: Dx: Essential hypertesion I10  Associated Diagnoses: Essential hypertension         STOP taking these medications       denosumab (PROLIA) 60 MG/ML SOSY injection Comments:   Reason for Stopping:         ibuprofen (ADVIL/MOTRIN) 600 MG tablet Comments:   Reason for Stopping:         lisinopril-hydrochlorothiazide (ZESTORETIC) 10-12.5 MG tablet Comments:   Reason for Stopping:         Multiple Vitamin (ONE-DAILY MULTI-VITAMIN) TABS Comments:   Reason for Stopping:             Allergies   Allergies   Allergen Reactions     Ibandronate [Ibandronic Acid] Headache and Shortness Of Breath     Septra [Sulfamethoxazole W/Trimethoprim] Other (See Comments)     Acute hepatitis

## 2022-11-11 NOTE — PLAN OF CARE
Goal Outcome Evaluation:      Plan of Care Reviewed With: patient    Overall Patient Progress: improvingOverall Patient Progress: improving     Doing well, denied dizziness at rest and denied dyspnea. HR has been NSR. Low B/p at 0330 of 93/54. Recheck  was 89/54. Notified hospitalist on-call, Dr. Carballo. No new orders. Recheck at 0645 was 111/56. Continue to monitor.

## 2022-11-11 NOTE — CONSULTS
Neurology Note:    I spoke with Dr. Gorman about this patient.  Reviewed the images and notes.    Patient with new onset A. fib, requiring anticoagulation ideally.  She does have history of meningioma followed by outside neurosurgery.  I do not have the outside images to review, so it is hard to say whether or not the  sinus invasion seen on our imaging is new.      From a risk/benefit standpoint I think it would be reasonable to start the patient on Eliquis both for venous blood flow and from a cardiac standpoint.    There is a plan for MRV and Neurosurgery follow-up in a couple of weeks with which I agree.  The patient should be advised to monitor for any new neurologic symptoms and seek medical attention if these were to occur in the meantime (risk of bleed).  She is not currently experiencing any concerning symptoms. Plan is for discharge today.     Please call with any additional questions.     Tiana Goodman MD

## 2022-11-11 NOTE — TELEPHONE ENCOUNTER
Reason for Call:  Appointment Request    Patient requesting this type of appt:  Hospital/ED Follow-Up     Requested provider: Barb Elias    Reason patient unable to be scheduled: Not with their preferred provider    When does patient want to be seen/preferred time: 3-7 days    Comments: scheduled for  Dr. Salvador for 11/18/22    Okay to leave a detailed message?: No task completed.     Call taken on 11/11/2022 at 4:23 PM by ZIA Barker

## 2022-11-11 NOTE — PROGRESS NOTES
Neuro and Hospitalist dicussed the plan of care for the patient and she is to be discharged to home this afternoon. Waiting for orders.

## 2022-11-11 NOTE — PLAN OF CARE
PRIMARY DIAGNOSIS: ACUTE PAIN  OUTPATIENT/OBSERVATION GOALS TO BE MET BEFORE DISCHARGE:  1. Pain Status: Pain free.    2. Return to near baseline physical activity: Yes    3. Cleared for discharge by consultants (if involved): No    Discharge Planner Nurse   Safe discharge environment identified: Yes  Barriers to discharge: Yes       Entered by: Moriah Bundy RN 11/11/2022 12:23 PM   Neurosurgery consulted to see the patient. BP 99/64 and held Cardizem since BP less than 110. Patient walked with cane and SBA and voided in the toilet and remains angela wick off. Family bedside visiting and speaking [facility]. Patient wants to go home when able. Call light in reach.  Please review provider order for any additional goals.   Nurse to notify provider when observation goals have been met and patient is ready for discharge.Goal Outcome Evaluation:

## 2022-11-11 NOTE — CONSULTS
NEUROSURGERY CONSULTATION NOTE    Jennifer MORA Joseluis   [address] [facility] [address]  77 year old female  Admission Date/Time: 11/10/2022  9:47 AM  Primary Care Provider: Barb Elias  [facility] Attending Physician: No att. providers found    Neurosurgery was asked to see this patient by No att. providers found for evaluation of meningioma.     PROBLEM LIST:  Principal Problem:    A-fib (H)       Neurosurgery Attending: The patient's clinical examination, laboratory data, and plan was discussed with Dr Scanlon    CONSULTATION ASSESSMENT AND PLAN:    Jennifer is a 78yo Kelli-speaking F with hx meningioma and hypertension who presented to [facility] ED on 11/10/22 for evaluation of hypertension (despite taking her home meds), chest heaviness, dizziness, and shortness of breath x 3 days. She was found to be in new onset afib with rvr, assoc with hypokalemia. MRI brain done for dizziness revealed growth of her known partially calcified left infratentorial with supratentorial extension and invasion of the left transverse dural venous sinus. Partial loss of flow void within left sigmoid sinus with maintained left internal jugular vein flow void. Mild mass effect and edema left cerebellar hemisphere.    Jennifer is doing well today. Feels much better than when she came in. She seems to be asymptomatic for her meningioma at this time other than mild difficulty with tandem gait. We reviewed her imaging in detail. I compared her new MRI to her prior MRI in 2015. Her lesion has grown and it now appears to be invading the left transverse dural venous sinus. Recommended consult with a neurosurgeon as an outpatient within the next 2 weeks with a brain MRA/MRV prior to this appointment. Recommend neurology opinion on any anticoagulation indicated for venous sinus invasion prior to discharge. Discussed with hospitalist who will discuss further with neurology. We will call patient to set up her outpatient surgical consult appt and arrange  imaging.    Plan:  1. Neurology input appreciated prior to dispo re any anticoag needed for venous sinus invasion - Dr Gorman to discuss with neurology  2. Outpatient surgical consult with neurosurgeon within the next few wks   3. Will need Brain MRA/MRV arranged and complete prior to appt for any potential surgical planning. Will be discussed at tumor board and decide on appropriate neurosurgeon within system  4. OK for anticoagulation from our perspective  5. Reviewed red flag symptoms with patient for which she should return to ER prior to planned outpatient appt      HPI:    Jennifer Huggins is a 77 year old Kelli-speaking female with hx meningioma and hypertension who presented to [facility] ED on 11/10/22 for evaluation of hypertension (despite taking her home meds), chest heaviness, dizziness, and shortness of breath x 3 days. She was found to be in new onset Afib with RVR, assoc with hypokalemia. She tells me that she has known about her meningioma for a number of years. She has not experienced any related symptoms. She currently denies headache, dizziness, nausea/vomiting, double or blurry vision, fatigue, numbness, tingling, imbalance, gait instability, or coordination problems. She has not been followed for her meningioma by another neurosurgery group      Past Medical History:   Diagnosis Date     Benign adenomatous polyp of large intestine      Biliary colic      Cervicalgia      Cholelithiasis      Chronic allergic conjunctivitis      Cystitis      Dyspepsia      Gastritis      GERD (gastroesophageal reflux disease)      HLD (hyperlipidemia)      Hypertension      Hypokalemia      Meningioma (H)      Multiple lung nodules on CT     CT ABD 11/23/1013, mult nodules < 5mm in size, per Fleischner Society recommendations  repeat CT in 12 months CT Chest 2/20/17:  Stable pulmonary nodules suggesting incidental findings given long-term stability.      Osteoporosis      Positional vertigo      Pulmonary nodules       "Recurrent UTI      Transaminitis 10/29/2015     Urinary incontinence      Xerosis cutis      Past Surgical History:   Procedure Laterality Date     CHOLECYSTECTOMY  08/21/2015       REVIEW OF SYSTEMS:   negative    MEDICATIONS:  No current outpatient medications on file.         ALLERGIES/SENSITIVITIES:     Allergies   Allergen Reactions     Ibandronate [Ibandronic Acid] Headache and Shortness Of Breath     Septra [Sulfamethoxazole W/Trimethoprim] Other (See Comments)     Acute hepatitis        PERTINENT SOCIAL HISTORY: per below  Social History     Socioeconomic History     Marital status:    Tobacco Use     Smoking status: Never     Smokeless tobacco: Never   Substance and Sexual Activity     Alcohol use: No     Drug use: No     Sexual activity: Never     Birth control/protection: Post-menopausal         FAMILY HISTORY:  Family History   Problem Relation Age of Onset     No Known Problems Mother      No Known Problems Father         PHYSICAL EXAM:   Constitutional: BP 99/64 (BP Location: Left arm)   Pulse 77   Temp 98.6  F (37  C) (Oral)   Resp 18   Ht 4' 10\" (1.473 m)   Wt 124 lb 3.2 oz (56.3 kg)   SpO2 94%   BMI 25.96 kg/m       Mental Status: A & Ox3 in no acute distress.  Affect is appropriate.  Speech is fluent in Kelli.  Recent and remote memory are intact.  Attention span and concentration are normal.     Cranial Nerves: CN1: grossly intact per patient recall. CN2: No funduscopic exam performed. CN3,4 & 6: Pupillary light response, lateral and vertical gaze normal.  No nystagmus.  Visual fields are full to confrontation. CN5: Intact to touch CN7: No facial weakness, smile, facial symmetry intact. CN8: Intact to spoken voice. CN9&10: Gag reflex, uvula midline, palate rises with phonation. CN11: Shoulder shrug 5/5 intact bilaterally. CN12: Tongue midline and moves freely from side to side.     Motor: No pronator drift of upper extremities. Normal bulk and tone all muscle groups of upper and " lower extremities.    Strength:   5/5 throughout both deltoids, triceps, biceps, handgrips, hip flexors, quads, hamstrings, dorsiflexion, plantarflexion     Sensory: Sensation intact bilaterally to light touch throughout upper and lower extremities     Coordination: finger to nose, heel to shin, rapid alternating movements smooth and rhythmic.     Normal gait and station, smooth and coordinated.   Some mild difficulty with tandem.          IMAGING:  I personally reviewed all radiographic images   EXAM: MR BRAIN W/O and W CONTRAST  LOCATION: [facility]  DATE/TIME: 11/10/2022 3:34 PM     INDICATION: L SIDED MENINGIOMA  COMPARISON: None.  CONTRAST: 6ml gadavist   TECHNIQUE: Routine multiplanar multisequence head MRI without and with intravenous contrast.     FINDINGS:  INTRACRANIAL CONTENTS: No acute/subacute infarct, acute intracranial hemorrhage, or extra-axial fluid collection. A 2.9 x 3.1 x 2.3 cm homogeneously enhancing partially calcified extra-axial predominantly left infratentorial lesion with supratentorial   extension and invasion of the left transverse dural venous sinus. Partial loss of flow void within the left sigmoid sinus with maintained left internal jugular vein flow void. Mild regional mass effect with mild edema in the adjacent left cerebellar   hemisphere. No brain herniation, hydrocephalus, or midline shift. No significant increased cerebral blood volume within the mass. Mild generalized parenchymal volume loss. Patchy and confluent nonspecific T2/FLAIR hyperintensities within the cerebral   white matter, most consistent with moderate chronic microvascular ischemic change.      SELLA: No abnormality accounting for technique.     OSSEOUS STRUCTURES/SOFT TISSUES: Normal marrow signal. The major intracranial arterial flow voids are maintained.      ORBITS: No abnormality accounting for technique.      SINUSES/MASTOIDS: No significant paranasal sinus or mastoid mucosal  disease.                                                                       IMPRESSION:  1.  A predominantly left infratentorial homogeneously enhancing extra-axial lesion with supratentorial extension and invasion of the left transverse dural venous sinus, most consistent with a meningioma. Mild regional mass effect and edema within the   left cerebellar hemisphere without midline shift, herniation, or hydrocephalus.  2.  Additional, chronic findings, as above.      EXAM: CT CHEST PULMONARY EMBOLISM W CONTRAST  LOCATION: [facility]  DATE/TIME: 11/10/2022 12:46 PM     INDICATION: Shortness of breath, chest pain  COMPARISON: CT exams 03/24/2017 02/20/2017  TECHNIQUE: CT chest pulmonary angiogram during arterial phase injection of IV contrast. Multiplanar reformats and MIP reconstructions were performed. Dose reduction techniques were used.   CONTRAST: IsoVue 370 100mL     FINDINGS:  ANGIOGRAM CHEST: Mildly limited due to respiratory motion. No central or peripheral pulmonary artery embolism. Thoracic aorta is negative for dissection. No CT evidence of right heart strain.     LUNGS AND PLEURA: The central airways are clear. Mild mosaic lung attenuation. Mild bibasilar atelectasis/scarring. No focal pneumonic consolidation or pleural effusion. Stable benign pulmonary nodules including a dominant 5 mm right middle lobe nodule   image 138 series 9 and 2 mm right apical nodule image 59.     MEDIASTINUM/AXILLAE: Prominent thyroid gland. No thoracic adenopathy. Normal heart size and no pericardial effusion.     CORONARY ARTERY CALCIFICATION: Mild.     UPPER ABDOMEN: Stable right renal atrophy.     MUSCULOSKELETAL: Spinal degenerative changes.                                                                      IMPRESSION:  1.  No pulmonary artery embolism.  2.  Findings suggesting mild air trapping which may reflect bronchiolitis. No pneumonic infiltrate or pleural effusion.  3.  Stable benign  pulmonary nodules. No follow-up is indicated.    EXAM: CT HEAD W/O CONTRAST  LOCATION: [facility]  DATE/TIME: 11/10/2022 12:45 PM     INDICATION: headache  COMPARISON: None.  TECHNIQUE: Routine CT Head without IV contrast. Multiplanar reformats. Dose reduction techniques were used.     FINDINGS:  INTRACRANIAL CONTENTS: No intracranial hemorrhage. 1.8 x 2.9 x 2.7 cm (transverse times AP times craniocaudal) slightly hyperdense extra-axial mass with internal calcifications over the left cerebellar convexity. Mild diffuse cerebral parenchymal volume   loss. No midline shift. The basilar cisterns are patent. Mild periventricular and scattered foci of deep white matter hypodensities involving both cerebral hemispheres. No CT evidence for an acute infarct. No cerebellar tonsillar ectopia.     VISUALIZED ORBITS/SINUSES/MASTOIDS: No intraorbital abnormality. Moderate opacification of the right ethmoid air cells. No middle ear or mastoid effusion.     BONES/SOFT TISSUES: No acute abnormality.                                                                      IMPRESSION:  1.  1.8 x 2.9 x 2.7 cm slightly hyperdense extra-axial mass with internal calcifications over the left cerebellar convexity. This is likely meningioma. It can be be confirmed with contrast enhanced brain MRI.  2.  Mild diffuse cerebral parenchymal volume loss. Presumed chronic hypertensive/microvascular ischemic white matter changes.        Discussed the findings with Dr. Miller at 12:52 PM, 11/10/2022.      [facility]  CT HEAD WO CONTRAST  7/24/2017 2:13 AM     INDICATION: vertigo; history meningioma  TECHNIQUE: Routine. Dose reduction techniques were used.  CONTRAST: None.  COMPARISON:  2/7/2015     FINDINGS: No intracranial hemorrhage, extraaxial collection, mass effect or CT evidence of acute infarct.  Mild to moderate presumed chronic small vessel ischemic changes. The ventricles and sulci are normal for age. Stable  meningioma along the left   tentorium. Osseous structures are intact. Fluid in the posterior right sphenoid locule. Orbits negative. Mastoid air cells are clear.      IMPRESSION:  CONCLUSION:  1.  No CT finding of significant mass effect, infarct or hemorrhage.  2.  Left tentorial meningioma is grossly stable in size when compared to MRI dated 2/7/2015.  3.  Age-related changes.  4.  Air-fluid level within a right sphenoid locule. Correlate for symptoms of acute sinusitis.       HEAD MRI WITHOUT AND WITH IV CONTRAST  2/7/2015 11:11 AM     INDICATION: Meningioma  TECHNIQUE: Head MRI without and with intravenous contrast.  CONTRAST: 11ml magnevist  COMPARISON: 07/22/2013     FINDINGS: 16 x 15 x 16 mm (AP by TR by CC) is presumed left lateral tentorial meningioma with abutment/mass effect on cerebellum, left transverse and sigmoid sinus, and anterior temporal lobe. Slight interval enlargement, comparable prior dimensions are      16 x 13 x 14 mm.     No other lesions identified. No acute infarct, intra-axial mass, or hemorrhage. Slight atrophy and mild chronic small vessel ischemia. Normal flow-voids. Negative orbits. Minimal mucosal thickening ethmoid air cells otherwise clear paranasal sinuses and      mastoid air cells.     IMPRESSION:  CONCLUSION:  1.  Slight interval enlargement of left tentorial meningioma.  2.  No acute infarct, intra-axial mass, or hemorrhage.  3.  Slight atrophy and mild chronic small vessel ischemia.             (critical care)  Total time in critical care I spent was 1 hour.  examining the pt, reviewing the scans, reviewing notes from chart, discussing treatment options with risks and benefits and coordinating care.      Racquel HUNTERP-C  [facility]  O. [phone]

## 2022-11-11 NOTE — PLAN OF CARE
PRIMARY DIAGNOSIS: ACUTE PAIN  OUTPATIENT/OBSERVATION GOALS TO BE MET BEFORE DISCHARGE:  1. Pain Status: Pain free.    2. Return to near baseline physical activity: Yes    3. Cleared for discharge by consultants (if involved): No    Discharge Planner Nurse   Safe discharge environment identified: Yes  Barriers to discharge: Yes       Entered by: Moriah Bundy RN 11/11/2022 10:01 AM   Patient Kelli speaking and no english. Kelli  used to communicate with the patient on language line. Patient with no c/o pain. Neurologist consult for suspected meningioma. No c/o dizziness.NSR. Call light in reach.  diet ordered.   Please review provider order for any additional goals.   Nurse to notify provider when observation goals have been met and patient is ready for discharge.Goal Outcome Evaluation:

## 2022-11-11 NOTE — PROGRESS NOTES
Discussed with Dr. Gorman.  Echo shows normal LV function.  Tolerating low-dose diltiazem.  We will use 30 mg twice daily at hospital discharge.  Discontinue lisinopril HCTZ to avoid recurrent hypokalemia.  Stable for discharge.  Frederic Rivera MD

## 2022-11-11 NOTE — PLAN OF CARE
Problem: Dysrhythmia  Goal: Normalized Cardiac Rhythm  Outcome: Progressing   Goal Outcome Evaluation:    When the pt got to the floor her cardiac rhythm switched from A-Fib to NSR- confirmed by EKG. No c/o pain. Granddaughter was here and helped interpret- anything major needs the use of an . A1 with cane and gait belt. Tele- NSR.

## 2022-11-11 NOTE — PLAN OF CARE
Goal Outcome Evaluation:  Discussed AVS, follow up appt scheduled, medications to patient and grand daughter, who is present at bedside using Kelli .  Belongings returned.   Questions answered.

## 2022-11-14 ENCOUNTER — TELEPHONE (OUTPATIENT)
Dept: NEUROSURGERY | Facility: CLINIC | Age: 77
End: 2022-11-14

## 2022-11-14 NOTE — PROGRESS NOTES
MRA and MRV orders updated to STAT to facilitate getting images scheduled within 10-14 days.    Glory Ulrich RN    Him/He

## 2022-11-14 NOTE — TELEPHONE ENCOUNTER
Called pt with the help of Kelli  services. Attempted to assist pt with MRA/MRV scheduling.     Unable to connect with the pt at the home number listed.   Attempted to call the emergency contact Ck, dropped the call/no answer. Attempted to call pt's nephew Bandar, was able to connect with him. He stated that in order to schedule appts we would need to call her children [phone] See Po; and [phone] Naw.   Attempted to call [phone] See Po, unable to leave message and call was dropped; call was disconnected from  services as well.   9:16 AM Called  services again. Ck Po x2 picked up and dropped call.    Naw [phone] 9:19 AM was able to connect with her and explain the care plan of more imaging. She verbalized agreement to proceed with MRA/MRV at [facility]. Connected to radiology scheduling.     We were able to get her in for the MRA/MRV at 0630 at [facility] 11/15/22. The updated STAT orders need to be faxed to [facility] Rad [phone].    Appt time, date and location were confirmed with Naw x3. She verbalized no other questions or concerns.     Pt did verbalize significant claustrophobia. Phoned in Valium 5 mg #2 to take 1 tab PO 1 hour prior to MRI, may repeat another 1 tab 30 minutes prior to test prn. Called into [facility] in [place]. 10:13 AM     Glory Ulrich RN

## 2022-11-15 ENCOUNTER — HOSPITAL ENCOUNTER (OUTPATIENT)
Dept: MRI IMAGING | Facility: HOSPITAL | Age: 77
Discharge: HOME | End: 2022-11-15
Attending: SURGERY
Payer: MEDICARE

## 2022-11-15 ENCOUNTER — PATIENT OUTREACH (OUTPATIENT)
Dept: GERIATRIC MEDICINE | Facility: CLINIC | Age: 77
End: 2022-11-15

## 2022-11-15 DIAGNOSIS — D32.0 MENINGIOMA OF CEREBELLUM (H): ICD-10-CM

## 2022-11-15 DIAGNOSIS — D32.0 BENIGN NEOPLASM OF CEREBRAL MENINGES (H): ICD-10-CM

## 2022-11-15 PROCEDURE — 70546 MR ANGIOGRAPH HEAD W/O&W/DYE: CPT

## 2022-11-15 PROCEDURE — 255N000002 HC RX 255 OP 636: Performed by: SURGERY

## 2022-11-15 PROCEDURE — A9585 GADOBUTROL INJECTION: HCPCS | Performed by: SURGERY

## 2022-11-15 PROCEDURE — G1010 CDSM STANSON: HCPCS

## 2022-11-15 RX ORDER — GADOBUTROL 604.72 MG/ML
6 INJECTION INTRAVENOUS ONCE
Status: COMPLETED | OUTPATIENT
Start: 2022-11-15 | End: 2022-11-15

## 2022-11-15 RX ADMIN — GADOBUTROL 6 ML: 604.72 INJECTION INTRAVENOUS at 07:07

## 2022-11-15 NOTE — PROGRESS NOTES
[facility] Care Coordination Contact    [facility]  Ambulatory Care Coordination to Inpatient Care Management   Hand-In Communication    Date:  November 15, 2022  Name: Jennifer Huggins is enrolled in [facility] Care Coordination program and I am the Lead Care Coordinator.  CC Contact Information:.   Payor Source: Payor: MEDICARE / Plan: MEDICARE PT B ONLY / Product Type: Medicare /   Current services in place:     Please see the CC Snaphot and Care Management Flowsheets for specific  details of this Jennifer Huggins care plan.   Additional details/specific concerns r/t this admission:    No additional concerns at this time NA    I will follow this admission in Epic. Please feel free to contact me with questions or for further collaboration in discharge planning.  VAL Hill  [facility]  Cell: [phone]

## 2022-11-15 NOTE — PROGRESS NOTES
TRANSITIONS OF CARE (DENVER) LOG   DENVER tasks should be completed by the CC within one (1) business day of notification of each transition. Follow up contact with member is required after return to their usual care setting.  Note:  If CC finds out about the transitions fifteen (15) days or more after the member has returned to their usual care setting, no DENVER log is needed. However, the CC should check in with the member to discuss the transition process, any changes needed to the care plan and document it in a case note.    Member Name:  Jennifer Huggins MCO Name:  [facility] MCO/Health Plan Member ID#: [id number]   Product: MSC+ Care Coordinator Contact:  VAL Hill Agency/County/Care System: [facility]   Transition Communication Actions from Care Management Contact   Transition #1   Notification Date: 11/15/22 Transition Date:   11/10/22 Transition From: Home     Is this the member s usual care setting?               yes Transition To: [facility]    Transition Type:  Unplanned  Reason for Admission/Comments:  A- FIB  Contact member/responsible party to offer assistance with transition Date completed: 11/15/22: care coordinator was not notified of admission until 11/15/22 after member was discharged back home from the hospital on 11/11/22.       Notified PCP of transition--Date completed:  11/15/22    via  EMR  Name of PCP: Barb Elias     Transition #2   Notification Date: 11/15/22 Transition Date:   11/11/22 Transition From: [facility]     Is this the member s usual care setting?               yes Transition To: Home   Transition Type:  Planned  Reason for Admission/Comments:  Discharge home   Contact member/responsible party to offer assistance with transition Date completed: 11/15/22   CC contacted member and reviewed discharge summary.  Member has a follow-up appointment with PCP in 7 days: Yes: scheduled on 11/15/22   Member has had a change in  condition: No  Home visit needed: No  Care plan reviewed and updated.  The following home based services PCA were resumed.  New referrals placed: No  Transition log completed.   PCP notified of transition back to home via EMR.                Notified PCP of transition--Date completed:  11/15/22    via  EMR  Name of PCP: Barb Elias                                                                     *RETURN TO USUAL CARE SETTING: *Complete tasks below when the member is discharging TO their usual care setting within one (1) business day of notification..      For situations where the Care Coordinator is notified of the discharge prior to the date of discharge, the Care Coordinator must follow up with the member or designated representative to confirm that discharge actually occurred and discuss required DENVER tasks as outlined in the DENVER Instructions.  (This includes situations where it may be a  new  usual care setting for the member. (i.e., a community member who decides upon permanent nursing home placement following hospitalization and rehab).    Discuss with Member/Responsible Party:    Check  Yes  - if the member, family member and/or SNF/facility staff manages the following:    If  No  provide explanation in the comments section.          Date completed: 11/15/22 Communicated with member or their designated representative about the following:  care transition process; about changes to the member s health status; plan of care updates; education about transitions and how to prevent unplanned transitions/readmissions    Four Pillars for Optimal Transition:    Check  Yes  - if the member, family member and/or SNF/facility staff manages the following:    If  No  provide explanation in the comments section.          [x]  Yes     []  No Does the member have a follow-up appointment scheduled with primary care or specialist? (Mental health hospitalizations--the appt. should be w/in 7 days)              For mental  health hospitalizations:  []  Yes     []  No     Does the member have a follow-up appointment scheduled with a mental health practitioner within 7 days of discharge?  [x]  Yes     []  No     Has a medication review been completed with member? If no, refer to PCP, home care nurse, MTM, pharmacist  [x]  Yes     []  No     Can the member manage their medications or is there a system in place to manage medications (e.g. home care set-up)?         [x]  Yes     []  No     Can the member verbalize warning signs and symptoms to watch for and how to respond?  [x]  Yes     []  No     Does the member have a copy of and understand their discharge instructions?  If no, assist to obtain copy of discharge instructions, review discharge instructions, and assist to contact PCP to discuss questions about their recent hospitalization.  [x]  Yes     []  No     Does the member have adequate food, housing and transportation?  If no, add goal and discuss additional supports available to the member                                                                                                                                                                                 [x]  Yes     []  No     Is the member safe in their home?  If no, document needs and support provided                                                                                                                                                                          []  Yes     [x]  No     Are there any concerns of vulnerability, abuse, or neglect?  If yes, document concerns and actions taken by Care Coordinator as a mandated                                                                                                                                                                              [x]  Yes     []  No     Does the member use a Personal Health Care Record?  Check  Yes  if visit summary, discharge summary, and/or healthcare summary are being used as  a PHR.                                                                                                                                                                                  [x]  Yes     []  No     Have you reviewed the discharge summary with the member? If  No  provide explanation in comments.  [x]  Yes     []  No     Have you updated the member s care plan/support plan? Add new diagnosis, medications, treatments, goals & interventions, as applicable. If No, provide explanation in comments.    Comments:       VAL Parham  [facility]  Cell: [phone]

## 2022-11-21 ENCOUNTER — TELEPHONE (OUTPATIENT)
Dept: NEUROSURGERY | Facility: CLINIC | Age: 77
End: 2022-11-21

## 2022-11-21 DIAGNOSIS — D32.0 MENINGIOMA OF CEREBELLUM (H): Primary | ICD-10-CM

## 2022-11-21 NOTE — TELEPHONE ENCOUNTER
Patient's case has been reviewed at the neuro/oncology tumor conference this morning.  The consensus recommendation was to consider review of the images by Dr. Scanlon's partner, Dr. Daigle to determine if [facility].    This will be discussed with Dr. Daigle on 11/22/22 after which time a phone call will be made to the pt and her family ([place] [phone]).      Glory Ulrich RN

## 2022-11-22 NOTE — TELEPHONE ENCOUNTER
"After discussion with Dr. Daigle, the pt will be referred to Dr. De Guzman at the [facility] to discuss possible surgical intervention.     Called pt's daughter, Jonathan, with the assistance of a Kelli  to inform her of Dr. Daigle's recommendation. She verbalized understanding of the rationale for getting a second opinion based on the vasculature involved in the tumor. Jonathan did state that the pt may decline the referral as she is very reluctant to have surgery.     We discussed at length that if the pt does not want to undergo surgery and would rather opt for symptom management, reaching out to the pt's PCP for a palliative care consult is appropriate as the pt has stated \"I just want to die like this.\" She is scheduled for a virtual hospital follow up on 11/28. Encouraged Jonathan to discuss all concerns during this visit. Discussed use of the emergency room for unmanageable pain vs calling primary care for ongoing symptom management.     All questions and concerns were addressed to satisfaction.     Glory Ulrich RN    "

## 2022-11-28 ENCOUNTER — TELEPHONE (OUTPATIENT)
Dept: NEUROSURGERY | Facility: CLINIC | Age: 77
End: 2022-11-28

## 2022-11-28 ENCOUNTER — OFFICE VISIT (OUTPATIENT)
Dept: FAMILY MEDICINE | Facility: CLINIC | Age: 77
End: 2022-11-28
Payer: MEDICARE

## 2022-11-28 VITALS
WEIGHT: 126 LBS | HEIGHT: 58 IN | TEMPERATURE: 98 F | HEART RATE: 96 BPM | SYSTOLIC BLOOD PRESSURE: 150 MMHG | OXYGEN SATURATION: 98 % | BODY MASS INDEX: 26.45 KG/M2 | RESPIRATION RATE: 16 BRPM | DIASTOLIC BLOOD PRESSURE: 78 MMHG

## 2022-11-28 DIAGNOSIS — E87.6 HYPOKALEMIA: ICD-10-CM

## 2022-11-28 DIAGNOSIS — R06.2 WHEEZING: ICD-10-CM

## 2022-11-28 DIAGNOSIS — I48.0 PAROXYSMAL ATRIAL FIBRILLATION (H): Primary | ICD-10-CM

## 2022-11-28 DIAGNOSIS — R73.9 HYPERGLYCEMIA: ICD-10-CM

## 2022-11-28 LAB
ANION GAP SERPL CALCULATED.3IONS-SCNC: 15 MMOL/L (ref 7–15)
BUN SERPL-MCNC: 15.1 MG/DL (ref 8–23)
CALCIUM SERPL-MCNC: 9.2 MG/DL (ref 8.8–10.2)
CHLORIDE SERPL-SCNC: 108 MMOL/L (ref 98–107)
CREAT SERPL-MCNC: 0.83 MG/DL (ref 0.51–0.95)
DEPRECATED HCO3 PLAS-SCNC: 21 MMOL/L (ref 22–29)
GFR SERPL CREATININE-BSD FRML MDRD: 73 ML/MIN/1.73M2
GLUCOSE SERPL-MCNC: 165 MG/DL (ref 70–99)
POTASSIUM SERPL-SCNC: 4.1 MMOL/L (ref 3.4–5.3)
SODIUM SERPL-SCNC: 144 MMOL/L (ref 136–145)

## 2022-11-28 PROCEDURE — 36415 COLL VENOUS BLD VENIPUNCTURE: CPT | Performed by: FAMILY MEDICINE

## 2022-11-28 PROCEDURE — 99214 OFFICE O/P EST MOD 30 MIN: CPT | Performed by: FAMILY MEDICINE

## 2022-11-28 PROCEDURE — 80048 BASIC METABOLIC PNL TOTAL CA: CPT | Performed by: FAMILY MEDICINE

## 2022-11-28 RX ORDER — ALBUTEROL SULFATE 90 UG/1
2 AEROSOL, METERED RESPIRATORY (INHALATION) EVERY 6 HOURS PRN
Qty: 18 G | Refills: 1 | Status: SHIPPED | OUTPATIENT
Start: 2022-11-28 | End: 2023-01-26

## 2022-11-28 NOTE — PROGRESS NOTES
"  Assessment & Plan     Atrial fibrillation w RVR, hospital follow up  On exam, seems to be in sinus rhythm. She has had no recurrence of afib symptoms. Reports compliance with medication changes made in hospital. States she is taking diltiazem as prescribed, but did not yet take today, likely the cause of elevated BP.    Wheezing  Reports subjective wheezing 1-2x/week triggered by cold weather. Normal exam today. Trial of albuterol inhaler. Consider PFTs in future  - albuterol (PROAIR HFA/PROVENTIL HFA/VENTOLIN HFA) 108 (90 Base) MCG/ACT inhaler  Dispense: 18 g; Refill: 1    Hypokalemia  Replaced during hospital stay, recheck today  - Basic metabolic panel  (Ca, Cl, CO2, Creat, Gluc, K, Na, BUN)    Declined covid vaccine             MED REC REQUIRED  Post Medication Reconciliation Status:  Discharge medications reconciled, continue medications without change    BMI:   Estimated body mass index is 26.33 kg/m  as calculated from the following:    Height as of this encounter: 1.473 m (4' 10\").    Weight as of this encounter: 57.2 kg (126 lb).   Weight management plan: not able to discuss today due to time constraints      No follow-ups on file.    Camelia Salvador MD  [facility] KARISSA Rodriguez is a 77 year old, presenting for the following health issues:  Hospital F/U      [facility]   Patient hospitalized 11/10 through 11/11 for A. fib with RVR.  Presented to the ER with malaise, fatigue, shortness of breath and dizziness, as well as head heaviness.  Was found to be in atrial fibrillation with rapid ventricular response with hypokalemia.  Treated with IV diltiazem x2.  Potassium was replaced.  Magnesium was normal.  Converted to sinus rhythm.  Echocardiogram was normal.  Cardiology consulted.  Her previous antihypertensives were discontinued and she was started on diltiazem.    Head CT in ER showed meningioma, previously known but slightly change since last imaging.  MRI confirmed.  Neurosurgery " "was consulted.  Plan for outpatient follow-up.  Neurology was consulted for their opinion on anticoagulation, given venous sinus invasion, and they agreed with Eliquis.    Needs neurosurgery follow-up 2 weeks after discharge.  They were supposed to call patient to schedule, no appointment currently listed.    Feeling better since discharge. No further symptoms of malaise, fatigue, dizziness, or SOB.     States she was wheezing 1-2 days ago, not today. States this happens up to 1-2x/week if exposed to cold weather. Requests inhaler. States she has used in the past with relief of symptoms.     Hospital Follow-up Visit:    Hospital/Nursing Home/IP Rehab Facility: [facility]  Date of Admission: 11/10/22  Date of Discharge: 11/11/22  Reason(s) for Admission: atrial fibrillation w RVR    Was your hospitalization related to COVID-19? No   Problems taking medications regularly:  None  Medication changes since discharge: None  Problems adhering to non-medication therapy:  None    Summary of hospitalization:  [facility] discharge summary reviewed  Diagnostic Tests/Treatments reviewed.  Follow up needed: NS appt, not yeat scheduled but due  Other Healthcare Providers Involved in Patient s Care:         Specialist appointment - needs Neurosurgery clinic appt  Update since discharge: improved.         Plan of care communicated with patient and family             Review of Systems   As per HPI      Objective    BP (!) 150/78   Pulse 96   Temp 98  F (36.7  C) (Oral)   Resp 16   Ht 1.473 m (4' 10\")   Wt 57.2 kg (126 lb)   SpO2 98%   BMI 26.33 kg/m    Body mass index is 26.33 kg/m .  Physical Exam   GENERAL: healthy, alert and no distress  EYES: Eyes grossly normal to inspection, PERRL and conjunctivae and sclerae normal  HENT: mouth without ulcers or lesions  NECK: no adenopathy  RESP: lungs clear to auscultation - no rales, rhonchi or wheezes  CV: Recheck /82; heart regular " rate and rhythm, normal S1 S2, no S3 or S4, no murmur, click or rub, no peripheral edema and peripheral pulses strong  MS: no gross musculoskeletal defects noted, no edema

## 2022-11-28 NOTE — TELEPHONE ENCOUNTER
Health Call Center    Phone Message    May a detailed message be left on voicemail: yes     Reason for Call: Appointment Intake    Referring Provider Name: Shila Scanlon MD in [facility] NEUROSURGERY  Diagnosis and/or Symptoms: Meningioma of cerebellum   Second opinion with Dr. De Guzman about surgical intervention    Pt called to schedule second opinion with Dr. De Guzman.  Writer unable to scheduled per protocols.  Referral is at Priority: 1-2 Weeks.    Please call Pt back at [phone] to discuss and schedule.    Action Taken: Message routed to:  [facility] Neurosurgery    Travel Screening: Not Applicable

## 2022-12-05 ENCOUNTER — HOSPITAL ENCOUNTER (EMERGENCY)
Facility: HOSPITAL | Age: 77
Discharge: HOME | End: 2022-12-06
Attending: STUDENT IN AN ORGANIZED HEALTH CARE EDUCATION/TRAINING PROGRAM | Admitting: STUDENT IN AN ORGANIZED HEALTH CARE EDUCATION/TRAINING PROGRAM
Payer: MEDICARE

## 2022-12-05 DIAGNOSIS — E87.6 HYPOKALEMIA: ICD-10-CM

## 2022-12-05 DIAGNOSIS — E83.42 HYPOMAGNESEMIA: ICD-10-CM

## 2022-12-05 DIAGNOSIS — I48.91 ATRIAL FIBRILLATION WITH RVR (H): ICD-10-CM

## 2022-12-05 DIAGNOSIS — I10 HYPERTENSION NOT AT GOAL: ICD-10-CM

## 2022-12-05 DIAGNOSIS — R00.2 PALPITATIONS: ICD-10-CM

## 2022-12-05 PROCEDURE — 85025 COMPLETE CBC W/AUTO DIFF WBC: CPT | Performed by: STUDENT IN AN ORGANIZED HEALTH CARE EDUCATION/TRAINING PROGRAM

## 2022-12-05 PROCEDURE — 93005 ELECTROCARDIOGRAM TRACING: CPT | Performed by: STUDENT IN AN ORGANIZED HEALTH CARE EDUCATION/TRAINING PROGRAM

## 2022-12-05 PROCEDURE — 83735 ASSAY OF MAGNESIUM: CPT | Performed by: EMERGENCY MEDICINE

## 2022-12-05 PROCEDURE — 99285 EMERGENCY DEPT VISIT HI MDM: CPT | Mod: 25

## 2022-12-05 PROCEDURE — 80048 BASIC METABOLIC PNL TOTAL CA: CPT | Performed by: STUDENT IN AN ORGANIZED HEALTH CARE EDUCATION/TRAINING PROGRAM

## 2022-12-05 PROCEDURE — 84484 ASSAY OF TROPONIN QUANT: CPT | Performed by: STUDENT IN AN ORGANIZED HEALTH CARE EDUCATION/TRAINING PROGRAM

## 2022-12-05 PROCEDURE — 96365 THER/PROPH/DIAG IV INF INIT: CPT

## 2022-12-05 PROCEDURE — 36415 COLL VENOUS BLD VENIPUNCTURE: CPT | Performed by: STUDENT IN AN ORGANIZED HEALTH CARE EDUCATION/TRAINING PROGRAM

## 2022-12-05 PROCEDURE — 96375 TX/PRO/DX INJ NEW DRUG ADDON: CPT

## 2022-12-06 ENCOUNTER — ALLIED HEALTH/NURSE VISIT (OUTPATIENT)
Dept: FAMILY MEDICINE | Facility: CLINIC | Age: 77
End: 2022-12-06
Payer: MEDICARE

## 2022-12-06 ENCOUNTER — PATIENT OUTREACH (OUTPATIENT)
Dept: GERIATRIC MEDICINE | Facility: CLINIC | Age: 77
End: 2022-12-06

## 2022-12-06 VITALS
DIASTOLIC BLOOD PRESSURE: 77 MMHG | HEIGHT: 58 IN | SYSTOLIC BLOOD PRESSURE: 137 MMHG | BODY MASS INDEX: 25.19 KG/M2 | TEMPERATURE: 98.4 F | HEART RATE: 72 BPM | OXYGEN SATURATION: 97 % | RESPIRATION RATE: 26 BRPM | WEIGHT: 120 LBS

## 2022-12-06 VITALS — SYSTOLIC BLOOD PRESSURE: 126 MMHG | DIASTOLIC BLOOD PRESSURE: 70 MMHG

## 2022-12-06 DIAGNOSIS — I48.0 PAROXYSMAL ATRIAL FIBRILLATION (H): Primary | ICD-10-CM

## 2022-12-06 DIAGNOSIS — I10 ESSENTIAL HYPERTENSION: ICD-10-CM

## 2022-12-06 LAB
ANION GAP SERPL CALCULATED.3IONS-SCNC: 13 MMOL/L (ref 7–15)
BASOPHILS # BLD AUTO: 0.1 10E3/UL (ref 0–0.2)
BASOPHILS NFR BLD AUTO: 1 %
BUN SERPL-MCNC: 11 MG/DL (ref 8–23)
CALCIUM SERPL-MCNC: 9.3 MG/DL (ref 8.8–10.2)
CHLORIDE SERPL-SCNC: 105 MMOL/L (ref 98–107)
CREAT SERPL-MCNC: 0.91 MG/DL (ref 0.51–0.95)
DEPRECATED HCO3 PLAS-SCNC: 24 MMOL/L (ref 22–29)
EOSINOPHIL # BLD AUTO: 0.4 10E3/UL (ref 0–0.7)
EOSINOPHIL NFR BLD AUTO: 5 %
ERYTHROCYTE [DISTWIDTH] IN BLOOD BY AUTOMATED COUNT: 13.2 % (ref 10–15)
GFR SERPL CREATININE-BSD FRML MDRD: 65 ML/MIN/1.73M2
GLUCOSE SERPL-MCNC: 183 MG/DL (ref 70–99)
HCT VFR BLD AUTO: 45.8 % (ref 35–47)
HGB BLD-MCNC: 15.2 G/DL (ref 11.7–15.7)
HOLD SPECIMEN: NORMAL
HOLD SPECIMEN: NORMAL
IMM GRANULOCYTES # BLD: 0 10E3/UL
IMM GRANULOCYTES NFR BLD: 0 %
LYMPHOCYTES # BLD AUTO: 2.7 10E3/UL (ref 0.8–5.3)
LYMPHOCYTES NFR BLD AUTO: 31 %
MAGNESIUM SERPL-MCNC: 1.6 MG/DL (ref 1.7–2.3)
MCH RBC QN AUTO: 30.6 PG (ref 26.5–33)
MCHC RBC AUTO-ENTMCNC: 33.2 G/DL (ref 31.5–36.5)
MCV RBC AUTO: 92 FL (ref 78–100)
MONOCYTES # BLD AUTO: 0.6 10E3/UL (ref 0–1.3)
MONOCYTES NFR BLD AUTO: 7 %
NEUTROPHILS # BLD AUTO: 4.9 10E3/UL (ref 1.6–8.3)
NEUTROPHILS NFR BLD AUTO: 56 %
NRBC # BLD AUTO: 0 10E3/UL
NRBC BLD AUTO-RTO: 0 /100
PLATELET # BLD AUTO: 318 10E3/UL (ref 150–450)
POTASSIUM SERPL-SCNC: 3.3 MMOL/L (ref 3.4–5.3)
RBC # BLD AUTO: 4.96 10E6/UL (ref 3.8–5.2)
SODIUM SERPL-SCNC: 142 MMOL/L (ref 136–145)
TROPONIN T SERPL HS-MCNC: 8 NG/L
WBC # BLD AUTO: 8.6 10E3/UL (ref 4–11)

## 2022-12-06 PROCEDURE — 99207 PR NO CHARGE NURSE ONLY: CPT

## 2022-12-06 PROCEDURE — 250N000011 HC RX IP 250 OP 636: Performed by: STUDENT IN AN ORGANIZED HEALTH CARE EDUCATION/TRAINING PROGRAM

## 2022-12-06 PROCEDURE — 250N000013 HC RX MED GY IP 250 OP 250 PS 637: Performed by: STUDENT IN AN ORGANIZED HEALTH CARE EDUCATION/TRAINING PROGRAM

## 2022-12-06 RX ORDER — POTASSIUM CHLORIDE 1.5 G/1.58G
20 POWDER, FOR SOLUTION ORAL ONCE
Status: COMPLETED | OUTPATIENT
Start: 2022-12-06 | End: 2022-12-06

## 2022-12-06 RX ORDER — POTASSIUM CHLORIDE 1.5 G/1.58G
20 POWDER, FOR SOLUTION ORAL DAILY
Qty: 14 PACKET | Refills: 0 | Status: SHIPPED | OUTPATIENT
Start: 2022-12-06 | End: 2024-01-23 | Stop reason: ALTCHOICE

## 2022-12-06 RX ORDER — MAGNESIUM OXIDE 400 MG/1
400 TABLET ORAL DAILY
Qty: 30 TABLET | Refills: 0 | Status: SHIPPED | OUTPATIENT
Start: 2022-12-06 | End: 2023-01-05

## 2022-12-06 RX ORDER — LISINOPRIL 5 MG/1
10 TABLET ORAL ONCE
Status: COMPLETED | OUTPATIENT
Start: 2022-12-06 | End: 2022-12-06

## 2022-12-06 RX ORDER — LISINOPRIL 10 MG/1
10 TABLET ORAL DAILY
Qty: 30 TABLET | Refills: 0 | Status: SHIPPED | OUTPATIENT
Start: 2022-12-06 | End: 2023-01-10

## 2022-12-06 RX ORDER — MAGNESIUM SULFATE HEPTAHYDRATE 40 MG/ML
2 INJECTION, SOLUTION INTRAVENOUS ONCE
Status: COMPLETED | OUTPATIENT
Start: 2022-12-06 | End: 2022-12-06

## 2022-12-06 RX ORDER — DILTIAZEM HYDROCHLORIDE 5 MG/ML
15 INJECTION INTRAVENOUS ONCE
Status: COMPLETED | OUTPATIENT
Start: 2022-12-06 | End: 2022-12-06

## 2022-12-06 RX ADMIN — DILTIAZEM HYDROCHLORIDE 15 MG: 5 INJECTION INTRAVENOUS at 00:18

## 2022-12-06 RX ADMIN — LISINOPRIL 10 MG: 5 TABLET ORAL at 00:41

## 2022-12-06 RX ADMIN — POTASSIUM CHLORIDE 20 MEQ: 1.5 POWDER, FOR SOLUTION ORAL at 01:02

## 2022-12-06 RX ADMIN — MAGNESIUM SULFATE HEPTAHYDRATE 2 G: 40 INJECTION, SOLUTION INTRAVENOUS at 01:02

## 2022-12-06 ASSESSMENT — ACTIVITIES OF DAILY LIVING (ADL): ADLS_ACUITY_SCORE: 35

## 2022-12-06 NOTE — ED PROVIDER NOTES
EMERGENCY DEPARTMENT ENCOUNTER       ED Course & Medical Decision Making     12:02 AM I met with patient for initial interview and encounter. PPE worn includes N95 mask and exam gloves.   12:59 AM I updated the patient about family member about lab results.     Final Impression  77 year old female presents for evaluation of elevated blood pressure, palpitations, lightheadedness.  Patient noted to be in A. fib with RVR with rates of 110-170 and elevated blood pressure of 197/87 in triage.  Patient reports a history of hypertension, though looks like currently controlling medication that would affect blood pressure would be the Cardizem she is on. Admitted 11/10/2022 for A. fib with RVR with elevated blood pressures, CT head during that hospitalization showed an incidental meningioma for which patient did follow-up in neurosurgery clinic, was ultimately referred to Dr. Arevalo at [facility] for possible surgical intervention, though based on their documented discussions with family it sounds like they would not be in favor of surgical intervention, would prefer a palliative approach if needed.  No reports of headache or vision changes, do not think the meningioma seen on prior imaging is contributing to her symptoms today, no other focal neurologic deficits.  Patient given 15 mg IV diltiazem for rate control, 10 mg oral lisinopril for blood pressure control.  Labs returned showing mildly low potassium and magnesium, was given some oral potassium and IV magnesium replacements.  Troponin negative, EKG nonischemic.  While in the ED she did spontaneously convert to normal sinus rhythm with a rate in the 70s.  Blood pressure improved to the 130s.  Will discharge home with a 30-day prescription for lisinopril and instructions to follow-up with the clinic to have her blood pressure rechecked.    Prior to making a final disposition on this patient the results of patient's tests and other diagnostic studies were discussed with the  "patient. All questions were answered. Patient expressed understanding of the plan and was amenable to it.    Medications   diltiazem (CARDIZEM) injection 15 mg (15 mg Intravenous Given 12/6/22 0018)   lisinopril (ZESTRIL) tablet 10 mg (10 mg Oral Given 12/6/22 0041)   magnesium sulfate 2 g in water intermittent infusion (0 g Intravenous Stopped 12/6/22 0202)   potassium chloride (KLOR-CON) Packet 20 mEq (20 mEq Oral Given 12/6/22 0102)       Final Impression     1. Atrial fibrillation with RVR (H)    2. Palpitations    3. Hypertension not at goal    4. Hypomagnesemia    5. Hypokalemia      Chief Complaint     Chief Complaint   Patient presents with     Dizziness     Palpitations     Pt arrives to triage ambulatory w/ granddaughter endorsing blood pressure readings up and down all day but mostly high. Endorsing dizziness all day since woke up this AM and \"heart racing\" and w/ that feeling \"chest heaviness\". Has brain tumor and reports that last time she was here she had same symptoms.    Heart rate irregular in triage-chest heaviness 9/10 pain in triage      HPI     Junior ABBY Huggins is a 77 year old female who presents for evaluation of lightheadedness and palpitations.     The patient presents with intermittent high blood pressure all day on 12/5/22 starting in the morning. Her blood pressure was ranging in the 160-180. She also endorses some lightheadedness, palpitations and chest pain. She is currently taking medications for high blood pressure and atrial fibrillation. Errol any other complaints or concerns at the moment.     I, Soha Her am serving as a scribe to document services personally performed by Dr. Alfa Persaud MD, based on my observation and the provider's statements to me. I, Dr. Alfa Persaud MD attest that Autumn Her is acting in a scribe capacity, has observed my performance of the services and has documented them in accordance with my direction.    Past Medical History     Past Medical " History:   Diagnosis Date     Benign adenomatous polyp of large intestine      Biliary colic      Cervicalgia      Cholelithiasis      Chronic allergic conjunctivitis      Cystitis      Dyspepsia      Gastritis      GERD (gastroesophageal reflux disease)      HLD (hyperlipidemia)      Hypertension      Hypokalemia      Meningioma (H)      Multiple lung nodules on CT      Osteoporosis      Positional vertigo      Pulmonary nodules      Recurrent UTI      Transaminitis 10/29/2015     Urinary incontinence      Xerosis cutis      Past Surgical History:   Procedure Laterality Date     CHOLECYSTECTOMY  08/21/2015     Family History   Problem Relation Age of Onset     No Known Problems Mother      No Known Problems Father       Social History     Tobacco Use     Smoking status: Never     Smokeless tobacco: Never   Substance Use Topics     Alcohol use: No     Drug use: No     Allergies   Allergen Reactions     Ibandronate [Ibandronic Acid] Headache and Shortness Of Breath     Septra [Sulfamethoxazole W/Trimethoprim] Other (See Comments)     Acute hepatitis        Relevant past medical, surgical, family and social history as documented above, has been reviewed and discussed with patient. No changes or additions, unless otherwise noted in the HPI.    Current Medications     lisinopril (ZESTRIL) 10 MG tablet  magnesium oxide (MAG-OX) 400 MG tablet  potassium chloride (KLOR-CON) 20 MEQ packet  acetaminophen (TYLENOL) 650 MG CR tablet  albuterol (PROAIR HFA/PROVENTIL HFA/VENTOLIN HFA) 108 (90 Base) MCG/ACT inhaler  apixaban ANTICOAGULANT (ELIQUIS) 5 MG tablet  artificial tears (GENTEAL) 0.1-0.2-0.3 % ophthalmic solution  atorvastatin (LIPITOR) 80 MG tablet  blood pressure monitor Kit  diclofenac (VOLTAREN) 1 % topical gel  diltiazem (CARDIZEM) 30 MG tablet  ergocalciferol (ERGOCALCIFEROL) 1.25 MG (63279 UT) capsule  ketotifen (ZADITOR) 0.025 % ophthalmic solution  meclizine (ANTIVERT) 25 MG tablet  omeprazole (PRILOSEC) 20 MG   "capsule        Review of Systems     Constitutional: Denies fever, chills  Eyes: Denies visual changes      Respiratory: Denies cough or shortness of breath.      Cardiovascular: Denies leg swelling. Positive for chest pain and palpitations.   GI: Denies abdominal pain, nausea, vomiting  Musculoskeletal: Denies any new back pain  Neurologic: Denies current headache, new weakness, focal weakness, or sensory changes.     Remainder of systems reviewed, unless noted in HPI all others negative.    Physical Exam     /77   Pulse 72   Temp 98.4  F (36.9  C) (Oral)   Resp 26   Ht 1.473 m (4' 10\")   Wt 54.4 kg (120 lb)   SpO2 97%   BMI 25.08 kg/m    Constitutional: Awake, alert, in no acute distress.      Head: Normocephalic, atraumatic.      ENT: Mucous membranes moist.      Eyes: PERRL, Conjunctiva normal.      Respiratory: Respirations even, unlabored. Lungs clear to ascultation bilaterally, in no acute respiratory distress.      Cardiovascular: A. fib with rates 120s-170s. +2 radial pulses, equal bilaterally. No pitting edema.      GI: Abdomen soft, non-tender. No guarding or rebound.   Musculoskeletal: Moves all 4 extremities equally, strength symmetrical on bilateral uppers and lowers.      Integument: Warm, dry. No rash. No bruising or petechiae.      Neurologic: Alert & oriented x 3. Normal speech. Grossly normal motor and sensory function. No focal deficits noted.   Psychiatric: Normal mood and affect. Normal judgement.        Labs & Imaging     Results for orders placed or performed during the hospital encounter of 12/05/22   Basic metabolic panel   Result Value Ref Range    Sodium 142 136 - 145 mmol/L    Potassium 3.3 (L) 3.4 - 5.3 mmol/L    Chloride 105 98 - 107 mmol/L    Carbon Dioxide (CO2) 24 22 - 29 mmol/L    Anion Gap 13 7 - 15 mmol/L    Urea Nitrogen 11.0 8.0 - 23.0 mg/dL    Creatinine 0.91 0.51 - 0.95 mg/dL    Calcium 9.3 8.8 - 10.2 mg/dL    Glucose 183 (H) 70 - 99 mg/dL    GFR Estimate 65 >60 " mL/min/1.73m2   Result Value Ref Range    Troponin T, High Sensitivity 8 <=14 ng/L   Result Value Ref Range    Magnesium 1.6 (L) 1.7 - 2.3 mg/dL   CBC with platelets and differential   Result Value Ref Range    WBC Count 8.6 4.0 - 11.0 10e3/uL    RBC Count 4.96 3.80 - 5.20 10e6/uL    Hemoglobin 15.2 11.7 - 15.7 g/dL    Hematocrit 45.8 35.0 - 47.0 %    MCV 92 78 - 100 fL    MCH 30.6 26.5 - 33.0 pg    MCHC 33.2 31.5 - 36.5 g/dL    RDW 13.2 10.0 - 15.0 %    Platelet Count 318 150 - 450 10e3/uL    % Neutrophils 56 %    % Lymphocytes 31 %    % Monocytes 7 %    % Eosinophils 5 %    % Basophils 1 %    % Immature Granulocytes 0 %    NRBCs per 100 WBC 0 <1 /100    Absolute Neutrophils 4.9 1.6 - 8.3 10e3/uL    Absolute Lymphocytes 2.7 0.8 - 5.3 10e3/uL    Absolute Monocytes 0.6 0.0 - 1.3 10e3/uL    Absolute Eosinophils 0.4 0.0 - 0.7 10e3/uL    Absolute Basophils 0.1 0.0 - 0.2 10e3/uL    Absolute Immature Granulocytes 0.0 <=0.4 10e3/uL    Absolute NRBCs 0.0 10e3/uL   Extra Blue Top Tube   Result Value Ref Range    Hold Specimen JIC    Extra Red Top Tube   Result Value Ref Range    Hold Specimen JIC        EKG     Atrial fibrillation with RVR, rate 128.  QRS 66.  QTc 496.  No STEMI.     Alfa Persaud MD  12/06/22 0357

## 2022-12-06 NOTE — PROGRESS NOTES
Jennifer Huggins is a 77 year old patient who comes in today for a Blood Pressure check.  Initial BP:  BP (!) 148/70    Second BP was 126/70  Data Unavailable  Disposition: provider notified while patient in the clinic    Patient was in the ER yesterday for Atrial fib. Patient is not experiencing any chest pain or shortness of breath. She was started on Lisinopril 10 mg which seems to be helping.  Dr Elias notified and she wants her to be seen in 1 week   appt was made with Dr Salvador on 12/12/2022

## 2022-12-06 NOTE — PROGRESS NOTES
[facility] Care Coordination Contact  CC received notification of Emergency Room visit.  ER visit occurred on 12/5/22 at [facility] with Dx of    Dizziness   Palpitations     CC contacted member and reviewed discharge summary.  Member has a follow-up appointment with PCP: Yes: scheduled on 12/6/2022  Member has had a change in condition: No  New referrals placed: No  Home Visit Needed: No  Care plan reviewed and updated.  PCP notified of ED visit via EMR.  VAL Hill  [facility]  Cell: [phone]

## 2022-12-06 NOTE — DISCHARGE INSTRUCTIONS
Your potassium and magnesium are both just a little bit low, thus we are recommending replacement over the next few weeks, prescriptions have been written for these.    Your blood pressure was also fairly high when you checked in, sounds like you are having high blood pressures at home as well thus we are trialing you on a new blood pressure medication called lisinopril.  If your blood pressures get too low, please discontinue this medication.  However, if this medication works well to control your blood pressure and you tolerate it well please talk to your primary doctor about staying on this medication long-term as I am only writing a 30-day trial prescription.

## 2022-12-06 NOTE — ED TRIAGE NOTES
"Pt arrives to triage ambulatory w/ granddaughter endorsing blood pressure readings up and down all day but mostly high. Endorsing dizziness all day since woke up this AM and \"heart racing\" and w/ that feeling \"chest heaviness\". Has brain tumor and reports that last time she was here she had same symptoms.    Heart rate irregular in triage-chest heaviness 9/10 pain in triage  "

## 2022-12-12 ENCOUNTER — TELEPHONE (OUTPATIENT)
Dept: FAMILY MEDICINE | Facility: CLINIC | Age: 77
End: 2022-12-12

## 2022-12-12 ENCOUNTER — OFFICE VISIT (OUTPATIENT)
Dept: FAMILY MEDICINE | Facility: CLINIC | Age: 77
End: 2022-12-12
Payer: MEDICARE

## 2022-12-12 VITALS
RESPIRATION RATE: 16 BRPM | SYSTOLIC BLOOD PRESSURE: 128 MMHG | OXYGEN SATURATION: 99 % | HEART RATE: 109 BPM | BODY MASS INDEX: 25.61 KG/M2 | HEIGHT: 58 IN | TEMPERATURE: 98.3 F | WEIGHT: 122 LBS | DIASTOLIC BLOOD PRESSURE: 80 MMHG

## 2022-12-12 DIAGNOSIS — E83.42 HYPOMAGNESEMIA: ICD-10-CM

## 2022-12-12 DIAGNOSIS — Z13.1 SCREENING FOR DIABETES MELLITUS: ICD-10-CM

## 2022-12-12 DIAGNOSIS — I48.0 PAROXYSMAL ATRIAL FIBRILLATION (H): Primary | ICD-10-CM

## 2022-12-12 DIAGNOSIS — E87.6 HYPOKALEMIA: ICD-10-CM

## 2022-12-12 DIAGNOSIS — R73.9 HYPERGLYCEMIA: ICD-10-CM

## 2022-12-12 LAB
ANION GAP SERPL CALCULATED.3IONS-SCNC: 11 MMOL/L (ref 7–15)
BUN SERPL-MCNC: 16.6 MG/DL (ref 8–23)
CALCIUM SERPL-MCNC: 9.9 MG/DL (ref 8.8–10.2)
CHLORIDE SERPL-SCNC: 103 MMOL/L (ref 98–107)
CREAT SERPL-MCNC: 0.93 MG/DL (ref 0.51–0.95)
DEPRECATED HCO3 PLAS-SCNC: 26 MMOL/L (ref 22–29)
GFR SERPL CREATININE-BSD FRML MDRD: 63 ML/MIN/1.73M2
GLUCOSE SERPL-MCNC: 120 MG/DL (ref 70–99)
HBA1C MFR BLD: 5.6 % (ref 0–5.6)
MAGNESIUM SERPL-MCNC: 2 MG/DL (ref 1.7–2.3)
POTASSIUM SERPL-SCNC: 4.4 MMOL/L (ref 3.4–5.3)
SODIUM SERPL-SCNC: 140 MMOL/L (ref 136–145)

## 2022-12-12 PROCEDURE — 83036 HEMOGLOBIN GLYCOSYLATED A1C: CPT | Performed by: FAMILY MEDICINE

## 2022-12-12 PROCEDURE — 99214 OFFICE O/P EST MOD 30 MIN: CPT | Performed by: FAMILY MEDICINE

## 2022-12-12 PROCEDURE — 36415 COLL VENOUS BLD VENIPUNCTURE: CPT | Performed by: FAMILY MEDICINE

## 2022-12-12 PROCEDURE — 83735 ASSAY OF MAGNESIUM: CPT | Performed by: FAMILY MEDICINE

## 2022-12-12 PROCEDURE — 80048 BASIC METABOLIC PNL TOTAL CA: CPT | Performed by: FAMILY MEDICINE

## 2022-12-12 RX ORDER — DILTIAZEM HYDROCHLORIDE 30 MG/1
30 TABLET, FILM COATED ORAL 2 TIMES DAILY
Qty: 60 TABLET | Refills: 5 | Status: SHIPPED | OUTPATIENT
Start: 2022-12-12 | End: 2023-06-17

## 2022-12-12 NOTE — TELEPHONE ENCOUNTER
Reason for Call:  Appointment Request    Patient requesting this type of appt:  Follow up BP    Requested provider: Barb Elias    Reason patient unable to be scheduled: Pt was not sure if she needed to come back to see PCP or Nurse only visit for follow up on BP. She was told to return once a week.    When does patient want to be seen/preferred time: 1-2 weeks    Comments: If Pt needs to follow up, please indicate what type of visit and will need to call Pt to schedule.    Okay to leave a detailed message?: Yes at Home number on file [phone] (home)    Call taken on 12/12/2022 at 2:06 PM by Sherley Valdez

## 2022-12-12 NOTE — PROGRESS NOTES
Assessment & Plan     Paroxysmal atrial fibrillation (H)  HTN  Restart cardizem (patient stopped after ER visit, did not understand plan). Restart eliquis. Decrease lisinopril to the prescribed dose of ONE 10mg pill daily (has been taking 2 daily due to misunderstanding). Recheck pulse and BP in about a week, nurse only visit is ok if no provider openings. Time spent going over medications with patient and family memeber today, including identifying different pills that were in the wrong container and had no container. Reviewed all of the medication bottles as well as pills in pill box. Made recommendation to patient and family member to set up pills in am and pm slots and discussed how to do this.   - diltiazem (CARDIZEM) 30 MG tablet  Dispense: 60 tablet; Refill: 5  - apixaban ANTICOAGULANT (ELIQUIS) 5 MG tablet  Dispense: 60 tablet; Refill: 2    Hypokalemia  Recheck lab today  - Basic metabolic panel  (Ca, Cl, CO2, Creat, Gluc, K, Na, BUN)    Hypomagnesemia  Recheck lab today  - Magnesium    Hyperglycemia  Requests DM screen today if not done recently. She had elevated glucose in recent past, but not clear if fasting at the time  - Hemoglobin A1c            No follow-ups on file.    Camelia Salvador MD  [facility] KARISSA Rodriguez is a 77 year old accompanied by her daughter, presenting for the following health issues:  RECHECK (ER follow up)      HPI   ER follow up 12/5/22 afib w RVR, elevated BP. Converted to sinus rhythm in ER. Started on lisinopril for HTN, given 30 day supply.     Lots of confusion regarding medications - not taking eliquis or cardizem - has two cardizem in eliquis bottle, has not taken since ER, thought she was supposed to stop when she started lisinopril. Not clear why not taking eliquis.   Taking lisinopril, but taking two 10mg pills daily, not one as prescribed.    Pill box is set up in an unusual way. Instead of using am and pm slots for morning and evening  "meds, she puts once daily meds in one slot, and bid meds in the other slot.           Review of Systems         Objective    /80 (BP Location: Right arm)   Pulse 109   Temp 98.3  F (36.8  C) (Oral)   Resp 16   Ht 1.473 m (4' 10\")   Wt 55.3 kg (122 lb)   SpO2 99%   BMI 25.50 kg/m    Body mass index is 25.5 kg/m .  Physical Exam   Gen: NAD  HEENT: PER sclerae clear  CV: Reg rhythm, slightly elevated rate without murmur, extremities without edema  Resp: LCTAB        "

## 2022-12-12 NOTE — TELEPHONE ENCOUNTER
[facility] Call Center    Phone Message    May a detailed message be left on voicemail: yes     Reason for Call: Other: Cherie from [facility] Juan Manuel is calling to schedule appt. Pt was referred. Referral says priority 1-2 weeks. Pt is scheduled with Dr. De Guzman for 03/07/23 Please call Aldo (Pt's sister) to schedule sooner appt.     Action Taken: Message routed to:  [facility] Neurosurgery    Travel Screening: Not Applicable

## 2022-12-13 NOTE — TELEPHONE ENCOUNTER
In light of tight provider access, I recommend nurse only appointment for BP check within one week. Thank you, JF

## 2022-12-13 NOTE — TELEPHONE ENCOUNTER
Writer called patient two times with the help of a Kelli  regarding provider's message below. No answer, call went straight to voicemail--unable to leave VM as there was no option to do so.    If pt calls back, please assist pt in scheduling for an MA visit for a BP check as indicated by Dr. Salvador. Thanks.    DUKE JacobsonN, RN   [facility]

## 2022-12-14 NOTE — TELEPHONE ENCOUNTER
Writer attempt #2 to call patient with the help of a Kelli  regarding provider's message below. No answer, call went straight to voicemail--unable to leave VM as there was no option to do so.     If pt calls back, please assist pt in scheduling for an MA visit for a BP check as indicated by Dr. Salvador. Thanks.     An Mcghee, DUKEN, RN              [facility]

## 2022-12-15 ENCOUNTER — TELEPHONE (OUTPATIENT)
Dept: FAMILY MEDICINE | Facility: CLINIC | Age: 77
End: 2022-12-15

## 2022-12-15 NOTE — TELEPHONE ENCOUNTER
Called pt and DB on MA schedule 12/21.    Isaiah Koch, BSN RN  [facility]

## 2022-12-15 NOTE — TELEPHONE ENCOUNTER
----- Message from Camelia Salvador MD sent at 12/15/2022 10:41 AM CST -----  Blood tests are normal- potassium and magnesium are back to normal

## 2022-12-16 NOTE — TELEPHONE ENCOUNTER
Contacted patient with Kelli  ID# [phone] to relay test results. No answer, left voicemail message.

## 2022-12-21 ENCOUNTER — ALLIED HEALTH/NURSE VISIT (OUTPATIENT)
Dept: FAMILY MEDICINE | Facility: CLINIC | Age: 77
End: 2022-12-21
Payer: MEDICARE

## 2022-12-21 VITALS — SYSTOLIC BLOOD PRESSURE: 128 MMHG | DIASTOLIC BLOOD PRESSURE: 66 MMHG

## 2022-12-21 DIAGNOSIS — I10 ESSENTIAL HYPERTENSION: Primary | ICD-10-CM

## 2022-12-21 PROCEDURE — 99207 PR NO CHARGE NURSE ONLY: CPT

## 2022-12-21 NOTE — PROGRESS NOTES
I met with Jennifer Huggins at the request of Dr. Elias to recheck her blood pressure.  Blood pressure medications on the med list were reviewed with patient.    Patient has taken all medications as per usual regimen: Yes  Patient reports tolerating them without any issues or concerns: Yes    Vitals:    12/21/22 1320   BP: 128/66   BP Location: Left arm   Patient Position: Sitting   Cuff Size: Adult Regular       Blood pressure was taken, previous encounter was reviewed, recorded blood pressure below 140/90.  Patient was discharged and the note will be sent to the provider for final review.

## 2022-12-26 ENCOUNTER — PATIENT OUTREACH (OUTPATIENT)
Dept: GERIATRIC MEDICINE | Facility: CLINIC | Age: 77
End: 2022-12-26

## 2022-12-26 NOTE — LETTER
December 26, 2022    JENNIFER MORA BHUMIKA  [address] [place] [address]      Dear Jennifer:    As a member of [facility] ([facility] you are provided a care coordinator. I will be your new care coordinator as of 1/1/2023. I will be calling you soon to see how you are doing and determine your needs.    If you have any questions, please feel free to call me at [phone]. If you reach my voice mail, please leave a message and your phone number. If you are hearing impaired, please call the [place] Relay at 667 or [phone] (lmbyra-pw-vmfaum relay service).    I look forward to speaking with you soon.    Sincerely,          Vita Chandra RN, PHN  [phone]  [email or web address]      [facility]          [facility]  [id number] DHS Approved ([id number])  [id number] (11/18)

## 2022-12-26 NOTE — PROGRESS NOTES
[facility] Care Coordination Contact    Internal CC change effective 1/1/2023.  Mailed member CC Change letter.  Additional tasks to be completed by CMS include: update database & EPIC, enter CC Change in MMIS, and move member files on Q drive.    Myranda Kelly  Case Management Specialist  [facility]  [phone]

## 2023-01-08 ENCOUNTER — APPOINTMENT (OUTPATIENT)
Dept: MRI IMAGING | Facility: HOSPITAL | Age: 78
End: 2023-01-08
Attending: EMERGENCY MEDICINE
Payer: MEDICARE

## 2023-01-08 ENCOUNTER — HOSPITAL ENCOUNTER (EMERGENCY)
Facility: HOSPITAL | Age: 78
Discharge: HOME | End: 2023-01-08
Attending: EMERGENCY MEDICINE | Admitting: EMERGENCY MEDICINE
Payer: MEDICARE

## 2023-01-08 ENCOUNTER — APPOINTMENT (OUTPATIENT)
Dept: RADIOLOGY | Facility: HOSPITAL | Age: 78
End: 2023-01-08
Attending: EMERGENCY MEDICINE
Payer: MEDICARE

## 2023-01-08 VITALS
SYSTOLIC BLOOD PRESSURE: 189 MMHG | OXYGEN SATURATION: 99 % | TEMPERATURE: 97.9 F | HEART RATE: 75 BPM | DIASTOLIC BLOOD PRESSURE: 81 MMHG | RESPIRATION RATE: 20 BRPM

## 2023-01-08 DIAGNOSIS — D32.9 MENINGIOMA (H): ICD-10-CM

## 2023-01-08 DIAGNOSIS — R07.9 CHEST PAIN, UNSPECIFIED TYPE: ICD-10-CM

## 2023-01-08 DIAGNOSIS — R42 DIZZINESS: ICD-10-CM

## 2023-01-08 DIAGNOSIS — E87.6 HYPOKALEMIA: ICD-10-CM

## 2023-01-08 DIAGNOSIS — I10 HYPERTENSION, UNSPECIFIED TYPE: ICD-10-CM

## 2023-01-08 LAB
ANION GAP SERPL CALCULATED.3IONS-SCNC: 10 MMOL/L (ref 7–15)
BASOPHILS # BLD AUTO: 0 10E3/UL (ref 0–0.2)
BASOPHILS NFR BLD AUTO: 0 %
BUN SERPL-MCNC: 9.1 MG/DL (ref 8–23)
CALCIUM SERPL-MCNC: 9.1 MG/DL (ref 8.8–10.2)
CHLORIDE SERPL-SCNC: 108 MMOL/L (ref 98–107)
CREAT SERPL-MCNC: 0.83 MG/DL (ref 0.51–0.95)
DEPRECATED HCO3 PLAS-SCNC: 26 MMOL/L (ref 22–29)
EOSINOPHIL # BLD AUTO: 0.7 10E3/UL (ref 0–0.7)
EOSINOPHIL NFR BLD AUTO: 9 %
ERYTHROCYTE [DISTWIDTH] IN BLOOD BY AUTOMATED COUNT: 13.2 % (ref 10–15)
GFR SERPL CREATININE-BSD FRML MDRD: 72 ML/MIN/1.73M2
GLUCOSE SERPL-MCNC: 108 MG/DL (ref 70–99)
HCT VFR BLD AUTO: 42.7 % (ref 35–47)
HGB BLD-MCNC: 14 G/DL (ref 11.7–15.7)
IMM GRANULOCYTES # BLD: 0 10E3/UL
IMM GRANULOCYTES NFR BLD: 0 %
LYMPHOCYTES # BLD AUTO: 2.5 10E3/UL (ref 0.8–5.3)
LYMPHOCYTES NFR BLD AUTO: 34 %
MAGNESIUM SERPL-MCNC: 1.9 MG/DL (ref 1.7–2.3)
MCH RBC QN AUTO: 30.7 PG (ref 26.5–33)
MCHC RBC AUTO-ENTMCNC: 32.8 G/DL (ref 31.5–36.5)
MCV RBC AUTO: 94 FL (ref 78–100)
MONOCYTES # BLD AUTO: 0.5 10E3/UL (ref 0–1.3)
MONOCYTES NFR BLD AUTO: 7 %
NEUTROPHILS # BLD AUTO: 3.6 10E3/UL (ref 1.6–8.3)
NEUTROPHILS NFR BLD AUTO: 50 %
NRBC # BLD AUTO: 0 10E3/UL
NRBC BLD AUTO-RTO: 0 /100
PLATELET # BLD AUTO: 290 10E3/UL (ref 150–450)
POTASSIUM SERPL-SCNC: 3 MMOL/L (ref 3.4–5.3)
RBC # BLD AUTO: 4.56 10E6/UL (ref 3.8–5.2)
SODIUM SERPL-SCNC: 144 MMOL/L (ref 136–145)
TROPONIN T SERPL HS-MCNC: 8 NG/L
TROPONIN T SERPL HS-MCNC: 8 NG/L
WBC # BLD AUTO: 7.3 10E3/UL (ref 4–11)

## 2023-01-08 PROCEDURE — 36415 COLL VENOUS BLD VENIPUNCTURE: CPT | Performed by: EMERGENCY MEDICINE

## 2023-01-08 PROCEDURE — 70553 MRI BRAIN STEM W/O & W/DYE: CPT | Mod: MG

## 2023-01-08 PROCEDURE — 96374 THER/PROPH/DIAG INJ IV PUSH: CPT | Mod: 59

## 2023-01-08 PROCEDURE — 85025 COMPLETE CBC W/AUTO DIFF WBC: CPT | Performed by: STUDENT IN AN ORGANIZED HEALTH CARE EDUCATION/TRAINING PROGRAM

## 2023-01-08 PROCEDURE — 84484 ASSAY OF TROPONIN QUANT: CPT | Mod: 91 | Performed by: EMERGENCY MEDICINE

## 2023-01-08 PROCEDURE — 80048 BASIC METABOLIC PNL TOTAL CA: CPT | Performed by: EMERGENCY MEDICINE

## 2023-01-08 PROCEDURE — 99285 EMERGENCY DEPT VISIT HI MDM: CPT | Mod: 25

## 2023-01-08 PROCEDURE — 84484 ASSAY OF TROPONIN QUANT: CPT | Performed by: STUDENT IN AN ORGANIZED HEALTH CARE EDUCATION/TRAINING PROGRAM

## 2023-01-08 PROCEDURE — 85025 COMPLETE CBC W/AUTO DIFF WBC: CPT | Performed by: EMERGENCY MEDICINE

## 2023-01-08 PROCEDURE — A9585 GADOBUTROL INJECTION: HCPCS | Performed by: EMERGENCY MEDICINE

## 2023-01-08 PROCEDURE — 80048 BASIC METABOLIC PNL TOTAL CA: CPT | Performed by: STUDENT IN AN ORGANIZED HEALTH CARE EDUCATION/TRAINING PROGRAM

## 2023-01-08 PROCEDURE — 93005 ELECTROCARDIOGRAM TRACING: CPT | Performed by: STUDENT IN AN ORGANIZED HEALTH CARE EDUCATION/TRAINING PROGRAM

## 2023-01-08 PROCEDURE — 250N000013 HC RX MED GY IP 250 OP 250 PS 637: Performed by: EMERGENCY MEDICINE

## 2023-01-08 PROCEDURE — 250N000011 HC RX IP 250 OP 636: Performed by: EMERGENCY MEDICINE

## 2023-01-08 PROCEDURE — 96361 HYDRATE IV INFUSION ADD-ON: CPT

## 2023-01-08 PROCEDURE — 93005 ELECTROCARDIOGRAM TRACING: CPT | Performed by: EMERGENCY MEDICINE

## 2023-01-08 PROCEDURE — 36415 COLL VENOUS BLD VENIPUNCTURE: CPT | Performed by: STUDENT IN AN ORGANIZED HEALTH CARE EDUCATION/TRAINING PROGRAM

## 2023-01-08 PROCEDURE — 70549 MR ANGIOGRAPH NECK W/O&W/DYE: CPT | Mod: MG

## 2023-01-08 PROCEDURE — 84484 ASSAY OF TROPONIN QUANT: CPT | Performed by: EMERGENCY MEDICINE

## 2023-01-08 PROCEDURE — 71046 X-RAY EXAM CHEST 2 VIEWS: CPT

## 2023-01-08 PROCEDURE — 258N000003 HC RX IP 258 OP 636: Performed by: EMERGENCY MEDICINE

## 2023-01-08 PROCEDURE — 83735 ASSAY OF MAGNESIUM: CPT | Performed by: EMERGENCY MEDICINE

## 2023-01-08 PROCEDURE — 70544 MR ANGIOGRAPHY HEAD W/O DYE: CPT | Mod: MG,XU

## 2023-01-08 PROCEDURE — 255N000002 HC RX 255 OP 636: Performed by: EMERGENCY MEDICINE

## 2023-01-08 RX ORDER — MECLIZINE HCL 12.5 MG 12.5 MG/1
12.5 TABLET ORAL ONCE
Status: COMPLETED | OUTPATIENT
Start: 2023-01-08 | End: 2023-01-08

## 2023-01-08 RX ORDER — HYDRALAZINE HYDROCHLORIDE 10 MG/1
10 TABLET, FILM COATED ORAL ONCE
Status: COMPLETED | OUTPATIENT
Start: 2023-01-08 | End: 2023-01-08

## 2023-01-08 RX ORDER — GADOBUTROL 604.72 MG/ML
6 INJECTION INTRAVENOUS ONCE
Status: COMPLETED | OUTPATIENT
Start: 2023-01-08 | End: 2023-01-08

## 2023-01-08 RX ORDER — POTASSIUM CHLORIDE 1500 MG/1
40 TABLET, EXTENDED RELEASE ORAL ONCE
Status: COMPLETED | OUTPATIENT
Start: 2023-01-08 | End: 2023-01-08

## 2023-01-08 RX ORDER — HYDRALAZINE HYDROCHLORIDE 20 MG/ML
5 INJECTION INTRAMUSCULAR; INTRAVENOUS ONCE
Status: COMPLETED | OUTPATIENT
Start: 2023-01-08 | End: 2023-01-08

## 2023-01-08 RX ADMIN — GADOBUTROL 6 ML: 604.72 INJECTION INTRAVENOUS at 13:10

## 2023-01-08 RX ADMIN — HYDRALAZINE HYDROCHLORIDE 5 MG: 20 INJECTION INTRAMUSCULAR; INTRAVENOUS at 12:17

## 2023-01-08 RX ADMIN — SODIUM CHLORIDE 500 ML: 9 INJECTION, SOLUTION INTRAVENOUS at 12:17

## 2023-01-08 RX ADMIN — POTASSIUM CHLORIDE 40 MEQ: 1500 TABLET, EXTENDED RELEASE ORAL at 12:12

## 2023-01-08 RX ADMIN — MECLIZINE 12.5 MG: 12.5 TABLET ORAL at 12:13

## 2023-01-08 RX ADMIN — HYDRALAZINE HYDROCHLORIDE 10 MG: 10 TABLET, FILM COATED ORAL at 15:30

## 2023-01-08 ASSESSMENT — ENCOUNTER SYMPTOMS
SHORTNESS OF BREATH: 0
WEAKNESS: 0
NUMBNESS: 0
RECTAL PAIN: 0
COUGH: 0
CONFUSION: 0
FEVER: 0
DIZZINESS: 1
DYSURIA: 0
DIARRHEA: 0
VOMITING: 0
HEADACHES: 0
ABDOMINAL PAIN: 0

## 2023-01-08 ASSESSMENT — ACTIVITIES OF DAILY LIVING (ADL)
ADLS_ACUITY_SCORE: 35
ADLS_ACUITY_SCORE: 35

## 2023-01-08 NOTE — DISCHARGE INSTRUCTIONS
Call tomorrow and make an appointment to follow-up with your family doctor for blood pressure recheck and to discuss any blood pressure medication changes.    Take your usual blood pressure medications tonight.    Return to emergency department with persistent headache, persistent dizziness, worsening chest pain, new numbness or weakness in arms or legs, confusion, or any other concerns.    Thank you for choosing [facility] Emergency [facility].  It has been my pleasure caring for you today.     ~Dr. Berta MD

## 2023-01-08 NOTE — ED TRIAGE NOTES
Patient presents here with hypertension, dizziness, and chest pain that has occurred over the past 24 hours. She has been taking her antihypertensives.

## 2023-01-10 ENCOUNTER — HOSPITAL ENCOUNTER (OUTPATIENT)
Facility: CLINIC | Age: 78
Discharge: HOME | End: 2023-01-10
Admitting: FAMILY MEDICINE
Payer: MEDICARE

## 2023-01-10 ENCOUNTER — PATIENT OUTREACH (OUTPATIENT)
Dept: GERIATRIC MEDICINE | Facility: CLINIC | Age: 78
End: 2023-01-10

## 2023-01-10 ENCOUNTER — OFFICE VISIT (OUTPATIENT)
Dept: FAMILY MEDICINE | Facility: CLINIC | Age: 78
End: 2023-01-10
Attending: EMERGENCY MEDICINE
Payer: MEDICARE

## 2023-01-10 VITALS
BODY MASS INDEX: 25.61 KG/M2 | DIASTOLIC BLOOD PRESSURE: 76 MMHG | SYSTOLIC BLOOD PRESSURE: 160 MMHG | TEMPERATURE: 100 F | WEIGHT: 122 LBS | HEART RATE: 81 BPM | OXYGEN SATURATION: 98 % | HEIGHT: 58 IN

## 2023-01-10 DIAGNOSIS — D32.9 MENINGIOMA (H): ICD-10-CM

## 2023-01-10 DIAGNOSIS — L29.9 ITCHING: ICD-10-CM

## 2023-01-10 DIAGNOSIS — R42 DIZZINESS: ICD-10-CM

## 2023-01-10 DIAGNOSIS — R00.2 HEART PALPITATIONS: ICD-10-CM

## 2023-01-10 DIAGNOSIS — I10 HYPERTENSION, UNSPECIFIED TYPE: ICD-10-CM

## 2023-01-10 DIAGNOSIS — E55.9 VITAMIN D DEFICIENCY: ICD-10-CM

## 2023-01-10 DIAGNOSIS — G47.00 INSOMNIA, UNSPECIFIED TYPE: ICD-10-CM

## 2023-01-10 DIAGNOSIS — M81.0 AGE-RELATED OSTEOPOROSIS WITHOUT CURRENT PATHOLOGICAL FRACTURE: ICD-10-CM

## 2023-01-10 DIAGNOSIS — Z86.79 HISTORY OF ATRIAL FIBRILLATION: ICD-10-CM

## 2023-01-10 DIAGNOSIS — K21.9 GASTROESOPHAGEAL REFLUX DISEASE WITHOUT ESOPHAGITIS: ICD-10-CM

## 2023-01-10 DIAGNOSIS — Z76.89 ENCOUNTER TO ESTABLISH CARE: Primary | ICD-10-CM

## 2023-01-10 DIAGNOSIS — R07.9 CHEST PAIN, UNSPECIFIED TYPE: ICD-10-CM

## 2023-01-10 DIAGNOSIS — Z76.0 ENCOUNTER FOR MEDICATION REFILL: Primary | ICD-10-CM

## 2023-01-10 DIAGNOSIS — E87.6 HYPOKALEMIA: ICD-10-CM

## 2023-01-10 PROCEDURE — 80053 COMPREHEN METABOLIC PANEL: CPT

## 2023-01-10 PROCEDURE — 99214 OFFICE O/P EST MOD 30 MIN: CPT

## 2023-01-10 PROCEDURE — 36415 COLL VENOUS BLD VENIPUNCTURE: CPT

## 2023-01-10 RX ORDER — ATORVASTATIN CALCIUM 40 MG/1
TABLET, FILM COATED ORAL
COMMUNITY
Start: 2022-12-09 | End: 2023-04-17

## 2023-01-10 RX ORDER — HYDROXYZINE HYDROCHLORIDE 25 MG/1
25 TABLET, FILM COATED ORAL 3 TIMES DAILY PRN
Qty: 30 TABLET | Refills: 0 | Status: SHIPPED | OUTPATIENT
Start: 2023-01-10 | End: 2023-01-26

## 2023-01-10 RX ORDER — LISINOPRIL 5 MG/1
5 TABLET ORAL DAILY
Qty: 30 TABLET | Refills: 0 | Status: SHIPPED | OUTPATIENT
Start: 2023-01-10 | End: 2023-01-26

## 2023-01-10 ASSESSMENT — PATIENT HEALTH QUESTIONNAIRE - PHQ9
10. IF YOU CHECKED OFF ANY PROBLEMS, HOW DIFFICULT HAVE THESE PROBLEMS MADE IT FOR YOU TO DO YOUR WORK, TAKE CARE OF THINGS AT HOME, OR GET ALONG WITH OTHER PEOPLE: VERY DIFFICULT
SUM OF ALL RESPONSES TO PHQ QUESTIONS 1-9: 18
SUM OF ALL RESPONSES TO PHQ QUESTIONS 1-9: 18

## 2023-01-10 NOTE — PROGRESS NOTES
Assessment & Plan     Encounter to establish care  Patient presents to clinic today to establish care.  Initially was scheduled for follow-up post-ER visit however she reports she would like to follow at the [facility].  I have reviewed her medical records, and discussed chronic and new conditions with patient today.    Hypertension, unspecified type  Patient was seen in the emergency room and found to have high blood pressure.  Today pressure is 160/76.  She is taking her diltiazem as recommended for A. Fib.  It does appear she was on lisinopril at some point, but is no longer taking this.  I will restart this today.  Follow-up in 2 weeks to see how she is doing.  Advised patient that if she has headaches, increasing chest pain, swelling in her legs, or vision changes she should be seen urgently.  - lisinopril (ZESTRIL) 5 MG tablet; Take 1 tablet (5 mg) by mouth daily  Dispense: 30 tablet; Refill: 0    Chest pain, unspecified type  Heart palpitations  History of atrial fibrillation  Patient reports chest pain and heart palpitations that happens intermittently.  She reports it mostly happens at night.  She was diagnosed with A. fib in December.  I do think based on her having intermittent cyst symptoms of the chest pain and the heart palpitations we should do a continuous monitor for 2 days.  Patient is agreeable to this plan.  Orders placed.  On exam today she appears euvolemic, heart rate is regular and rhythm with no extra sounds, she does have +1 edema bilaterally to her lower extremities, she does not have any palpitations during the visit.    Dizziness  Suspect dizziness is from prolonged elevated BP. She did have a history of vertigo in the past, but patient reports this is a different dizziness. She agrees that she thinks it is from the elevated blood pressure. We will try to decrease the BP by adding Lisinopril and see if she finds benefit. If she does not, we will do further work-up.   - Primary  Care Referral    Hypokalemia  Noted on most recent lab work, potassium was 3.0. Will recheck this today to make sure it has not worsened and hopefully has improved. Patient has no muscle weakness or tingling, but does report heart palpitations.   - Primary Care Referral  - Comprehensive metabolic panel (BMP + Alb, Alk Phos, ALT, AST, Total. Bili, TP)    Meningioma (H)  Patient has follow-up appointment with neurosurgery on 03/07/2023.  She saw Dr. De Guzman in the past. No findings on exam today to indicate neuro deficit.  It does sound like from patient that she does not think she wants to have surgery, and have discussed the palliative approach with Dr. Arevalo if needed.    Insomnia, unspecified type  Itching  Patient reports trouble sleeping due to itching. Will prescribe Hydroxyzine and see if she finds benefit. She has no rashes of concerns. Her liver testing looked good. TSH was good in November. Unclear etiology of the itching, could be anxiety from recent illness, or dry skin from weather. Will continue to monitor and if itching does not improve, could reevaluate.    - hydrOXYzine (ATARAX) 25 MG tablet; Take 1 tablet (25 mg) by mouth 3 times daily as needed for itching  Dispense: 30 tablet; Refill: 0    Gastroesophageal reflux disease without esophagitis  Chronic. She does have a history of peptic ulcer in 2018.  She finds benefit with the omeprazole and takes it daily.  We will continue.   - omeprazole (PRILOSEC) 20 MG DR capsule; [OMEPRAZOLE (PRILOSEC) 20 MG CAPSULE] TAKE 1 CAPSULE BY MOUTH DAILY BEFORE BREAKFAST  Dispense: 90 capsule; Refill: 2     MED REC REQUIRED  Post Medication Reconciliation Status: discharge medications reconciled and changed, per note/orders  Depression Screening Follow Up    PHQ 1/10/2023   PHQ-9 Total Score 18   Q9: Thoughts of better off dead/self-harm past 2 weeks Several days   F/U: Thoughts of suicide or self-harm No   F/U: Safety concerns No     Will discuss at next visit,  "patient feels her depressive symptoms are situational.     Return in about 2 weeks (around 1/24/2023).    Shelli Rodriguez is a 78 year old accompanied by her daughter, presenting for the following health issues:  Hospital F/U (Er follow up for Hypertension, dizziness. Still feeling dizzy)    Patient presents today with her daughter, and an  is on the phone.  She notes she wants to change to the [facility].  She was in the ER for chest pain and a fast heart rate.  She was found to have high blood pressure.  She is feeling okay today.  She is still having dizziness and a little lightheadedness.  She reports she was taking the medication she thought she was supposed to be.  She does not follow with cardiology.  She does not know if she has been diagnosed with any heart conditions.  She is not having any chest pain now, no shortness of breath, no headaches, no vision changes.    She also notes she cannot sleep.  She reports is because of itchiness and when she feels her heart beating really fast.  She has chest pain with the heart beating fast and this causes her to lay awake.  She does not know why she is itchy.  She does not think she has dry skin.  She says it is diffuse itching.    HPI   0956}  ED/UC Followup:    Facility:  [facility]  Date of visit: 1/8/2023  Reason for visit: dizziness   Current Status: still mildly dizzy   Review of Systems   Constitutional: Negative for fatigue.   Respiratory: Negative for cough and shortness of breath.    Cardiovascular: Positive for chest pain, palpitations and peripheral edema.   Skin: Negative for rash.   Neurological: Positive for dizziness and light-headedness. Negative for syncope, numbness, headaches and paresthesias.   Psychiatric/Behavioral: The patient is nervous/anxious.           Objective    BP (!) 160/76 (BP Location: Right arm, Patient Position: Sitting, Cuff Size: Adult Regular)   Pulse 81   Temp 100  F (37.8  C) (Oral)   Ht 1.461 m (4' 9.5\")  "  Wt 55.3 kg (122 lb)   SpO2 98%   BMI 25.94 kg/m    Body mass index is 25.94 kg/m .  Physical Exam  Constitutional:       General: She is not in acute distress.  HENT:      Right Ear: Tympanic membrane, ear canal and external ear normal.      Left Ear: Tympanic membrane, ear canal and external ear normal.   Eyes:      Extraocular Movements: Extraocular movements intact.      Pupils: Pupils are equal, round, and reactive to light.   Cardiovascular:      Rate and Rhythm: Normal rate and regular rhythm.      Pulses: Normal pulses.      Heart sounds: Normal heart sounds. No murmur heard.  Pulmonary:      Effort: Pulmonary effort is normal. No respiratory distress.      Breath sounds: Normal breath sounds. No wheezing.   Abdominal:      General: Abdomen is flat. Bowel sounds are normal.      Palpations: Abdomen is soft.   Musculoskeletal:         General: Normal range of motion.      Cervical back: Normal range of motion. No rigidity or tenderness.      Right lower le+ Edema present.      Left lower le+ Edema present.   Lymphadenopathy:      Cervical: No cervical adenopathy.   Neurological:      General: No focal deficit present.      Mental Status: She is alert.      Cranial Nerves: No cranial nerve deficit.      Motor: No weakness.      Gait: Gait normal.   Psychiatric:         Mood and Affect: Mood normal.         Thought Content: Thought content normal.       At the end of the visit, I confirmed understanding of what was discussed. Patient and daughter have no further questions or concerns that were brought up at this time.     Dorita Sancehz, DEZ, APRN, FNP-C

## 2023-01-10 NOTE — PROGRESS NOTES
[facility] Care Coordination Contact  CC received notification of Emergency Room visit.  ER visit occurred on 1/8/2023 at [facility] with Dx of Hypertension, Hypokalemia, and chest pain.    CC contacted member and left a message requesting a return call.

## 2023-01-11 LAB
ALBUMIN SERPL BCG-MCNC: 4.2 G/DL (ref 3.5–5.2)
ALP SERPL-CCNC: 44 U/L (ref 35–104)
ALT SERPL W P-5'-P-CCNC: 26 U/L (ref 10–35)
ANION GAP SERPL CALCULATED.3IONS-SCNC: 18 MMOL/L (ref 7–15)
AST SERPL W P-5'-P-CCNC: 24 U/L (ref 10–35)
BILIRUB SERPL-MCNC: 0.6 MG/DL
BUN SERPL-MCNC: 13.1 MG/DL (ref 8–23)
CALCIUM SERPL-MCNC: 9.7 MG/DL (ref 8.8–10.2)
CHLORIDE SERPL-SCNC: 107 MMOL/L (ref 98–107)
CREAT SERPL-MCNC: 0.86 MG/DL (ref 0.51–0.95)
DEPRECATED HCO3 PLAS-SCNC: 19 MMOL/L (ref 22–29)
GFR SERPL CREATININE-BSD FRML MDRD: 69 ML/MIN/1.73M2
GLUCOSE SERPL-MCNC: 130 MG/DL (ref 70–99)
POTASSIUM SERPL-SCNC: 4 MMOL/L (ref 3.4–5.3)
PROT SERPL-MCNC: 7.3 G/DL (ref 6.4–8.3)
SODIUM SERPL-SCNC: 144 MMOL/L (ref 136–145)

## 2023-01-11 RX ORDER — ERGOCALCIFEROL 1.25 MG/1
CAPSULE, LIQUID FILLED ORAL
Qty: 4 CAPSULE | Refills: 3 | Status: SHIPPED | OUTPATIENT
Start: 2023-01-11 | End: 2023-07-07

## 2023-01-11 NOTE — TELEPHONE ENCOUNTER
----- Message from GEORGIE Angeles CNP sent at 1/11/2023  6:32 AM CST -----  Please call patient and let her know her Potassium has come up since the check 3 days ago when it was low. This is great. We will recheck this at our follow-up. Don't forget to check your BP everyday and record it for ReAnn to see at the next visit.

## 2023-01-12 ENCOUNTER — TELEPHONE (OUTPATIENT)
Dept: FAMILY MEDICINE | Facility: CLINIC | Age: 78
End: 2023-01-12
Payer: MEDICARE

## 2023-01-12 ASSESSMENT — ENCOUNTER SYMPTOMS
COUGH: 0
PALPITATIONS: 1
PARESTHESIAS: 0
SHORTNESS OF BREATH: 0
FATIGUE: 0
HEADACHES: 0
NERVOUS/ANXIOUS: 1
DIZZINESS: 1
NUMBNESS: 0
LIGHT-HEADEDNESS: 1

## 2023-01-12 NOTE — TELEPHONE ENCOUNTER
Voicemail left for patient using a phone .     Please relay message below to patient when they return call.

## 2023-01-19 ENCOUNTER — PATIENT OUTREACH (OUTPATIENT)
Dept: GERIATRIC MEDICINE | Facility: CLINIC | Age: 78
End: 2023-01-19
Payer: MEDICARE

## 2023-01-19 NOTE — PROGRESS NOTES
[facility] Care Coordination Contact    CC received notification of Emergency Room visit.  ER visit occurred on 1/8/2023 at [facility] with Dx of HTN, Hypokalemia, Chest Pain.    CC contacted adult daughter Aldo Boucher and left a message requesting a return call.  Member has a follow-up appointment with PCP: Yes: scheduled on 1/10/23  Member has had a change in condition: Per MD LERMA note, just some medication adjustments and rtn to clinic for f/u on BP.   New referrals placed: No  Home Visit Needed: Unable to discuss with daughter VM left x2, no response. Member is due for a visit in February, will reassess condition if no response from daughter before then.   Care plan reviewed and updated.  PCP notified of ED visit via EMR.    Vita Chandra RN, BSN, PHN  [facility]  Phone: [phone]

## 2023-01-23 ENCOUNTER — PATIENT OUTREACH (OUTPATIENT)
Dept: GERIATRIC MEDICINE | Facility: CLINIC | Age: 78
End: 2023-01-23
Payer: MEDICARE

## 2023-01-23 NOTE — PROGRESS NOTES
Encounter opened due to Regulatory [employer] Update to close FVP Program.    Mary Galeana  Care Management Specialist  [facility]  [phone]

## 2023-01-23 NOTE — PROGRESS NOTES
Encounter opened due to Regulatory [employer] Update to open FVP Program.    Mary Galeana  Care Management Specialist  [facility]  [phone]

## 2023-01-26 ENCOUNTER — OFFICE VISIT (OUTPATIENT)
Dept: FAMILY MEDICINE | Facility: CLINIC | Age: 78
End: 2023-01-26
Payer: MEDICARE

## 2023-01-26 VITALS
TEMPERATURE: 98 F | DIASTOLIC BLOOD PRESSURE: 68 MMHG | RESPIRATION RATE: 16 BRPM | HEART RATE: 70 BPM | HEIGHT: 58 IN | SYSTOLIC BLOOD PRESSURE: 146 MMHG | WEIGHT: 125.1 LBS | OXYGEN SATURATION: 99 % | BODY MASS INDEX: 26.26 KG/M2

## 2023-01-26 DIAGNOSIS — G47.00 INSOMNIA, UNSPECIFIED TYPE: ICD-10-CM

## 2023-01-26 DIAGNOSIS — I10 HYPERTENSION, UNSPECIFIED TYPE: Primary | ICD-10-CM

## 2023-01-26 DIAGNOSIS — L29.9 ITCHING: ICD-10-CM

## 2023-01-26 DIAGNOSIS — R06.2 WHEEZING: ICD-10-CM

## 2023-01-26 DIAGNOSIS — H53.8 BLURRED VISION: ICD-10-CM

## 2023-01-26 LAB
ANION GAP SERPL CALCULATED.3IONS-SCNC: 14 MMOL/L (ref 7–15)
BUN SERPL-MCNC: 18.5 MG/DL (ref 8–23)
CALCIUM SERPL-MCNC: 9.1 MG/DL (ref 8.8–10.2)
CHLORIDE SERPL-SCNC: 106 MMOL/L (ref 98–107)
CREAT SERPL-MCNC: 0.92 MG/DL (ref 0.51–0.95)
DEPRECATED HCO3 PLAS-SCNC: 22 MMOL/L (ref 22–29)
GFR SERPL CREATININE-BSD FRML MDRD: 64 ML/MIN/1.73M2
GLUCOSE SERPL-MCNC: 117 MG/DL (ref 70–99)
POTASSIUM SERPL-SCNC: 3.1 MMOL/L (ref 3.4–5.3)
SODIUM SERPL-SCNC: 142 MMOL/L (ref 136–145)

## 2023-01-26 PROCEDURE — 36415 COLL VENOUS BLD VENIPUNCTURE: CPT

## 2023-01-26 PROCEDURE — 99213 OFFICE O/P EST LOW 20 MIN: CPT

## 2023-01-26 PROCEDURE — 80048 BASIC METABOLIC PNL TOTAL CA: CPT

## 2023-01-26 RX ORDER — ALBUTEROL SULFATE 90 UG/1
2 AEROSOL, METERED RESPIRATORY (INHALATION) EVERY 6 HOURS PRN
Qty: 18 G | Refills: 3 | Status: SHIPPED | OUTPATIENT
Start: 2023-01-26 | End: 2024-01-23

## 2023-01-26 RX ORDER — LISINOPRIL 10 MG/1
10 TABLET ORAL DAILY
Qty: 30 TABLET | Refills: 1 | Status: SHIPPED | OUTPATIENT
Start: 2023-01-26 | End: 2023-03-23

## 2023-01-26 RX ORDER — HYDROXYZINE HYDROCHLORIDE 25 MG/1
25 TABLET, FILM COATED ORAL 3 TIMES DAILY PRN
Qty: 30 TABLET | Refills: 3 | Status: SHIPPED | OUTPATIENT
Start: 2023-01-26 | End: 2024-01-26

## 2023-01-26 ASSESSMENT — PAIN SCALES - GENERAL: PAINLEVEL: NO PAIN (0)

## 2023-01-26 NOTE — LETTER
Letter by Soraya Khoury RN at      Author: Soraya Khoury RN Service: -- Author Type: --    Filed:  Encounter Date: 4/16/2021 Status: (Other)       April 16, 2021    JENNIFER SWANSON BHUMIKA  [address]  [facility] [address]        Dear Jennifer:    At [facility], we are dedicated to improving your health and well-being. Enclosed is the Comprehensive Care Plan that we developed with you on 4/8/21. Please review the Care Plan carefully.    As a reminder, some of the things we discussed at your visit include:    Your physical and mental health    Ways to reduce falls    Health care needs you may have    Dont forget to contact your care coordinator if you:    Have been hospitalized or plan to be hospitalized     Have had a fall     Have experienced a change in physical health    Are experiencing emotional problems     If you do not agree with your Care Plan, have questions about it, or have experienced a change in your needs, please call me at [phone]. If you are hearing impaired, please call the [place] Relay at [phone] or [phone] (dkjneh-ji-ligmcl relay service).    Sincerely,          Soraya Khoury RN    E-mail: [email or web address]  Phone: [phone]    Care Manager  [id number] [place] [id number]     [id number] (11/18)                               
Warm/Dry

## 2023-01-26 NOTE — PATIENT INSTRUCTIONS
BP that is too high is over 140/90    Too low is less than 100/50.    Please continue to keep track of your BP at home.     Your dose of lisinopril should be 10 mg.

## 2023-01-26 NOTE — LETTER
February 13, 2023      Jennifer MORA Joseluis  [address] [place] [address]        Dear ,    We are writing to inform you of your test results.    Your potassium level is lower than it was last time we checked blood work. This can be a side effect from your lisinopril (blood pressure medication). I think we should recheck it and see where it is at, and if still low we would want to start you on a supplementation of potassium. Please set up a lab appointment for sometime this week if you are able (please help her schedule)     Please let us know if you have any questions or concerns,       Resulted Orders   Basic metabolic panel  (Ca, Cl, CO2, Creat, Gluc, K, Na, BUN)   Result Value Ref Range    Sodium 142 136 - 145 mmol/L    Potassium 3.1 (L) 3.4 - 5.3 mmol/L    Chloride 106 98 - 107 mmol/L    Carbon Dioxide (CO2) 22 22 - 29 mmol/L    Anion Gap 14 7 - 15 mmol/L    Urea Nitrogen 18.5 8.0 - 23.0 mg/dL    Creatinine 0.92 0.51 - 0.95 mg/dL    Calcium 9.1 8.8 - 10.2 mg/dL    Glucose 117 (H) 70 - 99 mg/dL    GFR Estimate 64 >60 mL/min/1.73m2      Comment:      eGFR calculated using 2021 CKD-EPI equation.     If you have any questions or concerns, please call the clinic at the number listed above.     Sincerely,    GEORGIE Angeles CNP

## 2023-02-07 ENCOUNTER — PATIENT OUTREACH (OUTPATIENT)
Dept: GERIATRIC MEDICINE | Facility: CLINIC | Age: 78
End: 2023-02-07
Payer: MEDICARE

## 2023-02-07 NOTE — LETTER
February 7, 2023    JENNIFER MORA BHUMIKA  [address] [place] [address]    Dear Jennifer:     I m your care coordinator. I ve been unable to reach you by phone. I am writing to ask you or your authorized representative to call me at [phone]. If you reach my voicemail, leave a message with your daytime phone number. Include a date and time that I can call you. If you are hearing impaired, call the [place] Relay at [phone] or [phone] (pcckpv-ro-scgcrh relay service).    The reason I am trying to reach you is:     [x] To schedule an assessment  [] For your six (6)-month check-in  [] Other:      Please call me as soon as you receive this letter. I look forward to speaking with you.    Sincerely,      Vita Chandra RN, PHN  [phone]  [email or web address]      [employer]        [id number] accepted  [id number]                                                                        B  (08/2022)

## 2023-02-07 NOTE — PROGRESS NOTES
"[facility] Care Coordination Contact    Per CC, mailed client an \"Unable to Contact\" letter.    Leandro Hayward  [facility]  Case Management Specialist  [phone]    "

## 2023-02-13 ENCOUNTER — PATIENT OUTREACH (OUTPATIENT)
Dept: GERIATRIC MEDICINE | Facility: CLINIC | Age: 78
End: 2023-02-13
Payer: MEDICARE

## 2023-02-13 ENCOUNTER — TELEPHONE (OUTPATIENT)
Dept: FAMILY MEDICINE | Facility: CLINIC | Age: 78
End: 2023-02-13

## 2023-02-13 DIAGNOSIS — E87.6 HYPOKALEMIA: Primary | ICD-10-CM

## 2023-02-13 NOTE — TELEPHONE ENCOUNTER
Attempted to call, but unable to leave voicemail. Please relay message from Kameron Sanchez:     Your potassium level is lower than it was last time we checked blood work. This can be a side effect from your lisinopril (blood pressure medication). I think we should recheck it and see where it is at, and if still low we would want to start you on a supplementation of potassium. Please set up a lab appointment for sometime this week if you are able (please help her schedule)     Please let us know if you have any questions or concerns,        Letter sent via [employer].

## 2023-02-13 NOTE — TELEPHONE ENCOUNTER
----- Message from GEORGIE Angeles CNP sent at 2/13/2023  7:03 AM CST -----  Please call:     Dah,    Your potassium level is lower than it was last time we checked blood work. This can be a side effect from your lisinopril (blood pressure medication). I think we should recheck it and see where it is at, and if still low we would want to start you on a supplementation of potassium. Please set up a lab appointment for sometime this week if you are able (please help her schedule)    Please let us know if you have any questions or concerns,     Dorita Sanchez DNP, GEORGIE, FNP-C

## 2023-02-13 NOTE — PROGRESS NOTES
[facility] Care Coordination Contact    Called adult daughter Aldo Boucher to schedule annual HRA home visit. HRA has been scheduled for 2/14/23.  Emailed [employer] and scheduled an  for the home visit.    Vita Chandra RN, BSN, PHN  [facility]  Phone: [phone]

## 2023-02-15 ENCOUNTER — PATIENT OUTREACH (OUTPATIENT)
Dept: GERIATRIC MEDICINE | Facility: CLINIC | Age: 78
End: 2023-02-15
Payer: MEDICARE

## 2023-02-15 NOTE — Clinical Note
ANTICOAGULATION THERAPY EDUCATION   PATIENT  INSTRUCTION    Your Guide to Using Warfarin:  Please refer to this handout for questions regarding your medication, Coumadin/Warfarin. This handout includes information on dosing, blood testing, possible side effects of Coumadin/Warfarin, using other medications, dietary guidelines and safety concerns while on anticoagulation therapy.    Please contact your primary care physician and/or seek appropriate medical care if you experience:  · A serious fall  · Increased menstrual bleeding   · An injury to your head  · Notice a different color urine or stool   · Increased bleeding with teeth brushing   · If you have any other unusual bruising   · Have bleeding from your nose or a cut that doesn't stop bleeding         Call your physician immediately if you notice any signs and symptoms of a blood clot such as:  · Sudden weakness in any limb  · Dizziness or faintness   · Numbness or tingling anywhere  · New shortness of breath or chest pain   · Sudden onset of slurred speech or inability to speak  · New pain, swelling, redness or heat in any extremity   · Visual changes or loss of sight in either eye         Other factors that may affect your anticoagulation therapy include:  · Fever  · Stress   · Diarrhea  · Changes in exercise/activity level   · Vomiting         While on Anticoagulation therapy avoid:  · Pregnancy, using birth control and hormone replacement therapy    · Body piercing or tattoos      Please inform family members and other health care providers that you are on anticoagulation therapy. Make sure to carry an up-to-date medication list. You can also wear a medical alert bracelet to identify that you are on anticoagulation therapy.       Hi Dr. Elias, please see annual reassessment note. As noted in visit note, family is requesting a bedside commode and a wheelchair for Dah Lay. I have already entered the order for the DME, if you feel that it is appropriate as well, please sign and I will fax over to [facility]. Feel free to reach out to me if there are any questions. Thank you,  Vita Chandra RN, BSN, PHN [phone]

## 2023-02-15 NOTE — PROGRESS NOTES
[facility] Care Coordination Contact    [facility] Home Visit Assessment     Home visit for Health Risk Assessment with Jennifer Limoo completed on February 14, 2023    Type of residence:: Private home - no stairs  Current living arrangement:: I live in a private home with family     Assessment completed with:: Patient, Care Team Member, Children    Current Care Plan  Member currently receiving the following home care services:     Member currently receiving the following community resources: DME, PCA, Transportation Services      Medication Review  Medication reconciliation completed in Epic: Yes  Medication set-up & administration: Family/informal caregiver sets up weekly.  Family caregiver administers medications.  Medication Risk Assessment Medication (1 or more, place referral to MTM): N/A: No risk factors identified  MTM Referral Placed: No: No risk factors idenified    Mental/Behavioral Health   Depression Screening:   PHQ-2 Total Score (Adult) - Positive if 3 or more points; Administer PHQ-9 if positive: 1       Mental health DX:: No        Falls Assessment:   Fallen 2 or more times in the past year?: No   Any fall with injury in the past year?: No    ADL/IADL Dependencies:   Dependent ADLs:: Ambulation-walker, Ambulation-cane, Bathing, Dressing, Eating, Grooming, Incontinence, Positioning, Transfers, Toileting  Dependent IADLs:: Cleaning, Cooking, Laundry, Shopping, Meal Preparation, Medication Management, Money Management, Transportation, Incontinence    [facility] sponsored benefits: Shared information re: Silver Sneakers/gym memberships, ASA, Calcium +D.    PCA Assessment completed at visit: Yes Annual PCA assessment indicated 40 units per day (10 hrs/day) of PCA. This is the same as the previous assessment.     Elderly Waiver Eligibility: Yes, but member declines EW services; will not open to EW    Care Plan & Recommendations: Continue personal care attendant services for ADL and IADL  support. Daughter reports that family and Dah Lay would like to keep Dr. Elias as PCP since she knows her history. Family reports that due to Dah Lay's weakness they have been having to use the seated walker to wheel her around in the home and outside the home. For safety, CC discussed option of wheelchair with family, they are interested. Family would also like to get a bedside commode for Dah Lay to use in the home. CC will contact PCP if DME will be appropriate.       See [facility] for detailed assessment information.    Follow-Up Plan: Member informed of future contact, plan to f/u with member with a 6 month telephone assessment.  Contact information shared with member and family, encouraged member to call with any questions or concerns at any time.    [facility] care continuum providers: Please see Snapshot and Care Management Flowsheets for Specific details of care plan.    This CC note routed to PCP.    Vita Chandra RN, BSN, PHN  [facility]  Phone: [phone]

## 2023-02-17 ENCOUNTER — PATIENT OUTREACH (OUTPATIENT)
Dept: GERIATRIC MEDICINE | Facility: CLINIC | Age: 78
End: 2023-02-17
Payer: MEDICARE

## 2023-02-17 DIAGNOSIS — M15.0 PRIMARY OSTEOARTHRITIS INVOLVING MULTIPLE JOINTS: Primary | ICD-10-CM

## 2023-02-17 NOTE — PROGRESS NOTES
DME supply(s) have been requested by the patient. DME orders(s) have been queued up and pended for the provider to review. Patient reports the need for DME supplies due to increased weakness and unsteady gait. Once completed, please route the encounter to Vita Chandra RN, BSN, PHN to fax completed documentation and order requisition to [facility].    Vita Chandra RN, BSN, PHN  [facility]  Phone: [phone]

## 2023-02-19 NOTE — PROGRESS NOTES
Assessment & Plan     1. Hypertesion, unspecified type  BP today is 146/68, will increase lisinopril to 10 mg from 5 mg started 2 weeks ago. She is tolerating well. Will recheck BMP. She does report her dizziness and heart palpitations are improving.   - lisinopril (ZESTRIL) 10 MG tablet; Take 1 tablet (10 mg) by mouth daily for 60 days  Dispense: 30 tablet; Refill: 1  - Adult Eye  Referral; Future  - Basic metabolic panel  (Ca, Cl, CO2, Creat, Gluc, K, Na, BUN)    Potassium is down on BMP recheck, will want to recheck this in a few weeks and possibly start supplementation. Increased Lisinopril dose may also help correct.     2. Blurred vision  Improving with better BP control, but do think patient would benefit from eye referral as she has not had an eye appointment in quite sometime. Referral placed.   - Adult Eye  Referral; Future    3. Insomnia, unspecified type  Patient notes insomnia related to anxiety over heart palpitations, but this is improving. No baseline anxiety. No pain. No frequent urination waking her up at night. Will trial hydroxyzine for now and hopefully as BP continues to improve, palpitations will continue to improve and insomnia will improve.   - hydrOXYzine (ATARAX) 25 MG tablet; Take 1 tablet (25 mg) by mouth 3 times daily as needed for itching  Dispense: 30 tablet; Refill: 3    4. Itching  No rashes or erythema to skin noted. TSH in October was WNL. Could be from lisinopril though patient does not recall this happening when she took lisinopril in the past. BMP WNL. No obvious triggers. Skin is somewhat dry, recommend moisturizing with water based lotion. Hydroxyzine can be used PRN if itching is bothersome.    - hydrOXYzine (ATARAX) 25 MG tablet; Take 1 tablet (25 mg) by mouth 3 times daily as needed for itching  Dispense: 30 tablet; Refill: 3    5. Wheezing  Patient started on this in 11/2022 for self-reported wheezing in the cold. Patient notes albuterol helps. Discussed  getting PFT in the future, patient declines at this time, but would like albuterol refill. Informed her to keep track of triggers for wheezing and how often she needing the inhaler. No further refills without PFT testing. Lung exam today reveals no abnormal sounds, no coughing during exam, no SOB.   - albuterol (PROAIR HFA/PROVENTIL HFA/VENTOLIN HFA) 108 (90 Base) MCG/ACT inhaler; Inhale 2 puffs into the lungs every 6 hours as needed for shortness of breath or wheezing  Dispense: 18 g; Refill: 3    Return in about 1 week (around 2/2/2023).    GEORGIE Huffman [facility]   Jennifer is a 78 year old accompanied by her family member, presenting for the following health issues:  Hypertension (Follow up) and Palpatations (Follow up)    Patient is stil feeling dizzy and having some blurred vision. Dizziness has imporved since our last vivist. Blurred vision is still present, but not getting worse. No eye pain. She is having less heart palpitations than when I last saw her. She is having no headaches.     Requesting refill of albuterol inhaler. She has been using this for wheezing when in the cold. No SOB or cough. She has found benefit when using this. She has not seen pulmonology or had any lung testing that she is aware of.     She brings up concerns with not being able to fall asleep at night. She is anxious about her heart palpitations which she becomes more aware of when she lays down. These are becoming less frequent. She is able to stay asleep at night. No chronic pain. No waking up SOB. No other anxious thoughts.     She also has concerns today with generalized itchiness. It is worse in the winter, but no other triggers she can identify. She started the Lisinopril, but has never had this in the past that she recalls. No other new medications. No rashes.     HPI     Review of Systems   Eyes: Positive for visual disturbance.   Cardiovascular: Positive for palpitations.  "Negative for chest pain and peripheral edema.   Respiratory: No SOB. No cough. Reports wheezing when in the cold.   Skin:        itching   Neurological: Negative for headaches.   Psychiatric/Behavioral: Positive for sleep disturbance.          Objective    BP (!) 146/68   Pulse 70   Temp 98  F (36.7  C) (Oral)   Resp 16   Ht 4' 9.5\" (1.461 m)   Wt 125 lb 1.6 oz (56.7 kg)   SpO2 99%   BMI 26.60 kg/m    Body mass index is 26.6 kg/m .  Physical Exam  Constitutional:       General: She is not in acute distress.  Eyes:      Extraocular Movements: Extraocular movements intact.   Cardiovascular:      Rate and Rhythm: Normal rate and regular rhythm.      Pulses: Normal pulses.      Heart sounds: Normal heart sounds. No murmur heard.  Pulmonary:      Effort: Pulmonary effort is normal. No respiratory distress.      Breath sounds: Normal breath sounds. No wheezing.   Musculoskeletal:         General: Normal range of motion.      Right lower leg: No edema.      Left lower leg: No edema.   Neurological:      General: No focal deficit present.      Mental Status: She is alert. Mental status is at baseline.   Psychiatric:         Mood and Affect: Mood normal.         Thought Content: Thought content normal.        At the end of the visit, I confirmed understanding of what was discussed. Patient has no further questions or concerns that were brought up at this time.     Dorita Sanchez, DEZ, APRN, FNP-C            "

## 2023-02-21 ENCOUNTER — ALLIED HEALTH/NURSE VISIT (OUTPATIENT)
Dept: FAMILY MEDICINE | Facility: CLINIC | Age: 78
End: 2023-02-21
Payer: MEDICARE

## 2023-02-21 VITALS — SYSTOLIC BLOOD PRESSURE: 132 MMHG | DIASTOLIC BLOOD PRESSURE: 78 MMHG | HEART RATE: 80 BPM

## 2023-02-21 DIAGNOSIS — I10 HYPERTENSION, UNSPECIFIED TYPE: Primary | ICD-10-CM

## 2023-02-21 PROCEDURE — 99207 PR NO CHARGE NURSE ONLY: CPT

## 2023-02-22 ENCOUNTER — PATIENT OUTREACH (OUTPATIENT)
Dept: GERIATRIC MEDICINE | Facility: CLINIC | Age: 78
End: 2023-02-22

## 2023-02-22 NOTE — LETTER
February 22, 2023    JENNIFER MORA BHUMIKA  [address] [place] [address]        Dear Jennifer:    At [facility], we re dedicated to improving your health and wellness. Enclosed is the Care Plan developed with you on 2/14/23. Please review the Care Plan carefully.    As a reminder, during your visit we talked about:    Ways to manage your physical and mental health    Using health care to maintain and improve your health     Your preventive care needs     Remember to contact your care coordinator if you:    Are hospitalized, or plan to be hospitalized     Have a fall      Have a change in your physical or mental health    Need help finding support or services    If you have questions, or don t agree with your Care Plan, call me at [phone]. You can also call me if your needs change. TTY users, call the [place] [employer] at (175) or 1-966.950.9516 (xrzetz-wm-sduiuv relay service).    Sincerely,          Vita Chandra RN, PHN  [phone]  [email or web address]      [employer]    [id number] accepted    [id number] (07/2022)

## 2023-02-23 NOTE — TELEPHONE ENCOUNTER
FUTURE VISIT INFORMATION      FUTURE VISIT INFORMATION:    Date: 3/7/2023    Time: 11am    Location: [facility]  REFERRAL INFORMATION:    Referring provider:      Referring providers clinic:      Reason for visit/diagnosis      RECORDS REQUESTED FROM:       Clinic name Comments Records Status Imaging Status   Internal MR Brain-11/10/2022, 1/8/2023    MRA/MRV-11/15/2022    MRA Neck/Brain-1/8/2023 Epic PACS

## 2023-02-23 NOTE — PROGRESS NOTES
[facility] Care Coordination Contact    Received after visit chart from care coordinator.  Completed following tasks: Mailed copy of care plan to client, Updated services in Database and Mailed Safe Medication Disposal     [facility]:  Emailed completed PCA assessment to [facility].  Faxed copy of PCA assessment to [facility] Agency and mailed copy to member.  Faxed MD Communication to PCP.     Leandro Hayward  [facility]  Case Management Specialist  [phone]

## 2023-02-28 ENCOUNTER — MEDICAL CORRESPONDENCE (OUTPATIENT)
Dept: HEALTH INFORMATION MANAGEMENT | Facility: CLINIC | Age: 78
End: 2023-02-28

## 2023-03-07 ENCOUNTER — PRE VISIT (OUTPATIENT)
Dept: NEUROSURGERY | Facility: CLINIC | Age: 78
End: 2023-03-07

## 2023-03-21 ENCOUNTER — TELEPHONE (OUTPATIENT)
Dept: FAMILY MEDICINE | Facility: CLINIC | Age: 78
End: 2023-03-21
Payer: MEDICARE

## 2023-03-21 NOTE — TELEPHONE ENCOUNTER
Delphine Prince Nurse Geovanni  [facility],       This patient is scheduled for Prolia.  With their insurance coverage, it follows Medicare's NGS policy.  Currently we have the DX M81.0 but this request will need an additional diagnosis to support why this patient is not appropriate for other therapies.           M81.8 (ICD-10-CM) - Other osteoporosis without current pathological fracture (**covered as a single code)       Z92.29 - Personal history of other drug therapy   T50.995S - Adverse effect of other drugs, medicaments and biological substances, sequela   T88.7XXS - Unspecified adverse effect of drug or medicament, sequela   Z88.8 - Allergy status to other drugs, medicaments and biological substances status       N18.3 - Chronic kidney disease, stage 3 (moderate)   N18.4 - Chronic kidney disease, stage 4 (severe)   N18.5 - Chronic kidney disease, stage 5       Would a second diagnosis code apply to this patient? If so, can you please have it added to the order?     Thanks,   Delphine

## 2023-03-21 NOTE — TELEPHONE ENCOUNTER
I received same message and already replied that I can't put in any new orders because she transferred care to another clinic. New clinic will need to address.

## 2023-03-21 NOTE — TELEPHONE ENCOUNTER
Writer received staff message for [facility] from Delphine Prince. Turned Staff message into a TE.    Routing message to Dr. Elias to kindly provide guidance on message below.      DUKE JacobsonN, RN   [facility]

## 2023-03-22 DIAGNOSIS — I10 HYPERTENSION, UNSPECIFIED TYPE: ICD-10-CM

## 2023-03-23 RX ORDER — LISINOPRIL 10 MG/1
10 TABLET ORAL DAILY
Qty: 30 TABLET | Refills: 1 | Status: SHIPPED | OUTPATIENT
Start: 2023-03-23 | End: 2023-06-01

## 2023-03-23 NOTE — TELEPHONE ENCOUNTER
Please call patient and let her know I refilled her blood pressure medication, please let them know that I have an order in for her to get her potassium rechecked and if they could make a lab appointment for us to ensure this has come up.

## 2023-03-23 NOTE — TELEPHONE ENCOUNTER
"Routing refill request to provider for review/approval because:  Labs out of range:  Serum potassium    Last Written Prescription Date:  1/26/2023  Last Fill Quantity: 30,  # refills: 1   Last office visit provider:  1/26/2023     Requested Prescriptions   Pending Prescriptions Disp Refills     lisinopril (ZESTRIL) 10 MG tablet 30 tablet 1     Sig: Take 1 tablet (10 mg) by mouth daily       ACE Inhibitors (Including Combos) Protocol Failed - 3/22/2023  3:11 PM        Failed - Normal serum potassium on file in past 12 months     Recent Labs   Lab Test 01/26/23  1654   POTASSIUM 3.1*             Passed - Blood pressure under 140/90 in past 12 months     BP Readings from Last 3 Encounters:   02/21/23 132/78   01/26/23 (!) 146/68   01/10/23 (!) 160/76                 Passed - Recent (12 mo) or future (30 days) visit within the authorizing provider's specialty     Patient has had an office visit with the authorizing provider or a provider within the authorizing providers department within the previous 12 mos or has a future within next 30 days. See \"Patient Info\" tab in inbasket, or \"Choose Columns\" in Meds & Orders section of the refill encounter.              Passed - Medication is active on med list        Passed - Patient is age 18 or older        Passed - No active pregnancy on record        Passed - Normal serum creatinine on file in past 12 months     Recent Labs   Lab Test 01/26/23  1654   CR 0.92       Ok to refill medication if creatinine is low          Passed - No positive pregnancy test within past 12 months             Ellyn Boateng RN 03/23/23 4:07 PM  "

## 2023-04-27 DIAGNOSIS — E78.00 HYPERCHOLESTEROLEMIA: ICD-10-CM

## 2023-04-27 RX ORDER — ATORVASTATIN CALCIUM 80 MG/1
80 TABLET, FILM COATED ORAL AT BEDTIME
Qty: 90 TABLET | Refills: 0 | Status: SHIPPED | OUTPATIENT
Start: 2023-04-27 | End: 2023-07-18

## 2023-04-28 NOTE — PROGRESS NOTES
Please call patient with Kelli  and let her know I refilled her cholesterol medicine.  I have ordered for kidney function recheck via blood work and would like her to get in to get this done as well as a recheck on her cholesterol.  Both labs are ordered.  Please help her schedule a lab only appointment.

## 2023-05-15 DIAGNOSIS — Z76.0 ENCOUNTER FOR MEDICATION REFILL: ICD-10-CM

## 2023-05-15 DIAGNOSIS — M81.0 AGE-RELATED OSTEOPOROSIS WITHOUT CURRENT PATHOLOGICAL FRACTURE: ICD-10-CM

## 2023-05-15 DIAGNOSIS — E55.9 VITAMIN D DEFICIENCY: ICD-10-CM

## 2023-05-16 RX ORDER — ERGOCALCIFEROL 1.25 MG/1
CAPSULE, LIQUID FILLED ORAL
Qty: 4 CAPSULE | Refills: 3 | OUTPATIENT
Start: 2023-05-16

## 2023-05-16 RX ORDER — CHOLECALCIFEROL (VITAMIN D3) 50 MCG
1 TABLET ORAL DAILY
Qty: 30 TABLET | Refills: 1 | Status: SHIPPED | OUTPATIENT
Start: 2023-05-16 | End: 2023-07-18

## 2023-05-31 DIAGNOSIS — I10 HYPERTENSION, UNSPECIFIED TYPE: ICD-10-CM

## 2023-05-31 DIAGNOSIS — I48.0 PAROXYSMAL ATRIAL FIBRILLATION (H): ICD-10-CM

## 2023-05-31 RX ORDER — LISINOPRIL 10 MG/1
10 TABLET ORAL DAILY
Qty: 30 TABLET | Refills: 1 | Status: CANCELLED | OUTPATIENT
Start: 2023-05-31

## 2023-05-31 NOTE — TELEPHONE ENCOUNTER
Pharmacy calling to get a medication refill on medications attached  -Message from pharmacy (Fax)

## 2023-06-16 DIAGNOSIS — I48.0 PAROXYSMAL ATRIAL FIBRILLATION (H): ICD-10-CM

## 2023-06-17 RX ORDER — DILTIAZEM HYDROCHLORIDE 30 MG/1
TABLET, FILM COATED ORAL
Qty: 180 TABLET | Refills: 2 | Status: SHIPPED | OUTPATIENT
Start: 2023-06-17 | End: 2023-10-06

## 2023-06-17 NOTE — TELEPHONE ENCOUNTER
"Last Written Prescription Date:  12/12/2022  Last Fill Quantity: 60,  # refills: 5   Last office visit provider:  1/26/2023     Requested Prescriptions   Pending Prescriptions Disp Refills     diltiazem (CARDIZEM) 30 MG tablet [Pharmacy Med Name: DILTIAZEM HCL 30 MG TABS 30 Tablet] 60 tablet 5     Sig: TAKE 1 TABLET BY MOUTH 2 TIMES DAILY       Calcium Channel Blockers Protocol  Passed - 6/16/2023  1:35 PM        Passed - Blood pressure under 140/90 in past 12 months     BP Readings from Last 3 Encounters:   02/21/23 132/78   01/26/23 (!) 146/68   01/10/23 (!) 160/76                 Passed - Normal ALT in past 12 months     Recent Labs   Lab Test 01/10/23  1650   ALT 26             Passed - Recent (12 mo) or future (30 days) visit within the authorizing provider's specialty     Patient has had an office visit with the authorizing provider or a provider within the authorizing providers department within the previous 12 mos or has a future within next 30 days. See \"Patient Info\" tab in inbasket, or \"Choose Columns\" in Meds & Orders section of the refill encounter.              Passed - Medication is active on med list        Passed - Patient is age 18 or older        Passed - No active pregnancy on record        Passed - Normal serum creatinine on file in past 12 months     Recent Labs   Lab Test 01/26/23  1654   CR 0.92       Ok to refill medication if creatinine is low          Passed - No positive pregnancy test in past 12 months             ERENDIRA SOTO RN 06/17/23 12:13 PM  "

## 2023-07-01 DIAGNOSIS — I10 HYPERTENSION, UNSPECIFIED TYPE: ICD-10-CM

## 2023-07-02 DIAGNOSIS — I48.0 PAROXYSMAL ATRIAL FIBRILLATION (H): ICD-10-CM

## 2023-07-02 NOTE — TELEPHONE ENCOUNTER
"Routing refill request to provider for review/approval because:  Labs out of range:  Potassium    Last Written Prescription Date:  6-1-23  Last Fill Quantity: 30,  # refills: 0   Last office visit provider: 1-26-23     Requested Prescriptions   Pending Prescriptions Disp Refills     lisinopril (ZESTRIL) 10 MG tablet [Pharmacy Med Name: LISINOPRIL 10 MG TABS 10 Tablet] 30 tablet 0     Sig: TAKE 1 TABLET (10 MG) BY MOUTH DAILY       ACE Inhibitors (Including Combos) Protocol Failed - 7/1/2023  2:39 PM        Failed - Normal serum potassium on file in past 12 months     Recent Labs   Lab Test 01/26/23  1654   POTASSIUM 3.1*             Passed - Blood pressure under 140/90 in past 12 months     BP Readings from Last 3 Encounters:   02/21/23 132/78   01/26/23 (!) 146/68   01/10/23 (!) 160/76                 Passed - Recent (12 mo) or future (30 days) visit within the authorizing provider's specialty     Patient has had an office visit with the authorizing provider or a provider within the authorizing providers department within the previous 12 mos or has a future within next 30 days. See \"Patient Info\" tab in inbasket, or \"Choose Columns\" in Meds & Orders section of the refill encounter.              Passed - Medication is active on med list        Passed - Patient is age 18 or older        Passed - No active pregnancy on record        Passed - Normal serum creatinine on file in past 12 months     Recent Labs   Lab Test 01/26/23  1654   CR 0.92       Ok to refill medication if creatinine is low          Passed - No positive pregnancy test within past 12 months             Edie Damon RN 07/01/23 11:48 PM  "

## 2023-07-02 NOTE — TELEPHONE ENCOUNTER
Routing refill request to provider for review/approval because:  Drug not on the [facility] refill protocol     Last Written Prescription Date:  6/1/23  Last Fill Quantity: 60,  # refills: 0   Last office visit provider:  1/26/23     Requested Prescriptions   Pending Prescriptions Disp Refills     ELIQUIS ANTICOAGULANT 5 MG tablet [Pharmacy Med Name: ELIQUIS 5 MG TABS 5 Tablet] 60 tablet 0     Sig: TAKE 1 TABLET (5 MG) BY MOUTH 2 TIMES DAILY       Direct Oral Anticoagulant Agents Failed - 7/2/2023 11:00 AM        Failed - Weight is greater than 60 kg for the past year     Wt Readings from Last 3 Encounters:   01/26/23 56.7 kg (125 lb 1.6 oz)   01/10/23 55.3 kg (122 lb)   12/12/22 55.3 kg (122 lb)             Failed - Recent (6 mo) or future (30 days) visit within the authorizing provider's specialty        Passed - Normal Platelets on file in past 12 months     Recent Labs   Lab Test 01/08/23  1015                  Passed - Medication is active on med list        Passed - Patient is 18-79 years of age        Passed - Serum creatinine less than or equal to 1.4 on file in past 12 mos     Recent Labs   Lab Test 01/26/23  1654   CR 0.92       Ok to refill medication if creatinine is low          Passed - No active pregnancy on record        Passed - No positive pregnancy test within past 12 months             KENIA POE RN 07/02/23 3:09 PM

## 2023-07-03 ENCOUNTER — APPOINTMENT (OUTPATIENT)
Dept: MRI IMAGING | Facility: HOSPITAL | Age: 78
End: 2023-07-03
Attending: EMERGENCY MEDICINE
Payer: MEDICARE

## 2023-07-03 ENCOUNTER — APPOINTMENT (OUTPATIENT)
Dept: CT IMAGING | Facility: HOSPITAL | Age: 78
End: 2023-07-03
Attending: EMERGENCY MEDICINE
Payer: MEDICARE

## 2023-07-03 ENCOUNTER — HOSPITAL ENCOUNTER (EMERGENCY)
Facility: HOSPITAL | Age: 78
Discharge: HOME | End: 2023-07-03
Attending: EMERGENCY MEDICINE | Admitting: EMERGENCY MEDICINE
Payer: MEDICARE

## 2023-07-03 VITALS
BODY MASS INDEX: 25.28 KG/M2 | WEIGHT: 125.4 LBS | DIASTOLIC BLOOD PRESSURE: 81 MMHG | RESPIRATION RATE: 23 BRPM | OXYGEN SATURATION: 97 % | SYSTOLIC BLOOD PRESSURE: 198 MMHG | HEART RATE: 68 BPM | TEMPERATURE: 98.9 F | HEIGHT: 59 IN

## 2023-07-03 DIAGNOSIS — D32.9 MENINGIOMA (H): ICD-10-CM

## 2023-07-03 DIAGNOSIS — I10 HYPERTENSION, UNSPECIFIED TYPE: ICD-10-CM

## 2023-07-03 DIAGNOSIS — R42 DIZZINESS: ICD-10-CM

## 2023-07-03 LAB
ANION GAP SERPL CALCULATED.3IONS-SCNC: 9 MMOL/L (ref 7–15)
BASOPHILS # BLD AUTO: 0 10E3/UL (ref 0–0.2)
BASOPHILS NFR BLD AUTO: 0 %
BUN SERPL-MCNC: 10.8 MG/DL (ref 8–23)
CALCIUM SERPL-MCNC: 8.8 MG/DL (ref 8.8–10.2)
CHLORIDE SERPL-SCNC: 110 MMOL/L (ref 98–107)
CREAT SERPL-MCNC: 1.02 MG/DL (ref 0.51–0.95)
DEPRECATED HCO3 PLAS-SCNC: 24 MMOL/L (ref 22–29)
EOSINOPHIL # BLD AUTO: 0.2 10E3/UL (ref 0–0.7)
EOSINOPHIL NFR BLD AUTO: 2 %
ERYTHROCYTE [DISTWIDTH] IN BLOOD BY AUTOMATED COUNT: 13.3 % (ref 10–15)
ERYTHROCYTE [SEDIMENTATION RATE] IN BLOOD BY WESTERGREN METHOD: 22 MM/HR (ref 0–30)
GFR SERPL CREATININE-BSD FRML MDRD: 56 ML/MIN/1.73M2
GLUCOSE SERPL-MCNC: 89 MG/DL (ref 70–99)
HCT VFR BLD AUTO: 37.3 % (ref 35–47)
HGB BLD-MCNC: 12.4 G/DL (ref 11.7–15.7)
IMM GRANULOCYTES # BLD: 0 10E3/UL
IMM GRANULOCYTES NFR BLD: 0 %
LYMPHOCYTES # BLD AUTO: 1.1 10E3/UL (ref 0.8–5.3)
LYMPHOCYTES NFR BLD AUTO: 17 %
MAGNESIUM SERPL-MCNC: 1.8 MG/DL (ref 1.7–2.3)
MCH RBC QN AUTO: 31.1 PG (ref 26.5–33)
MCHC RBC AUTO-ENTMCNC: 33.2 G/DL (ref 31.5–36.5)
MCV RBC AUTO: 94 FL (ref 78–100)
MONOCYTES # BLD AUTO: 0.4 10E3/UL (ref 0–1.3)
MONOCYTES NFR BLD AUTO: 6 %
NEUTROPHILS # BLD AUTO: 4.7 10E3/UL (ref 1.6–8.3)
NEUTROPHILS NFR BLD AUTO: 75 %
NRBC # BLD AUTO: 0 10E3/UL
NRBC BLD AUTO-RTO: 0 /100
PLATELET # BLD AUTO: 213 10E3/UL (ref 150–450)
POTASSIUM SERPL-SCNC: 3.3 MMOL/L (ref 3.4–5.3)
RBC # BLD AUTO: 3.99 10E6/UL (ref 3.8–5.2)
SODIUM SERPL-SCNC: 143 MMOL/L (ref 136–145)
TROPONIN T SERPL HS-MCNC: 8 NG/L
WBC # BLD AUTO: 6.4 10E3/UL (ref 4–11)

## 2023-07-03 PROCEDURE — 250N000013 HC RX MED GY IP 250 OP 250 PS 637: Performed by: EMERGENCY MEDICINE

## 2023-07-03 PROCEDURE — 70496 CT ANGIOGRAPHY HEAD: CPT | Mod: MF

## 2023-07-03 PROCEDURE — 96360 HYDRATION IV INFUSION INIT: CPT | Mod: 59

## 2023-07-03 PROCEDURE — 70498 CT ANGIOGRAPHY NECK: CPT | Mod: MF

## 2023-07-03 PROCEDURE — A9585 GADOBUTROL INJECTION: HCPCS | Mod: JZ | Performed by: EMERGENCY MEDICINE

## 2023-07-03 PROCEDURE — 83735 ASSAY OF MAGNESIUM: CPT | Performed by: EMERGENCY MEDICINE

## 2023-07-03 PROCEDURE — 84484 ASSAY OF TROPONIN QUANT: CPT | Performed by: EMERGENCY MEDICINE

## 2023-07-03 PROCEDURE — 85652 RBC SED RATE AUTOMATED: CPT | Performed by: EMERGENCY MEDICINE

## 2023-07-03 PROCEDURE — 258N000003 HC RX IP 258 OP 636: Performed by: EMERGENCY MEDICINE

## 2023-07-03 PROCEDURE — 250N000011 HC RX IP 250 OP 636: Mod: JZ | Performed by: EMERGENCY MEDICINE

## 2023-07-03 PROCEDURE — 36415 COLL VENOUS BLD VENIPUNCTURE: CPT | Performed by: EMERGENCY MEDICINE

## 2023-07-03 PROCEDURE — 70553 MRI BRAIN STEM W/O & W/DYE: CPT | Mod: MA

## 2023-07-03 PROCEDURE — 85025 COMPLETE CBC W/AUTO DIFF WBC: CPT | Performed by: EMERGENCY MEDICINE

## 2023-07-03 PROCEDURE — 255N000002 HC RX 255 OP 636: Mod: JZ | Performed by: EMERGENCY MEDICINE

## 2023-07-03 PROCEDURE — 82310 ASSAY OF CALCIUM: CPT | Performed by: EMERGENCY MEDICINE

## 2023-07-03 PROCEDURE — G1010 CDSM STANSON: HCPCS

## 2023-07-03 PROCEDURE — 96361 HYDRATE IV INFUSION ADD-ON: CPT

## 2023-07-03 PROCEDURE — 99285 EMERGENCY DEPT VISIT HI MDM: CPT | Mod: 25

## 2023-07-03 RX ORDER — MECLIZINE HYDROCHLORIDE 25 MG/1
25 TABLET ORAL 3 TIMES DAILY PRN
Qty: 30 TABLET | Refills: 0 | Status: SHIPPED | OUTPATIENT
Start: 2023-07-03 | End: 2023-07-07

## 2023-07-03 RX ORDER — MECLIZINE HCL 12.5 MG 12.5 MG/1
25 TABLET ORAL ONCE
Status: COMPLETED | OUTPATIENT
Start: 2023-07-03 | End: 2023-07-03

## 2023-07-03 RX ORDER — IOPAMIDOL 755 MG/ML
75 INJECTION, SOLUTION INTRAVASCULAR ONCE
Status: COMPLETED | OUTPATIENT
Start: 2023-07-03 | End: 2023-07-03

## 2023-07-03 RX ORDER — LISINOPRIL 10 MG/1
10 TABLET ORAL DAILY
Qty: 30 TABLET | Refills: 0 | Status: SHIPPED | OUTPATIENT
Start: 2023-07-03 | End: 2023-07-07

## 2023-07-03 RX ORDER — GADOBUTROL 604.72 MG/ML
6 INJECTION INTRAVENOUS ONCE
Status: COMPLETED | OUTPATIENT
Start: 2023-07-03 | End: 2023-07-03

## 2023-07-03 RX ORDER — POTASSIUM CHLORIDE 1.5 G/1.58G
40 POWDER, FOR SOLUTION ORAL ONCE
Status: COMPLETED | OUTPATIENT
Start: 2023-07-03 | End: 2023-07-03

## 2023-07-03 RX ORDER — ACETAMINOPHEN 325 MG/1
650 TABLET ORAL ONCE
Status: COMPLETED | OUTPATIENT
Start: 2023-07-03 | End: 2023-07-03

## 2023-07-03 RX ADMIN — POTASSIUM CHLORIDE 40 MEQ: 1.5 POWDER, FOR SOLUTION ORAL at 09:56

## 2023-07-03 RX ADMIN — ACETAMINOPHEN 650 MG: 325 TABLET ORAL at 08:26

## 2023-07-03 RX ADMIN — IOPAMIDOL 75 ML: 755 INJECTION, SOLUTION INTRAVENOUS at 10:23

## 2023-07-03 RX ADMIN — SODIUM CHLORIDE 500 ML: 9 INJECTION, SOLUTION INTRAVENOUS at 09:06

## 2023-07-03 RX ADMIN — GADOBUTROL 6 ML: 604.72 INJECTION INTRAVENOUS at 13:26

## 2023-07-03 RX ADMIN — MECLIZINE 25 MG: 12.5 TABLET ORAL at 08:26

## 2023-07-03 ASSESSMENT — ENCOUNTER SYMPTOMS
SHORTNESS OF BREATH: 0
WEAKNESS: 0
SORE THROAT: 0
DIARRHEA: 0
HEADACHES: 0
BLOOD IN STOOL: 0
COUGH: 0
NAUSEA: 0
VOMITING: 0

## 2023-07-03 ASSESSMENT — ACTIVITIES OF DAILY LIVING (ADL)
ADLS_ACUITY_SCORE: 35

## 2023-07-03 NOTE — ED TRIAGE NOTES
Pt to ED via private vehicle with grand daughter who is translating for triage    Pt reports hypertension with headache and dizziness for 1 week. Reports that the symptoms worsened yesterday which is why she came in today.  Takes BP meds at home and reports not missed meds and has been taking them as prescribed. Checks BP's regularly at home and reports a normal reading for her is 120/80 on home machine   Pt 178/90 in triage. Headache rated 3/10. No neuro changes. No weakness. Denies chest pain, diff breathing and SOB. Denies recent trauma     Triage Assessment     Row Name 07/03/23 0722       Triage Assessment (Adult)    Airway WDL WDL       Respiratory WDL    Respiratory WDL WDL       Skin Circulation/Temperature WDL    Skin Circulation/Temperature WDL WDL       Cardiac WDL    Cardiac WDL WDL       Peripheral/Neurovascular WDL    Peripheral Neurovascular WDL WDL       Cognitive/Neuro/Behavioral WDL    Cognitive/Neuro/Behavioral WDL WDL

## 2023-07-03 NOTE — DISCHARGE INSTRUCTIONS
Read and follow the discharge instructions.  Your MRI did not reveal a stroke.  You do have a meningioma follow-up with a neurologist.  These have been present before.    Continue your current medications.    Take the Lisinopril which is your blood pressure medication as instructed.    Take the meclizine as needed for dizziness.    Your may Take meclizine as needed for dizziness.    Call your primary care doctor on Wednesday to discuss your elevated blood pressure

## 2023-07-03 NOTE — PROGRESS NOTES
Neurosurgery Treatment Plan:   Called by [facility] 78 year old female presented with complaint of dizziness and HTN has history of brain lesion likely meningoma appears was to be evaluated by Dr. Arevalo in March of this year. Current neurologically intact per provider        Images:   Images reviewed personally      FINDINGS:  INTRACRANIAL CONTENTS: No areas of abnormally restricted diffusion to suggest acute or subacute infarct. No areas of abnormal parenchymal susceptibility. There is a large extra-axial dural based area of abnormal enhancement arising from the lateral wall   the left posterior cranial fossa and extending through the lateral tentorium along the floor the left middle cranial fossa. The lesion measures approximately 2.2 x 2.2 x 2.9 cm in size (AP, TRV, CC). It is unclear if the lesion invades or elevates the   transverse sinus. Elevation is favored as the sinus enhances ventral and dorsal to the lesion and appears to extend along the surface of the lesion on coronal images. This could be further assessed with dedicated MRV. There is mass effect upon the   posterior left temporal lobe and lateral left cerebellar hemisphere with a small amount of FLAIR hyperintensity within the left cerebellum compatible with vasogenic edema.     No other areas of abnormal enhancement. Mild prominence the lateral ventricles. Patchy areas of FLAIR hyperintensity are seen within the cerebral white matter, nonspecific but compatible with areas of gliosis or chronic myelin loss. Major skull base   arterial vascular flow-voids are preserved.     OSSEOUS STRUCTURES/SOFT TISSUES: Visualized marrow space is unremarkable.     ORBITS: No abnormality accounting for technique.      SINUSES/MASTOIDS: Minimal ethmoid and mild right maxillary sinus mucosal thickening. No middle ear or mastoid effusion. No air-fluid levels.                                                                   IMPRESSION:  1.  No finding for acute  infarct or hemorrhage.  2.  Extra-axial dural based mass is seen arising from the superior lateral wall of the posterior fossa on the left and extending along the posterior inferior aspect of the left middle cranial fossa. The lesion invades or elevates the  left transverse   sinus which appears draped along the surface of the lesion. Consider correlation with dedicated MRV for further assessment.  3.  The mass results in localized mass effect with mild edema seen within the lateral aspect left cerebellar hemisphere.  4.  Slight volume loss and moderate presumed sequela of chronic microvascular ischemic change.     Plan:   Patient to follow up with Dr. Arevalo for further evaluation   No current neurosurgical intervention indicated     Jane Downey PA-C  [facility] Neurosurgery  O: [phone]

## 2023-07-03 NOTE — Clinical Note
Jennifer Huggins was seen and treated in our emergency department on 7/3/2023.  She may return to work on 07/10/2023.       If you have any questions or concerns, please don't hesitate to call.      Christel Galvez MD

## 2023-07-03 NOTE — ED PROVIDER NOTES
EMERGENCY DEPARTMENT ENCOUNTER      NAME: Jennifer Huggins  AGE: 78 year old female  YOB: 1945  MRN: [id number]  EVALUATION DATE & TIME: 7/3/2023  7:28 AM    PCP: Kameron Sanchez    ED PROVIDER: Christel Galvez M.D.      CHIEF COMPLAINT     Chief Complaint   Patient presents with     Hypertension     Headache         FINAL IMPRESSION:     1. Hypertension, unspecified type    2. Dizziness    3. Meningioma (H)          MEDICAL DECISION MAKING:       Pertinent Labs & Imaging studies reviewed. (See chart for details)    78 year old female presents to the Emergency Department for evaluation of dizziness, blurry vision, elevated blood pressure.    ED Course as of 07/03/23 1834   Mon Jul 03, 2023   0749 Mrs. Huggins is a 78-year-old female who presents here with her granddaughter who serves as  formal history and physical also obtained with voice .   0749 Having dizziness for about a week.  She notices that when she is dizzy her blood pressure is high.  She also notices that the dizziness is when is hot outside.  She also noticed blurry vision.   0749 Reports this is the same dizziness that she had before but ran out of her medication.   0749 Denies any headache denies any problems see me no upper respiratory symptoms chest pain shortness of breath feeling fainting abdominal pain vomiting diarrhea melena leg swelling or rashes.   0750 Blood pressures have been from the 150s to 170s.  On her records her blood pressure machine.  She states she usually runs systolics of the 120s.   0750 Examination she is well-appearing no distress neck is supple cardiopulmonary no murmurs regular rhythm abdomen is soft extremities without edema.   0750 differential diagnosis for dizziness has been going on for 1 week and elevated blood pressure with blurry vision include but not limited to hypertensive urgency emergency intracranial bleed acute coronary syndrome electrolyte abnormality sepsis.   0750 Reviewed previous  records patient does have a history of vertigo has been becoming meclizine.  She denies any positional symptoms.   0750 We will establish an IV.  Will check electrolytes.  EKG.  Will image head.   0751 EKG interpreted by me reveals sinus rhythm low voltage normal anterior progression normal axis.  Compared to a previous 1 inverted T waves are now upright.   0944 Slightly low potassium will replace.   0956 Evaluated.  States she feels better.  Interestingly blood pressure is still higher than her baseline.   1828 MRI no acute cva  Previously seen meningioma  D/w with NS  Recommends follow up   1829 Clinical impression and decision making  79 yo female presents c/o dizziness and elevated bp  On further questioning she run out of her lisinopril  Well appearing on exam  No evidence of cva  Previous seen meningioma  Symptoms improved after tylenol and meclizine  History BPV  Today sx are not positional but improved with the above  Hypertensive but improved at discharged  Rx for lisinopril and meclizine  Recommend follow up with PCP to discuss if increases dose of antihypertensives are needed  Patient and family member in agreement discharged ambulatory in stable condition               Vital Signs: Hypertension.  EKG: Sinus rhythm.  Imaging: mri no acute cva meningioma  Home Meds: reviewed  ED meds/abx: tylenol meclizine kcl  Fluids:    Labs  K 3.3  Cr 1.02  Wbc 6.4  Hgb 12.4  Platelets 213      Medical Decision Making    History:    Supplemental history from: Documented in chart, if applicable and Family Member/Significant Other    External Record(s) reviewed: Documented in chart, if applicable.    Work Up:    Chart documentation includes differential considered and any EKGs or imaging independently interpreted by provider, where specified.    In additional to work up documented, I considered the following work up: Documented in chart, if applicable.    External consultation:    Discussion of management with another  provider: Documented in chart, if applicable    Complicating factors:    Care impacted by chronic illness: Hypertension    Care affected by social determinants of health: N/A    Disposition considerations: Discharge. I prescribed additional prescription strength medication(s) as charted. See documentation for any additional details.      Review of Previous Records    Seen at [facility] on 1/26/2023 for hypertension  Takes Eliquis for a-fib, diltiazem, meclizine for vertigo      Consults      ED COURSE     7:15 AM I introduced myself to the patient, obtained patient history, performed a physical exam, and discussed plan for ED workup including potential diagnostic laboratory/imaging studies and interventions.  11:36 rechecked and updated patient     At the conclusion of the encounter I discussed the results of all of the tests and the disposition. The questions were answered. The patient and grand-daughter acknowledged understanding and was agreeable with the care plan.         MEDICATIONS GIVEN IN THE EMERGENCY:     Medications   acetaminophen (TYLENOL) tablet 650 mg (650 mg Oral $Given 7/3/23 0826)   meclizine (ANTIVERT) tablet 25 mg (25 mg Oral $Given 7/3/23 0826)   0.9% sodium chloride BOLUS (0 mLs Intravenous Stopped 7/3/23 1231)   potassium chloride (KLOR-CON) Packet 40 mEq (40 mEq Oral $Given 7/3/23 0956)   iopamidol (ISOVUE-370) solution 75 mL (75 mLs Intravenous $Given 7/3/23 1023)   gadobutrol (GADAVIST) injection 6 mL (6 mLs Intravenous $Given 7/3/23 1326)       NEW PRESCRIPTIONS STARTED AT TODAY'S ER VISIT     Discharge Medication List as of 7/3/2023  2:44 PM      START taking these medications    Details   !! lisinopril (ZESTRIL) 10 MG tablet Take 1 tablet (10 mg) by mouth daily, Disp-30 tablet, R-0, E-Prescribe      !! meclizine (ANTIVERT) 25 MG tablet Take 1 tablet (25 mg) by mouth 3 times daily as needed for dizziness, Disp-30 tablet, R-0, Local Print      !! meclizine (ANTIVERT) 25 MG tablet  Take 1 tablet (25 mg) by mouth 3 times daily as needed for dizziness, Disp-30 tablet, R-0, E-Prescribe       !! - Potential duplicate medications found. Please discuss with provider.             =================================================================    HPI     Patient information was obtained from: Patient and grand-daughter     Use of : Yes (Phone) - Language Kelli Huggins is a 78 year old female who presents by private car with grand daughter for hypertension and headache.     The patient reports that when her blood pressure gets high she starts not feeling well. She notes that she has been having trouble since the beginning of the month. She takes her blood pressure everyday has been about 151-179/72-86. She has had symptoms like this before. She notes that she needs a refill for the medication that helps with the dizziness.     She endorses some blurry vision. Denies headache, chest pain, cough, abdominal pain, leg swelling, weakness, rash, blood in stool or urine, vomiting, diarrhea, falls.       REVIEW OF SYSTEMS   Review of Systems   HENT: Negative for ear pain and sore throat.    Eyes:        Vision changes with high blood pressure   Respiratory: Negative for cough and shortness of breath.    Cardiovascular: Negative for chest pain and leg swelling.   Gastrointestinal: Negative for blood in stool, diarrhea, nausea and vomiting.   Skin: Negative for rash.   Neurological: Negative for weakness and headaches.   All other systems reviewed and are negative.        PAST MEDICAL HISTORY:     Past Medical History:   Diagnosis Date     Benign adenomatous polyp of large intestine      Biliary colic      Cervicalgia      Cholelithiasis      Chronic allergic conjunctivitis      Cystitis      Dyspepsia      Gastritis      GERD (gastroesophageal reflux disease)      HLD (hyperlipidemia)      Hypertension      Hypokalemia      Meningioma (H)      Multiple lung nodules on CT     CT ABD  11/23/1013, mult nodules < 5mm in size, per Fleischner Society recommendations  repeat CT in 12 months CT Chest 2/20/17:  Stable pulmonary nodules suggesting incidental findings given long-term stability.      Osteoporosis      Positional vertigo      Pulmonary nodules      Recurrent UTI      Transaminitis 10/29/2015     Urinary incontinence      Xerosis cutis        PAST SURGICAL HISTORY:     Past Surgical History:   Procedure Laterality Date     CHOLECYSTECTOMY  08/21/2015         CURRENT MEDICATIONS:   lisinopril (ZESTRIL) 10 MG tablet  meclizine (ANTIVERT) 25 MG tablet  meclizine (ANTIVERT) 25 MG tablet  acetaminophen (TYLENOL) 650 MG CR tablet  albuterol (PROAIR HFA/PROVENTIL HFA/VENTOLIN HFA) 108 (90 Base) MCG/ACT inhaler  apixaban ANTICOAGULANT (ELIQUIS ANTICOAGULANT) 5 MG tablet  artificial tears (GENTEAL) 0.1-0.2-0.3 % ophthalmic solution  atorvastatin (LIPITOR) 40 MG tablet  atorvastatin (LIPITOR) 80 MG tablet  blood pressure monitor Kit  diltiazem (CARDIZEM) 30 MG tablet  hydrOXYzine (ATARAX) 25 MG tablet  ketotifen (ZADITOR) 0.025 % ophthalmic solution  lisinopril (ZESTRIL) 10 MG tablet  meclizine (ANTIVERT) 25 MG tablet  omeprazole (PRILOSEC) 20 MG DR capsule  potassium chloride (KLOR-CON) 20 MEQ packet  vitamin D2 (ERGOCALCIFEROL) 67920 units (1250 mcg) capsule  vitamin D3 (CHOLECALCIFEROL) 50 mcg (2000 units) tablet         ALLERGIES:     Allergies   Allergen Reactions     Ibandronate [Ibandronic Acid] Headache and Shortness Of Breath     Septra [Sulfamethoxazole-Trimethoprim] Other (See Comments)     Acute hepatitis        FAMILY HISTORY:     Family History   Problem Relation Age of Onset     No Known Problems Mother      No Known Problems Father        SOCIAL HISTORY:     Social History     Socioeconomic History     Marital status:    Tobacco Use     Smoking status: Never     Smokeless tobacco: Never   Vaping Use     Vaping Use: Never used   Substance and Sexual Activity     Alcohol use: No  "    Drug use: No     Sexual activity: Never     Birth control/protection: Post-menopausal       VITALS:   BP (!) 198/81   Pulse 68   Temp 98.9  F (37.2  C) (Oral)   Resp 23   Ht 1.499 m (4' 11\")   Wt 56.9 kg (125 lb 6.4 oz)   SpO2 97%   BMI 25.33 kg/m      PHYSICAL EXAM     Physical Exam  Vitals and nursing note reviewed. Exam conducted with a chaperone present.   Constitutional:       Appearance: Normal appearance.   HENT:      Right Ear: Tympanic membrane normal.      Left Ear: Tympanic membrane normal.      Nose: Nose normal.   Eyes:      Extraocular Movements: Extraocular movements intact.      Pupils: Pupils are equal, round, and reactive to light.   Cardiovascular:      Rate and Rhythm: Normal rate and regular rhythm.      Pulses: Normal pulses.      Heart sounds: Normal heart sounds.   Pulmonary:      Effort: Pulmonary effort is normal.      Breath sounds: Normal breath sounds.   Abdominal:      Palpations: Abdomen is soft.      Tenderness: There is no abdominal tenderness.   Musculoskeletal:         General: No swelling.      Cervical back: Normal range of motion and neck supple.      Right lower leg: No edema.      Left lower leg: No edema.   Skin:     General: Skin is warm and dry.      Capillary Refill: Capillary refill takes less than 2 seconds.   Neurological:      General: No focal deficit present.      Mental Status: She is alert.      Motor: No weakness.      Gait: Gait normal.   Psychiatric:         Mood and Affect: Mood normal.         Behavior: Behavior normal. Behavior is cooperative.           LAB:     All pertinent labs reviewed and interpreted.  Labs Ordered and Resulted from Time of ED Arrival to Time of ED Departure   BASIC METABOLIC PANEL - Abnormal       Result Value    Sodium 143      Potassium 3.3 (*)     Chloride 110 (*)     Carbon Dioxide (CO2) 24      Anion Gap 9      Urea Nitrogen 10.8      Creatinine 1.02 (*)     Calcium 8.8      Glucose 89      GFR Estimate 56 (*)  "   TROPONIN T, HIGH SENSITIVITY - Normal    Troponin T, High Sensitivity 8     MAGNESIUM - Normal    Magnesium 1.8     ERYTHROCYTE SEDIMENTATION RATE AUTO - Normal    Erythrocyte Sedimentation Rate 22     CBC WITH PLATELETS AND DIFFERENTIAL    WBC Count 6.4      RBC Count 3.99      Hemoglobin 12.4      Hematocrit 37.3      MCV 94      MCH 31.1      MCHC 33.2      RDW 13.3      Platelet Count 213      % Neutrophils 75      % Lymphocytes 17      % Monocytes 6      % Eosinophils 2      % Basophils 0      % Immature Granulocytes 0      NRBCs per 100 WBC 0      Absolute Neutrophils 4.7      Absolute Lymphocytes 1.1      Absolute Monocytes 0.4      Absolute Eosinophils 0.2      Absolute Basophils 0.0      Absolute Immature Granulocytes 0.0      Absolute NRBCs 0.0          RADIOLOGY:     Reviewed all pertinent imaging. Please see official radiology report.  MR Brain w/o & w Contrast   Final Result   IMPRESSION:   1.  No finding for acute infarct or hemorrhage.      2.  Extra-axial dural based mass is seen arising from the superior lateral wall of the posterior fossa on the left and extending along the posterior inferior aspect of the left middle cranial fossa. The lesion invades or elevates the  left transverse    sinus which appears draped along the surface of the lesion. Consider correlation with dedicated MRV for further assessment.      3.  The mass results in localized mass effect with mild edema seen within the lateral aspect left cerebellar hemisphere.      4.  Slight volume loss and moderate presumed sequela of chronic microvascular ischemic change.       ADDENDUM: Comparison is made with MRI brain performed 01/08/2023 and MRV brain 11/15/2022 . The size of the lesion is stable compared to the 01/08/2023 prior. Edema within the adjacent lateral left cerebellar hemisphere appears similar to mildly    increased in the interval. On prior MRV, the lesion was shown to locally occluded the lateral left transverse sinus.  The extent of nonspecific supratentorial white matter signal abnormality/FLAIR hyperintensity is very similar to prior.      CTA Head Neck with Contrast   Final Result   IMPRESSION:    HEAD CT:   1.  No acute intracranial process.   2.  Unchanged presumed meningioma left posterior fossa involving the left transverse sinus.      HEAD CTA:    1.  No large artery occlusion.   2.  Atherosclerosis with mild narrowing of the carotid siphons.   3.  Focal moderate stenosis of proximal basilar artery.   4.  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 This may reflect an infundibulum or aneurysm.      NECK CTA:   1.  Scattered plaque within the aortic arch and carotid bifurcations.   2.  No measurable carotid or vertebral artery stenosis.           EKG:     EKG #1  Sinus rhythm normal anterior progression normal axis low voltage    Time:334006    Ventricular rate 73 bmp  Axis normal  AR interval 156 ms  QRS duration 70 ms  QT/DHM908/414  ms    Compared to previous EKG on January 8, 2023 inverted T wave in V3 now flat.  Inverted T waves in V4 V5 now upright.  I have independently reviewed and interpreted the EKG(s) documented above.      PROCEDURES:     Procedures      I, Lisa Basurto, am serving as a scribe to document services personally performed by Dr. Galvez based on my observation and the provider's statements to me. I, Christel Galvez MD attest that Lisa Basurto is acting in a scribe capacity, has observed my performance of the services and has documented them in accordance with my direction.    Christel Galvez M.D.  Emergency Medicine  [facility] EMERGENCY DEPARTMENT  [address] [id number] [phone]  Dept: [phone]     Christel Galvez MD  07/03/23 6778

## 2023-07-06 RX ORDER — APIXABAN 5 MG/1
TABLET, FILM COATED ORAL
Qty: 60 TABLET | Refills: 0 | Status: SHIPPED | OUTPATIENT
Start: 2023-07-06 | End: 2023-07-07

## 2023-07-06 RX ORDER — LISINOPRIL 10 MG/1
10 TABLET ORAL DAILY
Qty: 30 TABLET | Refills: 0 | Status: SHIPPED | OUTPATIENT
Start: 2023-07-06 | End: 2023-07-18

## 2023-07-06 NOTE — TELEPHONE ENCOUNTER
Attempted to call patient and daughter with a phone - unable to leave a voicemail. Pts listed number was not in service or not accepting calls.     If patient returns call, please inform her that she is due for a BP follow up with PCP in 1 month. Needs appt for any future refills.     Thank you

## 2023-07-06 NOTE — TELEPHONE ENCOUNTER
Please reach out to patient to get a follow-up blood pressure appointment scheduled with me. I have refilled lisinopril for 1 month. Thank you.

## 2023-07-07 ENCOUNTER — TELEPHONE (OUTPATIENT)
Dept: FAMILY MEDICINE | Facility: CLINIC | Age: 78
End: 2023-07-07
Payer: MEDICARE

## 2023-07-07 DIAGNOSIS — I48.0 PAROXYSMAL ATRIAL FIBRILLATION (H): Primary | ICD-10-CM

## 2023-07-07 NOTE — TELEPHONE ENCOUNTER
Fax received from pharmacy regarding PA needed for Elquis 5mg.    Alternative covered is Xarelto    Start PA or send in alternative?

## 2023-07-07 NOTE — PROGRESS NOTES
If patient calls back please let her know that her insurance did not want to cover the Eliquis.  This is her blood thinner for her A-fib.  She typically takes this twice a day.  They will cover a medication called Xarelto which will give us the same benefit.  I have sent for the Xarelto and she should fill this.  This Xarelto is only 1 tablet once a day, no longer taking the medicine twice a day.  She should stop taking the Eliquis when she starts the Xarelto.  So for example, if she takes a dose of Eliquis on Friday evening she should not take any Eliquis on Saturday and should start the Xarelto.

## 2023-07-07 NOTE — TELEPHONE ENCOUNTER
I changed the medication to Xarelto. Please call the pharmacy and let them know if the patient picks the medication up to remind her to stop taking her Eliquis and start taking the Xarelto instead. I did call the patient to relay this information but her phone went to voicemail.

## 2023-07-10 ENCOUNTER — OFFICE VISIT (OUTPATIENT)
Dept: NEUROSURGERY | Facility: CLINIC | Age: 78
End: 2023-07-10
Payer: MEDICARE

## 2023-07-10 VITALS — SYSTOLIC BLOOD PRESSURE: 149 MMHG | DIASTOLIC BLOOD PRESSURE: 71 MMHG | OXYGEN SATURATION: 97 % | HEART RATE: 73 BPM

## 2023-07-10 DIAGNOSIS — D32.0 MENINGIOMA OF CEREBELLUM (H): Primary | ICD-10-CM

## 2023-07-10 PROCEDURE — 99205 OFFICE O/P NEW HI 60 MIN: CPT | Performed by: NEUROLOGICAL SURGERY

## 2023-07-10 ASSESSMENT — PAIN SCALES - GENERAL: PAINLEVEL: NO PAIN (0)

## 2023-07-10 NOTE — TELEPHONE ENCOUNTER
Using  left message for patient to call back. Please relay message from Kameron Sanchez and assist in scheduling an appointment.

## 2023-07-10 NOTE — LETTER
7/10/2023         RE: Jennifer Huggins  [address] [place] [address]        Dear Colleague,    Thank you for referring your patient, Jennifer Huggins, to the [facility]. Please see a copy of my visit note below.    NEUROSURGERY CONSULTATION NOTE    Neurosurgery was asked to see this patient by No att. providers found for evaluation of incidental diagnosis of left lateral tentorial lesion measuring 2.2 x 2.2 x 2.9 cm in size      CONSULTATION ASSESSMENT AND PLAN:    Ms. Huggins is a 78-year-old right-handed Kelli speaking female who presented to the clinic today with her daughter for evaluation of incidentally diagnosed left lateral tentorial lesion.  The clinical history, examination and discussion of the management was performed with the .  She has a significant past medical history of hypertension, atrial fibrillation on Xarelto.    She has clinical symptoms of dizziness for a year with no recent worsening.  She denies any new onset headache with nausea vomiting or blurring of vision.  She has no focal deficit on neurological examination.    Her MRI shows left lateral tentorial lesion measuring 2.2 x 2.2 x 2.9 cm in size.  The lesion is locally occluding the left lateral transverse sinus.    I explained the MRI findings to the patient and her daughter and showed them the images.  Based on the clinical history, examination and MRI findings the lesion is likely to be meningioma.  I explained the natural history of these lesions and discussed the management options.  I discussed with the patient the management options including watchful observation with follow-up MRI in 6 months, surgical resection of the mass with the goal of achieving tissue diagnosis and decompression and radiation therapy including stereotactic radiosurgery.  Since patient is asymptomatic from the lesion I would recommend watchful clinical observation with follow-up MRI in 6 months.  Patient is not willing  for any surgical intervention at this point.  We will order MRI brain with and without contrast along with MR venogram in 6 months.  I will follow her with the MRI.    Patient and her daughter agreed with the plan.  All questions were answered and patient sounded understanding.  I explained to the patient and her daughter that if patient start developing worsening headache, nausea, vomiting, blurring of vision or double vision or any focal neurological symptoms that should come to the ER and inform us immediately.  Patient and her daughter sounded understanding.  They can contact us if there are any further questions or concerns.    I spent more than 60 minutes in this apt, examining the pt, reviewing the scans, reviewing notes from chart, discussing treatment options with risks and benefits and coordinating care.     Yoni Guillory MD      HPI:   Ms. Huggins is a 78-year-old right-handed Kelli speaking female who presented to the clinic today with her daughter for evaluation of incidentally diagnosed left lateral tentorial lesion.  She has a significant past medical history of hypertension, atrial fibrillation on Xarelto.    She presented to the ER on 07/03/2023 with dizziness and blurring of vision and was diagnosed to have increased blood pressure with systolic blood pressures in the 170s.  She underwent scan for the same and was noticed to have left lateral tentorial lesion.  As per the patient and the daughter they were aware of the lesion approximately 7 to 8 years back however patient does not remember why and where the the CT scan was done.    A long history of dizziness for approximately a year or so with no recent worsening.  She has diminished vision related to her age with no history of double vision.  She denies any worsening headaches along with nausea vomiting or double vision.    She denies any imbalance on walking or falls.  She denies any focal neurological deficits or any bladder or bowel issues.   Also denies any diminished hearing in her left ear or any pulsating sound/tinnitus in left ear.    She does not smoke or consume alcohol and denies use of recreational drugs.    She has a history of atrial fibrillation on Xarelto. She denies any other significant cardiac or pulmonary history.    No other significant medical or surgical history.      Past Medical History:   Diagnosis Date     Benign adenomatous polyp of large intestine      Biliary colic      Cervicalgia      Cholelithiasis      Chronic allergic conjunctivitis      Cystitis      Dyspepsia      Gastritis      GERD (gastroesophageal reflux disease)      HLD (hyperlipidemia)      Hypertension      Hypokalemia      Meningioma (H)      Multiple lung nodules on CT     CT ABD 11/23/1013, mult nodules < 5mm in size, per Fleischner Society recommendations  repeat CT in 12 months CT Chest 2/20/17:  Stable pulmonary nodules suggesting incidental findings given long-term stability.      Osteoporosis      Positional vertigo      Pulmonary nodules      Recurrent UTI      Transaminitis 10/29/2015     Urinary incontinence      Xerosis cutis        Past Surgical History:   Procedure Laterality Date     CHOLECYSTECTOMY  08/21/2015       REVIEW OF SYSTEMS:  Review Of Systems  Skin: negative  Eyes: negative  Ears/Nose/Throat: negative  Respiratory: No shortness of breath, dyspnea on exertion, cough, or hemoptysis  Cardiovascular: negative  Gastrointestinal: negative  Genitourinary: negative  Musculoskeletal: negative  Neurologic: negative  Psychiatric: negative  Hematologic/Lymphatic/Immunologic: negative  Endocrine: negative    MEDICATIONS:  Current Outpatient Medications   Medication Sig Dispense Refill     acetaminophen (TYLENOL) 650 MG CR tablet Take 1 tablet (650 mg) by mouth every 8 hours as needed for pain 50 tablet 4     albuterol (PROAIR HFA/PROVENTIL HFA/VENTOLIN HFA) 108 (90 Base) MCG/ACT inhaler Inhale 2 puffs into the lungs every 6 hours as needed for  shortness of breath or wheezing 18 g 3     artificial tears (GENTEAL) 0.1-0.2-0.3 % ophthalmic solution Place 2 drops into both eyes 4 times daily as needed (dry or irritated eyes) 15 mL 11     atorvastatin (LIPITOR) 80 MG tablet Take 1 tablet (80 mg) by mouth At Bedtime 90 tablet 0     blood pressure monitor Kit [BLOOD PRESSURE MONITOR KIT] Home blood pressure cuff and monitor 1 each 0     diltiazem (CARDIZEM) 30 MG tablet TAKE 1 TABLET BY MOUTH 2 TIMES DAILY 180 tablet 2     hydrOXYzine (ATARAX) 25 MG tablet Take 1 tablet (25 mg) by mouth 3 times daily as needed for itching 30 tablet 3     ketotifen (ZADITOR) 0.025 % ophthalmic solution Place 1 drop into both eyes 2 times daily 10 mL 11     lisinopril (ZESTRIL) 10 MG tablet TAKE 1 TABLET (10 MG) BY MOUTH DAILY 30 tablet 0     meclizine (ANTIVERT) 25 MG tablet TAKE 1 TABLET BY MOUTH 3 TIMES A DAY AS NEEDED FOR DIZZINESS OR NAUSEA. 30 tablet 1     omeprazole (PRILOSEC) 20 MG DR capsule [OMEPRAZOLE (PRILOSEC) 20 MG CAPSULE] TAKE 1 CAPSULE BY MOUTH DAILY BEFORE BREAKFAST 90 capsule 2     potassium chloride (KLOR-CON) 20 MEQ packet Take 20 mEq by mouth daily 14 packet 0     rivaroxaban ANTICOAGULANT (XARELTO) 20 MG TABS tablet Take 1 tablet (20 mg) by mouth daily (with dinner) 30 tablet 1     vitamin D3 (CHOLECALCIFEROL) 50 mcg (2000 units) tablet Take 1 tablet (50 mcg) by mouth daily 30 tablet 1         ALLERGIES/SENSITIVITIES:     Allergies   Allergen Reactions     Ibandronate [Ibandronic Acid] Headache and Shortness Of Breath     Septra [Sulfamethoxazole-Trimethoprim] Other (See Comments)     Acute hepatitis        PERTINENT SOCIAL HISTORY: Non-smoker  Social History     Socioeconomic History     Marital status:    Tobacco Use     Smoking status: Never     Smokeless tobacco: Never   Vaping Use     Vaping Use: Never used   Substance and Sexual Activity     Alcohol use: No     Drug use: No     Sexual activity: Never     Birth control/protection: Post-menopausal          FAMILY HISTORY:  Family History   Problem Relation Age of Onset     No Known Problems Mother      No Known Problems Father         PHYSICAL EXAM:   Constitutional: BP (!) 149/71   Pulse 73   SpO2 97%      Mental Status: A & O in no acute distress.  Affect is appropriate.  Speech is fluent.  Recent and remote memory are intact.  Attention span and concentration are normal.        Cranial Nerves:   CN1: grossly intact per patient recall.   CN2: No funduscopic exam performed.   CN3,4 & 6: Pupillary light response, lateral and vertical gaze normal.  No nystagmus.  Visual fields are full to confrontation.   CN5: Intact to touch   CN7: No facial weakness, smile, facial symmetry intact.   CN8: Intact to spoken voice.   CN9&10: Gag reflex, uvula midline, palate rises with phonation.   CN11: Shoulder shrug 5/5 intact bilaterally.   CN12: Tongue midline and moves freely from side to side.     Motor: No pronator drift of upper extremity.   Normal bulk and tone all muscle groups of upper and lower extremities.       Delt Bi Tri Hand Flex/  Ext Iliopsoas Quadriceps Tibialis Anterior EHL Gastroc     C5 C6 C7 C8/T1 L2 L3 L4 L5 S1   R 5 5 5 5 5 5 5 5 5   L 5 5 5 5 5 5 5 5 5          Sensory: Sensation intact bilaterally to light touch.     Coordination; finger to nose, rapid alternating movements smooth and rhythmic.   Romberg intact.   Heel/toe/tandem gait intact.    Normal gait and station.     Reflexes;                       Right              Left  Brachioradialis (C5,6)      2+                 2+  Biceps   (C5,6)                 2+                 2+  Triceps  (C7,8)                 2+                2+  Knee (L3,4)                      2+                2+  Ankle jerk (S1,2)              2+                2+      No hoffmans/babinski/ clonus.    IMAGING:  I personally reviewed all radiographic images and agree with neuroradiology report of left lateral tentorial lesion measuring approximately 2.2 x 2.2 x 2.9 cm in  size.     07/03/2023: MRI Brain:   IMPRESSION:  1.  No finding for acute infarct or hemorrhage.     2.  Extra-axial dural based mass is seen arising from the superior lateral wall of the posterior fossa on the left and extending along the posterior inferior aspect of the left middle cranial fossa. The lesion invades or elevates the  left transverse sinus which appears draped along the surface of the lesion. Consider correlation with dedicated MRV for further assessment.     3.  The mass results in localized mass effect with mild edema seen within the lateral aspect left cerebellar hemisphere.     4.  Slight volume loss and moderate presumed sequela of chronic microvascular ischemic change.      ADDENDUM: Comparison is made with MRI brain performed 01/08/2023 and MRV brain 11/15/2022 . The size of the lesion is stable compared to the 01/08/2023 prior. Edema within the adjacent lateral left cerebellar hemisphere appears similar to mildly   increased in the interval. On prior MRV, the lesion was shown to locally occluded the lateral left transverse sinus. The extent of nonspecific supratentorial white matter signal abnormality/FLAIR hyperintensity is very similar to prior.      Cc:   Kameron Sanchez               Again, thank you for allowing me to participate in the care of your patient.        Sincerely,        Yoni Guillory MD

## 2023-07-10 NOTE — PROGRESS NOTES
NEUROSURGERY CONSULTATION NOTE    Neurosurgery was asked to see this patient by No att. providers found for evaluation of incidental diagnosis of left lateral tentorial lesion measuring 2.2 x 2.2 x 2.9 cm in size      CONSULTATION ASSESSMENT AND PLAN:    Ms. Huggins is a 78-year-old right-handed Kelli speaking female who presented to the clinic today with her daughter for evaluation of incidentally diagnosed left lateral tentorial lesion.  The clinical history, examination and discussion of the management was performed with the .  She has a significant past medical history of hypertension, atrial fibrillation on Xarelto.    She has clinical symptoms of dizziness for a year with no recent worsening.  She denies any new onset headache with nausea vomiting or blurring of vision.  She has no focal deficit on neurological examination.    Her MRI shows left lateral tentorial lesion measuring 2.2 x 2.2 x 2.9 cm in size.  The lesion is locally occluding the left lateral transverse sinus.    I explained the MRI findings to the patient and her daughter and showed them the images.  Based on the clinical history, examination and MRI findings the lesion is likely to be meningioma.  I explained the natural history of these lesions and discussed the management options.  I discussed with the patient the management options including watchful observation with follow-up MRI in 6 months, surgical resection of the mass with the goal of achieving tissue diagnosis and decompression and radiation therapy including stereotactic radiosurgery.  Since patient is asymptomatic from the lesion I would recommend watchful clinical observation with follow-up MRI in 6 months.  Patient is not willing for any surgical intervention at this point.  We will order MRI brain with and without contrast along with MR venogram in 6 months.  I will follow her with the MRI.    Patient and her daughter agreed with the plan.  All questions were answered and  patient sounded understanding.  I explained to the patient and her daughter that if patient start developing worsening headache, nausea, vomiting, blurring of vision or double vision or any focal neurological symptoms that should come to the ER and inform us immediately.  Patient and her daughter sounded understanding.  They can contact us if there are any further questions or concerns.    I spent more than 60 minutes in this apt, examining the pt, reviewing the scans, reviewing notes from chart, discussing treatment options with risks and benefits and coordinating care.     Yoni Guillory MD      HPI:   Ms. Huggins is a 78-year-old right-handed Kelli speaking female who presented to the clinic today with her daughter for evaluation of incidentally diagnosed left lateral tentorial lesion.  She has a significant past medical history of hypertension, atrial fibrillation on Xarelto.    She presented to the ER on 07/03/2023 with dizziness and blurring of vision and was diagnosed to have increased blood pressure with systolic blood pressures in the 170s.  She underwent scan for the same and was noticed to have left lateral tentorial lesion.  As per the patient and the daughter they were aware of the lesion approximately 7 to 8 years back however patient does not remember why and where the the CT scan was done.    A long history of dizziness for approximately a year or so with no recent worsening.  She has diminished vision related to her age with no history of double vision.  She denies any worsening headaches along with nausea vomiting or double vision.    She denies any imbalance on walking or falls.  She denies any focal neurological deficits or any bladder or bowel issues.  Also denies any diminished hearing in her left ear or any pulsating sound/tinnitus in left ear.    She does not smoke or consume alcohol and denies use of recreational drugs.    She has a history of atrial fibrillation on Xarelto. She denies any other  significant cardiac or pulmonary history.    No other significant medical or surgical history.      Past Medical History:   Diagnosis Date     Benign adenomatous polyp of large intestine      Biliary colic      Cervicalgia      Cholelithiasis      Chronic allergic conjunctivitis      Cystitis      Dyspepsia      Gastritis      GERD (gastroesophageal reflux disease)      HLD (hyperlipidemia)      Hypertension      Hypokalemia      Meningioma (H)      Multiple lung nodules on CT     CT ABD 11/23/1013, mult nodules < 5mm in size, per Fleischner Society recommendations  repeat CT in 12 months CT Chest 2/20/17:  Stable pulmonary nodules suggesting incidental findings given long-term stability.      Osteoporosis      Positional vertigo      Pulmonary nodules      Recurrent UTI      Transaminitis 10/29/2015     Urinary incontinence      Xerosis cutis        Past Surgical History:   Procedure Laterality Date     CHOLECYSTECTOMY  08/21/2015       REVIEW OF SYSTEMS:  Review Of Systems  Skin: negative  Eyes: negative  Ears/Nose/Throat: negative  Respiratory: No shortness of breath, dyspnea on exertion, cough, or hemoptysis  Cardiovascular: negative  Gastrointestinal: negative  Genitourinary: negative  Musculoskeletal: negative  Neurologic: negative  Psychiatric: negative  Hematologic/Lymphatic/Immunologic: negative  Endocrine: negative    MEDICATIONS:  Current Outpatient Medications   Medication Sig Dispense Refill     acetaminophen (TYLENOL) 650 MG CR tablet Take 1 tablet (650 mg) by mouth every 8 hours as needed for pain 50 tablet 4     albuterol (PROAIR HFA/PROVENTIL HFA/VENTOLIN HFA) 108 (90 Base) MCG/ACT inhaler Inhale 2 puffs into the lungs every 6 hours as needed for shortness of breath or wheezing 18 g 3     artificial tears (GENTEAL) 0.1-0.2-0.3 % ophthalmic solution Place 2 drops into both eyes 4 times daily as needed (dry or irritated eyes) 15 mL 11     atorvastatin (LIPITOR) 80 MG tablet Take 1 tablet (80 mg) by  mouth At Bedtime 90 tablet 0     blood pressure monitor Kit [BLOOD PRESSURE MONITOR KIT] Home blood pressure cuff and monitor 1 each 0     diltiazem (CARDIZEM) 30 MG tablet TAKE 1 TABLET BY MOUTH 2 TIMES DAILY 180 tablet 2     hydrOXYzine (ATARAX) 25 MG tablet Take 1 tablet (25 mg) by mouth 3 times daily as needed for itching 30 tablet 3     ketotifen (ZADITOR) 0.025 % ophthalmic solution Place 1 drop into both eyes 2 times daily 10 mL 11     lisinopril (ZESTRIL) 10 MG tablet TAKE 1 TABLET (10 MG) BY MOUTH DAILY 30 tablet 0     meclizine (ANTIVERT) 25 MG tablet TAKE 1 TABLET BY MOUTH 3 TIMES A DAY AS NEEDED FOR DIZZINESS OR NAUSEA. 30 tablet 1     omeprazole (PRILOSEC) 20 MG DR capsule [OMEPRAZOLE (PRILOSEC) 20 MG CAPSULE] TAKE 1 CAPSULE BY MOUTH DAILY BEFORE BREAKFAST 90 capsule 2     potassium chloride (KLOR-CON) 20 MEQ packet Take 20 mEq by mouth daily 14 packet 0     rivaroxaban ANTICOAGULANT (XARELTO) 20 MG TABS tablet Take 1 tablet (20 mg) by mouth daily (with dinner) 30 tablet 1     vitamin D3 (CHOLECALCIFEROL) 50 mcg (2000 units) tablet Take 1 tablet (50 mcg) by mouth daily 30 tablet 1         ALLERGIES/SENSITIVITIES:     Allergies   Allergen Reactions     Ibandronate [Ibandronic Acid] Headache and Shortness Of Breath     Septra [Sulfamethoxazole-Trimethoprim] Other (See Comments)     Acute hepatitis        PERTINENT SOCIAL HISTORY: Non-smoker  Social History     Socioeconomic History     Marital status:    Tobacco Use     Smoking status: Never     Smokeless tobacco: Never   Vaping Use     Vaping Use: Never used   Substance and Sexual Activity     Alcohol use: No     Drug use: No     Sexual activity: Never     Birth control/protection: Post-menopausal         FAMILY HISTORY:  Family History   Problem Relation Age of Onset     No Known Problems Mother      No Known Problems Father         PHYSICAL EXAM:   Constitutional: BP (!) 149/71   Pulse 73   SpO2 97%      Mental Status: A & O in no acute  distress.  Affect is appropriate.  Speech is fluent.  Recent and remote memory are intact.  Attention span and concentration are normal.        Cranial Nerves:   CN1: grossly intact per patient recall.   CN2: No funduscopic exam performed.   CN3,4 & 6: Pupillary light response, lateral and vertical gaze normal.  No nystagmus.  Visual fields are full to confrontation.   CN5: Intact to touch   CN7: No facial weakness, smile, facial symmetry intact.   CN8: Intact to spoken voice.   CN9&10: Gag reflex, uvula midline, palate rises with phonation.   CN11: Shoulder shrug 5/5 intact bilaterally.   CN12: Tongue midline and moves freely from side to side.     Motor: No pronator drift of upper extremity.   Normal bulk and tone all muscle groups of upper and lower extremities.       Delt Bi Tri Hand Flex/  Ext Iliopsoas Quadriceps Tibialis Anterior EHL Gastroc     C5 C6 C7 C8/T1 L2 L3 L4 L5 S1   R 5 5 5 5 5 5 5 5 5   L 5 5 5 5 5 5 5 5 5          Sensory: Sensation intact bilaterally to light touch.     Coordination; finger to nose, rapid alternating movements smooth and rhythmic.   Romberg intact.   Heel/toe/tandem gait intact.    Normal gait and station.     Reflexes;                       Right              Left  Brachioradialis (C5,6)      2+                 2+  Biceps   (C5,6)                 2+                 2+  Triceps  (C7,8)                 2+                2+  Knee (L3,4)                      2+                2+  Ankle jerk (S1,2)              2+                2+      No hoffmans/babinski/ clonus.    IMAGING:  I personally reviewed all radiographic images and agree with neuroradiology report of left lateral tentorial lesion measuring approximately 2.2 x 2.2 x 2.9 cm in size.     07/03/2023: MRI Brain:   IMPRESSION:  1.  No finding for acute infarct or hemorrhage.     2.  Extra-axial dural based mass is seen arising from the superior lateral wall of the posterior fossa on the left and extending along the posterior  inferior aspect of the left middle cranial fossa. The lesion invades or elevates the  left transverse sinus which appears draped along the surface of the lesion. Consider correlation with dedicated MRV for further assessment.     3.  The mass results in localized mass effect with mild edema seen within the lateral aspect left cerebellar hemisphere.     4.  Slight volume loss and moderate presumed sequela of chronic microvascular ischemic change.      ADDENDUM: Comparison is made with MRI brain performed 01/08/2023 and MRV brain 11/15/2022 . The size of the lesion is stable compared to the 01/08/2023 prior. Edema within the adjacent lateral left cerebellar hemisphere appears similar to mildly   increased in the interval. On prior MRV, the lesion was shown to locally occluded the lateral left transverse sinus. The extent of nonspecific supratentorial white matter signal abnormality/FLAIR hyperintensity is very similar to prior.      Cc:   Kameron Sanchez

## 2023-07-10 NOTE — NURSING NOTE
"Jennifer Huggins is a 78 year old female who presents for:  Chief Complaint   Patient presents with     Consult     Meningioma - review MRI  No pain        Initial Vitals:  BP (!) 149/71   Pulse 73   SpO2 97%  Estimated body mass index is 25.33 kg/m  as calculated from the following:    Height as of 7/3/23: 4' 11\" (1.499 m).    Weight as of 7/3/23: 125 lb 6.4 oz (56.9 kg).. There is no height or weight on file to calculate BSA. BP completed using cuff size: regular  No Pain (0)    Nursing Comments: [facility] to follow up with Primary Care provider regarding elevated blood pressure.      Zac Aparicio  "

## 2023-07-11 NOTE — TELEPHONE ENCOUNTER
Patient has ED follow up on 7/18, need for follow up added to apt notes.  Will close encounter at this time.

## 2023-07-18 ENCOUNTER — OFFICE VISIT (OUTPATIENT)
Dept: FAMILY MEDICINE | Facility: CLINIC | Age: 78
End: 2023-07-18
Payer: MEDICARE

## 2023-07-18 ENCOUNTER — TELEPHONE (OUTPATIENT)
Dept: FAMILY MEDICINE | Facility: CLINIC | Age: 78
End: 2023-07-18

## 2023-07-18 VITALS
SYSTOLIC BLOOD PRESSURE: 134 MMHG | RESPIRATION RATE: 16 BRPM | WEIGHT: 124 LBS | HEART RATE: 68 BPM | HEIGHT: 59 IN | DIASTOLIC BLOOD PRESSURE: 73 MMHG | OXYGEN SATURATION: 99 % | BODY MASS INDEX: 25 KG/M2 | TEMPERATURE: 99 F

## 2023-07-18 DIAGNOSIS — K21.9 GASTROESOPHAGEAL REFLUX DISEASE WITHOUT ESOPHAGITIS: ICD-10-CM

## 2023-07-18 DIAGNOSIS — H57.9 PRURITUS OF BOTH EYES: ICD-10-CM

## 2023-07-18 DIAGNOSIS — I10 HYPERTENSION, UNSPECIFIED TYPE: Primary | ICD-10-CM

## 2023-07-18 DIAGNOSIS — E78.00 HYPERCHOLESTEROLEMIA: ICD-10-CM

## 2023-07-18 DIAGNOSIS — E55.9 VITAMIN D DEFICIENCY: ICD-10-CM

## 2023-07-18 DIAGNOSIS — E87.6 HYPOKALEMIA: ICD-10-CM

## 2023-07-18 LAB
ANION GAP SERPL CALCULATED.3IONS-SCNC: 12 MMOL/L (ref 7–15)
BUN SERPL-MCNC: 16.4 MG/DL (ref 8–23)
CALCIUM SERPL-MCNC: 9.4 MG/DL (ref 8.8–10.2)
CHLORIDE SERPL-SCNC: 107 MMOL/L (ref 98–107)
CREAT SERPL-MCNC: 1.04 MG/DL (ref 0.51–0.95)
DEPRECATED HCO3 PLAS-SCNC: 25 MMOL/L (ref 22–29)
GFR SERPL CREATININE-BSD FRML MDRD: 55 ML/MIN/1.73M2
GLUCOSE SERPL-MCNC: 117 MG/DL (ref 70–99)
POTASSIUM SERPL-SCNC: 3.8 MMOL/L (ref 3.4–5.3)
SODIUM SERPL-SCNC: 144 MMOL/L (ref 136–145)

## 2023-07-18 PROCEDURE — 36415 COLL VENOUS BLD VENIPUNCTURE: CPT | Performed by: FAMILY MEDICINE

## 2023-07-18 PROCEDURE — 99214 OFFICE O/P EST MOD 30 MIN: CPT | Performed by: FAMILY MEDICINE

## 2023-07-18 PROCEDURE — 80048 BASIC METABOLIC PNL TOTAL CA: CPT | Performed by: FAMILY MEDICINE

## 2023-07-18 RX ORDER — ATORVASTATIN CALCIUM 80 MG/1
80 TABLET, FILM COATED ORAL AT BEDTIME
Qty: 90 TABLET | Refills: 1 | Status: SHIPPED | OUTPATIENT
Start: 2023-07-18 | End: 2023-10-06

## 2023-07-18 RX ORDER — GLIPIZIDE 10 MG/1
2 TABLET ORAL 4 TIMES DAILY PRN
Qty: 15 ML | Refills: 11 | Status: SHIPPED | OUTPATIENT
Start: 2023-07-18 | End: 2023-10-06

## 2023-07-18 RX ORDER — CHOLECALCIFEROL (VITAMIN D3) 50 MCG
1 TABLET ORAL DAILY
Qty: 90 TABLET | Refills: 3 | Status: SHIPPED | OUTPATIENT
Start: 2023-07-18 | End: 2024-07-28

## 2023-07-18 RX ORDER — LISINOPRIL 10 MG/1
10 TABLET ORAL DAILY
Qty: 90 TABLET | Refills: 2 | Status: SHIPPED | OUTPATIENT
Start: 2023-07-18 | End: 2023-10-06

## 2023-07-18 NOTE — PROGRESS NOTES
OFFICE VISIT    Assessment/Plan:     Patient Instructions:    -Continue on the medications as prescribed.    -Dr. Dean recommends that you check your blood pressure intermittently.  -When checking your blood pressure, find a location where the area is calm and quiet. Try to sit down for 3-5 minutes first. Using a blood pressure cuff that wraps around the upper arm is more accurate. Keep your wrist facing up and legs straight. (If using a wrist cuff, be sure to hold the cuff up to the level of the heart when checking your blood pressure) As the blood pressure machine is working, do not talk or do anything else.   -The goal is to have the blood pressure under 140/90 on a consistent basis.  Blood pressures above this range increases your risk of developing heart attacks, strokes, kidney issues, and many other medical issues.  -Try to limit salt intake.  Following a low-salt diet (eating about 4493-7303 mg or less of salt each day) can be helpful to control the blood pressure.  -Find ways to stay active.   -Please see the end of the packet for more information regarding elevated blood pressures and things you can do at home to help improve the blood pressure.    Please seek immediate medical attention (go to the emergency room or urgent care) for the following reasons: worsening symptoms, headaches, vision changes, fevers, chills, nausea, vomiting, weakness, or any concerning changes.    Please return to clinic in 6 months for follow-up with your primary care doctor, or sooner as needed.      Jennifer was seen today for recheck and refill request.  Diagnoses and all orders for this visit:    Hypertension, unspecified type  Hypokalemia: Stable.  Continue medications below.  Recheck tomorrow.  -     Basic metabolic panel; Future  -     lisinopril (ZESTRIL) 10 MG tablet; Take 1 tablet (10 mg) by mouth daily    Hypercholesterolemia: Stable.  Continue medications below.  -     atorvastatin (LIPITOR) 80 MG tablet; Take 1 tablet  (80 mg) by mouth At Bedtime    Pruritus of both eyes  -     artificial tears (GENTEAL) 0.1-0.2-0.3 % ophthalmic solution; Place 2 drops into both eyes 4 times daily as needed (dry or irritated eyes)    Gastroesophageal reflux disease without esophagitis: Stable.  Continue medications below.  -     omeprazole (PRILOSEC) 20 MG DR capsule; [OMEPRAZOLE (PRILOSEC) 20 MG CAPSULE] TAKE 1 CAPSULE BY MOUTH DAILY BEFORE BREAKFAST    Vitamin D deficiency: Stable.  Continue medications below.  -     vitamin D3 (CHOLECALCIFEROL) 50 mcg (2000 units) tablet; Take 1 tablet (50 mcg) by mouth daily    Other orders  -     REVIEW OF HEALTH MAINTENANCE PROTOCOL ORDERS      The diagnoses, treatment options, risk, benefits, and recommendations were reviewed with patient/guardian.  Questions were answered to patient's/guardian satisfaction.  Red flag signs were reviewed.  Patient/guardian is in agreement with above plan.      Subjective: 78 year old female with history of atrial fibrillation and prescribed Xarelto, hypertension, hepatitis, osteoporosis, hypercholesterolemia, GERD, dyspepsia, dizziness, constipation, chronic allergic conjunctivitis, history of meningioma who presents to clinic for the following complaints:   Patient presents with:  RECHECK: Follow up ER due to Hypertension  Refill Request    Patient was seen in the emergency room on 07/03/2023.  Due to headaches and was diagnosed with headaches and hypertension.  Patient had presented with dizziness for a week and when patient would have dizziness, her blood pressure will be high.  When it is hot outside, the dizziness can also be noted.    Patient was discharged with lisinopril 10 mg once daily.  Patient had also endorsed some dizziness, so was prescribed meclizine.  Patient noted to have low potassium and that was replaced while in the hospital.  EKG demonstrated sinus rhythm low voltage normal anterior progression normal axis.      Since then, she has been okay overall.  The BP at home can go high and can go low. The highest has been 189 and the lowest has been . She has not missed any doses of the lisinopril.  The dizziness has been better compared to when she was in the ER. She also feels stiffness in her neck at times, too. If the BP is high, she feels dizzy. If the BP is normal, she feels good.  She has needed the meclizine as well. BP is in the normal range today.     Denies any issues or side effects on any of the medications reviewed today.    Lisinopril 10 mg once daily: Refill needed.  Utilizes this medication to help with blood pressure control. It appears that patient was prescribed lisinopril 5 mg once daily on 1/10/2023.  Patient was then increased to lisinopril 10 mg once daily on 01/26/2023 and this prescription has been continued since then. Patient had been prescribed lisinopril-hydrochlorothiazide 10-12.5 mg once daily in the past.    Patient is also on diltiazem 30 mg twice daily, though associated diagnoses is only listed as paroxysmal atrial fibrillation.     1/10/2023  3:40 PM 1/26/2023  4:17 PM 1/26/2023  4:22 PM 2/21/2023  1:36 PM 7/3/2023  7:21 AM 7/3/2023  7:30 AM 7/3/2023  8:15 AM   Vital Signs          Systolic 160 !  158 !  146 !  132  178 !  189 !  168 !    Diastolic 76 !  70 !  68 !  78  90 !  88 !  81 !    Pulse 81  70   80  81  75  68       7/3/2023  8:45 AM 7/3/2023  9:45 AM 7/3/2023  11:00 AM 7/3/2023  12:30 PM 7/3/2023  12:45 PM 7/3/2023  2:00 PM 7/10/2023  4:06 PM   Vital Signs          Systolic   174 !    198 !  149 !    Diastolic   81 !    81 !  71 !    Pulse 67  65  72  65  68   73       7/18/2023  12:16 PM   Vital Signs    Systolic 134    Diastolic 73    Pulse 68       Legend:  ! Abnormal    Patient requesting refills of atorvastatin, artificial tears, omeprazole, vitamin D.  Stable on these medications.  Denies any issues or side effects.    A professional Kelli telephone  was utilized for the office visit.     The  "10 point review of system is negative except as stated in the HPI.    Allergies were reviewed and updated.    Objective:   /73   Pulse 68   Temp 99  F (37.2  C) (Oral)   Resp 16   Ht 1.499 m (4' 11\")   Wt 56.2 kg (124 lb)   SpO2 99%   BMI 25.04 kg/m    General: Active, alert, nontoxic-appearing.  No acute distress.  HEENT: Normocephalic, atraumatic.  Pupils are equal and round.  Sclera is clear.  Normal external ears. Nares patent.  Moist mucous membranes.    Cardiac: RRR.  S1, S2 present.  No murmurs, rubs, or gallops.  Respiratory/chest: Clear to auscultation bilaterally.  No wheezes, rales, rhonchi.  Breathing is not labored.  No accessory muscle usage.  Abdomen: Soft, nondistended, nontender.  No masses or organomegaly noted.  No guarding or rebound tenderness appreciated.  Extremities: Voluntary movements intact.  Integumentary: No concerning rash or skin changes appreciated.        Frederic Dean MD  [facility] [place] [address]  Phone: [phone]  Fax: [phone]    7/20/2023  6:09 PM            Current Outpatient Medications   Medication     albuterol (PROAIR HFA/PROVENTIL HFA/VENTOLIN HFA) 108 (90 Base) MCG/ACT inhaler     artificial tears (GENTEAL) 0.1-0.2-0.3 % ophthalmic solution     atorvastatin (LIPITOR) 80 MG tablet     diltiazem (CARDIZEM) 30 MG tablet     lisinopril (ZESTRIL) 10 MG tablet     meclizine (ANTIVERT) 25 MG tablet     omeprazole (PRILOSEC) 20 MG DR capsule     rivaroxaban ANTICOAGULANT (XARELTO) 20 MG TABS tablet     vitamin D3 (CHOLECALCIFEROL) 50 mcg (2000 units) tablet     acetaminophen (TYLENOL) 650 MG CR tablet     blood pressure monitor Kit     hydrOXYzine (ATARAX) 25 MG tablet     ketotifen (ZADITOR) 0.025 % ophthalmic solution     potassium chloride (KLOR-CON) 20 MEQ packet     Current Facility-Administered Medications   Medication     denosumab (PROLIA) injection 60 mg       Allergies   Allergen Reactions     Ibandronate " "[Ibandronic Acid] Headache and Shortness Of Breath     Septra [Sulfamethoxazole-Trimethoprim] Other (See Comments)     Acute hepatitis        Patient Active Problem List    Diagnosis Date Noted     A-fib (H) 11/10/2022     Priority: Medium     Osteoarthritis of multiple joints 08/06/2020     Priority: Medium     Osteoporosis      Priority: Medium     DXA 4/28/11:  T-score -3.2  Fosamax initiated in 2011, switched to ibandronate in 2012 for   convenience.   DXA 11/13/13: T-score -2.8  Ibandronate added as allergy in 2014 for \"headache and shortness of   breath\" - details are unclear. Has been on calcium and Vit D only since.  DXA 3/7/17:  \"No significant difference\".  T-score -3.2, moderate bone   architecture.   Will start Prolia injections q6m on 5/10/17.      Formatting of this note might be different from the original.  DXA 4/28/11:  T-score -3.2  Fosamax initiated in 2011, switched to ibandronate in 2012 for convenience.   DXA 11/13/13: T-score -2.8  Ibandronate added as allergy in 2014 for \"headache and shortness of breath\" - details are unclear. Has been on calcium and Vit D only since.  DXA 3/7/17:  \"No significant difference\".  T-score -3.2, moderate bone architecture.   Will start Prolia injections q6m on 5/10/17.    Last Assessment & Plan:   Formatting of this note might be different from the original.  DXA 4/28/11:  T-score -3.2  Fosamax initiated in 2011, switched to ibandronate in 2012 for convenience.   DXA 11/13/13: T-score -2.8  Ibandronate added as allergy in 2014 for \"headache and shortness of breath\" - details are unclear. Has been on calcium and Vit D only since.  DXA 3/7/17:  \"No significant difference\".  T-score -3.2, moderate bone architecture.   Will start Prolia injections q6m on 5/10/17.    Is about due for Prolia injection, but we do not have it in stock today.  She will return between May 7-10 for injection, which will be covered under previous prior authorization.  Will need to pursue " another PA for following injection.       Essential hypertension      Priority: Medium     Last Assessment & Plan:   Formatting of this note might be different from the original.  Borderline control.  Continue current medications.       Hypokalemia      Priority: Medium     Last Assessment & Plan:   Formatting of this note might be different from the original.  Has been out of potassium supplement recently.  Will check labs and get her restarted on potassium supplementation.       Dizziness 03/16/2017     Priority: Medium     Last Assessment & Plan:   Formatting of this note might be different from the original.  Meclizine refilled, which has been helpful in the past.       Hypercholesterolemia      Priority: Medium     Last Assessment & Plan:   Formatting of this note might be different from the original.  Unable to initiate statin at this time due to elevated LFT's.       Hepatitis (non-viral, sporadic elevation of LFT's) 11/18/2015     Priority: Medium     She had cholecystectomy 8/21/15.  The surgery had been delayed from   initial diagnosis of gallstones 11/23/13 due to hypokalemia and difficulty   with communication.  She then admitted for elevated LFTs 10/28/15. At that time, she had gotten   3 days of Septra for UTI, and it was thought that the Septra had caused   the hepatitis.   Her LFTs came down after that, but upon recheck (asympomatic) 2/14/17 have   returned elevated again (AST 80, , ). Liver ultrasound on   2/17/17 was normal.  She had not had any antibiotics or medication changes   in the last several months.  She is NOT on a statin medication.  She   doesn't drink any alcohol and does not frequently use Tylenol.  Was seen at [facility] 3/6/17.  LFT's normal that day.  Also had normal viral   hepatitis labs, iron studies, alpha-1 antitripsan, DEEPALI, actin (smooth   muscle) antibody.      Formatting of this note might be different from the original.  She had cholecystectomy 8/21/15.  The surgery  had been delayed from initial diagnosis of gallstones 11/23/13 due to hypokalemia and difficulty with communication.    She then admitted for elevated LFTs 10/28/15. At that time, she had gotten 3 days of Septra for UTI, and it was thought that the Septra had caused the hepatitis.     Her LFTs came down after that, but upon recheck (asympomatic) 2/14/17 have returned elevated again (AST 80, , ). Liver ultrasound on 2/17/17 was normal.  She had not had any antibiotics or medication changes in the last several months.  She is NOT on a statin medication.  She doesn't drink any alcohol and does not frequently use Tylenol.    Was seen at [facility] 3/6/17.  LFT's normal that day.  Also had normal viral hepatitis labs, iron studies, alpha-1 antitripsan, DEEPALI, actin (smooth muscle) antibody.    Last Assessment & Plan:   Formatting of this note might be different from the original.  She had cholecystectomy 8/21/15.  The surgery had been delayed from initial diagnosis of gallstones 11/23/13 due to hypokalemia and difficulty with communication.    She then admitted for elevated LFTs 10/28/15. At that time, she had gotten 3 days of Septra for UTI, and it was thought that the Septra had caused the hepatitis.     Her LFTs came down after that, but upon recheck (asympomatic) 2/14/17 have returned elevated again (AST 80, , ). Liver ultrasound on 2/17/17 was normal.  She had not had any antibiotics or medication changes in the last several months.  She is NOT on a statin medication.  She doesn't drink any alcohol and does not frequently use Tylenol.    Was seen at [facility] 3/6/17.  LFT's normal that day.  Also had normal viral hepatitis labs, iron studies, alpha-1 antitripsan, DEEPALI, actin (smooth muscle) antibody.  LFTs were also normal in the emergency room on 3/24/17.  She has remained asymptomatic throughout.  It is not clear whether she has a follow-up appointment with [facility] already scheduled;  we will have her meet with specialty  in this regard today.       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     First noted on brain MRI November 2012 and increasing slightly in 2013.        Formatting of this note might be different from the original.  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   First noted on brain MRI November 2012 and increasing slightly in 2013.       Constipation 08/23/2015     Priority: Medium     GERD (gastroesophageal reflux disease) 06/03/2015     Priority: Medium     Recurrent UTI 02/27/2015     Priority: Medium     Last Assessment & Plan:   Formatting of this note might be different from the original.  Most recently diagnosed with E coli UTI on 3/24/17 in the emergency room.  She has recovered completely from this at this time.       Joint Pain, Localized In The Shoulder      Priority: Medium     Created by [employer] [facility] Annotation: Sep 11 2012 10:28AM - Barb Elias: Had   negative   right shoulder x-ray 9/11/12.  Has had steroid injections by Dr. Elias   6/6/12, 9/11/12, 9/18/13.      Formatting of this note might be different from the original.  Created by [employer] [facility] Annotation: Sep 11 2012 10:28AM - Barb Elias: Had negative   right shoulder x-ray 9/11/12.  Has had steroid injections by Dr. Elias   6/6/12, 9/11/12, 9/18/13.       Cervicalgia      Priority: Medium     Created by Conversion      Formatting of this note might be different from the original.  Created by Conversion       Benign paroxysmal positional vertigo      Priority: Medium     Created by Conversion      Formatting of this note might be different from the original.  Created by Conversion       Xerosis Cutis      Priority: Medium     Created by Conversion      Formatting of this note might be different from the original.  Created by Conversion       Chronic allergic conjunctivitis       Priority: Medium     Created by Conversion      Formatting of this note might be different from the original.  Created by Conversion       Neuritis      Priority: Medium     Created by Conversion  [facility] Annotation: Jan 13 2008  2:45PM - Barb Elias: hands and   toes      Formatting of this note might be different from the original.  Created by Conversion  [facility] Annotation: Jan 13 2008  2:45PM - Jada Barb: hands and toes       Dyspepsia      Priority: Medium     Created by Conversion      Formatting of this note might be different from the original.  Created by Conversion       Urinary incontinence      Priority: Medium     Created by Conversion      Formatting of this note might be different from the original.  Created by Conversion       Benign Adenomatous Polyp Of The Large Intestine      Priority: Medium     Created by Conversion  [facility] Annotation: May  9 2008  2:32PRADHA [place] ToreyMonae: Adenomatous   polyp removed on screening colonoscopy.  Needs f/u in 5 years.      Formatting of this note might be different from the original.  Created by Conversion  [facility] Annotation: May  9 2008  2:MIKE Monae Maldonado: Adenomatous   polyp removed on screening colonoscopy.  Needs f/u in 5 years.         Family History   Problem Relation Age of Onset     No Known Problems Mother      No Known Problems Father        Past Surgical History:   Procedure Laterality Date     CHOLECYSTECTOMY  08/21/2015        Social History     Socioeconomic History     Marital status:      Spouse name: Not on file     Number of children: Not on file     Years of education: Not on file     Highest education level: Not on file   Occupational History     Not on file   Tobacco Use     Smoking status: Never     Smokeless tobacco: Never   Vaping Use     Vaping Use: Never used   Substance and Sexual Activity     Alcohol use: No     Drug use: No     Sexual activity: Never     Birth control/protection:  Post-menopausal   Other Topics Concern     Not on file   Social History Narrative     Not on file     Social Determinants of Health     Financial Resource Strain: Not on file   Food Insecurity: Not on file   Transportation Needs: Not on file   Physical Activity: Not on file   Stress: Not on file   Social Connections: Not on file   Intimate Partner Violence: Not on file   Housing Stability: Not on file

## 2023-07-18 NOTE — TELEPHONE ENCOUNTER
Patient Quality Outreach    Patient is due for the following:   Physical Annual Wellness Visit    Next Steps:   Patient was scheduled for Wellness visit    Type of outreach:    Phone, spoke to patient/parent. Scheduled Wellness visit    Next Steps:  Reach out within 90 days via Phone.    Max number of attempts reached: No. Will try again in 90 days if patient still on fail list.    Questions for provider review:    None           Fransisca Sorto  Chart routed to Care Team.

## 2023-07-18 NOTE — PATIENT INSTRUCTIONS
-Thank you for choosing the [facility].  -It was a pleasure to see you today.  -Please take a look at the information below for more specific details regarding the treatment plan and recommendations.  -In this after visit summary is a list of your medications and specific instructions.  Please review this carefully as there may be changes made to your medication list.  -If there are any particular questions or concerns, please feel free to reach out to Dr. Dean.  -If any labs have been completed, we will reach out to you about results.  If the results are normal or not concerning, a letter or Coverity[employer] message will be sent to you.  If any follow-up is needed, either Dr. Dean or the nurse will give you a call.  If you have not heard regarding results after 2 weeks, please reach out to the clinic.    Patient Instructions:    -Continue on the medications as prescribed.    -Dr. Dean recommends that you check your blood pressure intermittently.  -When checking your blood pressure, find a location where the area is calm and quiet. Try to sit down for 3-5 minutes first. Using a blood pressure cuff that wraps around the upper arm is more accurate. Keep your wrist facing up and legs straight. (If using a wrist cuff, be sure to hold the cuff up to the level of the heart when checking your blood pressure) As the blood pressure machine is working, do not talk or do anything else.   -The goal is to have the blood pressure under 140/90 on a consistent basis.  Blood pressures above this range increases your risk of developing heart attacks, strokes, kidney issues, and many other medical issues.  -Try to limit salt intake.  Following a low-salt diet (eating about 6563-3122 mg or less of salt each day) can be helpful to control the blood pressure.  -Find ways to stay active.   -Please see the end of the packet for more information regarding elevated blood pressures and things you can do at home to help improve  the blood pressure.    Please seek immediate medical attention (go to the emergency room or urgent care) for the following reasons: worsening symptoms, headaches, vision changes, fevers, chills, nausea, vomiting, weakness, or any concerning changes.    Please return to clinic in 6 months for follow-up with your primary care doctor, or sooner as needed.      --------------------------------------------------------------------------------------------------------------------    -We are always looking for ways to improve.  You may be selected to receive a survey regarding your visit today.  We encourage you to complete the survey and provide specific, constructive feedback to help us improve our processes.  Thank you for your time!  -Please review the contact information listed on the after visit summary and in the electronic chart.  Below is the phone number that we have on file.  If there are any changes that are needed to be made, please reach out to the clinic.  [phone] (home)

## 2023-08-22 NOTE — PROGRESS NOTES
"  {PROVIDER CHARTING PREFERENCE:033349}    Subjective   Dah is a 78 year old{ACCOMPANIED BY STATEMENT (Optional):456426}, presenting for the following health issues:  Hypertension (Follow up) and Palpatations (Follow up)    Patient is still feeling dizzy. It has gotten somewhat better. She is  Heart palpitations are improving. She has not done the heart monitor yet. No more chest pain.   Her vision is blurred.  No headaches.   This started when the BP was elevated, unchanged.     HPI     {SUPERLIST (Optional):712120}  {additonal problems for provider to add (Optional):359260}    Review of Systems   {ROS COMP (Optional):325135}      Objective    BP (!) 146/68   Pulse 70   Temp 98  F (36.7  C) (Oral)   Resp 16   Ht 4' 9.5\" (1.461 m)   Wt 125 lb 1.6 oz (56.7 kg)   SpO2 99%   BMI 26.60 kg/m    Body mass index is 26.6 kg/m .  Physical Exam   {Exam List (Optional):995266}    {Diagnostic Test Results (Optional):903150}    {AMBULATORY ATTESTATION (Optional):128016}            " Z Plasty Text: The lesion was extirpated to the level of the fat with a #15 scalpel blade. Given the location of the defect, shape of the defect and the proximity to free margins a Z-plasty was deemed most appropriate for repair. Using a sterile surgical marker, the appropriate transposition arms of the Z-plasty were drawn incorporating the defect and placing the expected incisions within the relaxed skin tension lines where possible. The area thus outlined was incised deep to adipose tissue with a #15 scalpel blade. The skin margins were undermined to an appropriate distance in all directions utilizing iris scissors. The opposing transposition arms were then transposed and carried over into place in opposite direction and anchored with interrupted buried subcutaneous sutures.

## 2023-09-29 NOTE — ED PROVIDER NOTES
EMERGENCY DEPARTMENT ENCOUNTER      NAME: Jennifer Huggins  AGE: 78 year old female  YOB: 1945  MRN: [id number]  EVALUATION DATE & TIME: 1/8/2023 11:28 AM    PCP: Barb Elias    ED PROVIDER: Ellyn Hampton M.D.        Chief Complaint   Patient presents with     Hypertension         FINAL IMPRESSION:    1. Hypertension, unspecified type    2. Dizziness    3. Chest pain, unspecified type    4. Hypokalemia    5. Meningioma (H)            MEDICAL DECISION MAKING:    Jennifer Huggins is a 78 year old female with history of meningioma, Afib, HTN, HLD, GERD, who presents to the ER with complaints of dizziness, chest pain, high blood pressure.  She has had chest pain on and off now for nearly 2 months.  Troponin x2 negative.  MRI does not show any acute findings but shows the chronic meningioma already seen.  No signs for stroke.  No concerns for subarachnoid hemorrhage.  Blood pressure initially elevated in the 190 systolic though this did improve with some IV hydralazine.  Patient symptoms resolved after hydralazine, IV fluids, and meclizine.  Blood pressure has crept back up toward nearly 190 systolic without any return of her symptoms.  Oral hydralazine has been given and patient to take her usual blood pressure medications at home tonight.  She will follow-up with primary care doctor tomorrow for blood pressure recheck and management of her medications.    She understands what to watch for and when return to ER and all of her questions have been answered.        ED COURSE:  11:50 AM   I met with the patient to gather history and perform my exam. ED course and treatment discussed.    2:57 PM  Patient states that she is feeling much better and that her vision has gone back to normal and her dizziness has resolved.  Blood pressure is now down to the 160s.  Will discuss with neurosurgery though anticipate discharge home and patient is comfortable with that plan.    3:04 PM   I spoke to NP Shweta Henriquez with  Neurosurgery regarding plan for care.  Since imaging appears stable she does not have the patient requires any emergent evaluation and sounds like the patient had declined any intervention this was first recognized anyways.  She does not think this to be causing her current symptoms.    4:11 PM  Updated pt and family on plan for discharge home and f/u with PCP for BP recheck.  She is asymptomatic at this time.  Blood pressure has come back up to about 190 systolic but oral hydralazine has been given.  She is due to take her usual blood pressure medications at home as well anyways.  At this time I feel she is safe for discharge to follow-up with primary care tomorrow for blood pressure recheck and medication adjustment as needed.  Not fully clear whether or not this represents hypertensive urgency type picture as her blood pressure is back up and she is remaining asymptomatic.  She did have improvement with IV fluids and may have been more of a little dehydration type etiology as well.    At this time I do not think that this represents CVA, TIA, SAH, glaucoma, temporal arteritis, sinus thrombosis, vascular dissection, pseudotumor cerebri, meningitis, enchephalitis, hypertensive urgency or emergency, myocarditis, pericarditis, endocarditis, PE, ruptured AAA, ACS, pneumothorax, aortic dissection, bowel obstruction, bowel ischemia, cholecystitis, kidney stone, pyelonephritis, or other such etiologies at this time. At this time I feel that this patient can be discharged from the emergency department and can followup as directed.      COVID-19 PPE worn during patient evaluation:  Mask: n95 and homemade masks   Eye Protection: goggles   Gown: none   Hair cover: yes  Face shield: none   Patient wearing a mask: yes     At the conclusion of the encounter I discussed the results of all of the tests and the disposition. Their questions were answered. The patient (and any family present) acknowledged understanding and were  agreeable with the care plan.      CONSULTANTS:  Neurosurgery - Shweta Henriquez CNP      MEDICATIONS GIVEN IN THE EMERGENCY:  Medications   hydrALAZINE (APRESOLINE) injection 5 mg (5 mg Intravenous Given 1/8/23 1217)   potassium chloride ER (KLOR-CON M) CR tablet 40 mEq (40 mEq Oral Given 1/8/23 1212)   0.9% sodium chloride BOLUS (0 mLs Intravenous Stopped 1/8/23 1500)   meclizine (ANTIVERT) tablet 12.5 mg (12.5 mg Oral Given 1/8/23 1213)   gadobutrol (GADAVIST) injection 6 mL (6 mLs Intravenous Given 1/8/23 1310)   hydrALAZINE (APRESOLINE) tablet 10 mg (10 mg Oral Given 1/8/23 1530)           NEW PRESCRIPTIONS STARTED AT TODAY'S ER VISIT     Medication List      There are no discharge medications for this visit.             CONDITION:  stable        DISPOSITION:  discharge home with family         =================================================================  =================================================================  TRIAGE ASSESSMENT:  Patient presents here with hypertension, dizziness, and chest pain that has occurred over the past 24 hours. She has been taking her antihypertensives.       ED Triage Vitals [01/08/23 0937]   Enc Vitals Group      BP (!) 190/95      Pulse 74      Resp 20      Temp 97.9  F (36.6  C)      Temp src Oral      SpO2 98 %          ================================================================  ================================================================    HPI    Patient information was obtained from: Patient and family    Use of Intrepreter: N/A     Jennifer ABBY Huggins is a 78 year old female with history of meningioma, Afib, HTN, HLD, GERD, who presents to the ER with complaints of dizziness, chest pain, high blood pressure.    Per chart review, patient was seen in this ED on 12/5/2022 for high blood pressure, palpitations, and lightheadedness. Patient was noted to be in Afib with RVR with rates of 110-170 and elevated blood pressure of 197/87 upon arrival. It was reported  that patient also declined surgical intervention for her meningioma which was incidentally found in CT during a previous hospitalization for Afib in 11/2022. Patient did spontaniously convert to sinus rhythm with rate in the 70s during this visit, and blood pressure improved to 130s systolic after diltiazem and lisinopril. Patient was discharged with a 30 day prescription for lisinopril. She was seen in clinic for follow up on 12/12, and was instructed to continue lisinopril, diltiazem, and Eliquis which were taken incorrectly due to a misunderstanding.     declined. History interpreted by family member.     Patient reports she has had persistent high blood pressure, midline chest pain, palpitations, and dizziness since yesterday. She states that this dizziness makes it difficult to focus on objects because they appear dark and blurry. Notes that this dizziness is improved by closing her eyes. She adds that she has been compliant with her antihypertensives which she takes in the morning and in the evening. She denies any fever, cough, headache, abdominal pain, or any other complaints.     She states that she has been having chest pain since discharge from the hospital a month ago, comes and goes and today's episode has been there since yesterday.      REVIEW OF SYSTEMS  Review of Systems   Constitutional: Negative for fever.   Eyes: Positive for visual disturbance (vision blurry).   Respiratory: Negative for cough and shortness of breath.    Cardiovascular: Positive for chest pain.   Gastrointestinal: Negative for abdominal pain, diarrhea, rectal pain and vomiting.   Genitourinary: Negative for dysuria.   Allergic/Immunologic: Negative for immunocompromised state.   Neurological: Positive for dizziness. Negative for weakness, numbness and headaches.   Psychiatric/Behavioral: Negative for confusion.   All other systems reviewed and are negative.      PAST MEDICAL HISTORY:  Past Medical History:   Diagnosis  Date     Benign adenomatous polyp of large intestine      Biliary colic      Cervicalgia      Cholelithiasis      Chronic allergic conjunctivitis      Cystitis      Dyspepsia      Gastritis      GERD (gastroesophageal reflux disease)      HLD (hyperlipidemia)      Hypertension      Hypokalemia      Meningioma (H)      Multiple lung nodules on CT     CT ABD 11/23/1013, mult nodules < 5mm in size, per Fleischner Society recommendations  repeat CT in 12 months CT Chest 2/20/17:  Stable pulmonary nodules suggesting incidental findings given long-term stability.      Osteoporosis      Positional vertigo      Pulmonary nodules      Recurrent UTI      Transaminitis 10/29/2015     Urinary incontinence      Xerosis cutis          PAST SURGICAL HISTORY:  Past Surgical History:   Procedure Laterality Date     CHOLECYSTECTOMY  08/21/2015         CURRENT MEDICATIONS:    Prior to Admission medications    Medication Sig Start Date End Date Taking? Authorizing Provider   acetaminophen (TYLENOL) 650 MG CR tablet Take 1 tablet (650 mg) by mouth every 8 hours as needed for pain 5/27/22   Barb Elias MD   albuterol (PROAIR HFA/PROVENTIL HFA/VENTOLIN HFA) 108 (90 Base) MCG/ACT inhaler Inhale 2 puffs into the lungs every 6 hours as needed for shortness of breath / dyspnea or wheezing 11/28/22   Camelia Salvador MD   apixaban ANTICOAGULANT (ELIQUIS) 5 MG tablet Take 1 tablet (5 mg) by mouth 2 times daily 12/12/22   Camelia Salvador MD   artificial tears (GENTEAL) 0.1-0.2-0.3 % ophthalmic solution Place 2 drops into both eyes 4 times daily as needed (dry or irritated eyes) 3/30/22   Barb Elias MD   atorvastatin (LIPITOR) 80 MG tablet Take 1 tablet (80 mg) by mouth At Bedtime 5/27/22   Barb Elias MD   blood pressure monitor Kit [BLOOD PRESSURE MONITOR KIT] Home blood pressure cuff and monitor 2/12/21   Barb Elias MD   diclofenac (VOLTAREN) 1 % topical gel Apply 2 g topically 4 times daily 5/27/22   Barb Elias  MD   diltiazem (CARDIZEM) 30 MG tablet Take 1 tablet (30 mg) by mouth 2 times daily 12/12/22   Camelia Salvador MD   ergocalciferol (ERGOCALCIFEROL) 1.25 MG (18379 UT) capsule Take 1 capsule (50,000 Units) by mouth once a week 3/30/22   Barb Elias MD   ketotifen (ZADITOR) 0.025 % ophthalmic solution Place 1 drop into both eyes 2 times daily 3/30/22   Barb Elias MD   lisinopril (ZESTRIL) 10 MG tablet Take 1 tablet (10 mg) by mouth daily for 30 days 12/6/22 1/5/23  Alfa Persaud MD   meclizine (ANTIVERT) 25 MG tablet [MECLIZINE (ANTIVERT) 25 MG TABLET] TAKE 1 TABLET BY MOUTH 3 TIMES A DAY AS NEEDED FOR DIZZINESS OR NAUSEA.  Patient taking differently: Take 25 mg by mouth daily [MECLIZINE (ANTIVERT) 25 MG TABLET] TAKE 1 TABLET BY MOUTH 3 TIMES A DAY AS NEEDED FOR DIZZINESS OR NAUSEA. 3/30/22   Barb Elias MD   omeprazole (PRILOSEC) 20 MG DR capsule [OMEPRAZOLE (PRILOSEC) 20 MG CAPSULE] TAKE 1 CAPSULE BY MOUTH DAILY BEFORE BREAKFAST 3/30/22   Barb Elias MD   potassium chloride (KLOR-CON) 20 MEQ packet Take 20 mEq by mouth daily 12/6/22   Alfa Persaud MD         ALLERGIES:  Allergies   Allergen Reactions     Ibandronate [Ibandronic Acid] Headache and Shortness Of Breath     Septra [Sulfamethoxazole W/Trimethoprim] Other (See Comments)     Acute hepatitis          FAMILY HISTORY:  Family History   Problem Relation Age of Onset     No Known Problems Mother      No Known Problems Father          SOCIAL HISTORY:  Social History     Socioeconomic History     Marital status:    Tobacco Use     Smoking status: Never     Smokeless tobacco: Never   Substance and Sexual Activity     Alcohol use: No     Drug use: No     Sexual activity: Never     Birth control/protection: Post-menopausal         VITALS:  Patient Vitals for the past 24 hrs:   BP Temp Temp src Pulse Resp SpO2   01/08/23 1600 (!) 189/81 -- -- 75 -- 99 %   01/08/23 1540 (!) 172/65 -- -- 72 -- 99 %   01/08/23 1530 (!) 189/83 --  -- 75 -- 100 %   01/08/23 1515 (!) 176/89 -- -- 80 -- 100 %   01/08/23 1500 (!) 157/79 -- -- 78 -- 100 %   01/08/23 1450 (!) 164/78 -- -- 67 -- 99 %   01/08/23 1430 (!) 178/81 -- -- 72 -- 100 %   01/08/23 1420 (!) 165/79 -- -- 73 -- 99 %   01/08/23 1410 (!) 166/81 -- -- 73 -- 100 %   01/08/23 1230 (!) 183/86 -- -- 65 20 100 %   01/08/23 1220 (!) 179/86 -- -- 60 19 100 %   01/08/23 1215 (!) 176/80 -- -- 65 20 100 %   01/08/23 1140 (!) 194/90 -- -- 59 19 100 %   01/08/23 0937 (!) 190/95 97.9  F (36.6  C) Oral 74 20 98 %       Wt Readings from Last 3 Encounters:   12/12/22 55.3 kg (122 lb)   12/06/22 54.4 kg (120 lb)   11/28/22 57.2 kg (126 lb)       Estimated Creatinine Clearance: 49.6 mL/min (based on SCr of 0.83 mg/dL).    PHYSICAL EXAM    Constitutional:  Well developed, Well nourished, NAD, GCS 15  HENT:  Normocephalic, Atraumatic, Bilateral external ears normal, Nose normal. Neck- Supple, No stridor.  Eyes:  PERRL, EOMI, Conjunctiva normal, No discharge.  Intact normal vision in all visual fields.  No appreciated visual field cuts.  Respiratory:  Normal breath sounds, No respiratory distress, No wheezing, Speaks full sentences easily. No cough.   Cardiovascular:  Normal heart rate, Regular rhythm, No rubs, No gallops. Chest wall nontender.   GI:  No excessive obesity.  Bowel sounds normal, Soft, No tenderness, No masses, No flank tenderness. No rebound or guarding.   : deferred  Musculoskeletal: No cyanosis, No clubbing. Good range of motion in all major joints. No major deformities noted.   Integument:  Warm, Dry, No erythema, No rash.  No petechiae.   Neurologic:  Alert & oriented x 3, Normal motor function, Normal sensory function, No focal deficits noted.  No visual field cuts appreciated.  Vision appears grossly normal.  Psychiatric:  Affect normal, Cooperative         LAB:  All pertinent labs reviewed and interpreted.  Recent Results (from the past 24 hour(s))   Basic metabolic panel    Collection Time:  01/08/23 10:15 AM   Result Value Ref Range    Sodium 144 136 - 145 mmol/L    Potassium 3.0 (L) 3.4 - 5.3 mmol/L    Chloride 108 (H) 98 - 107 mmol/L    Carbon Dioxide (CO2) 26 22 - 29 mmol/L    Anion Gap 10 7 - 15 mmol/L    Urea Nitrogen 9.1 8.0 - 23.0 mg/dL    Creatinine 0.83 0.51 - 0.95 mg/dL    Calcium 9.1 8.8 - 10.2 mg/dL    Glucose 108 (H) 70 - 99 mg/dL    GFR Estimate 72 >60 mL/min/1.73m2   Troponin T, High Sensitivity    Collection Time: 01/08/23 10:15 AM   Result Value Ref Range    Troponin T, High Sensitivity 8 <=14 ng/L   CBC with platelets and differential    Collection Time: 01/08/23 10:15 AM   Result Value Ref Range    WBC Count 7.3 4.0 - 11.0 10e3/uL    RBC Count 4.56 3.80 - 5.20 10e6/uL    Hemoglobin 14.0 11.7 - 15.7 g/dL    Hematocrit 42.7 35.0 - 47.0 %    MCV 94 78 - 100 fL    MCH 30.7 26.5 - 33.0 pg    MCHC 32.8 31.5 - 36.5 g/dL    RDW 13.2 10.0 - 15.0 %    Platelet Count 290 150 - 450 10e3/uL    % Neutrophils 50 %    % Lymphocytes 34 %    % Monocytes 7 %    % Eosinophils 9 %    % Basophils 0 %    % Immature Granulocytes 0 %    NRBCs per 100 WBC 0 <1 /100    Absolute Neutrophils 3.6 1.6 - 8.3 10e3/uL    Absolute Lymphocytes 2.5 0.8 - 5.3 10e3/uL    Absolute Monocytes 0.5 0.0 - 1.3 10e3/uL    Absolute Eosinophils 0.7 0.0 - 0.7 10e3/uL    Absolute Basophils 0.0 0.0 - 0.2 10e3/uL    Absolute Immature Granulocytes 0.0 <=0.4 10e3/uL    Absolute NRBCs 0.0 10e3/uL   Magnesium    Collection Time: 01/08/23 10:15 AM   Result Value Ref Range    Magnesium 1.9 1.7 - 2.3 mg/dL   Troponin T, High Sensitivity (now)    Collection Time: 01/08/23  2:16 PM   Result Value Ref Range    Troponin T, High Sensitivity 8 <=14 ng/L       No results found for: ABORH        RADIOLOGY:  Reviewed all pertinent imaging. Please see official radiology report.    MRA Angiogram Neck w/o & w Contrast   Final Result   IMPRESSION:   HEAD MRI:    1.  No acute intracranial finding.   2.  Unchanged meningioma centered at the left tentorial  leaflet with infratentorial and supratentorial extension with invasion of the distal left transverse dural venous sinus. Similar mild regional mass effect and edema within the left cerebellar    hemisphere.      HEAD MRA:    1.  No large vessel occlusion or high-grade stenosis.   2.  Unchanged 2.5 mm outpouching arising from the proximal cavernous right ICA projecting posterolaterally, which may represent an aneurysm versus infundibulum.      NECK MRA:   1.  No evidence of carotid or vertebral artery hemodynamically significant stenosis or dissection.      MR Head w/o Contrast Angiogram   Final Result   IMPRESSION:   HEAD MRI:    1.  No acute intracranial finding.   2.  Unchanged meningioma centered at the left tentorial leaflet with infratentorial and supratentorial extension with invasion of the distal left transverse dural venous sinus. Similar mild regional mass effect and edema within the left cerebellar    hemisphere.      HEAD MRA:    1.  No large vessel occlusion or high-grade stenosis.   2.  Unchanged 2.5 mm outpouching arising from the proximal cavernous right ICA projecting posterolaterally, which may represent an aneurysm versus infundibulum.      NECK MRA:   1.  No evidence of carotid or vertebral artery hemodynamically significant stenosis or dissection.      MR Brain w/o & w Contrast   Final Result   IMPRESSION:   HEAD MRI:    1.  No acute intracranial finding.   2.  Unchanged meningioma centered at the left tentorial leaflet with infratentorial and supratentorial extension with invasion of the distal left transverse dural venous sinus. Similar mild regional mass effect and edema within the left cerebellar    hemisphere.      HEAD MRA:    1.  No large vessel occlusion or high-grade stenosis.   2.  Unchanged 2.5 mm outpouching arising from the proximal cavernous right ICA projecting posterolaterally, which may represent an aneurysm versus infundibulum.      NECK MRA:   1.  No evidence of carotid or  vertebral artery hemodynamically significant stenosis or dissection.      Chest XR,  PA & LAT   Final Result   IMPRESSION: Negative chest.                  EKG:    Indication: dizziness    Performed at: 09:43a  Impression: Normal sinus rhythm at 64 bpm.  Flipped T waves noted in lead aVR and V1- V4.  VT interval 158, QRS 70, QTc 435 ms.  EKG from December 6, 2022 shows atrial fibrillation, but compared to EKG from November 10, 2022 for which she is in sinus rhythm, this EKG looks very similar including the flipped T waves.      I have independently reviewed and interpreted the EKG(s) documented above.        PROCEDURES:  none    Medical Decision Making    History:    Supplemental history from: Family Member/Significant Other    External Record(s) reviewed: Outpatient Record: Blood pressure recheck from outpatient clinic shows a blood pressure in the 120s systolic.    Work Up:    Chart documentation includes differential considered and any EKGs or imaging independently interpreted by provider.    In additional to work up documented, I considered the following work up: See chart documentation, if applicable.    External consultation:    Discussion of management with another provider: See chart documentation, if applicable    Complicating factors:    Care impacted by chronic illness: Hypertension    Care affected by social determinants of health: N/A    Disposition considerations: Discharge. I recommended the patient continue their current prescription strength medication(s): Including her lisinopril and diltiazem.. I considered admission, but discharged patient after significant clinical improvement.              I, Nic Medrano, am serving as a scribe to document services personally performed by Dr. Ellyn Hampton based on my observation and the provider's statements to me. I, Dr. Ellyn Hampton MD attest that Nic Medrano is acting in a scribe capacity, has observed my performance of the services and has  documented them in accordance with my direction.        Ellyn Hampton M.D. [facility]  Emergency Medicine and Medical Toxicology  Formerly [facility] EMERGENCY DEPARTMENT  [address] [place] [id number] [phone]  Dept: [phone]           Ellyn Hampton MD  01/08/23 4069     No

## 2023-10-02 ENCOUNTER — PATIENT OUTREACH (OUTPATIENT)
Dept: GERIATRIC MEDICINE | Facility: CLINIC | Age: 78
End: 2023-10-02
Payer: MEDICARE

## 2023-10-02 NOTE — PROGRESS NOTES
[facility] Care Coordination Contact      [facility] Six-Month Telephone Assessment    6 month telephone assessment completed on 10/2/23.    ER visits: Yes -  [facility]  Hospitalizations: No  TCU stays: No  Significant health status changes: None, daughter reports stable.   Falls/Injuries: No  ADL/IADL changes: No  Changes in services: No    Caregiver Assessment follow up:  Daughters are still primary caregiver for Dah Lay Joseluis.     Goals: See POC in chart for goal progress documentation.      Will see member in 6 months for an annual health risk assessment.   Encouraged member to call CC with any questions or concerns in the meantime.     Vita Chandra RN, BSN, PHN  [facility]  Phone: [phone]

## 2023-10-06 ENCOUNTER — OFFICE VISIT (OUTPATIENT)
Dept: FAMILY MEDICINE | Facility: CLINIC | Age: 78
End: 2023-10-06
Payer: MEDICARE

## 2023-10-06 VITALS
TEMPERATURE: 97.6 F | DIASTOLIC BLOOD PRESSURE: 77 MMHG | SYSTOLIC BLOOD PRESSURE: 136 MMHG | OXYGEN SATURATION: 98 % | RESPIRATION RATE: 16 BRPM | WEIGHT: 125.75 LBS | HEIGHT: 57 IN | BODY MASS INDEX: 27.13 KG/M2 | HEART RATE: 74 BPM

## 2023-10-06 DIAGNOSIS — N18.31 CHRONIC KIDNEY DISEASE, STAGE 3A (H): ICD-10-CM

## 2023-10-06 DIAGNOSIS — Z00.00 ENCOUNTER FOR MEDICARE ANNUAL WELLNESS EXAM: Primary | ICD-10-CM

## 2023-10-06 DIAGNOSIS — I10 ESSENTIAL HYPERTENSION: ICD-10-CM

## 2023-10-06 DIAGNOSIS — H57.9 PRURITUS OF BOTH EYES: ICD-10-CM

## 2023-10-06 DIAGNOSIS — R42 DIZZINESS: ICD-10-CM

## 2023-10-06 DIAGNOSIS — M81.0 AGE-RELATED OSTEOPOROSIS WITHOUT CURRENT PATHOLOGICAL FRACTURE: ICD-10-CM

## 2023-10-06 DIAGNOSIS — E78.00 HYPERCHOLESTEROLEMIA: ICD-10-CM

## 2023-10-06 DIAGNOSIS — I48.0 PAROXYSMAL ATRIAL FIBRILLATION (H): ICD-10-CM

## 2023-10-06 DIAGNOSIS — D32.0 BENIGN NEOPLASM OF CEREBRAL MENINGES (H): ICD-10-CM

## 2023-10-06 LAB
ALBUMIN UR-MCNC: NEGATIVE MG/DL
ANION GAP SERPL CALCULATED.3IONS-SCNC: 11 MMOL/L (ref 7–15)
APPEARANCE UR: CLEAR
BILIRUB UR QL STRIP: NEGATIVE
BUN SERPL-MCNC: 12.6 MG/DL (ref 8–23)
CALCIUM SERPL-MCNC: 9.3 MG/DL (ref 8.8–10.2)
CHLORIDE SERPL-SCNC: 105 MMOL/L (ref 98–107)
COLOR UR AUTO: YELLOW
CREAT SERPL-MCNC: 0.85 MG/DL (ref 0.51–0.95)
CREAT UR-MCNC: 34.1 MG/DL
DEPRECATED HCO3 PLAS-SCNC: 26 MMOL/L (ref 22–29)
EGFRCR SERPLBLD CKD-EPI 2021: 70 ML/MIN/1.73M2
GLUCOSE SERPL-MCNC: 157 MG/DL (ref 70–99)
GLUCOSE UR STRIP-MCNC: NEGATIVE MG/DL
HGB UR QL STRIP: NEGATIVE
KETONES UR STRIP-MCNC: NEGATIVE MG/DL
LEUKOCYTE ESTERASE UR QL STRIP: ABNORMAL
MICROALBUMIN UR-MCNC: <12 MG/L
MICROALBUMIN/CREAT UR: NORMAL MG/G{CREAT}
NITRATE UR QL: NEGATIVE
PH UR STRIP: 6 [PH] (ref 5–7)
POTASSIUM SERPL-SCNC: 3.7 MMOL/L (ref 3.4–5.3)
SODIUM SERPL-SCNC: 142 MMOL/L (ref 135–145)
SP GR UR STRIP: <=1.005 (ref 1–1.03)
UROBILINOGEN UR STRIP-ACNC: 0.2 E.U./DL

## 2023-10-06 PROCEDURE — 81003 URINALYSIS AUTO W/O SCOPE: CPT | Performed by: FAMILY MEDICINE

## 2023-10-06 PROCEDURE — 80048 BASIC METABOLIC PNL TOTAL CA: CPT | Performed by: FAMILY MEDICINE

## 2023-10-06 PROCEDURE — 82043 UR ALBUMIN QUANTITATIVE: CPT | Performed by: FAMILY MEDICINE

## 2023-10-06 PROCEDURE — 82570 ASSAY OF URINE CREATININE: CPT | Performed by: FAMILY MEDICINE

## 2023-10-06 PROCEDURE — G0008 ADMIN INFLUENZA VIRUS VAC: HCPCS | Performed by: FAMILY MEDICINE

## 2023-10-06 PROCEDURE — 36415 COLL VENOUS BLD VENIPUNCTURE: CPT | Performed by: FAMILY MEDICINE

## 2023-10-06 PROCEDURE — G0439 PPPS, SUBSEQ VISIT: HCPCS | Performed by: FAMILY MEDICINE

## 2023-10-06 PROCEDURE — 99214 OFFICE O/P EST MOD 30 MIN: CPT | Mod: 25 | Performed by: FAMILY MEDICINE

## 2023-10-06 PROCEDURE — 90662 IIV NO PRSV INCREASED AG IM: CPT | Performed by: FAMILY MEDICINE

## 2023-10-06 RX ORDER — APIXABAN 5 MG/1
5 TABLET, FILM COATED ORAL 2 TIMES DAILY
COMMUNITY
Start: 2023-07-20 | End: 2023-10-06

## 2023-10-06 RX ORDER — GLIPIZIDE 10 MG/1
2 TABLET ORAL 4 TIMES DAILY PRN
Qty: 15 ML | Refills: 11 | Status: SHIPPED | OUTPATIENT
Start: 2023-10-06

## 2023-10-06 RX ORDER — LISINOPRIL 10 MG/1
10 TABLET ORAL DAILY
Qty: 30 TABLET | Refills: 11 | Status: SHIPPED | OUTPATIENT
Start: 2023-10-06 | End: 2023-11-14

## 2023-10-06 RX ORDER — ATORVASTATIN CALCIUM 80 MG/1
80 TABLET, FILM COATED ORAL AT BEDTIME
Qty: 30 TABLET | Refills: 11 | Status: SHIPPED | OUTPATIENT
Start: 2023-10-06 | End: 2024-01-23

## 2023-10-06 RX ORDER — MECLIZINE HYDROCHLORIDE 25 MG/1
TABLET ORAL
Qty: 30 TABLET | Refills: 4 | Status: SHIPPED | OUTPATIENT
Start: 2023-10-06 | End: 2023-11-14

## 2023-10-06 RX ORDER — DILTIAZEM HYDROCHLORIDE 30 MG/1
30 TABLET, FILM COATED ORAL 2 TIMES DAILY
Qty: 60 TABLET | Refills: 11 | Status: SHIPPED | OUTPATIENT
Start: 2023-10-06 | End: 2024-01-23

## 2023-10-06 RX ORDER — APIXABAN 5 MG/1
5 TABLET, FILM COATED ORAL 2 TIMES DAILY
Qty: 60 TABLET | Refills: 11 | Status: SHIPPED | OUTPATIENT
Start: 2023-10-06 | End: 2023-11-13

## 2023-10-06 ASSESSMENT — ENCOUNTER SYMPTOMS
SORE THROAT: 0
CHILLS: 0
HEARTBURN: 0
SHORTNESS OF BREATH: 0
EYE PAIN: 0
HEMATURIA: 0
HEMATOCHEZIA: 0
NERVOUS/ANXIOUS: 0
ABDOMINAL PAIN: 0
WEAKNESS: 1
FEVER: 0
CONSTIPATION: 0
DIARRHEA: 0
PARESTHESIAS: 0
BREAST MASS: 0
PALPITATIONS: 0
FREQUENCY: 0
DIZZINESS: 1
DYSURIA: 0
JOINT SWELLING: 1
NAUSEA: 0
HEADACHES: 0
ARTHRALGIAS: 1
MYALGIAS: 0
COUGH: 0

## 2023-10-06 ASSESSMENT — ACTIVITIES OF DAILY LIVING (ADL)
CURRENT_FUNCTION: BATHING REQUIRES ASSISTANCE
CURRENT_FUNCTION: LAUNDRY REQUIRES ASSISTANCE
CURRENT_FUNCTION: MEDICATION ADMINISTRATION REQUIRES ASSISTANCE
CURRENT_FUNCTION: TRANSPORTATION REQUIRES ASSISTANCE
CURRENT_FUNCTION: HOUSEWORK REQUIRES ASSISTANCE
CURRENT_FUNCTION: MONEY MANAGEMENT REQUIRES ASSISTANCE
CURRENT_FUNCTION: PREPARING MEALS REQUIRES ASSISTANCE
CURRENT_FUNCTION: SHOPPING REQUIRES ASSISTANCE

## 2023-10-06 NOTE — PROGRESS NOTES
"  SUBJECTIVE:   Jennifer is a 78 year old who presents for Preventive Visit.      10/6/2023     1:43 PM   Additional Questions   Roomed by Letha MORA   Accompanied by Daughter       Are you in the first 12 months of your Medicare coverage?  No    Healthy Habits:     In general, how would you rate your overall health?  Poor    Do you usually eat at least 4 servings of fruit and vegetables a day, include whole grains    & fiber and avoid regularly eating high fat or \"junk\" foods?  Yes    Ability to successfully perform activities of daily living:  Transportation requires assistance, shopping requires assistance, preparing meals requires assistance, housework requires assistance, bathing requires assistance, laundry requires assistance, medication administration requires assistance and money management requires assistance    Home Safety:  Lack of grab bars in the bathroom and lack of handrails on stairs    Hearing Impairment:  No hearing concerns    In the past 6 months, have you been bothered by leaking of urine?  No    In general, how would you rate your overall mental or emotional health?  Good      Today's PHQ-2 Score:       10/6/2023     2:07 PM   PHQ-2 ( 1999 [employer])   Q1: Little interest or pleasure in doing things 0   Q2: Feeling down, depressed or hopeless 0   PHQ-2 Score 0   Q1: Little interest or pleasure in doing things Not at all   Q2: Feeling down, depressed or hopeless Not at all   PHQ-2 Score 0       Ran out of Eliquis in July.  Thought it was for blood clot in arm.    Have you ever done Advance Care Planning? (For example, a Health Directive, POLST, or a discussion with a medical provider or your loved ones about your wishes): No, advance care planning information given to patient to review.  Advanced care planning was discussed at today's visit.       Fall risk  Fallen 2 or more times in the past year?: No  Any fall with injury in the past year?: No    Cognitive Screening Not appropriate due language barrier " and  Unable- pt does not know how to draw a clock.        Reviewed and updated as needed this visit by clinical staff   Tobacco  Allergies  Meds              Reviewed and updated as needed this visit by Provider                 Social History     Tobacco Use    Smoking status: Never    Smokeless tobacco: Never   Substance Use Topics    Alcohol use: No             10/6/2023     2:06 PM   Alcohol Use   Prescreen: >3 drinks/day or >7 drinks/week? Not Applicable     Do you have a current opioid prescription? No  Do you use any other controlled substances or medications that are not prescribed by a provider? None              Current providers sharing in care for this patient include:    Patient Care Team:  Barb Elias MD as PCP - General (Family Medicine)  Vita Chandra RN as Lead Care Coordinator (Primary Care - CC)  Kameron Sanchez APRN CNP as Assigned PCP  Yoni Guillory MD as Assigned Neuroscience Provider    The following health maintenance items are reviewed in Epic and correct as of today:  Health Maintenance   Topic Date Due    RSV VACCINE 60+ (1 - 1-dose 60+ series) Never done    COVID-19 Vaccine (3 - 2023-24 season) 09/01/2023    LIPID  09/30/2023    HEMOGLOBIN  07/03/2024    ANNUAL REVIEW OF HM ORDERS  07/18/2024    MEDICARE ANNUAL WELLNESS VISIT  10/06/2024    BMP  10/06/2024    MICROALBUMIN  10/06/2024    FALL RISK ASSESSMENT  10/06/2024    ADVANCE CARE PLANNING  11/01/2026    DTAP/TDAP/TD IMMUNIZATION (4 - Td or Tdap) 11/14/2027    DEXA  08/17/2035    HEPATITIS C SCREENING  Completed    PHQ-2 (once per calendar year)  Completed    INFLUENZA VACCINE  Completed    Pneumococcal Vaccine: 65+ Years  Completed    URINALYSIS  Completed    ZOSTER IMMUNIZATION  Completed    IPV IMMUNIZATION  Aged Out    HPV IMMUNIZATION  Aged Out    MENINGITIS IMMUNIZATION  Aged Out    MAMMO SCREENING  Discontinued    COLORECTAL CANCER SCREENING  Discontinued     BP Readings from Last 3 Encounters:   10/06/23 136/77    07/18/23 134/73   07/10/23 (!) 149/71    Wt Readings from Last 3 Encounters:   10/06/23 57 kg (125 lb 12 oz)   07/18/23 56.2 kg (124 lb)   07/03/23 56.9 kg (125 lb 6.4 oz)                  Patient Active Problem List   Diagnosis    Neuritis    Meningioma    Joint Pain, Localized In The Shoulder    Cervicalgia    Hypercholesterolemia    Chronic allergic conjunctivitis    Osteoporosis    Dyspepsia    Benign paroxysmal positional vertigo    Essential hypertension    Hypokalemia    Urinary incontinence    Benign Adenomatous Polyp Of The Large Intestine    Xerosis Cutis    Recurrent UTI    GERD (gastroesophageal reflux disease)    Constipation    Hepatitis (non-viral, sporadic elevation of LFT's)    Dizziness    Osteoarthritis of multiple joints    A-fib (H)    Chronic kidney disease, stage 3a (H)     Past Surgical History:   Procedure Laterality Date    CHOLECYSTECTOMY  08/21/2015       Social History     Tobacco Use    Smoking status: Never    Smokeless tobacco: Never   Substance Use Topics    Alcohol use: No     Family History   Problem Relation Age of Onset    No Known Problems Mother     No Known Problems Father          Current Outpatient Medications   Medication Sig Dispense Refill    acetaminophen (TYLENOL) 650 MG CR tablet Take 1 tablet (650 mg) by mouth every 8 hours as needed for pain 50 tablet 4    albuterol (PROAIR HFA/PROVENTIL HFA/VENTOLIN HFA) 108 (90 Base) MCG/ACT inhaler Inhale 2 puffs into the lungs every 6 hours as needed for shortness of breath or wheezing 18 g 3    artificial tears (GENTEAL) 0.1-0.2-0.3 % ophthalmic solution Place 2 drops into both eyes 4 times daily as needed (dry or irritated eyes) 15 mL 11    atorvastatin (LIPITOR) 80 MG tablet Take 1 tablet (80 mg) by mouth at bedtime 30 tablet 11    blood pressure monitor Kit [BLOOD PRESSURE MONITOR KIT] Home blood pressure cuff and monitor 1 each 0    diltiazem (CARDIZEM) 30 MG tablet Take 1 tablet (30 mg) by mouth 2 times daily 60 tablet 11     ELIQUIS ANTICOAGULANT 5 MG tablet Take 1 tablet (5 mg) by mouth 2 times daily 60 tablet 11    hydrOXYzine (ATARAX) 25 MG tablet Take 1 tablet (25 mg) by mouth 3 times daily as needed for itching 30 tablet 3    ketotifen (ZADITOR) 0.025 % ophthalmic solution Place 1 drop into both eyes 2 times daily 10 mL 11    lisinopril (ZESTRIL) 10 MG tablet Take 1 tablet (10 mg) by mouth daily 30 tablet 11    meclizine (ANTIVERT) 25 MG tablet TAKE 1 TABLET BY MOUTH 3 TIMES A DAY AS NEEDED FOR DIZZINESS OR NAUSEA. 30 tablet 4    omeprazole (PRILOSEC) 20 MG DR capsule [OMEPRAZOLE (PRILOSEC) 20 MG CAPSULE] TAKE 1 CAPSULE BY MOUTH DAILY BEFORE BREAKFAST 90 capsule 2    potassium chloride (KLOR-CON) 20 MEQ packet Take 20 mEq by mouth daily 14 packet 0    vitamin D3 (CHOLECALCIFEROL) 50 mcg (2000 units) tablet Take 1 tablet (50 mcg) by mouth daily 90 tablet 3     Allergies   Allergen Reactions    Ibandronate [Ibandronic Acid] Headache and Shortness Of Breath    Septra [Sulfamethoxazole-Trimethoprim] Other (See Comments)     Acute hepatitis      Recent Labs   Lab Test 10/06/23  1523 07/18/23  1246 01/26/23  1654 01/10/23  1650 01/08/23  1015 12/12/22  1327 11/10/22  1559 11/10/22  1005 09/30/22  1657 09/24/21  1704 09/24/21  1704 02/12/21  1320 02/12/21  1320 08/06/20  1112   A1C  --   --   --   --   --  5.6  --   --   --   --   --   --   --   --    LDL  --   --   --   --   --   --   --   --  119*  --  108  --  163* 193*   HDL  --   --   --   --   --   --   --   --  49*  --  59  --  55 53   TRIG  --   --   --   --   --   --   --   --  139  --  169*  --  188* 266*   ALT  --   --   --  26  --   --   --  25 18  --  24  --  19 29   CR 0.85 1.04*   < > 0.86   < > 0.93   < > 0.83 1.05*  --  0.93  --  0.93 0.96   GFRESTIMATED 70 55*   < > 69   < > 63   < > 73 55*  --  60*   < > 59* 57*   GFRESTBLACK  --   --   --   --   --   --   --   --   --   --   --   --  >60 >60   POTASSIUM 3.7 3.8   < > 4.0   < > 4.4   < > 2.8* 2.9*   < > 4.0  --  3.7  "3.1*   TSH  --   --   --   --   --   --   --  3.51  --   --   --   --   --   --     < > = values in this interval not displayed.          Mammogram Screening - Patient over age 75, has elected to discontinue screenings.  Pertinent mammograms are reviewed under the imaging tab.    Review of Systems   Constitutional:  Negative for chills and fever.   HENT:  Negative for congestion, ear pain, hearing loss and sore throat.    Eyes:  Positive for visual disturbance. Negative for pain.   Respiratory:  Negative for cough and shortness of breath.    Cardiovascular:  Positive for peripheral edema. Negative for chest pain and palpitations.   Gastrointestinal:  Negative for abdominal pain, constipation, diarrhea, heartburn, hematochezia and nausea.   Breasts:  Negative for tenderness, breast mass and discharge.   Genitourinary:  Negative for dysuria, frequency, genital sores, hematuria, pelvic pain, urgency, vaginal bleeding and vaginal discharge.   Musculoskeletal:  Positive for arthralgias and joint swelling. Negative for myalgias.   Skin:  Negative for rash.   Neurological:  Positive for dizziness and weakness. Negative for headaches and paresthesias.   Psychiatric/Behavioral:  Negative for mood changes. The patient is not nervous/anxious.          OBJECTIVE:   /77   Pulse 74   Temp 97.6  F (36.4  C) (Oral)   Resp 16   Ht 1.444 m (4' 8.85\")   Wt 57 kg (125 lb 12 oz)   SpO2 98%   BMI 27.36 kg/m   Estimated body mass index is 27.36 kg/m  as calculated from the following:    Height as of this encounter: 1.444 m (4' 8.85\").    Weight as of this encounter: 57 kg (125 lb 12 oz).  Physical Exam  GENERAL APPEARANCE: healthy, alert and no distress  EYES: Eyes grossly normal to inspection, PERRL and conjunctivae and sclerae normal  HENT: ear canals and TM's normal, nose and mouth without ulcers or lesions, oropharynx clear and oral mucous membranes moist  NECK: no adenopathy, no asymmetry, masses, or scars and thyroid " normal to palpation  RESP: lungs clear to auscultation - no rales, rhonchi or wheezes  BREAST: normal without masses, tenderness or nipple discharge and no palpable axillary masses or adenopathy  CV: regular rate and rhythm, normal S1 S2, no S3 or S4, no murmur, click or rub, no peripheral edema and peripheral pulses strong  ABDOMEN: soft, nontender, no hepatosplenomegaly, no masses and bowel sounds normal  MS: no musculoskeletal defects are noted and gait is age appropriate without ataxia  SKIN: no suspicious lesions or rashes  NEURO: Normal strength and tone, sensory exam grossly normal, mentation intact and speech normal  PSYCH: mentation appears normal and affect normal/bright        ASSESSMENT / PLAN:     Pt here to re-establish care with me/annual wellness visit.    Encounter for Medicare annual wellness exam  See below     Paroxysmal atrial fibrillation (H)  Due to some past confusion/insurance coverage issues between Eliquis and Xarelto, pt was not on any anticoagulant.  Restarting now with Eliquis as that seems to be preferred by her insurance.  Also, never saw cardiology after hospitalization 11/10/22-11/11/22 for afib with RVR. Will refer now.  -     ELIQUIS ANTICOAGULANT 5 MG tablet; Take 1 tablet (5 mg) by mouth 2 times daily  -     diltiazem (CARDIZEM) 30 MG tablet; Take 1 tablet (30 mg) by mouth 2 times daily  -     Adult Cardiology [facility] S[facility] Referral; Future    Chronic kidney disease, stage 3a (H)  Most GFR has been above 60, but last 2 slightly below. Continue to monitor carefully.  -     Basic metabolic panel  (Ca, Cl, CO2, Creat, Gluc, K, Na, BUN); Future  -     Urinalysis Macroscopic; Future  -     Albumin Random Urine Quantitative with Creat Ratio; Future    Essential hypertension  Adequate control on diltiazem and lisinopril. Continue.    Age-related osteoporosis without current pathological fracture  On Prolia 5/10/17-9/30/22, unclear if got exactly every 6 months during this time and no  "shots in the last year since switching doctors. Will check DXA now and go from there.  -     DX Hip/Pelvis/Spine; Future    Pruritus of both eyes  -     artificial tears (GENTEAL) 0.1-0.2-0.3 % ophthalmic solution; Place 2 drops into both eyes 4 times daily as needed (dry or irritated eyes)    Dizziness  -     meclizine (ANTIVERT) 25 MG tablet; TAKE 1 TABLET BY MOUTH 3 TIMES A DAY AS NEEDED FOR DIZZINESS OR NAUSEA.    Meningioma  First noted on brain MRI 11/14/12.  Has been stable for growing very slowly since that time.  Most recent MRI 7/3/23 showed mild edema and locally occluded lateral left transverse sinus.  Neurosurgery (Dr Yoni Guillory) saw her during hospitalization 7/10/23 and recommends follow-up MRI and neurosurg visit in 6 months.    Hypercholesterolemia  -     atorvastatin (LIPITOR) 80 MG tablet; Take 1 tablet (80 mg) by mouth at bedtime      Other orders  -     INFLUENZA VACCINE 65+ (FLUZONE HD)  -     PRIMARY CARE FOLLOW-UP SCHEDULING; Future  -     PRIMARY CARE FOLLOW-UP SCHEDULING; Future               COUNSELING:  Reviewed preventive health counseling, as reflected in patient instructions      BMI:   Estimated body mass index is 27.36 kg/m  as calculated from the following:    Height as of this encounter: 1.444 m (4' 8.85\").    Weight as of this encounter: 57 kg (125 lb 12 oz).         She reports that she has never smoked. She has never used smokeless tobacco.      Appropriate preventive services were discussed with this patient, including applicable screening as appropriate for fall prevention, nutrition, physical activity, Tobacco-use cessation, weight loss and cognition.  Checklist reviewing preventive services available has been given to the patient.    Reviewed patients plan of care and provided an AVS. The Basic Care Plan (routine screening as documented in Health Maintenance) for Da meets the Care Plan requirement. This Care Plan has been established and reviewed with the Patient and " caregiver.          Barb Elias MD  [facility]    Identified Health Risks:  I have reviewed Opioid Use Disorder and Substance Use Disorder risk factors and made any needed referrals.     The patient was provided with suggestions to help her develop a healthy physical lifestyle.  The patient reports that she has difficulty with activities of daily living. I have asked that the patient make a follow up appointment in n/a weeks where this issue will be further evaluated and addressed.

## 2023-10-06 NOTE — PROGRESS NOTES
"SUBJECTIVE:   Jennifer is a 78 year old who presents for Preventive Visit.      10/6/2023     1:43 PM   Additional Questions   Roomed by Letha MORA   Accompanied by Daughter       Are you in the first 12 months of your Medicare coverage?  { :560387}    Healthy Habits:     In general, how would you rate your overall health?  Poor    Do you usually eat at least 4 servings of fruit and vegetables a day, include whole grains    & fiber and avoid regularly eating high fat or \"junk\" foods?  Yes    Ability to successfully perform activities of daily living:  Transportation requires assistance, shopping requires assistance, preparing meals requires assistance, housework requires assistance, bathing requires assistance, laundry requires assistance, medication administration requires assistance and money management requires assistance    Home Safety:  Lack of grab bars in the bathroom and lack of handrails on stairs    Hearing Impairment:  No hearing concerns    In the past 6 months, have you been bothered by leaking of urine?  No    In general, how would you rate your overall mental or emotional health?  Good      Today's PHQ-2 Score:       10/6/2023     2:07 PM   PHQ-2 ( 1999 [employer])   Q1: Little interest or pleasure in doing things 0   Q2: Feeling down, depressed or hopeless 0   PHQ-2 Score 0   Q1: Little interest or pleasure in doing things Not at all   Q2: Feeling down, depressed or hopeless Not at all   PHQ-2 Score 0           Have you ever done Advance Care Planning? (For example, a Health Directive, POLST, or a discussion with a medical provider or your loved ones about your wishes): { :601493}    {Hearing Test Done (Optional):583265}   Fall risk  { :704381}  {If any of the above assessments are answered yes, consider ordering appropriate referrals (Optional):050985}  Cognitive Screening   1) Repeat 3 items (Leader, Season, Table)  {Get patient's attention, then say, \"I am going to say three words that I want you to remember " "now and later.  The words are Leader, Season, and Table.  Please say them for me now.\"  If pt. unable after 3 tries, go to next item}  2) Clock draw: {Say the following phrases in order: \"Please draw a clock.  Start by drawing a large [place].  Put all the numbers in the [place] and set the hands to show 11:10 (10 past 11).\"  If pt cannot complete in 3 minutes, stop and ask for recall items.  \"NORMAL\" test = all twelve numbers in correct location, and clock hands correctly designating 11:10} Unable- pt does not know how to draw a clock.  3) 3 item recall: {Say: \"What were the three words I asked you to remember?\"}{ :817442}  Results: {MINICOG RESULTS:665909}    Mini-CogTM Copyright S [employer]. Licensed by the author for use in [facility]; reprinted with permission (soob@[email or web address]). All rights reserved.      {Do you have sleep apnea, excessive snoring or daytime drowsiness? (Optional):420364}    Reviewed and updated as needed this visit by clinical staff   Tobacco  Allergies  Meds              Reviewed and updated as needed this visit by Provider                 Social History     Tobacco Use    Smoking status: Never    Smokeless tobacco: Never   Substance Use Topics    Alcohol use: No     {Rooming staff  Click this link to complete the Prescreen if response below is not for today's visit  Alcohol Use Prescreen >3 drinks/day or > 7 drinks/week.  If the prescreen question answer is YES, complete the full AUDIT  :292352}        10/6/2023     2:06 PM   Alcohol Use   Prescreen: >3 drinks/day or >7 drinks/week? Not Applicable   {add AUDIT responses (Optional) (A score of 7 for adult men is an indication of hazardous drinking; a score of 8 or more is an indication of an alcohol use disorder.  A score of 7 or more for adult women is an indication of hazardous drinking or an alchohol use disorder):384201}  Do you have a current opioid prescription? { :857570}  Do you use any other controlled substances or " medications that are not prescribed by a provider? {Substance Use :719892}  {Provider  If there are gaps in the social history shown above, please follow the link and refresh the note Link to Social and Substance History :334657}    {Outside tests to abstract? :825685}    {additional problems to add (Optional):872542}    Current providers sharing in care for this patient include: {Rooming staff:  Please update Care Team from storyboard, refresh this note and then delete this statement}  Patient Care Team:  Kameron Sanchez APRN CNP as PCP - General (Family Medicine)  Vita Chandra RN as Lead Care Coordinator (Primary Care - CC)  Kameron Sanchez APRN CNP as Assigned PCP  Yoni Guillory MD as Assigned Neuroscience Provider    The following health maintenance items are reviewed in Epic and correct as of today:  Health Maintenance   Topic Date Due    MEDICARE ANNUAL WELLNESS VISIT  09/24/2022    INFLUENZA VACCINE (1) 09/01/2023    COVID-19 Vaccine (3 - 2023-24 season) 09/01/2023    ANNUAL REVIEW OF HM ORDERS  07/18/2024    FALL RISK ASSESSMENT  10/06/2024    ADVANCE CARE PLANNING  11/01/2026    LIPID  09/30/2027    DTAP/TDAP/TD IMMUNIZATION (4 - Td or Tdap) 11/14/2027    DEXA  08/17/2035    HEPATITIS C SCREENING  Completed    PHQ-2 (once per calendar year)  Completed    Pneumococcal Vaccine: 65+ Years  Completed    ZOSTER IMMUNIZATION  Completed    IPV IMMUNIZATION  Aged Out    HPV IMMUNIZATION  Aged Out    MENINGITIS IMMUNIZATION  Aged Out    MAMMO SCREENING  Discontinued    COLORECTAL CANCER SCREENING  Discontinued     {Chronicprobdata (optional):497540}  {Decision Support (Optional):756930}    {Mammo DS 75+ :561847}  Pertinent mammograms are reviewed under the imaging tab.    Review of Systems   Constitutional:  Negative for chills and fever.   HENT:  Negative for congestion, ear pain, hearing loss and sore throat.    Eyes:  Positive for visual disturbance. Negative for pain.   Respiratory:  Negative for cough and  "shortness of breath.    Cardiovascular:  Positive for peripheral edema. Negative for chest pain and palpitations.   Gastrointestinal:  Negative for abdominal pain, constipation, diarrhea, heartburn, hematochezia and nausea.   Breasts:  Negative for tenderness, breast mass and discharge.   Genitourinary:  Negative for dysuria, frequency, genital sores, hematuria, pelvic pain, urgency, vaginal bleeding and vaginal discharge.   Musculoskeletal:  Positive for arthralgias and joint swelling. Negative for myalgias.   Skin:  Negative for rash.   Neurological:  Positive for dizziness and weakness. Negative for headaches and paresthesias.   Psychiatric/Behavioral:  Negative for mood changes. The patient is not nervous/anxious.      {ROS COMP (Optional):605289}    OBJECTIVE:   /77   Pulse 74   Temp 97.6  F (36.4  C) (Oral)   Resp 16   Ht 1.444 m (4' 8.85\")   Wt 57 kg (125 lb 12 oz)   SpO2 98%   BMI 27.36 kg/m   Estimated body mass index is 27.36 kg/m  as calculated from the following:    Height as of this encounter: 1.444 m (4' 8.85\").    Weight as of this encounter: 57 kg (125 lb 12 oz).  Physical Exam  {Exam (Optional) :030555}    {Diagnostic Test Results (Optional):829894}    ASSESSMENT / PLAN:   {Dia Picklist:371979}    {Patient advised of split billing (Optional):257124}      COUNSELING:  {Medicare Counselin}      BMI:   Estimated body mass index is 27.36 kg/m  as calculated from the following:    Height as of this encounter: 1.444 m (4' 8.85\").    Weight as of this encounter: 57 kg (125 lb 12 oz).   {Weight Management Plan needed for ACO:810789}      She reports that she has never smoked. She has never used smokeless tobacco.      Appropriate preventive services were discussed with this patient, including applicable screening as appropriate for fall prevention, nutrition, physical activity, Tobacco-use cessation, weight loss and cognition.  Checklist reviewing preventive services available has been " "given to the patient.    Reviewed patients plan of care and provided an AVS. The {CarePlan:945424} for Jennifer meets the Care Plan requirement. This Care Plan has been established and reviewed with the {PATIENT, FAMILY MEMBER, CAREGIVER:152345}.    {Counseling Resources  [place] Preventive Services Task Force  Cholesterol Screening  Health diet/nutrition  Pooled Cohorts Equation Calculator  [employer]'s MyPlate  ASA Prophylaxis  Lung CA Screening  Osteoporosis prevention/bone health :917797}  {Breast Cancer Risk Calculator  BRCA-Related Cancer Risk Assessment FHS-7 Tool :083264}    Barb Elias MD  [facility]    Identified Health Risks:  {Medicare required documentation of substance and opioid use disorders screening :063029}Answers submitted by the patient for this visit:  Annual Preventive Visit (Submitted on 10/6/2023)  Chief Complaint: Annual Exam:  In general, how would you rate your overall physical health?: poor  Do you usually eat at least 4 servings of fruit and vegetables a day, include whole grains & fiber, and avoid regularly eating high fat or \"junk\" foods? : Yes  Activities of Daily Living: transportation requires assistance, shopping requires assistance, preparing meals requires assistance, housework requires assistance, bathing requires assistance, laundry requires assistance, medication administration requires assistance, money management requires assistance  Home safety: lack of grab bars in the bathroom, lack of handrails on stairs  Hearing Impairment:: no hearing concerns  In the past 6 months, have you been bothered by leaking of urine?: No  abdominal pain: No  Blood in stool: No  Blood in urine: No  chest pain: No  chills: No  congestion: No  constipation: No  cough: No  diarrhea: No  dizziness: Yes  ear pain: No  eye pain: No  nervous/anxious: No  fever: No  frequency: No  genital sores: No  headaches: No  hearing loss: No  heartburn: No  arthralgias: Yes  joint swelling: " Yes  peripheral edema: Yes  mood changes: No  myalgias: No  nausea: No  dysuria: No  palpitations: No  Skin sensation changes: No  sore throat: No  urgency: No  rash: No  shortness of breath: No  visual disturbance: Yes  weakness: Yes  pelvic pain: No  vaginal bleeding: No  vaginal discharge: No  tenderness: No  breast mass: No  breast discharge: No  In general, how would you rate your overall mental or emotional health?: good  Exercise outside of work (Submitted on 10/6/2023)  Chief Complaint: Annual Exam:

## 2023-10-06 NOTE — PATIENT INSTRUCTIONS
Patient Education   Personalized Prevention Plan  You are due for the preventive services outlined below.  Your care team is available to assist you in scheduling these services.  If you have already completed any of these items, please share that information with your care team to update in your medical record.  Health Maintenance Due   Topic Date Due     Flu Vaccine (1) 09/01/2023     COVID-19 Vaccine (3 - 2023-24 season) 09/01/2023     Your Health Risk Assessment indicates you feel you are not in good health    A healthy lifestyle helps keep the body fit and the mind alert. It helps protect you from disease, helps you fight disease, and helps prevent chronic disease (disease that doesn't go away) from getting worse. This is important as you get older and begin to notice twinges in muscles and joints and a decline in the strength and stamina you once took for granted. A healthy lifestyle includes good healthcare, good nutrition, weight control, recreation, and regular exercise. Avoid harmful substances and do what you can to keep safe. Another part of a healthy lifestyle is stay mentally active and socially involved.    Good healthcare   Have a wellness visit every year.   If you have new symptoms, let us know right away. Don't wait until the next checkup.   Take medicines exactly as prescribed and keep your medicines in a safe place. Tell us if your medicine causes problems.   Healthy diet and weight control   Eat 3 or 4 small, nutritious, low-fat, high-fiber meals a day. Include a variety of fruits, vegetables, and whole-grain foods.   Make sure you get enough calcium in your diet. Calcium, vitamin D, and exercise help prevent osteoporosis (bone thinning).   If you live alone, try eating with others when you can. That way you get a good meal and have company while you eat it.   Try to keep a healthy weight. If you eat more calories than your body uses for energy, it will be stored as fat and you will gain  weight.     Recreation   Recreation is not limited to sports and team events. It includes any activity that provides relaxation, interest, enjoyment, and exercise. Recreation provides an outlet for physical, mental, and social energy. It can give a sense of worth and achievement. It can help you stay healthy.    Mental Exercise and Social Involvement  Mental and emotional health is as important as physical health. Keep in touch with friends and family. Stay as active as possible. Continue to learn and challenge yourself.   Things you can do to stay mentally active are:  Learn something new, like a foreign language or musical instrument.   Play SCRABBLE or do crossword puzzles. If you cannot find people to play these games with you at home, you can play them with others on your computer through the Internet.   Join a games club--anything from card games to chess or checkers or lawn bowling.   Start a new hobby.   Go back to school.   Volunteer.   Read.   Keep up with world events.  Activities of Daily Living    Your Health Risk Assessment indicates you have difficulties with activities of daily living such as housework, bathing, preparing meals, taking medication, etc. Please make a follow up appointment for us to address this issue in more detail.

## 2023-10-09 ENCOUNTER — TELEPHONE (OUTPATIENT)
Dept: FAMILY MEDICINE | Facility: CLINIC | Age: 78
End: 2023-10-09
Payer: MEDICARE

## 2023-10-09 NOTE — TELEPHONE ENCOUNTER
Medication Question or Refill    Contacts         Type Contact Phone/Fax    10/09/2023 12:22 PM CDT Phone (Incoming) Jennifer Huggins (Self) [phone] (M)            What medication are you calling about (include dose and sig)?: xarelto generic 20 mg tablet    Preferred Pharmacy:  [address] [address]  Phone: [phone] Fax: [phone]      Controlled Substance Agreement on file:   CSA -- Patient Level:    CSA: None found at the patient level.       Who prescribed the medication?: Dr. Sanchez    Do you need a refill? Yes    When did you use the medication last? August 2023    Patient offered an appointment? No    Do you have any questions or concerns?  No      Okay to leave a detailed message?: Yes at Cell number on file:    Telephone Information:   Mobile [phone]

## 2023-10-10 NOTE — TELEPHONE ENCOUNTER
Please call: Looks like Dr. Elias changed to Eliquis on 10/6/2023 and stopped the xarelto. They should reach out to her clinic for questions. If they are having issues when you talk to them, please forward a message to Dr. Elias's team for them.

## 2023-10-10 NOTE — TELEPHONE ENCOUNTER
Message left through  that requested medication has been changed by Dr. Elias.  To call Dr. Elias's office to clarify new medication.  Routing to Dr. Elias's team.

## 2023-11-13 ENCOUNTER — TELEPHONE (OUTPATIENT)
Dept: FAMILY MEDICINE | Facility: CLINIC | Age: 78
End: 2023-11-13
Payer: MEDICARE

## 2023-11-13 DIAGNOSIS — I48.0 PAROXYSMAL ATRIAL FIBRILLATION (H): ICD-10-CM

## 2023-11-13 RX ORDER — APIXABAN 5 MG/1
5 TABLET, FILM COATED ORAL 2 TIMES DAILY
Qty: 60 TABLET | Refills: 11 | Status: SHIPPED | OUTPATIENT
Start: 2023-11-13

## 2023-11-13 NOTE — TELEPHONE ENCOUNTER
Central Prior Authorization Team   Phone: [phone]    PA Initiation    Medication: ELIQUIS 5 MG PO TABS  Insurance Company: [employer] - Phone [phone] Fax [phone]  Pharmacy Filling the Rx: [facility] - [place], [place] - [address] [place] [address]  Filling Pharmacy Phone: [phone]  Filling Pharmacy Fax:    Start Date: 11/13/2023    Started PA on CMM and a response of Prior Authorization Not Required.  Called and spoke with George at the pharmacy, he states they received a paid claim and the patient has picked up the medication.

## 2023-11-13 NOTE — TELEPHONE ENCOUNTER
Prior Authorization Retail Medication Request    Medication/Dose: ELIQUIS ANTICOAGULANT 5 MG tablet   Diagnosis and ICD code (if different than what is on RX):    Paroxysmal atrial fibrillation (H) [I48.0]      New/renewal/insurance change PA/secondary ins. PA:  Previously Tried and Failed:  N/A   Rationale:  N/A    Insurance   Primary:      MEDICARE PT B ONLY   Insurance ID:  [id number]     Secondary (if applicable):[facility]  Insurance ID: [id number]

## 2023-11-14 ENCOUNTER — TELEPHONE (OUTPATIENT)
Dept: FAMILY MEDICINE | Facility: CLINIC | Age: 78
End: 2023-11-14
Payer: MEDICARE

## 2023-11-14 DIAGNOSIS — I10 ESSENTIAL HYPERTENSION: ICD-10-CM

## 2023-11-14 DIAGNOSIS — R42 DIZZINESS: ICD-10-CM

## 2023-11-14 RX ORDER — MECLIZINE HYDROCHLORIDE 25 MG/1
TABLET ORAL
Qty: 30 TABLET | Refills: 4 | Status: SHIPPED | OUTPATIENT
Start: 2023-11-14 | End: 2024-01-26

## 2023-11-14 RX ORDER — LISINOPRIL 10 MG/1
10 TABLET ORAL DAILY
Qty: 30 TABLET | Refills: 11 | Status: SHIPPED | OUTPATIENT
Start: 2023-11-14 | End: 2024-07-17 | Stop reason: DRUGHIGH

## 2023-11-14 NOTE — TELEPHONE ENCOUNTER
I think their was some kind of communication error.  When the last refill was sent, the system automatically cancels the old rx and enters the new rx instead. In any case, she should be on these meds.  Refills sent.  I'm submitting a compass report regarding the error.    Essential hypertension  -     lisinopril (ZESTRIL) 10 MG tablet; Take 1 tablet (10 mg) by mouth daily    Dizziness  -     meclizine (ANTIVERT) 25 MG tablet; TAKE 1 TABLET BY MOUTH 3 TIMES A DAY AS NEEDED FOR DIZZINESS OR NAUSEA.

## 2023-11-14 NOTE — TELEPHONE ENCOUNTER
Writer called [facility] to ensure RX was received. Per Pharmacist, both Meclizine and Lisinopril RX was received and ready for pick-up. Writer attempted to call pt with the help of a Kelli  regarding message below.    No answer, left detailed VM with call back number.    If pt calls back, please inform pt that both RX are ready for pick-up at [facility].    DUKE JacobsonN, RN   [facility]

## 2023-11-14 NOTE — TELEPHONE ENCOUNTER
General Call    Contacts         Type Contact Phone/Fax    11/14/2023 09:18 AM CST Phone (Incoming) Jennifer Huggins (Self) [phone] (M)     Calling with the help of a Kelli           Reason for Call:  Refill request    What are your questions or concerns:      Patient went to Pharmacy and was told that provider did not send her medications to the pharmacy.  There is still refills however the doctor had stopped prescribing this medication?    1) Meclizine 25 mg tablet  2) Lisinopril 10 mg tablet    Pt is calling clinic because they are unable to get the medication. Pt shared that she is unable to pick-up both medications. Chart reviewed, it appears there is still remaining refills left for two medications    Spoke with [facility] staff, it appears both medication RX was cancelled on 10/6/2023.  Lisinopril RX was cancelled by Dr. Frederic Dean on 10/06/2023.  Meclizine RX  was cancelled by Kameron JACQUES CNP on 10/06/2023.     Pharmacy states, if PCP would like to have pt continue taking these medications, new prescriptions will be needed for both Lisinopril and Meclinzine.     Date of last appointment with provider: 10/06/2023    Okay to leave a detailed message?: Yes at Cell number on file:    Telephone Information:   Mobile [phone]     Routing message to Dr. Elias to review and advise.    DUKE JacobsonN, RN   [facility]

## 2024-01-10 ENCOUNTER — HOSPITAL ENCOUNTER (OUTPATIENT)
Dept: BONE DENSITY | Facility: HOSPITAL | Age: 79
Discharge: HOME | End: 2024-01-10
Attending: FAMILY MEDICINE
Payer: MEDICARE

## 2024-01-10 ENCOUNTER — HOSPITAL ENCOUNTER (OUTPATIENT)
Dept: MRI IMAGING | Facility: HOSPITAL | Age: 79
Discharge: HOME | End: 2024-01-10
Attending: NEUROLOGICAL SURGERY
Payer: MEDICARE

## 2024-01-10 DIAGNOSIS — M81.0 AGE-RELATED OSTEOPOROSIS WITHOUT CURRENT PATHOLOGICAL FRACTURE: ICD-10-CM

## 2024-01-10 DIAGNOSIS — D32.0 MENINGIOMA OF CEREBELLUM (H): ICD-10-CM

## 2024-01-10 PROCEDURE — A9585 GADOBUTROL INJECTION: HCPCS | Performed by: NEUROLOGICAL SURGERY

## 2024-01-10 PROCEDURE — 77080 DXA BONE DENSITY AXIAL: CPT

## 2024-01-10 PROCEDURE — 70553 MRI BRAIN STEM W/O & W/DYE: CPT | Mod: MG

## 2024-01-10 PROCEDURE — 255N000002 HC RX 255 OP 636: Performed by: NEUROLOGICAL SURGERY

## 2024-01-10 RX ORDER — GADOBUTROL 604.72 MG/ML
0.1 INJECTION INTRAVENOUS ONCE
Status: COMPLETED | OUTPATIENT
Start: 2024-01-10 | End: 2024-01-10

## 2024-01-10 RX ADMIN — GADOBUTROL 6 ML: 604.72 INJECTION INTRAVENOUS at 10:47

## 2024-01-18 ENCOUNTER — PATIENT OUTREACH (OUTPATIENT)
Dept: GERIATRIC MEDICINE | Facility: CLINIC | Age: 79
End: 2024-01-18
Payer: MEDICARE

## 2024-01-18 NOTE — PROGRESS NOTES
[facility] Care Coordination Contact    Called adult daughter Aldo Boucher  to schedule annual HRA home visit. HRA has been scheduled for 1/23/2024 at 1:00 pm.  Called Adelaide Laird and scheduled an  for the home visit.    Vita Chandra RN, BSN, PHN  [facility]  Phone: [phone]

## 2024-01-23 ENCOUNTER — OFFICE VISIT (OUTPATIENT)
Dept: FAMILY MEDICINE | Facility: CLINIC | Age: 79
End: 2024-01-23
Payer: MEDICARE

## 2024-01-23 ENCOUNTER — PATIENT OUTREACH (OUTPATIENT)
Dept: GERIATRIC MEDICINE | Facility: CLINIC | Age: 79
End: 2024-01-23

## 2024-01-23 VITALS
WEIGHT: 125.75 LBS | HEIGHT: 57 IN | TEMPERATURE: 98.1 F | SYSTOLIC BLOOD PRESSURE: 126 MMHG | DIASTOLIC BLOOD PRESSURE: 79 MMHG | OXYGEN SATURATION: 96 % | BODY MASS INDEX: 27.13 KG/M2 | RESPIRATION RATE: 16 BRPM | HEART RATE: 87 BPM

## 2024-01-23 DIAGNOSIS — E78.00 HYPERCHOLESTEROLEMIA: ICD-10-CM

## 2024-01-23 DIAGNOSIS — R06.2 WHEEZING: ICD-10-CM

## 2024-01-23 DIAGNOSIS — Z92.29 HISTORY OF BISPHOSPHONATE THERAPY: ICD-10-CM

## 2024-01-23 DIAGNOSIS — M81.0 AGE-RELATED OSTEOPOROSIS WITHOUT CURRENT PATHOLOGICAL FRACTURE: Primary | ICD-10-CM

## 2024-01-23 DIAGNOSIS — I48.0 PAROXYSMAL ATRIAL FIBRILLATION (H): ICD-10-CM

## 2024-01-23 LAB
ALBUMIN SERPL BCG-MCNC: 4.1 G/DL (ref 3.5–5.2)
ALP SERPL-CCNC: 118 U/L (ref 40–150)
ALT SERPL W P-5'-P-CCNC: 23 U/L (ref 0–50)
ANION GAP SERPL CALCULATED.3IONS-SCNC: 11 MMOL/L (ref 7–15)
AST SERPL W P-5'-P-CCNC: 25 U/L (ref 0–45)
BILIRUB SERPL-MCNC: 0.3 MG/DL
BUN SERPL-MCNC: 12.9 MG/DL (ref 8–23)
CALCIUM SERPL-MCNC: 9.3 MG/DL (ref 8.8–10.2)
CHLORIDE SERPL-SCNC: 106 MMOL/L (ref 98–107)
CHOLEST SERPL-MCNC: 215 MG/DL
CREAT SERPL-MCNC: 0.88 MG/DL (ref 0.51–0.95)
DEPRECATED HCO3 PLAS-SCNC: 26 MMOL/L (ref 22–29)
EGFRCR SERPLBLD CKD-EPI 2021: 67 ML/MIN/1.73M2
FASTING STATUS PATIENT QL REPORTED: YES
GLUCOSE SERPL-MCNC: 110 MG/DL (ref 70–99)
HDLC SERPL-MCNC: 55 MG/DL
LDLC SERPL CALC-MCNC: 124 MG/DL
NONHDLC SERPL-MCNC: 160 MG/DL
POTASSIUM SERPL-SCNC: 4.2 MMOL/L (ref 3.4–5.3)
PROT SERPL-MCNC: 7.2 G/DL (ref 6.4–8.3)
SODIUM SERPL-SCNC: 143 MMOL/L (ref 135–145)
TRIGL SERPL-MCNC: 178 MG/DL

## 2024-01-23 PROCEDURE — 99214 OFFICE O/P EST MOD 30 MIN: CPT | Performed by: FAMILY MEDICINE

## 2024-01-23 PROCEDURE — 36415 COLL VENOUS BLD VENIPUNCTURE: CPT | Performed by: FAMILY MEDICINE

## 2024-01-23 PROCEDURE — 80053 COMPREHEN METABOLIC PANEL: CPT | Performed by: FAMILY MEDICINE

## 2024-01-23 PROCEDURE — 80061 LIPID PANEL: CPT | Performed by: FAMILY MEDICINE

## 2024-01-23 RX ORDER — DILTIAZEM HYDROCHLORIDE 30 MG/1
30 TABLET, FILM COATED ORAL 2 TIMES DAILY
Qty: 60 TABLET | Refills: 11 | Status: SHIPPED | OUTPATIENT
Start: 2024-01-23 | End: 2024-01-26

## 2024-01-23 RX ORDER — ALBUTEROL SULFATE 90 UG/1
2 AEROSOL, METERED RESPIRATORY (INHALATION) EVERY 6 HOURS PRN
Qty: 18 G | Refills: 3 | Status: SHIPPED | OUTPATIENT
Start: 2024-01-23 | End: 2024-07-17

## 2024-01-23 RX ORDER — ATORVASTATIN CALCIUM 80 MG/1
80 TABLET, FILM COATED ORAL AT BEDTIME
Qty: 30 TABLET | Refills: 11 | Status: SHIPPED | OUTPATIENT
Start: 2024-01-23 | End: 2024-07-17

## 2024-01-23 NOTE — PROGRESS NOTES
Assessment & Plan     Jennifer was seen today for follow up.    Diagnoses and all orders for this visit:    Age-related osteoporosis without current pathological fracture  History of bisphosphonate therapy  Has been on Prolia off and on since 2017 (got heaache and shortness of breath on Fosamax).  Hasn't gotten it since 9/30/22 because of needing more help to get scheduled.  Last DXA 1/10/24 showed FRAX 10 year probability of major osteoporotic fracture is 9.9%, and of hip fracture is 3.4%.  Recommend restarting Prolia and she is agreeable.  Ordered today, will have her return to clinic in 2 weeks to give time for prior auth if needed.  -     denosumab (PROLIA) injection 60 mg  -     denosumab (PROLIA) injection 60 mg    Wheezing  Pt reports needing albuterol occassionally, no certain hx asthma but will refill per her request.  -     albuterol (PROAIR HFA/PROVENTIL HFA/VENTOLIN HFA) 108 (90 Base) MCG/ACT inhaler; Inhale 2 puffs into the lungs every 6 hours as needed for shortness of breath or wheezing    Hypercholesterolemia  Has been off of atorvastatin (ran out and didn't know how to refill).  Will check labs and restart meds.  -     atorvastatin (LIPITOR) 80 MG tablet; Take 1 tablet (80 mg) by mouth at bedtime  -     Lipid panel reflex to direct LDL Non-fasting; Future  -     Comprehensive metabolic panel (BMP + Alb, Alk Phos, ALT, AST, Total. Bili, TP); Future    Paroxysmal atrial fibrillation (H)  Paroxysmal atrial fibrillation with rapid ventricular response dx'd 11/10/22.  Has been on diltiazem since then, but has been out for awhile now.  Will refill.  -      diltiazem (CARDIZEM) 30 MG tablet; Take 1 tablet (30 mg) by mouth 2 times daily             I spent a total of 33 minutes on the day of the visit.   Time spent by me doing chart review, history and exam, documentation and further activities per the note            Subjective   Jennifer is a 79 year old, presenting for the following health issues:  Follow Up  "(Med and DXA )        1/23/2024    10:31 AM   Additional Questions   Roomed by Lea OKEEFE     History of Present Illness       Reason for visit:  Med checka and DXA        Appears to have been out of potassium atorvastatino and diltaizem for months.                      Objective    /79   Pulse 87   Temp 98.1  F (36.7  C) (Oral)   Resp 16   Ht 1.444 m (4' 8.85\")   Wt 57 kg (125 lb 12 oz)   SpO2 96%   BMI 27.36 kg/m    Body mass index is 27.36 kg/m .  Physical Exam   Gen:  A&A, NAD  Neck:  supple, no sig LAD or thyromegally  CV:  HRRR, no M/R/G  Resp:  CTAB  Ext:  W&D, no edema             Signed Electronically by: Barb Elias MD    "

## 2024-01-26 ENCOUNTER — APPOINTMENT (OUTPATIENT)
Dept: RADIOLOGY | Facility: HOSPITAL | Age: 79
End: 2024-01-26
Attending: EMERGENCY MEDICINE
Payer: MEDICARE

## 2024-01-26 ENCOUNTER — HOSPITAL ENCOUNTER (OUTPATIENT)
Facility: HOSPITAL | Age: 79
Setting detail: OBSERVATION
Discharge: HOME | End: 2024-01-27
Attending: EMERGENCY MEDICINE | Admitting: INTERNAL MEDICINE
Payer: MEDICARE

## 2024-01-26 DIAGNOSIS — I48.0 PAROXYSMAL ATRIAL FIBRILLATION (H): Primary | ICD-10-CM

## 2024-01-26 DIAGNOSIS — I48.91 ATRIAL FIBRILLATION WITH RAPID VENTRICULAR RESPONSE (H): ICD-10-CM

## 2024-01-26 DIAGNOSIS — R07.9 CHEST PAIN, UNSPECIFIED TYPE: ICD-10-CM

## 2024-01-26 LAB
ANION GAP SERPL CALCULATED.3IONS-SCNC: 11 MMOL/L (ref 7–15)
APTT PPP: 40 SECONDS (ref 22–38)
BASOPHILS # BLD AUTO: 0.1 10E3/UL (ref 0–0.2)
BASOPHILS NFR BLD AUTO: 1 %
BUN SERPL-MCNC: 11.4 MG/DL (ref 8–23)
CALCIUM SERPL-MCNC: 8.7 MG/DL (ref 8.8–10.2)
CHLORIDE SERPL-SCNC: 111 MMOL/L (ref 98–107)
CREAT SERPL-MCNC: 0.92 MG/DL (ref 0.51–0.95)
DEPRECATED HCO3 PLAS-SCNC: 23 MMOL/L (ref 22–29)
EGFRCR SERPLBLD CKD-EPI 2021: 63 ML/MIN/1.73M2
EOSINOPHIL # BLD AUTO: 0.4 10E3/UL (ref 0–0.7)
EOSINOPHIL NFR BLD AUTO: 6 %
ERYTHROCYTE [DISTWIDTH] IN BLOOD BY AUTOMATED COUNT: 13.2 % (ref 10–15)
GLUCOSE SERPL-MCNC: 124 MG/DL (ref 70–99)
HCT VFR BLD AUTO: 42.7 % (ref 35–47)
HGB BLD-MCNC: 14.4 G/DL (ref 11.7–15.7)
HOLD SPECIMEN: NORMAL
HOLD SPECIMEN: NORMAL
IMM GRANULOCYTES # BLD: 0 10E3/UL
IMM GRANULOCYTES NFR BLD: 0 %
INR PPP: 1.21 (ref 0.85–1.15)
LYMPHOCYTES # BLD AUTO: 2.7 10E3/UL (ref 0.8–5.3)
LYMPHOCYTES NFR BLD AUTO: 37 %
MCH RBC QN AUTO: 30.7 PG (ref 26.5–33)
MCHC RBC AUTO-ENTMCNC: 33.7 G/DL (ref 31.5–36.5)
MCV RBC AUTO: 91 FL (ref 78–100)
MONOCYTES # BLD AUTO: 0.5 10E3/UL (ref 0–1.3)
MONOCYTES NFR BLD AUTO: 6 %
NEUTROPHILS # BLD AUTO: 3.6 10E3/UL (ref 1.6–8.3)
NEUTROPHILS NFR BLD AUTO: 50 %
NRBC # BLD AUTO: 0 10E3/UL
NRBC BLD AUTO-RTO: 0 /100
PLATELET # BLD AUTO: 270 10E3/UL (ref 150–450)
POTASSIUM SERPL-SCNC: 3.5 MMOL/L (ref 3.4–5.3)
RBC # BLD AUTO: 4.69 10E6/UL (ref 3.8–5.2)
SODIUM SERPL-SCNC: 145 MMOL/L (ref 135–145)
TROPONIN T SERPL HS-MCNC: 7 NG/L
WBC # BLD AUTO: 7.2 10E3/UL (ref 4–11)

## 2024-01-26 PROCEDURE — 85730 THROMBOPLASTIN TIME PARTIAL: CPT | Performed by: EMERGENCY MEDICINE

## 2024-01-26 PROCEDURE — 99222 1ST HOSP IP/OBS MODERATE 55: CPT | Performed by: INTERNAL MEDICINE

## 2024-01-26 PROCEDURE — 96361 HYDRATE IV INFUSION ADD-ON: CPT

## 2024-01-26 PROCEDURE — 71045 X-RAY EXAM CHEST 1 VIEW: CPT

## 2024-01-26 PROCEDURE — 258N000003 HC RX IP 258 OP 636: Performed by: EMERGENCY MEDICINE

## 2024-01-26 PROCEDURE — 93005 ELECTROCARDIOGRAM TRACING: CPT | Performed by: STUDENT IN AN ORGANIZED HEALTH CARE EDUCATION/TRAINING PROGRAM

## 2024-01-26 PROCEDURE — 84443 ASSAY THYROID STIM HORMONE: CPT | Performed by: INTERNAL MEDICINE

## 2024-01-26 PROCEDURE — 84484 ASSAY OF TROPONIN QUANT: CPT | Performed by: STUDENT IN AN ORGANIZED HEALTH CARE EDUCATION/TRAINING PROGRAM

## 2024-01-26 PROCEDURE — 85610 PROTHROMBIN TIME: CPT | Performed by: EMERGENCY MEDICINE

## 2024-01-26 PROCEDURE — 99291 CRITICAL CARE FIRST HOUR: CPT | Mod: 25

## 2024-01-26 PROCEDURE — 99285 EMERGENCY DEPT VISIT HI MDM: CPT | Mod: 25

## 2024-01-26 PROCEDURE — 96376 TX/PRO/DX INJ SAME DRUG ADON: CPT

## 2024-01-26 PROCEDURE — 85025 COMPLETE CBC W/AUTO DIFF WBC: CPT | Performed by: STUDENT IN AN ORGANIZED HEALTH CARE EDUCATION/TRAINING PROGRAM

## 2024-01-26 PROCEDURE — 36415 COLL VENOUS BLD VENIPUNCTURE: CPT | Performed by: STUDENT IN AN ORGANIZED HEALTH CARE EDUCATION/TRAINING PROGRAM

## 2024-01-26 PROCEDURE — G0378 HOSPITAL OBSERVATION PER HR: HCPCS

## 2024-01-26 PROCEDURE — 250N000011 HC RX IP 250 OP 636: Performed by: EMERGENCY MEDICINE

## 2024-01-26 PROCEDURE — 80048 BASIC METABOLIC PNL TOTAL CA: CPT | Performed by: STUDENT IN AN ORGANIZED HEALTH CARE EDUCATION/TRAINING PROGRAM

## 2024-01-26 RX ORDER — MECLIZINE HYDROCHLORIDE 25 MG/1
25 TABLET ORAL 2 TIMES DAILY
COMMUNITY
End: 2024-07-17

## 2024-01-26 RX ORDER — ENOXAPARIN SODIUM 100 MG/ML
40 INJECTION SUBCUTANEOUS EVERY 24 HOURS
Status: DISCONTINUED | OUTPATIENT
Start: 2024-01-26 | End: 2024-01-26

## 2024-01-26 RX ORDER — DILTIAZEM HYDROCHLORIDE 5 MG/ML
20 INJECTION INTRAVENOUS ONCE
Status: COMPLETED | OUTPATIENT
Start: 2024-01-26 | End: 2024-01-26

## 2024-01-26 RX ORDER — DILTIAZEM HYDROCHLORIDE 30 MG/1
30 TABLET, FILM COATED ORAL EVERY EVENING
COMMUNITY
End: 2024-01-27

## 2024-01-26 RX ORDER — ACETAMINOPHEN 650 MG/1
650 SUPPOSITORY RECTAL EVERY 4 HOURS PRN
Status: DISCONTINUED | OUTPATIENT
Start: 2024-01-26 | End: 2024-01-27 | Stop reason: HOSPADM

## 2024-01-26 RX ORDER — ATORVASTATIN CALCIUM 40 MG/1
80 TABLET, FILM COATED ORAL AT BEDTIME
Status: DISCONTINUED | OUTPATIENT
Start: 2024-01-26 | End: 2024-01-27 | Stop reason: HOSPADM

## 2024-01-26 RX ORDER — MECLIZINE HCL 12.5 MG 12.5 MG/1
25 TABLET ORAL EVERY MORNING
Status: DISCONTINUED | OUTPATIENT
Start: 2024-01-27 | End: 2024-01-27 | Stop reason: HOSPADM

## 2024-01-26 RX ORDER — ONDANSETRON 4 MG/1
4 TABLET, ORALLY DISINTEGRATING ORAL EVERY 6 HOURS PRN
Status: DISCONTINUED | OUTPATIENT
Start: 2024-01-26 | End: 2024-01-27 | Stop reason: HOSPADM

## 2024-01-26 RX ORDER — ACETAMINOPHEN 325 MG/1
650 TABLET ORAL EVERY 4 HOURS PRN
Status: DISCONTINUED | OUTPATIENT
Start: 2024-01-26 | End: 2024-01-27 | Stop reason: HOSPADM

## 2024-01-26 RX ORDER — ONDANSETRON 2 MG/ML
4 INJECTION INTRAMUSCULAR; INTRAVENOUS EVERY 6 HOURS PRN
Status: DISCONTINUED | OUTPATIENT
Start: 2024-01-26 | End: 2024-01-27 | Stop reason: HOSPADM

## 2024-01-26 RX ORDER — AMOXICILLIN 250 MG
1 CAPSULE ORAL 2 TIMES DAILY PRN
Status: DISCONTINUED | OUTPATIENT
Start: 2024-01-26 | End: 2024-01-27 | Stop reason: HOSPADM

## 2024-01-26 RX ORDER — GLIPIZIDE 10 MG/1
2 TABLET ORAL 4 TIMES DAILY PRN
Status: DISCONTINUED | OUTPATIENT
Start: 2024-01-26 | End: 2024-01-27 | Stop reason: HOSPADM

## 2024-01-26 RX ORDER — DILTIAZEM HCL/D5W 125 MG/125
5-15 PLASTIC BAG, INJECTION (ML) INTRAVENOUS CONTINUOUS
Status: DISCONTINUED | OUTPATIENT
Start: 2024-01-26 | End: 2024-01-27

## 2024-01-26 RX ORDER — PANTOPRAZOLE SODIUM 40 MG/1
40 TABLET, DELAYED RELEASE ORAL EVERY EVENING
Status: DISCONTINUED | OUTPATIENT
Start: 2024-01-26 | End: 2024-01-27 | Stop reason: HOSPADM

## 2024-01-26 RX ORDER — ENOXAPARIN SODIUM 100 MG/ML
1 INJECTION SUBCUTANEOUS EVERY 12 HOURS
Status: DISCONTINUED | OUTPATIENT
Start: 2024-01-26 | End: 2024-01-26

## 2024-01-26 RX ORDER — AMOXICILLIN 250 MG
2 CAPSULE ORAL 2 TIMES DAILY PRN
Status: DISCONTINUED | OUTPATIENT
Start: 2024-01-26 | End: 2024-01-27 | Stop reason: HOSPADM

## 2024-01-26 RX ADMIN — SODIUM CHLORIDE 500 ML: 9 INJECTION, SOLUTION INTRAVENOUS at 20:43

## 2024-01-26 RX ADMIN — DILTIAZEM HYDROCHLORIDE 20 MG: 5 INJECTION INTRAVENOUS at 20:43

## 2024-01-26 RX ADMIN — Medication 5 MG/HR: at 23:55

## 2024-01-26 ASSESSMENT — ACTIVITIES OF DAILY LIVING (ADL)
ADLS_ACUITY_SCORE: 35
ADLS_ACUITY_SCORE: 35

## 2024-01-26 ASSESSMENT — ENCOUNTER SYMPTOMS
PALPITATIONS: 1
BACK PAIN: 1

## 2024-01-27 ENCOUNTER — APPOINTMENT (OUTPATIENT)
Dept: CARDIOLOGY | Facility: HOSPITAL | Age: 79
End: 2024-01-27
Attending: INTERNAL MEDICINE
Payer: MEDICARE

## 2024-01-27 VITALS
BODY MASS INDEX: 27.28 KG/M2 | OXYGEN SATURATION: 98 % | WEIGHT: 125.4 LBS | DIASTOLIC BLOOD PRESSURE: 82 MMHG | HEART RATE: 68 BPM | TEMPERATURE: 97.8 F | SYSTOLIC BLOOD PRESSURE: 124 MMHG | RESPIRATION RATE: 21 BRPM

## 2024-01-27 LAB
ATRIAL RATE - MUSE: 357 BPM
ATRIAL RATE - MUSE: 71 BPM
DIASTOLIC BLOOD PRESSURE - MUSE: 74 MMHG
DIASTOLIC BLOOD PRESSURE - MUSE: NORMAL MMHG
INTERPRETATION ECG - MUSE: NORMAL
INTERPRETATION ECG - MUSE: NORMAL
LVEF ECHO: NORMAL
P AXIS - MUSE: 30 DEGREES
P AXIS - MUSE: NORMAL DEGREES
PR INTERVAL - MUSE: 174 MS
PR INTERVAL - MUSE: NORMAL MS
QRS DURATION - MUSE: 66 MS
QRS DURATION - MUSE: 76 MS
QT - MUSE: 314 MS
QT - MUSE: 404 MS
QTC - MUSE: 439 MS
QTC - MUSE: 465 MS
R AXIS - MUSE: 25 DEGREES
R AXIS - MUSE: 46 DEGREES
SYSTOLIC BLOOD PRESSURE - MUSE: 138 MMHG
SYSTOLIC BLOOD PRESSURE - MUSE: NORMAL MMHG
T AXIS - MUSE: 40 DEGREES
T AXIS - MUSE: 61 DEGREES
TROPONIN T SERPL HS-MCNC: 7 NG/L
TROPONIN T SERPL HS-MCNC: 7 NG/L
TSH SERPL DL<=0.005 MIU/L-ACNC: 2.23 UIU/ML (ref 0.3–4.2)
VENTRICULAR RATE- MUSE: 132 BPM
VENTRICULAR RATE- MUSE: 71 BPM

## 2024-01-27 PROCEDURE — 93005 ELECTROCARDIOGRAM TRACING: CPT | Performed by: INTERNAL MEDICINE

## 2024-01-27 PROCEDURE — 96366 THER/PROPH/DIAG IV INF ADDON: CPT

## 2024-01-27 PROCEDURE — 93306 TTE W/DOPPLER COMPLETE: CPT | Mod: 26 | Performed by: INTERNAL MEDICINE

## 2024-01-27 PROCEDURE — 93306 TTE W/DOPPLER COMPLETE: CPT

## 2024-01-27 PROCEDURE — 96365 THER/PROPH/DIAG IV INF INIT: CPT | Mod: 59

## 2024-01-27 PROCEDURE — 250N000011 HC RX IP 250 OP 636: Performed by: EMERGENCY MEDICINE

## 2024-01-27 PROCEDURE — 36415 COLL VENOUS BLD VENIPUNCTURE: CPT | Performed by: INTERNAL MEDICINE

## 2024-01-27 PROCEDURE — 84484 ASSAY OF TROPONIN QUANT: CPT | Performed by: INTERNAL MEDICINE

## 2024-01-27 PROCEDURE — 99222 1ST HOSP IP/OBS MODERATE 55: CPT | Performed by: INTERNAL MEDICINE

## 2024-01-27 PROCEDURE — 250N000013 HC RX MED GY IP 250 OP 250 PS 637: Performed by: INTERNAL MEDICINE

## 2024-01-27 PROCEDURE — G0378 HOSPITAL OBSERVATION PER HR: HCPCS

## 2024-01-27 PROCEDURE — 99239 HOSP IP/OBS DSCHRG MGMT >30: CPT | Performed by: INTERNAL MEDICINE

## 2024-01-27 RX ORDER — DILTIAZEM HYDROCHLORIDE 30 MG/1
30 TABLET, FILM COATED ORAL EVERY 12 HOURS
Status: DISCONTINUED | OUTPATIENT
Start: 2024-01-27 | End: 2024-01-27 | Stop reason: HOSPADM

## 2024-01-27 RX ORDER — DILTIAZEM HYDROCHLORIDE 30 MG/1
30 TABLET, FILM COATED ORAL EVERY EVENING
Status: DISCONTINUED | OUTPATIENT
Start: 2024-01-27 | End: 2024-01-27

## 2024-01-27 RX ORDER — DILTIAZEM HYDROCHLORIDE 30 MG/1
30 TABLET, FILM COATED ORAL EVERY EVENING
Status: DISCONTINUED | OUTPATIENT
Start: 2024-01-27 | End: 2024-01-27 | Stop reason: DRUGHIGH

## 2024-01-27 RX ORDER — DILTIAZEM HYDROCHLORIDE 30 MG/1
30 TABLET, FILM COATED ORAL EVERY 12 HOURS
Qty: 60 TABLET | Refills: 3 | Status: SHIPPED | OUTPATIENT
Start: 2024-01-27 | End: 2024-06-24

## 2024-01-27 RX ADMIN — MECLIZINE HYDROCHLORIDE 25 MG: 12.5 TABLET ORAL at 09:03

## 2024-01-27 RX ADMIN — DILTIAZEM HYDROCHLORIDE 30 MG: 30 TABLET, FILM COATED ORAL at 04:49

## 2024-01-27 RX ADMIN — APIXABAN 5 MG: 5 TABLET, FILM COATED ORAL at 00:30

## 2024-01-27 RX ADMIN — PANTOPRAZOLE SODIUM 40 MG: 40 TABLET, DELAYED RELEASE ORAL at 00:30

## 2024-01-27 RX ADMIN — APIXABAN 5 MG: 5 TABLET, FILM COATED ORAL at 09:03

## 2024-01-27 RX ADMIN — ATORVASTATIN CALCIUM 80 MG: 40 TABLET, FILM COATED ORAL at 00:30

## 2024-01-27 ASSESSMENT — ACTIVITIES OF DAILY LIVING (ADL)
DEPENDENT_IADLS:: CLEANING;COOKING;LAUNDRY;SHOPPING;MEAL PREPARATION;MEDICATION MANAGEMENT;MONEY MANAGEMENT;TRANSPORTATION;INCONTINENCE
ADLS_ACUITY_SCORE: 37
ADLS_ACUITY_SCORE: 35
ADLS_ACUITY_SCORE: 37
ADLS_ACUITY_SCORE: 37

## 2024-01-27 NOTE — PROVIDER NOTIFICATION
"Text paged attending at 01:15: \"Patient is on ditiazem drip titrated to 15mg/hr at 01:03. As of right now, she is still having intermittent accelerations up to the 130s-140s.\"  "

## 2024-01-27 NOTE — MEDICATION SCRIBE - ADMISSION MEDICATION HISTORY
Medication Scribe Admission Medication History    Admission medication history is complete. The information provided in this note is only as accurate as the sources available at the time of the update.    Information Source(s): Patient, Family member, and Prescription bottles via in-person    Pertinent Information: Patient's medications are being managed by daughter, Aldo, Aldo ([phone]) was present for interview. Patient's RX bottles were brought into the hospital along with a filled pill box. Pills were compared to bottles and [employer] to create the PTA list.     Diltiazem: Pill box was set up for 30 mg in the evening. Not 30 mg BID    Meclizine: Pill box was set up for 25 mg in the AM. Not 25 mg TID PRN    Omeprazole: Pill box was set up for 20 mg in the PM. Not 20 mg in the AM    Patient reported to be no longer taking: Tylenol, Atarax     Changes made to PTA medication list:  Added: None  Deleted: Tylenol, Atarax   Changed: Diltiazem, Meclizine, Omeprazole (for all see note above)    Medication Affordability:  Not including over the counter (OTC) medications, was there a time in the past 3 months when you did not take your medications as prescribed because of cost?: No    Allergies reviewed with patient and updates made in EHR: yes    Medication History Completed By: Nando Golden 1/26/2024 10:15 PM    Current Facility-Administered Medications for the 1/26/24 encounter (Hospital Encounter)   Medication    [START ON 2/6/2024] denosumab (PROLIA) injection 60 mg    [START ON 2/6/2024] denosumab (PROLIA) injection 60 mg     PTA Med List   Medication Sig Last Dose    albuterol (PROAIR HFA/PROVENTIL HFA/VENTOLIN HFA) 108 (90 Base) MCG/ACT inhaler Inhale 2 puffs into the lungs every 6 hours as needed for shortness of breath or wheezing Past Month at prn    artificial tears (GENTEAL) 0.1-0.2-0.3 % ophthalmic solution Place 2 drops into both eyes 4 times daily as needed (dry or irritated eyes) Past Month at prn     atorvastatin (LIPITOR) 80 MG tablet Take 1 tablet (80 mg) by mouth at bedtime 1/25/2024 at pm    diltiazem (CARDIZEM) 30 MG tablet Take 30 mg by mouth every evening 1/25/2024 at pm    ELIQUIS ANTICOAGULANT 5 MG tablet TAKE 1 TABLET (5 MG) BY MOUTH 2 TIMES DAILY 1/26/2024 at am    lisinopril (ZESTRIL) 10 MG tablet Take 1 tablet (10 mg) by mouth daily 1/26/2024 at am    meclizine (ANTIVERT) 25 MG tablet Take 25 mg by mouth every morning 1/26/2024 at am    omeprazole (PRILOSEC) 20 MG DR capsule Take 20 mg by mouth every evening 1/25/2024 at pm    vitamin D3 (CHOLECALCIFEROL) 50 mcg (2000 units) tablet Take 1 tablet (50 mcg) by mouth daily 1/26/2024 at am

## 2024-01-27 NOTE — ED PROVIDER NOTES
Emergency Department Encounter      NAME: Jennifer Huggins  AGE: 79 year old female  YOB: 1945  MRN: [id number]  EVALUATION DATE & TIME: 2024  8:06 PM    PCP: Barb Elias    ED PROVIDER: Bro Guadarrama M.D.      Chief Complaint   Patient presents with    Chest Pain    Palpitations         FINAL IMPRESSION:  1. Atrial fibrillation with rapid ventricular response (H)        MEDICAL DECISION MAKIN:23 PM I met with the patient, obtained history, performed an initial exam, and discussed options and plan for diagnostics and treatment here in the ED.   9:55 PM I discussed the case with hospitalist, Dr Smith, who accepts the patient      This patient is a 79-year-old Tajik speaking female who is brought to the ER by her daughter.  Her daughter assists with the patient's history but also most of the information was obtained through the .  The patient says that she has had over 4 days of right-sided chest pain which she described as a throbbing pain and radiated to her back.  She has also been having heart pounding sensation which was described as palpitations.  The patient's EKG showed that she was in atrial fibrillation with rapid ventricular response.  Her heart rate was 132 on the EKG.  I independently reviewed and interpreted the EKG.  Patient was given IV fluid as well as a dose of diltiazem for rate control.  Her heart rate dropped into the 70s after this.  She was started on diltiazem infusion for rate control.  The patient's lab work was unremarkable with a normal CBC, basic metabolic profile, troponin, PTT and INR.  I had asked the patient and daughter if the patient was on any blood thinners.  They said that she was not.  However later I was reviewing her medical records and there did appear to be a prescription at some point for Eliquis.  I admitted the patient to the hospitalist service.  We did discuss anticoagulation and the hospitalist will review her records and order her  anticoagulation.    Pertinent Labs & Imaging studies reviewed. (See chart for details)    Critical care time 35 minutes not including procedures    Medical Decision Making     History:  Supplemental history from: Documented in chart, if applicable  External Record(s) reviewed: Documented in chart, if applicable.     Work Up:  Chart documentation includes differential considered and any EKGs or imaging independently interpreted by provider, where specified.  In additional to work up documented, I considered the following work up: Documented in chart, if applicable.     External consultation:  Discussion of management with another provider: Documented in chart, if applicable     Complicating factors:  Care impacted by chronic illness: hypertension, hyperlipidemia, and CKD  Care affected by social determinants of health: Access to Medical Care     Disposition considerations: Admit      MEDICATIONS GIVEN IN THE EMERGENCY:  Medications   diltiazem (CARDIZEM) 125 mg in dextrose 5 % 125 mL infusion (has no administration in time range)   senna-docusate (SENOKOT-S/PERICOLACE) 8.6-50 MG per tablet 1 tablet (has no administration in time range)     Or   senna-docusate (SENOKOT-S/PERICOLACE) 8.6-50 MG per tablet 2 tablet (has no administration in time range)   ondansetron (ZOFRAN ODT) ODT tab 4 mg (has no administration in time range)     Or   ondansetron (ZOFRAN) injection 4 mg (has no administration in time range)   enoxaparin ANTICOAGULANT (LOVENOX) injection 40 mg (has no administration in time range)   acetaminophen (TYLENOL) tablet 650 mg (has no administration in time range)     Or   acetaminophen (TYLENOL) Suppository 650 mg (has no administration in time range)   diltiazem (CARDIZEM) injection 20 mg (20 mg Intravenous $Given 1/26/24 2043)   sodium chloride 0.9% BOLUS 500 mL (0 mLs Intravenous Stopped 1/26/24 2143)       NEW PRESCRIPTIONS STARTED AT TODAY'S ER VISIT:  New Prescriptions    No medications on file           =================================================================    HPI    Patient information was obtained from: the patient's daughter     Use of : Yes (Phone) - Language Kelli Huggins is a 79 year old female with a past medical history of meningioma, hypertension, GERD, atrial fibrillation, stage 3a CKD, and hyperlipidemia, who presents to the ED via walk in for evaluation of chest pain and palpitations.     Per patient's daughter: The patient has had right sided chest pain and palpitations for the last few days which worsened considerably today. She currently rates her chest pain as an 8/10, and describes it as a throbbing sensation. Her pain radiates to her back. She notes that warm compresses provide some relief for her chest pain. The patient's daughter denies the patient having recent surgery, current bleeding, and any other symptoms or complaints at this time.     ScHx: The patient denies smoking.     REVIEW OF SYSTEMS   Review of Systems   Cardiovascular:  Positive for chest pain (right) and palpitations.   Musculoskeletal:  Positive for back pain.   Skin:         Negative for bleeding   All other systems reviewed and are negative.       PAST MEDICAL HISTORY:  Past Medical History:   Diagnosis Date    Benign adenomatous polyp of large intestine     Biliary colic     Cervicalgia     Cholelithiasis     Chronic allergic conjunctivitis     Cystitis     Dyspepsia     Gastritis     GERD (gastroesophageal reflux disease)     HLD (hyperlipidemia)     Hypertension     Hypokalemia     Meningioma (H)     Multiple lung nodules on CT     CT ABD 11/23/1013, mult nodules < 5mm in size, per Fleischner Society recommendations  repeat CT in 12 months CT Chest 2/20/17:  Stable pulmonary nodules suggesting incidental findings given long-term stability.     Osteoporosis     Positional vertigo     Pulmonary nodules     Recurrent UTI     Transaminitis 10/29/2015    Urinary incontinence     Xerosis cutis         PAST SURGICAL HISTORY:  Past Surgical History:   Procedure Laterality Date    CHOLECYSTECTOMY  08/21/2015       CURRENT MEDICATIONS:      Current Facility-Administered Medications:     acetaminophen (TYLENOL) tablet 650 mg, 650 mg, Oral, Q4H PRN **OR** acetaminophen (TYLENOL) Suppository 650 mg, 650 mg, Rectal, Q4H PRN, Mague Smith MD    [START ON 2/6/2024] denosumab (PROLIA) injection 60 mg, 60 mg, Subcutaneous, Q6 Months, Barb Elias MD    [START ON 2/6/2024] denosumab (PROLIA) injection 60 mg, 60 mg, Subcutaneous, Once, Barb Elias MD    diltiazem (CARDIZEM) 125 mg in dextrose 5 % 125 mL infusion, 5-15 mg/hr, Intravenous, Continuous, Bro Guadarrama MD    enoxaparin ANTICOAGULANT (LOVENOX) injection 40 mg, 40 mg, Subcutaneous, Q24H, Mague Smith MD    ondansetron (ZOFRAN ODT) ODT tab 4 mg, 4 mg, Oral, Q6H PRN **OR** ondansetron (ZOFRAN) injection 4 mg, 4 mg, Intravenous, Q6H PRN, Mague Smith MD    senna-docusate (SENOKOT-S/PERICOLACE) 8.6-50 MG per tablet 1 tablet, 1 tablet, Oral, BID PRN **OR** senna-docusate (SENOKOT-S/PERICOLACE) 8.6-50 MG per tablet 2 tablet, 2 tablet, Oral, BID PRN, Mague Smith MD    Current Outpatient Medications:     acetaminophen (TYLENOL) 650 MG CR tablet, Take 1 tablet (650 mg) by mouth every 8 hours as needed for pain, Disp: 50 tablet, Rfl: 4    albuterol (PROAIR HFA/PROVENTIL HFA/VENTOLIN HFA) 108 (90 Base) MCG/ACT inhaler, Inhale 2 puffs into the lungs every 6 hours as needed for shortness of breath or wheezing, Disp: 18 g, Rfl: 3    artificial tears (GENTEAL) 0.1-0.2-0.3 % ophthalmic solution, Place 2 drops into both eyes 4 times daily as needed (dry or irritated eyes), Disp: 15 mL, Rfl: 11    atorvastatin (LIPITOR) 80 MG tablet, Take 1 tablet (80 mg) by mouth at bedtime, Disp: 30 tablet, Rfl: 11    blood pressure monitor Kit, [BLOOD PRESSURE MONITOR KIT] Home blood pressure cuff and monitor, Disp: 1 each, Rfl: 0    diltiazem (CARDIZEM) 30 MG  tablet, Take 1 tablet (30 mg) by mouth 2 times daily, Disp: 60 tablet, Rfl: 11    ELIQUIS ANTICOAGULANT 5 MG tablet, TAKE 1 TABLET (5 MG) BY MOUTH 2 TIMES DAILY, Disp: 60 tablet, Rfl: 11    hydrOXYzine (ATARAX) 25 MG tablet, Take 1 tablet (25 mg) by mouth 3 times daily as needed for itching, Disp: 30 tablet, Rfl: 3    lisinopril (ZESTRIL) 10 MG tablet, Take 1 tablet (10 mg) by mouth daily, Disp: 30 tablet, Rfl: 11    meclizine (ANTIVERT) 25 MG tablet, TAKE 1 TABLET BY MOUTH 3 TIMES A DAY AS NEEDED FOR DIZZINESS OR NAUSEA., Disp: 30 tablet, Rfl: 4    omeprazole (PRILOSEC) 20 MG DR capsule, [OMEPRAZOLE (PRILOSEC) 20 MG CAPSULE] TAKE 1 CAPSULE BY MOUTH DAILY BEFORE BREAKFAST, Disp: 90 capsule, Rfl: 2    vitamin D3 (CHOLECALCIFEROL) 50 mcg (2000 units) tablet, Take 1 tablet (50 mcg) by mouth daily, Disp: 90 tablet, Rfl: 3    ALLERGIES:  Allergies   Allergen Reactions    Ibandronate [Ibandronic Acid] Headache and Shortness Of Breath    Septra [Sulfamethoxazole-Trimethoprim] Other (See Comments)     Acute hepatitis        FAMILY HISTORY:  Family History   Problem Relation Age of Onset    No Known Problems Mother     No Known Problems Father        SOCIAL HISTORY:   Social History     Socioeconomic History    Marital status:    Tobacco Use    Smoking status: Never     Passive exposure: Never    Smokeless tobacco: Never   Vaping Use    Vaping Use: Never used   Substance and Sexual Activity    Alcohol use: No    Drug use: No    Sexual activity: Never     Birth control/protection: Post-menopausal     Social Determinants of Health     Financial Resource Strain: Low Risk  (1/23/2024)    Financial Resource Strain     Within the past 12 months, have you or your family members you live with been unable to get utilities (heat, electricity) when it was really needed?: No   Food Insecurity: Low Risk  (1/23/2024)    Food Insecurity     Within the past 12 months, did you worry that your food would run out before you got money to  buy more?: No     Within the past 12 months, did the food you bought just not last and you didn t have money to get more?: No   Transportation Needs: Low Risk  (1/23/2024)    Transportation Needs     Within the past 12 months, has lack of transportation kept you from medical appointments, getting your medicines, non-medical meetings or appointments, work, or from getting things that you need?: No   Housing Stability: Low Risk  (1/23/2024)    Housing Stability     Do you have housing? : Yes     Are you worried about losing your housing?: No       PHYSICAL EXAM:    Vitals: /72   Pulse 88   Temp 98  F (36.7  C) (Temporal)   Resp 20   SpO2 97%    Constitutional: Well developed, well nourished. Comfortable appearing.  HEAD:Normocephalic, atraumatic,   Eyes: PERRLA, EOM intact, conjunctiva clear, no discharge  ENT: mucous membranes moist, nose normal.   Neck- Supple, gross ROM intact.  No JVD.  No palpable nodes.  Pulmonary: Clear to auscultation bilaterally, no respiratory distress, no wheezing, speaks full sentences easily.  Chest: No chest wall tenderness  Cardiovascular: Rapid irregularly irregular rate and rhythm, no murmurs. No lower extremity edema, 2+ DP pulses.   GI: Soft, no tenderness to deep palpation in all quadrants, not distended, no masses.  No hepatosplenomegaly.  Musculoskeletal: Moving all 4 extremities intentionally and without pain. No obvious deformity. No calf tenderness to palpation or edema.   Back: No CVA tenderness  Skin: Warm, dry, no rash.  Neurologic: Alert & oriented x 3, speech clear, moving all extremities spontaneously   Psychiatric: Affect normal, cooperative.     LAB:  All pertinent labs reviewed and interpreted.  Labs Ordered and Resulted from Time of ED Arrival to Time of ED Departure   BASIC METABOLIC PANEL - Abnormal       Result Value    Sodium 145      Potassium 3.5      Chloride 111 (*)     Carbon Dioxide (CO2) 23      Anion Gap 11      Urea Nitrogen 11.4       Creatinine 0.92      GFR Estimate 63      Calcium 8.7 (*)     Glucose 124 (*)    INR - Abnormal    INR 1.21 (*)    PARTIAL THROMBOPLASTIN TIME - Abnormal    aPTT 40 (*)    TROPONIN T, HIGH SENSITIVITY - Normal    Troponin T, High Sensitivity 7     CBC WITH PLATELETS AND DIFFERENTIAL    WBC Count 7.2      RBC Count 4.69      Hemoglobin 14.4      Hematocrit 42.7      MCV 91      MCH 30.7      MCHC 33.7      RDW 13.2      Platelet Count 270      % Neutrophils 50      % Lymphocytes 37      % Monocytes 6      % Eosinophils 6      % Basophils 1      % Immature Granulocytes 0      NRBCs per 100 WBC 0      Absolute Neutrophils 3.6      Absolute Lymphocytes 2.7      Absolute Monocytes 0.5      Absolute Eosinophils 0.4      Absolute Basophils 0.1      Absolute Immature Granulocytes 0.0      Absolute NRBCs 0.0     TSH WITH FREE T4 REFLEX   CREATININE       RADIOLOGY:  XR Chest Port 1 View   Final Result   IMPRESSION: Left lower lobe atelectasis, otherwise lungs are clear. No pleural effusion or pneumothorax. Unchanged heart size and mediastinal contours.      Echocardiogram Complete    (Results Pending)       EKG:   Performed at: 20:05  Impression: Atrial fibrillation with rapid ventricular response with premature aberrantly conducted complexes. Low voltage QRS. Abnormal ECG.   Rate: 132  Rhythm: Atrial fibrillation   QRS Interval: 76  QTc Interval: 465  I have independently reviewed and interpreted the EKG(s) documented above.         I, Mary Salcedo, am serving as a scribe to document services personally performed by Dr. Bro Guadarrama based on my observation and the provider's statements to me. I, Bro Guadarrama M.D. attest that Mary Salcedo is acting in a scribe capacity, has observed my performance of the services and has documented them in accordance with my direction.      Bro Guadarrama M.D.  Emergency Medicine  [facility] EMERGENCY DEPARTMENT  [address]  [facility] [address]  [phone]  Dept: [phone]      Bro Guadarrama MD  01/27/24 0026

## 2024-01-27 NOTE — DISCHARGE SUMMARY
[facility] MEDICINE  DISCHARGE SUMMARY     Primary Care Physician: Barb Elias  Admission Date: 1/26/2024   Discharge Provider: KATHERINE Rodríguez Discharge Date: 1/27/2024   Diet: as below   Code Status: Full Code   Activity: DCACTIVITY: Activity as tolerated        Condition at Discharge: Stable     REASON FOR PRESENTATION(See Admission Note for Details)   Palpitations     PRINCIPAL & ACTIVE DISCHARGE DIAGNOSES     Principal Problem:    Atrial fibrillation with rapid ventricular response (H)      PENDING LABS     Unresulted Labs Ordered in the Past 30 Days of this Admission       No orders found for last 31 day(s).              PROCEDURES ( this hospitalization only)          RECOMMENDATIONS TO OUTPATIENT PROVIDER FOR F/U VISIT     Follow-up Appointments     Follow-up and recommended labs and tests       Follow up with primary care provider, Barb Elias, within 7 days for   hospital follow- up.    Follow up in the afib clinic in 2-4 wks                DISPOSITION     Home    SUMMARY OF HOSPITAL COURSE:    Jennifer Huggins is a 79 year old female with h/o hypertension, meningioma and paroxysmal afib admitted on 1/26/2024 with palpitations.      Rapid afib  -Started on dilt infusion after a bolus of 20mg. Converted to NSR at around 3:15 am. Maintaining NSR since. Dilt gtt stopped at around 4am  -Cardiology consulted.   -There was confusion about the home dose of diltiazem. Admission med-rec indicates once a day, but was discharged last time on BID dose. Discussed with ED pharmacy. Unclear why the patient was taking once a day; the prescriptions has always been for twice a day.   -Discharging home on diltiazem 30mg BID. Follow up with PCP and afib clinic  -Cont PTA Eliquis  -TSH, trops and echo- normal     HTN  -Resume home meds     Chest discomfort  -Likely associated with rapid afib. Resolved now  -Trops normal. Ok to discharge home per cardiology.    H/O meningioma  -Noted  first time in Nov 1012. Stable, growing very slowly since that time per chart review. Seen by NS in the past. On 7/3/23, MRI showed mild edema and locally occluding the lateral left transverse sinus.   -Patient is clear about not wanting surgery per daughter. Follow up with NS outpatient    Discharge Medications with Med changes:     Current Discharge Medication List        CONTINUE these medications which have CHANGED    Details   diltiazem (CARDIZEM) 30 MG tablet Take 1 tablet (30 mg) by mouth every 12 hours  Qty: 60 tablet, Refills: 3    Associated Diagnoses: Paroxysmal atrial fibrillation (H)           CONTINUE these medications which have NOT CHANGED    Details   albuterol (PROAIR HFA/PROVENTIL HFA/VENTOLIN HFA) 108 (90 Base) MCG/ACT inhaler Inhale 2 puffs into the lungs every 6 hours as needed for shortness of breath or wheezing  Qty: 18 g, Refills: 3    Comments: Pharmacy may dispense brand covered by insurance (Proair, or proventil or ventolin or generic albuterol inhaler)  Associated Diagnoses: Wheezing      artificial tears (GENTEAL) 0.1-0.2-0.3 % ophthalmic solution Place 2 drops into both eyes 4 times daily as needed (dry or irritated eyes)  Qty: 15 mL, Refills: 11    Associated Diagnoses: Pruritus of both eyes      atorvastatin (LIPITOR) 80 MG tablet Take 1 tablet (80 mg) by mouth at bedtime  Qty: 30 tablet, Refills: 11    Associated Diagnoses: Hypercholesterolemia      ELIQUIS ANTICOAGULANT 5 MG tablet TAKE 1 TABLET (5 MG) BY MOUTH 2 TIMES DAILY  Qty: 60 tablet, Refills: 11    Associated Diagnoses: Paroxysmal atrial fibrillation (H)      lisinopril (ZESTRIL) 10 MG tablet Take 1 tablet (10 mg) by mouth daily  Qty: 30 tablet, Refills: 11    Associated Diagnoses: Essential hypertension      meclizine (ANTIVERT) 25 MG tablet Take 25 mg by mouth every morning      omeprazole (PRILOSEC) 20 MG DR capsule Take 20 mg by mouth every evening      vitamin D3 (CHOLECALCIFEROL) 50 mcg (2000 units) tablet Take 1  tablet (50 mcg) by mouth daily  Qty: 90 tablet, Refills: 3    Associated Diagnoses: Vitamin D deficiency                   Rationale for medication changes:      Please see above        Consults   Cardiology       Immunizations given this encounter     Most Recent Immunizations   Administered Date(s) Administered    COVID-19 Monovalent 18+ (Moderna) 06/13/2021    Flu, Unspecified 02/11/2011    HepA, Unspecified 11/13/2008    HepB, Unspecified 08/24/2006    Influenza (High Dose) 3 valent vaccine 11/26/2019    Influenza Vaccine 65+ (Fluzone HD) 10/06/2023    Influenza Vaccine, 6+MO IM (QUADRIVALENT W/PRESERVATIVES) 09/18/2013    Influenza, seasonal, injectable, PF 09/16/2014    MMR 05/23/2008    Pneumo Conj 13-V (2010&after) 01/27/2015    Pneumococcal 23 valent 09/11/2012    TDAP (Adacel,Boostrix) 12/17/2007    Td (Adult), Adsorbed 11/14/2017    Td,adult,historic,unspecified 09/03/2008    Typhoid IM 11/14/2017    Varicella 08/24/2006    Zoster recombinant adjuvanted (SHINGRIX) 11/26/2021    Zoster vaccine, live 09/03/2008           Anticoagulation Information      Recent INR results:   Recent Labs   Lab 01/26/24 2020   INR 1.21*     Warfarin doses (if applicable) or name of other anticoagulant: NA      SIGNIFICANT IMAGING FINDINGS     Results for orders placed or performed during the hospital encounter of 01/26/24   XR Chest Port 1 View    Impression    IMPRESSION: Left lower lobe atelectasis, otherwise lungs are clear. No pleural effusion or pneumothorax. Unchanged heart size and mediastinal contours.   Echocardiogram Complete   Result Value Ref Range    LVEF  60-65%        SIGNIFICANT LABORATORY FINDINGS     Most Recent 3 CBC's:  Recent Labs   Lab Test 01/26/24 2020 07/03/23  0906 01/08/23  1015   WBC 7.2 6.4 7.3   HGB 14.4 12.4 14.0   MCV 91 94 94    213 290     Most Recent 3 BMP's:  Recent Labs   Lab Test 01/26/24 2020 01/23/24  1137 10/06/23  1523    143 142   POTASSIUM 3.5 4.2 3.7   CHLORIDE 111*  106 105   CO2 23 26 26   BUN 11.4 12.9 12.6   CR 0.92 0.88 0.85   ANIONGAP 11 11 11   JONATHAN 8.7* 9.3 9.3   * 110* 157*     Most Recent 2 LFT's:  Recent Labs   Lab Test 01/23/24  1137 01/10/23  1650   AST 25 24   ALT 23 26   ALKPHOS 118 44   BILITOTAL 0.3 0.6     Most Recent 3 INR's:  Recent Labs   Lab Test 01/26/24  2020 11/10/22  1005   INR 1.21* 0.94     Most Recent 3 Troponin's:No lab results found.  Most Recent 3 BNP's:  Recent Labs   Lab Test 11/10/22  1005   NTBNPI 576         Discharge Orders        Reason for your hospital stay    Palpitations     Follow-up and recommended labs and tests     Follow up with primary care provider, Barb Elias, within 7 days for hospital follow- up.    Follow up in the afib clinic in 2-4 wks     Activity    Your activity upon discharge: activity as tolerated     Diet    Follow this diet upon discharge: Orders Placed This Encounter      Combination Diet Low Saturated Fat Na <2400mg Diet, No Caffeine Diet       Examination   /73   Pulse 68   Temp 98.7  F (37.1  C) (Oral)   Resp 21   Wt 56.9 kg (125 lb 6.4 oz)   SpO2 98%   BMI 27.28 kg/m        General: Not in obvious distress.  HEENT: NC, AT   Chest: Clear to auscultation bilaterally  Heart: S1S2 normal, regular. No M/R/G  Abdomen: Soft. NT, ND. Bowel sounds- active.  Extremities: No legs swelling  Neuro: Alert and awake, grossly non-focal      Please see EMR for more detailed significant labs, imaging, consultant notes etc.    I, KATHERINE Rodríguez, personally saw the patient today and spent greater than 30 minutes discharging this patient.    KATHERINE Rodríguez  [facility]    CC:Barb Elias

## 2024-01-27 NOTE — PLAN OF CARE
PRIMARY DIAGNOSIS: CHEST PAIN  OUTPATIENT/OBSERVATION GOALS TO BE MET BEFORE DISCHARGE:  1. Ruled out acute coronary syndrome (negative or stable Troponin):   Serial troponin in progress.  2. Pain Status: C/O pain 6-7/10 throughout the night, but denied the need for intervention. Patient able to stand on scale with minimal assistance and tolerated it well.   3. Appropriate provocative testing performed: No. Awaiting echocardiogram  - Stress Test Procedure: Echo  - Interpretation of cardiac rhythm per telemetry tech: Was afib with tachycardia 100-130. HR improved on diltiazem gtt, patient reverted to sinus rhythm with HR in 60s at 03:15.     4. Cleared by Consultants (if applicable):No. Cardiologist consult pending.  5. Return to near baseline physical activity: No. Patient tolerated standing at bedside with minimal assist, but has otherwise stayed in bed on the purewick.  Discharge Planner Nurse   Safe discharge environment identified: No  Barriers to discharge: Yes. Needs provocative testing and cardiologist consult         Entered by: Edwina Cobb RN 01/27/2024 6:44 AM     VSS stable on RA.   Has remained in SR since 03:15. Still c/o chest pain which feels like a heavy weight, improving slowly after afib resolved. Patient denied need for interventions for pain throughout the night; endorsed 7/10 being tolerable and did not appear to be in distress.   /76   Pulse 76   Temp 97.7  F (36.5  C) (Oral)   Resp 27   Wt 56.9 kg (125 lb 6.4 oz)   SpO2 97%   BMI 27.28 kg/m

## 2024-01-27 NOTE — ED NOTES
[facility] ED Handoff Report    ED Chief Complaint: CP, Palpitations    ED Diagnosis:  (I48.91) Atrial fibrillation with rapid ventricular response (H)  Comment: Pt was given diltiazem and Hr went down to 80's  Plan: Monitor start on dilt drip if tachycardic       PMH:    Past Medical History:   Diagnosis Date    Benign adenomatous polyp of large intestine     Biliary colic     Cervicalgia     Cholelithiasis     Chronic allergic conjunctivitis     Cystitis     Dyspepsia     Gastritis     GERD (gastroesophageal reflux disease)     HLD (hyperlipidemia)     Hypertension     Hypokalemia     Meningioma (H)     Multiple lung nodules on CT     CT ABD 11/23/1013, mult nodules < 5mm in size, per Fleischner Society recommendations  repeat CT in 12 months CT Chest 2/20/17:  Stable pulmonary nodules suggesting incidental findings given long-term stability.     Osteoporosis     Positional vertigo     Pulmonary nodules     Recurrent UTI     Transaminitis 10/29/2015    Urinary incontinence     Xerosis cutis         Code Status:  Full Code     Falls Risk: No Band: Not applicable    Current Living Situation/Residence: lives in a house     Elimination Status: Continent: Yes     Activity Level: Independent    Patients Preferred Language:  Other: Kelli     Needed: Yes    Vital Signs:  /72   Pulse 88   Temp 98  F (36.7  C) (Temporal)   Resp 20   SpO2 97%      Cardiac Rhythm: A fib    Pain Score: 5/10    Is the Patient Confused:  No    Last Food or Drink: 01/26/24 at     Focused Assessment: Pt presented with palpitations, CP, and Hr in the 130's- 150s. AFIB with RVR. Pt has right sided chest pain that radiated to her back. Diltiazem was given and ain has decreased. Now HR is in the 80&90's.       Tests Performed: Done: Labs and Imaging    Treatments Provided:  Diltiazem/ 500ml fluid    Family Dynamics/Concerns: No    Family Updated On Visitor Policy: Yes    Plan of Care Communicated to Family: Yes    Who Was  Updated about Plan of Care: Family at bedside     Belongings Checklist Done and Signed by Patient: Yes    Medications sent with patient: N/A    Covid: asymptomatic , N/A    Additional Information:     RN: Inés Arreaga RN   1/26/2024 10:28 PM

## 2024-01-27 NOTE — CONSULTS
"  HEART CARE CONSULTATON NOTE        Assessment/Recommendations   Assessment/Plan:    Clinically Significant Risk Factors Present on Admission                 # Drug Induced Coagulation Defect: home medication list includes an anticoagulant medication        # Hypertension: Noted on problem list        # Overweight: Estimated body mass index is 27.28 kg/m  as calculated from the following:    Height as of 1/23/24: 1.444 m (4' 8.85\").    Weight as of this encounter: 56.9 kg (125 lb 6.4 oz).             Jennifer Huggins is a 79 year old female with past medical history of hypertension, dizziness, meningioma, CKD, atrial fibrillation, osteoporosis, meningioma, hyperlipidemia who presented to the ER.    Paroxysmal atrial fibrillation with RVR  - Resume home Diltiazem  - Continue Eliquis 5 mg BID    2. Chest pain  - ECG after conversion to NSR shows no signs of ischemia  - HST has been stable at 7   - TTE awaited, if normal can discharge home  - Etiollogy is likely 2/2 to AF with RVR    3. HTN  - High overnight  - continue home meds           History of Present Illness/Subjective    HPI: Jennifer Huggins is a 79 year old female with past medical history of hypertension, dizziness, meningioma, CKD, atrial fibrillation, osteoporosis, meningioma, hyperlipidemia who presented to the ER.    Per patient's daughter: The patient has had right sided chest pain and palpitations for the last few days which worsened considerably today. She currently rates her chest pain as an 8/10, and describes it as a throbbing sensation. Her pain radiates to her back. She notes that warm compresses provide some relief for her chest pain. The patient's daughter denies the patient having recent surgery, current bleeding, and any other symptoms or complaints at this time.      Review of ECG shows first episode of atrial fibrillation in November 2022.    2022    1. The left ventricle is normal in size. Left ventricular function is  normal.The ejection fraction " is > 65%.There is moderate concentric left  ventricular hypertrophy. Left ventricular diastolic function is abnormal. No  regional wall motion abnormalities noted.  2. Normal right ventricle size and systolic function.  3. The left atrium is mildly dilated.  4. There is mild trileaflet aortic sclerosis. No hemodynamically significant  valvular aortic stenosis.     Physical Examination  Review of Systems   VITALS: /72   Pulse 61   Temp 97.8  F (36.6  C) (Oral)   Resp 21   Wt 56.9 kg (125 lb 6.4 oz)   SpO2 97%   BMI 27.28 kg/m    BMI: Body mass index is 27.28 kg/m .  Wt Readings from Last 3 Encounters:   01/27/24 56.9 kg (125 lb 6.4 oz)   01/23/24 57 kg (125 lb 12 oz)   10/06/23 57 kg (125 lb 12 oz)       Intake/Output Summary (Last 24 hours) at 1/27/2024 0810  Last data filed at 1/27/2024 0654  Gross per 24 hour   Intake 740 ml   Output 1150 ml   Net -410 ml     General Appearance:   no distress, normal body habitus   ENT/Mouth: membranes moist, no oral lesions or bleeding gums.      EYES:  no scleral icterus, normal conjunctivae   Neck: no carotid bruits or thyromegaly   Chest/Lungs:   lungs are clear to auscultation, no rales or wheezing, no sternal scar, equal chest wall expansion    Cardiovascular:   Regular. Normal first and second heart sounds with no murmurs, rubs, or gallops; the carotid, radial and posterior tibial pulses are intact, no edema bilaterally    Abdomen:  no organomegaly, masses, bruits, or tenderness; bowel sounds are present   Extremities: no cyanosis or clubbing   Skin: no xanthelasma, warm.    Neurologic: normal  bilateral, no tremors     Psychiatric: alert and oriented x3, calm     Review Of Systems  Skin: negative  Eyes: negative  Ears/Nose/Throat: negative  Gastrointestinal: negative  Genitourinary: negative  Musculoskeletal: negative  Neurologic: negative  Psychiatric: negative  Hematologic/Lymphatic/Immunologic: negative  Endocrine: negative          Lab Results   "  Chemistry/lipid CBC Cardiac Enzymes/BNP/TSH/INR   Recent Labs   Lab Test 01/23/24  1137   CHOL 215*   HDL 55   *   TRIG 178*     Recent Labs   Lab Test 01/23/24  1137 09/30/22  1657 09/24/21  1704   * 119* 108     Recent Labs   Lab Test 01/26/24 2020      POTASSIUM 3.5   CHLORIDE 111*   CO2 23   *   BUN 11.4   CR 0.92   GFRESTIMATED 63   JONATHAN 8.7*     Recent Labs   Lab Test 01/26/24 2020 01/23/24  1137 10/06/23  1523   CR 0.92 0.88 0.85     Recent Labs   Lab Test 12/12/22  1327   A1C 5.6          Recent Labs   Lab Test 01/26/24 2020   WBC 7.2   HGB 14.4   HCT 42.7   MCV 91        Recent Labs   Lab Test 01/26/24 2020 07/03/23  0906 01/08/23  1015   HGB 14.4 12.4 14.0    No results for input(s): \"TROPONINI\" in the last 51799 hours.  Recent Labs   Lab Test 11/10/22  1005   NTBNPI 576     Recent Labs   Lab Test 01/26/24 2020   TSH 2.23     Recent Labs   Lab Test 01/26/24  2020 11/10/22  1005   INR 1.21* 0.94        Medical History  Surgical History Family History Social History   Past Medical History:   Diagnosis Date    Benign adenomatous polyp of large intestine     Biliary colic     Cervicalgia     Cholelithiasis     Chronic allergic conjunctivitis     Cystitis     Dyspepsia     Gastritis     GERD (gastroesophageal reflux disease)     HLD (hyperlipidemia)     Hypertension     Hypokalemia     Meningioma (H)     Multiple lung nodules on CT     CT ABD 11/23/1013, mult nodules < 5mm in size, per Fleischner Society recommendations  repeat CT in 12 months CT Chest 2/20/17:  Stable pulmonary nodules suggesting incidental findings given long-term stability.     Osteoporosis     Positional vertigo     Pulmonary nodules     Recurrent UTI     Transaminitis 10/29/2015    Urinary incontinence     Xerosis cutis      Past Surgical History:   Procedure Laterality Date    CHOLECYSTECTOMY  08/21/2015     Family History   Problem Relation Age of Onset    No Known Problems Mother     No Known " Problems Father         Social History     Socioeconomic History    Marital status:      Spouse name: Not on file    Number of children: Not on file    Years of education: Not on file    Highest education level: Not on file   Occupational History    Not on file   Tobacco Use    Smoking status: Never     Passive exposure: Never    Smokeless tobacco: Never   Vaping Use    Vaping Use: Never used   Substance and Sexual Activity    Alcohol use: No    Drug use: No    Sexual activity: Never     Birth control/protection: Post-menopausal   Other Topics Concern    Not on file   Social History Narrative    Not on file     Social Determinants of Health     Financial Resource Strain: Low Risk  (1/23/2024)    Financial Resource Strain     Within the past 12 months, have you or your family members you live with been unable to get utilities (heat, electricity) when it was really needed?: No   Food Insecurity: Low Risk  (1/23/2024)    Food Insecurity     Within the past 12 months, did you worry that your food would run out before you got money to buy more?: No     Within the past 12 months, did the food you bought just not last and you didn t have money to get more?: No   Transportation Needs: Low Risk  (1/23/2024)    Transportation Needs     Within the past 12 months, has lack of transportation kept you from medical appointments, getting your medicines, non-medical meetings or appointments, work, or from getting things that you need?: No   Physical Activity: Not on file   Stress: Not on file   Social Connections: Not on file   Interpersonal Safety: Not on file   Housing Stability: Low Risk  (1/23/2024)    Housing Stability     Do you have housing? : Yes     Are you worried about losing your housing?: No         Medications  Allergies   Current Outpatient Medications   Medication Sig Dispense Refill    albuterol (PROAIR HFA/PROVENTIL HFA/VENTOLIN HFA) 108 (90 Base) MCG/ACT inhaler Inhale 2 puffs into the lungs every 6 hours  as needed for shortness of breath or wheezing 18 g 3    artificial tears (GENTEAL) 0.1-0.2-0.3 % ophthalmic solution Place 2 drops into both eyes 4 times daily as needed (dry or irritated eyes) 15 mL 11    atorvastatin (LIPITOR) 80 MG tablet Take 1 tablet (80 mg) by mouth at bedtime 30 tablet 11    diltiazem (CARDIZEM) 30 MG tablet Take 30 mg by mouth every evening      ELIQUIS ANTICOAGULANT 5 MG tablet TAKE 1 TABLET (5 MG) BY MOUTH 2 TIMES DAILY 60 tablet 11    lisinopril (ZESTRIL) 10 MG tablet Take 1 tablet (10 mg) by mouth daily 30 tablet 11    meclizine (ANTIVERT) 25 MG tablet Take 25 mg by mouth every morning      omeprazole (PRILOSEC) 20 MG DR capsule Take 20 mg by mouth every evening      vitamin D3 (CHOLECALCIFEROL) 50 mcg (2000 units) tablet Take 1 tablet (50 mcg) by mouth daily 90 tablet 3        Allergies   Allergen Reactions    Ibandronate [Ibandronic Acid] Headache and Shortness Of Breath    Septra [Sulfamethoxazole-Trimethoprim] Other (See Comments)     Acute hepatitis          Sylwia Trinh MD

## 2024-01-27 NOTE — ED TRIAGE NOTES
Patient arrives from home with daughter.   C/o chest pain and palpitations for past several weeks. Tonight pain is worse.     EKG performed in triage.

## 2024-01-27 NOTE — H&P
"[facility]    History and Physical - Hospitalist Service       Date of Admission:  1/26/2024    Assessment & Plan      Jennifer Huggins is a 79 year old female admitted on 1/26/2024. She has h/o HTN. Presents with cp and palpitation for days. In Er pt was found to be in a fib rvr, hr 130.  H/o a fib on DOAC.    A fib rvr, recurrent  -Cardizem gtt initiated in er, continue  -PTA: Eliquis 5 mg bid, cardizem 30 mg daily. As per pt, she took medications as prescribed.   -continue Eliquis. Hold oral cardizem while on gtt.   -echo  -check tsh    HTN  -PTA meds on hold due to soft BP and cardizem gtt    Chest pain  -initial trop and ecg no e/o ischemia  -trops and echo     GERD  -ppi     Diet:  cardiac  DVT Prophylaxis: DOAC  Mei Catheter: Not present  Lines: None     Cardiac Monitoring: None  Code Status:  full    Clinically Significant Risk Factors Present on Admission               # Drug Induced Coagulation Defect: home medication list includes an anticoagulant medication    # Hypertension: Noted on problem list      # Overweight: Estimated body mass index is 27.36 kg/m  as calculated from the following:    Height as of 1/23/24: 1.444 m (4' 8.85\").    Weight as of 1/23/24: 57 kg (125 lb 12 oz).              Disposition Plan      Expected Discharge Date: 01/27/2024                  Mague Smith MD  Hospitalist Service  [facility]  Securely message with [employer] (more info)  Text page via [employer] Paging/Directory     ______________________________________________________________________    Chief Complaint   chest pain and palpitations    History is obtained from the patient, emergency department physician, and patient's daughter    Kelli  over the phone used    History of Present Illness   Jennifer Huggins is a 79 year old female with a past medical history of meningioma, hypertension, GERD, h/o atrial fibrillation on Eliquis, stage 3a CKD, and hyperlipidemia, who presents " to the ED via walk in for evaluation of chest pain and palpitations.      Per patient's daughter: The patient has had right sided chest pain and palpitations for the last few days which worsened considerably today. She currently rates her chest pain as an 8/10, and describes it as a throbbing sensation. Her pain radiates to her back. She notes that warm compresses provide some relief for her chest pain. The patient's daughter denies the patient having recent surgery, current bleeding, and any other symptoms or complaints at this time.       Past Medical History    Past Medical History:   Diagnosis Date    Benign adenomatous polyp of large intestine     Biliary colic     Cervicalgia     Cholelithiasis     Chronic allergic conjunctivitis     Cystitis     Dyspepsia     Gastritis     GERD (gastroesophageal reflux disease)     HLD (hyperlipidemia)     Hypertension     Hypokalemia     Meningioma (H)     Multiple lung nodules on CT     CT ABD 11/23/1013, mult nodules < 5mm in size, per Fleischner Society recommendations  repeat CT in 12 months CT Chest 2/20/17:  Stable pulmonary nodules suggesting incidental findings given long-term stability.     Osteoporosis     Positional vertigo     Pulmonary nodules     Recurrent UTI     Transaminitis 10/29/2015    Urinary incontinence     Xerosis cutis        Past Surgical History   Past Surgical History:   Procedure Laterality Date    CHOLECYSTECTOMY  08/21/2015       Prior to Admission Medications   Prior to Admission Medications   Prescriptions Last Dose Informant Patient Reported? Taking?   ELIQUIS ANTICOAGULANT 5 MG tablet   No No   Sig: TAKE 1 TABLET (5 MG) BY MOUTH 2 TIMES DAILY   acetaminophen (TYLENOL) 650 MG CR tablet   No No   Sig: Take 1 tablet (650 mg) by mouth every 8 hours as needed for pain   albuterol (PROAIR HFA/PROVENTIL HFA/VENTOLIN HFA) 108 (90 Base) MCG/ACT inhaler   No No   Sig: Inhale 2 puffs into the lungs every 6 hours as needed for shortness of breath or  wheezing   artificial tears (GENTEAL) 0.1-0.2-0.3 % ophthalmic solution   No No   Sig: Place 2 drops into both eyes 4 times daily as needed (dry or irritated eyes)   atorvastatin (LIPITOR) 80 MG tablet   No No   Sig: Take 1 tablet (80 mg) by mouth at bedtime   blood pressure monitor Kit   No No   Sig: [BLOOD PRESSURE MONITOR KIT] Home blood pressure cuff and monitor   diltiazem (CARDIZEM) 30 MG tablet   No No   Sig: Take 1 tablet (30 mg) by mouth 2 times daily   hydrOXYzine (ATARAX) 25 MG tablet   No No   Sig: Take 1 tablet (25 mg) by mouth 3 times daily as needed for itching   lisinopril (ZESTRIL) 10 MG tablet   No No   Sig: Take 1 tablet (10 mg) by mouth daily   meclizine (ANTIVERT) 25 MG tablet   No No   Sig: TAKE 1 TABLET BY MOUTH 3 TIMES A DAY AS NEEDED FOR DIZZINESS OR NAUSEA.   omeprazole (PRILOSEC) 20 MG DR capsule   No No   Sig: [OMEPRAZOLE (PRILOSEC) 20 MG CAPSULE] TAKE 1 CAPSULE BY MOUTH DAILY BEFORE BREAKFAST   vitamin D3 (CHOLECALCIFEROL) 50 mcg (2000 units) tablet   No No   Sig: Take 1 tablet (50 mcg) by mouth daily      Facility-Administered Medications Last Administration Doses Remaining   denosumab (PROLIA) injection 60 mg None recorded 2   denosumab (PROLIA) injection 60 mg None recorded 1           Allergies   Allergies   Allergen Reactions    Ibandronate [Ibandronic Acid] Headache and Shortness Of Breath    Septra [Sulfamethoxazole-Trimethoprim] Other (See Comments)     Acute hepatitis         Physical Exam   Vital Signs: Temp: 98  F (36.7  C) Temp src: Temporal BP: 110/72 Pulse: 88   Resp: 20 SpO2: 97 % O2 Device: None (Room air)    Weight: 0 lbs 0 oz    General.  Awake alert oriented not in acute distress.  HEENT.  Pupils equal round react to light, anicteric, EOM intact.  Neck supple no JVD.  CVS irregular rhythm no murmur gallops. Hr 110  Lungs.  Clear to auscultation bilateral no wheezing or rales.  Abdomen.  Soft nontender bowel sounds present.  Extremities.  No edema no calf  tenderness.  Neurological.  Awake and alert. No focal deficit.  Skin no rash. No pallor.  Psych. Normal mood.      Medical Decision Making       65 MINUTES SPENT BY ME on the date of service doing chart review, history, exam, documentation & further activities per the note.      Data     I have personally reviewed the following data over the past 24 hrs:    7.2  \   14.4   / 270     145 111 (H) 11.4 /  124 (H)   3.5 23 0.92 \     Trop: 7 BNP: N/A     INR:  1.21 (H) PTT:  40 (H)   D-dimer:  N/A Fibrinogen:  N/A       Imaging results reviewed over the past 24 hrs:   Recent Results (from the past 24 hour(s))   XR Chest Port 1 View    Narrative    EXAM: XR CHEST PORT 1 VIEW  LOCATION: [facility]  DATE: 1/26/2024    INDICATION: has chest pain and rapid a fib  COMPARISON: 01/08/2023      Impression    IMPRESSION: Left lower lobe atelectasis, otherwise lungs are clear. No pleural effusion or pneumothorax. Unchanged heart size and mediastinal contours.

## 2024-01-27 NOTE — PLAN OF CARE
Goal Outcome Evaluation:      Plan of Care Reviewed With: patient        Problem: Chest Pain  Goal: Resolution of Chest Pain Symptoms  Outcome: Progressing   Pt. Denies chest pain.       Problem: Dysrhythmia  Goal: Normalized Cardiac Rhythm  Outcome: Progressing   Pt. Has been on tele and has been normal sinus rhythm.    Milvia Marin RN

## 2024-01-27 NOTE — PROGRESS NOTES
Discharge paperwork reviewed with pt. And daughters through use of . Pt. Ambulated to front door with daughters.     Milvia Marin RN

## 2024-01-27 NOTE — CONSULTS
"Care Management Initial Consult    General Information  Assessment completed with: Patient, Children, Dah and daughter Se Se Boucher and granddaughter Eltaw  Type of CM/SW Visit: Initial Assessment    Primary Care Provider verified and updated as needed: Yes   Readmission within the last 30 days: no previous admission in last 30 days      Reason for Consult: discharge planning  Advance Care Planning: Advance Care Planning Reviewed: no concerns identified          Communication Assessment  Patient's communication style: spoken language (non-English) (speaks Kelli)             Cognitive  Cognitive/Neuro/Behavioral: WDL                      Living Environment:   People in home: child(duane), adult, grandchild(duane)  Dah and daughter Se Crain Po and son in law and 5 grandkids  Current living Arrangements: house      Able to return to prior arrangements: yes       Family/Social Support:  Care provided by: self, child(duane)  Provides care for: no one, unable/limited ability to care for self     Children, PCA          Description of Support System: Supportive, Involved    Support Assessment: Adequate family and caregiver support, Adequate social supports, Patient communicates needs well met    Current Resources:   Patient receiving home care services: No     Community Resources: PCA (\"2 family members are her PCA for 10hr/day. One PCA is her daughter\".)  Equipment currently used at home: cane, straight  Supplies currently used at home: Incontinence Supplies, Other (\"glasses\")    Employment/Financial:  Employment Status: unemployed     Employment/ Comments: \"no  history\"  Financial Concerns:     Referral to Financial Worker: No       Does the patient's insurance plan have a 3 day qualifying hospital stay waiver?  Yes     Which insurance plan 3 day waiver is available? Alternative insurance waiver    Will the waiver be used for post-acute placement? Undetermined at this time      Functional Status:  Prior to admission " "patient needed assistance:   Dependent ADLs:: Ambulation-cane, Bathing, Dressing, Eating, Grooming, Incontinence, Positioning, Transfers, Toileting  Dependent IADLs:: Cleaning, Cooking, Laundry, Shopping, Meal Preparation, Medication Management, Money Management, Transportation, Incontinence  Assesssment of Functional Status: At functional baseline    Mental Health Status:          Chemical Dependency Status:                Values/Beliefs:  Spiritual, Cultural Beliefs, Buddhist Practices, Values that affect care:                 Additional Information:  Jennifer lives in a house with her daughter Se Se Boucher and son in law and 5 grandkids.     2 family members are her PCAs for help with all ADLs and IADLs for 10hr/day. One of her PCAs is her daughter. She uses a cane for mobility. Daughter states, \"we are thinking of asking her doctor to order her a wheelchair\".    Likely home at discharge.     Family to transport at discharge.    CM to follow for medical progression of care, discharge recommendations, and final discharge plan.    Vicky Rdz RN    "

## 2024-01-30 ENCOUNTER — PATIENT OUTREACH (OUTPATIENT)
Dept: GERIATRIC MEDICINE | Facility: CLINIC | Age: 79
End: 2024-01-30
Payer: MEDICARE

## 2024-01-31 NOTE — PROGRESS NOTES
[facility] Care Coordination Contact  CC received notification of Emergency Room visit.  ER visit occurred on 1/26/2024 at [facility] with Dx of Paroxysmal A. Fib.    CC contacted adult son Aldo Boucher  and reviewed discharge summary.  Member has a follow-up appointment with PCP: Yes: scheduled on 2/6/2024  Member has had a change in condition: No  New referrals placed: No  Home Visit Needed: No  Care plan reviewed and updated.  PCP notified of ED visit via EMR.    Vita Chandra RN, BSN, PHN  [facility]  Phone: [phone]

## 2024-02-02 ENCOUNTER — PATIENT OUTREACH (OUTPATIENT)
Dept: GERIATRIC MEDICINE | Facility: CLINIC | Age: 79
End: 2024-02-02
Payer: MEDICARE

## 2024-02-02 NOTE — LETTER
[facility]  [address], [address]  [place] [address]  Phone:  [phone]  Fax:  [phone]      February 2, 2024    JENNIFER BAI  [address] [place] [address]    Dear Jennifer,    Enclosed is a copy of your completed PCA Assessment and Service Plan.  This is for your records and no action is required by you.  If you have additional questions regarding your assessment please contact me at [phone]. If you feel that your needs are not being met, please contact the Clinical Supervisor at [phone].    Sincerely,      Vita Chandra RN, PHN  [phone]  [email or web address]      [facility]            Enclosure:  Completed PCA assessment

## 2024-02-02 NOTE — PROGRESS NOTES
[facility] Care Coordination Contact    [facility]:  Emailed required PCA documents to [facility].  Faxed copy of PCA assessment to PCA Agency and mailed copy to member.  Faxed MD Communication to PCP.     Leandro Hayward  [facility]  Case Management Specialist  [phone]

## 2024-02-05 ENCOUNTER — PATIENT OUTREACH (OUTPATIENT)
Dept: GERIATRIC MEDICINE | Facility: CLINIC | Age: 79
End: 2024-02-05
Payer: MEDICARE

## 2024-02-05 NOTE — PROGRESS NOTES
[facility] Care Coordination Contact    Received after visit chart from care coordinator.  Completed following tasks: Mailed copy of care plan/support plan to member, Mailed [place] Choices signature sheet pages 3-4, Mailed Safe Medication Disposal , and Updated services in Database    Leandro Hayward  [facility]  Case Management Specialist  [phone]

## 2024-02-05 NOTE — LETTER
February 5, 2024    JENNIFER MORA BHUMIKA  [address] [place] [address]        Dear Jennifer:    At [facility], we re dedicated to improving your health and wellness. Enclosed is the Care Plan developed with you on 1/23/24. Please review the Care Plan carefully.    As a reminder, during your visit we talked about:  Ways to manage your physical and mental health  Using health care to maintain and improve your health   Your preventive care needs     Remember to contact your care coordinator if you:  Are hospitalized, or plan to be hospitalized   Have a fall    Have a change in your physical or mental health  Need help finding support or services    If you have questions, or don t agree with your Care Plan, call me at [phone]. You can also call me if your needs change. TTY users, call the [place] [employer] at (588) or 1-714.785.8038 (hrzbgv-jx-guqalr relay service).    Sincerely,          Vita Chandra RN, PHN  [phone]  [email or web address]      [employer]    [id number] accepted    [id number] (07/2022)

## 2024-02-06 ENCOUNTER — ALLIED HEALTH/NURSE VISIT (OUTPATIENT)
Dept: FAMILY MEDICINE | Facility: CLINIC | Age: 79
End: 2024-02-06
Payer: MEDICARE

## 2024-02-06 DIAGNOSIS — M81.0 AGE-RELATED OSTEOPOROSIS WITHOUT CURRENT PATHOLOGICAL FRACTURE: Primary | ICD-10-CM

## 2024-02-06 PROCEDURE — 99207 PR NO CHARGE NURSE ONLY: CPT

## 2024-02-06 PROCEDURE — 96372 THER/PROPH/DIAG INJ SC/IM: CPT | Performed by: FAMILY MEDICINE

## 2024-02-06 NOTE — PROGRESS NOTES
Clinic Administered Medication Documentation      Prolia Documentation    Indication: Prolia  (denosumab) is a prescription medicine used to treat osteoporosis in patients who:   Are at high risk for fracture, meaning patients who have had a fracture related to osteoporosis, or who have multiple risk factors for fracture.  Cannot use another osteoporosis medicine or other osteoporosis medicines did not work well.  The timeline for early/late injections would be 4 weeks early and any time after the 6 month lynette. If a patient receives their injection late, then the subsequent injection would be 6 months from the date that they actually received the injection.    When was the last injection?  2023  Was the last injection at least 6 months ago? Yes  Has the prior authorization been completed?  Yes  Is there an active order (written within the past 365 days, with administrations remaining, not ) in the chart?  Yes  Patient denies any dental work involving the bone (e.g. tooth extraction or dental implants) in the past 4 weeks?  Yes  Patient denies plans for any dental work involving the bone (e.g. tooth extraction or dental implants) in the next 4 weeks? Yes    The following steps were completed to comply with the REMS program for Prolia:  Reviewed information in the Medication Guide and Patient Counseling Chart, including the serious risks of Prolia  and the symptoms of each risk.  Advised patient to seek prompt medical attention if they have signs or symptoms of any of the serious risks.  Provided each patient a copy of the Medication Guide and Patient Brochure.    Prior to injection, verified patient identity using patient's name and date of birth. Medication was administered. Please see MAR and medication order for additional information. Patient instructed to remain in clinic for 15 minutes and report any adverse reaction to staff immediately but patient declined.    Vial/Syringe: Syringe  Was this  medication supplied by the patient? No  Verified that the patient has refills remaining in their prescription.       Ranjith Wood MSN, RN   [facility]

## 2024-02-06 NOTE — PROGRESS NOTES
Clinic Administered Medication Documentation      Prolia Documentation    Indication: Prolia  (denosumab) is a prescription medicine used to treat osteoporosis in patients who:   Are at high risk for fracture, meaning patients who have had a fracture related to osteoporosis, or who have multiple risk factors for fracture.  Cannot use another osteoporosis medicine or other osteoporosis medicines did not work well.  The timeline for early/late injections would be 4 weeks early and any time after the 6 month lynette. If a patient receives their injection late, then the subsequent injection would be 6 months from the date that they actually received the injection.    When was the last injection?  ***  Was the last injection at least 6 months ago? No  Was the last injection within 14 days of the 6 month due date?  No. Patient was rescheduled for ***

## 2024-02-18 NOTE — PROGRESS NOTES
[facility] Care Coordination Contact    [facility] Home Visit Assessment     Home visit for Health Risk Assessment with Jennifer Huggins completed on January 23, 2024    Type of residence:: Private home - no stairs  Current living arrangement:: I live in a private home with family     Assessment completed with:: Care Team Member, Patient, Family    Current Care Plan  Member currently receiving the following home care services:     Member currently receiving the following community resources: PCA      Medication Review  Medication reconciliation completed in Epic: Yes  Medication set-up & administration: Family/informal caregiver sets up weekly.  Family caregiver administers medications.  Medication Risk Assessment Medication (1 or more, place referral to MTM): N/A: No risk factors identified  MTM Referral Placed: No: No risk factors idenified    Mental/Behavioral Health   Depression Screening:           Mental health DX:: No        Falls Assessment:   Fallen 2 or more times in the past year?: No   Any fall with injury in the past year?: No    ADL/IADL Dependencies:           Health Plan sponsored benefits: [employer]: Shared information regarding preventative health screening and health plan supplemental benefits/incentives. Reviewed medication disposal form.    PCA Assessment completed at visit: Yes Annual PCA assessment indicated 6.25 hours per day of PCA. This is the same as the previous assessment.     Elderly Waiver Eligibility: Yes, but member declines EW services; will not open to EW    Care Plan & Recommendations: Da Lay will continue to receive personal care attendant services.         See [facility] for detailed assessment information.    Follow-Up Plan: Member informed of future contact, plan to f/u with member with a 6 month telephone assessment.  Contact information shared with member and family, encouraged member to call with any questions or concerns at any time.    [facility]  providers: Please see Snapshot and Care Management Flowsheets for Specific details of care plan.    This CC note routed to PCP, Barb Elias.    Vita Chandra RN, BSN, PHN  [facility]  Phone: [phone]         0 (no pain/absence of nonverbal indicators of pain)

## 2024-02-28 ENCOUNTER — LAB (OUTPATIENT)
Dept: LAB | Facility: HOSPITAL | Age: 79
End: 2024-02-28
Payer: MEDICARE

## 2024-02-28 ENCOUNTER — OFFICE VISIT (OUTPATIENT)
Dept: CARDIOLOGY | Facility: CLINIC | Age: 79
End: 2024-02-28
Attending: FAMILY MEDICINE
Payer: MEDICARE

## 2024-02-28 VITALS
DIASTOLIC BLOOD PRESSURE: 60 MMHG | RESPIRATION RATE: 24 BRPM | BODY MASS INDEX: 27.41 KG/M2 | HEART RATE: 67 BPM | OXYGEN SATURATION: 97 % | SYSTOLIC BLOOD PRESSURE: 152 MMHG | WEIGHT: 126 LBS

## 2024-02-28 DIAGNOSIS — I10 BENIGN ESSENTIAL HYPERTENSION: ICD-10-CM

## 2024-02-28 DIAGNOSIS — I48.0 PAROXYSMAL ATRIAL FIBRILLATION (H): Primary | ICD-10-CM

## 2024-02-28 DIAGNOSIS — R07.9 CHEST PAIN, UNSPECIFIED TYPE: ICD-10-CM

## 2024-02-28 PROCEDURE — 99214 OFFICE O/P EST MOD 30 MIN: CPT | Performed by: STUDENT IN AN ORGANIZED HEALTH CARE EDUCATION/TRAINING PROGRAM

## 2024-02-28 PROCEDURE — 84244 ASSAY OF RENIN: CPT

## 2024-02-28 PROCEDURE — 82088 ASSAY OF ALDOSTERONE: CPT

## 2024-02-28 PROCEDURE — 36415 COLL VENOUS BLD VENIPUNCTURE: CPT

## 2024-02-28 RX ORDER — LISINOPRIL 20 MG/1
20 TABLET ORAL DAILY
Qty: 30 TABLET | Refills: 3 | Status: SHIPPED | OUTPATIENT
Start: 2024-02-28 | End: 2024-06-24

## 2024-02-28 NOTE — PROGRESS NOTES
[facility] HEART CARE   [address]  [place] [address]   [email or web address]   OFFICE: [phone]     CARDIOLOGY CLINIC NOTE     Thank you, Barb Ram, for asking the [facility] Heart Care team to see Ms. Jennifer Huggins to evaluate atrial fibrillation        History of Present Illness   Ms. Jennifer Huggins is a 79 year old female with a significant past history of paroxysmal afib, HTN, HLD, hypokalemia, who presents for evaluation of atrial fibrillation.  History was assisted with a Kelli speaking .  The patient notes she was initially diagnosed with afib in 2022.  She was seen in the hospital at that time. She has afib episodes about 1-2 times a week.  She feels heart racing and uncomfortable.  She noted chest pain with this latest episode.  She notes usually the episodes self resolve but this latest episode was more severe.           Medications  Allergies   Current Outpatient Medications   Medication Sig Dispense Refill    albuterol (PROAIR HFA/PROVENTIL HFA/VENTOLIN HFA) 108 (90 Base) MCG/ACT inhaler Inhale 2 puffs into the lungs every 6 hours as needed for shortness of breath or wheezing 18 g 3    artificial tears (GENTEAL) 0.1-0.2-0.3 % ophthalmic solution Place 2 drops into both eyes 4 times daily as needed (dry or irritated eyes) 15 mL 11    atorvastatin (LIPITOR) 80 MG tablet Take 1 tablet (80 mg) by mouth at bedtime 30 tablet 11    diltiazem (CARDIZEM) 30 MG tablet Take 1 tablet (30 mg) by mouth every 12 hours 60 tablet 3    ELIQUIS ANTICOAGULANT 5 MG tablet TAKE 1 TABLET (5 MG) BY MOUTH 2 TIMES DAILY 60 tablet 11    lisinopril (ZESTRIL) 10 MG tablet Take 1 tablet (10 mg) by mouth daily 30 tablet 11    lisinopril (ZESTRIL) 20 MG tablet Take 1 tablet (20 mg) by mouth daily 30 tablet 3    meclizine (ANTIVERT) 25 MG tablet Take 25 mg by mouth 2 times daily      omeprazole (PRILOSEC) 20 MG DR capsule Take 20 mg by mouth every evening      vitamin D3  (CHOLECALCIFEROL) 50 mcg (2000 units) tablet Take 1 tablet (50 mcg) by mouth daily 90 tablet 3      Allergies   Allergen Reactions    Ibandronate [Ibandronic Acid] Headache and Shortness Of Breath    Septra [Sulfamethoxazole-Trimethoprim] Other (See Comments)     Acute hepatitis         Physical Examination Review of Systems   Vitals: BP (!) 152/60 (BP Location: Right arm, Patient Position: Sitting, Cuff Size: Adult Regular)   Pulse 67   Resp 24   Wt 57.2 kg (126 lb)   SpO2 97%   BMI 27.41 kg/m    BMI= Body mass index is 27.41 kg/m .  Wt Readings from Last 3 Encounters:   02/28/24 57.2 kg (126 lb)   01/27/24 56.9 kg (125 lb 6.4 oz)   01/23/24 57 kg (125 lb 12 oz)       General: pleasant female. No acute distress.   Neck: No JVD  Lungs: clear to auscultation  COR:  regular rate and rhythm, No murmurs, rubs, or gallops  Extrem: No edema        Please refer above for cardiac ROS details.       Past History     Family History:   Family History   Problem Relation Age of Onset    No Known Problems Mother     No Known Problems Father         Social History:   Social History     Socioeconomic History    Marital status:      Spouse name: Not on file    Number of children: Not on file    Years of education: Not on file    Highest education level: Not on file   Occupational History    Not on file   Tobacco Use    Smoking status: Never     Passive exposure: Never    Smokeless tobacco: Never   Vaping Use    Vaping Use: Never used   Substance and Sexual Activity    Alcohol use: No    Drug use: No    Sexual activity: Never     Birth control/protection: Post-menopausal   Other Topics Concern    Not on file   Social History Narrative    Not on file     Social Determinants of Health     Financial Resource Strain: Low Risk  (1/23/2024)    Financial Resource Strain     Within the past 12 months, have you or your family members you live with been unable to get utilities (heat, electricity) when it was really needed?: No    Food Insecurity: Low Risk  (1/23/2024)    Food Insecurity     Within the past 12 months, did you worry that your food would run out before you got money to buy more?: No     Within the past 12 months, did the food you bought just not last and you didn t have money to get more?: No   Transportation Needs: Low Risk  (1/23/2024)    Transportation Needs     Within the past 12 months, has lack of transportation kept you from medical appointments, getting your medicines, non-medical meetings or appointments, work, or from getting things that you need?: No   Physical Activity: Not on file   Stress: Not on file   Social Connections: Not on file   Interpersonal Safety: Not on file   Housing Stability: Low Risk  (1/23/2024)    Housing Stability     Do you have housing? : Yes     Are you worried about losing your housing?: No            Lab Results    Chemistry/lipid CBC Cardiac Enzymes/BNP/TSH/INR   Lab Results   Component Value Date    CHOL 215 (H) 01/23/2024    HDL 55 01/23/2024    TRIG 178 (H) 01/23/2024    BUN 11.4 01/26/2024     01/26/2024    CO2 23 01/26/2024    Lab Results   Component Value Date    WBC 7.2 01/26/2024    HGB 14.4 01/26/2024    HCT 42.7 01/26/2024    MCV 91 01/26/2024     01/26/2024    Lab Results   Component Value Date    TSH 2.23 01/26/2024    INR 1.21 (H) 01/26/2024          Cardiac Problems and Cardiac Diagnostics     Most Recent Cardiac testing:  ECG Personal interpretation  NSR, nonspecific T wave    ECHO 1/27/2024  Interpretation Summary     Left ventricular size, wall motion and function are normal. The ejection  fraction is 60-65%.  Normal right ventricle size and systolic function.  The left atrium is borderline dilated.  No hemodynamically significant valvular disease.     When compared previous stidy, there is no significant interval change.         Assessment/Recommendations   Assessment:    Ms. Jennifer Huggins is a 79 year old female with a significant past history of paroxysmal  afib, HTN, HLD, hypokalemia, who presents for evaluation of atrial fibrillation.      Paroxysmal afib   - Frequent episodes, highly symptomatic.  Pt prefers medications over catheter ablation   - Will get a CCTA to evaluate her candidacy for a 1c antiarrhythmic.  If ok, will start flecainide 50mg BID with ECG 1 week after   - Cont dilt for now  - CHADS-Vasc 4.  Cont eliquis 5mg BID.  No bleeding issues    HTN   - BP uncontrolled.  Has hypokalemia of unclear etiology.  Test renin/deepti   - Increase lisinopril to 20mg qdaily    RTC 6 months       Steven Wilks DO FACC  Non-invasive Cardiologist  [facility]

## 2024-02-28 NOTE — LETTER
2/28/2024    Barb Elias MD  [address] [place] [address] [address]  [place] [address]    RE: Jennifer Huggins       Dear Colleague,     I had the pleasure of seeing Jennifer Huggins in the [facility].    [facility] HEART CARE   [address]  [place] [address]   [email or web address]   OFFICE: [phone]     CARDIOLOGY CLINIC NOTE     Thank you, Barb Ram, for asking the [facility] [place] [facility] Heart Care team to see Ms. Jennifer Huggins to evaluate atrial fibrillation        History of Present Illness   Ms. Jennifer Huggins is a 79 year old female with a significant past history of paroxysmal afib, HTN, HLD, hypokalemia, who presents for evaluation of atrial fibrillation.  History was assisted with a Kelli speaking .  The patient notes she was initially diagnosed with afib in 2022.  She was seen in the hospital at that time. She has afib episodes about 1-2 times a week.  She feels heart racing and uncomfortable.  She noted chest pain with this latest episode.  She notes usually the episodes self resolve but this latest episode was more severe.           Medications  Allergies   Current Outpatient Medications   Medication Sig Dispense Refill    albuterol (PROAIR HFA/PROVENTIL HFA/VENTOLIN HFA) 108 (90 Base) MCG/ACT inhaler Inhale 2 puffs into the lungs every 6 hours as needed for shortness of breath or wheezing 18 g 3    artificial tears (GENTEAL) 0.1-0.2-0.3 % ophthalmic solution Place 2 drops into both eyes 4 times daily as needed (dry or irritated eyes) 15 mL 11    atorvastatin (LIPITOR) 80 MG tablet Take 1 tablet (80 mg) by mouth at bedtime 30 tablet 11    diltiazem (CARDIZEM) 30 MG tablet Take 1 tablet (30 mg) by mouth every 12 hours 60 tablet 3    ELIQUIS ANTICOAGULANT 5 MG tablet TAKE 1 TABLET (5 MG) BY MOUTH 2 TIMES DAILY 60 tablet 11    lisinopril (ZESTRIL) 10 MG tablet Take 1 tablet (10 mg) by mouth daily 30 tablet 11    lisinopril (ZESTRIL) 20 MG tablet Take 1 tablet  (20 mg) by mouth daily 30 tablet 3    meclizine (ANTIVERT) 25 MG tablet Take 25 mg by mouth 2 times daily      omeprazole (PRILOSEC) 20 MG DR capsule Take 20 mg by mouth every evening      vitamin D3 (CHOLECALCIFEROL) 50 mcg (2000 units) tablet Take 1 tablet (50 mcg) by mouth daily 90 tablet 3      Allergies   Allergen Reactions    Ibandronate [Ibandronic Acid] Headache and Shortness Of Breath    Septra [Sulfamethoxazole-Trimethoprim] Other (See Comments)     Acute hepatitis         Physical Examination Review of Systems   Vitals: BP (!) 152/60 (BP Location: Right arm, Patient Position: Sitting, Cuff Size: Adult Regular)   Pulse 67   Resp 24   Wt 57.2 kg (126 lb)   SpO2 97%   BMI 27.41 kg/m    BMI= Body mass index is 27.41 kg/m .  Wt Readings from Last 3 Encounters:   02/28/24 57.2 kg (126 lb)   01/27/24 56.9 kg (125 lb 6.4 oz)   01/23/24 57 kg (125 lb 12 oz)       General: pleasant female. No acute distress.   Neck: No JVD  Lungs: clear to auscultation  COR:  regular rate and rhythm, No murmurs, rubs, or gallops  Extrem: No edema        Please refer above for cardiac ROS details.       Past History     Family History:   Family History   Problem Relation Age of Onset    No Known Problems Mother     No Known Problems Father         Social History:   Social History     Socioeconomic History    Marital status:      Spouse name: Not on file    Number of children: Not on file    Years of education: Not on file    Highest education level: Not on file   Occupational History    Not on file   Tobacco Use    Smoking status: Never     Passive exposure: Never    Smokeless tobacco: Never   Vaping Use    Vaping Use: Never used   Substance and Sexual Activity    Alcohol use: No    Drug use: No    Sexual activity: Never     Birth control/protection: Post-menopausal   Other Topics Concern    Not on file   Social History Narrative    Not on file     Social Determinants of Health     Financial Resource Strain: Low Risk   (1/23/2024)    Financial Resource Strain     Within the past 12 months, have you or your family members you live with been unable to get utilities (heat, electricity) when it was really needed?: No   Food Insecurity: Low Risk  (1/23/2024)    Food Insecurity     Within the past 12 months, did you worry that your food would run out before you got money to buy more?: No     Within the past 12 months, did the food you bought just not last and you didn t have money to get more?: No   Transportation Needs: Low Risk  (1/23/2024)    Transportation Needs     Within the past 12 months, has lack of transportation kept you from medical appointments, getting your medicines, non-medical meetings or appointments, work, or from getting things that you need?: No   Physical Activity: Not on file   Stress: Not on file   Social Connections: Not on file   Interpersonal Safety: Not on file   Housing Stability: Low Risk  (1/23/2024)    Housing Stability     Do you have housing? : Yes     Are you worried about losing your housing?: No            Lab Results    Chemistry/lipid CBC Cardiac Enzymes/BNP/TSH/INR   Lab Results   Component Value Date    CHOL 215 (H) 01/23/2024    HDL 55 01/23/2024    TRIG 178 (H) 01/23/2024    BUN 11.4 01/26/2024     01/26/2024    CO2 23 01/26/2024    Lab Results   Component Value Date    WBC 7.2 01/26/2024    HGB 14.4 01/26/2024    HCT 42.7 01/26/2024    MCV 91 01/26/2024     01/26/2024    Lab Results   Component Value Date    TSH 2.23 01/26/2024    INR 1.21 (H) 01/26/2024          Cardiac Problems and Cardiac Diagnostics     Most Recent Cardiac testing:  ECG Personal interpretation  NSR, nonspecific T wave    ECHO 1/27/2024  Interpretation Summary     Left ventricular size, wall motion and function are normal. The ejection  fraction is 60-65%.  Normal right ventricle size and systolic function.  The left atrium is borderline dilated.  No hemodynamically significant valvular disease.     When  compared previous stidy, there is no significant interval change.         Assessment/Recommendations   Assessment:    Ms. Jennifer Huggins is a 79 year old female with a significant past history of paroxysmal afib, HTN, HLD, hypokalemia, who presents for evaluation of atrial fibrillation.      Paroxysmal afib   - Frequent episodes, highly symptomatic.  Pt prefers medications over catheter ablation   - Will get a CCTA to evaluate her candidacy for a 1c antiarrhythmic.  If ok, will start flecainide 50mg BID with ECG 1 week after   - Cont dilt for now  - CHADS-Vasc 4.  Cont eliquis 5mg BID.  No bleeding issues    HTN   - BP uncontrolled.  Has hypokalemia of unclear etiology.  Test renin/deepti   - Increase lisinopril to 20mg qdaily    RTC 6 months       Steven Wilks DO [facility]  Non-invasive Cardiologist  [facility]         Thank you for allowing me to participate in the care of your patient.      Sincerely,     Steven Wilks DO     [facility] Care  cc:   Barb Elias MD  [address] [place] [address]       Pupils equal, round and reactive to light, Extra-ocular movement intact, eyes are clear b/l

## 2024-02-28 NOTE — PATIENT INSTRUCTIONS
It was a pleasure meeting you today    In summary:    We will increase lisinopril to 20mg once daily  We will get a cardiac CT scan to evaluate for blockages.  If this looks ok we will start a new medication.    Please call my nurse Biju Means at [phone] if you have any questions or issues.    We will schedule a follow up visit in  6 months      Steven Wilks DO [facility]  Non-invasive Cardiologist  [facility]

## 2024-03-01 LAB
ALDOST SERPL-MCNC: 16.9 NG/DL (ref 0–31)
ALDOST/RENIN PLAS-RTO: 3.3 {RATIO} (ref 0–25)
RENIN PLAS-CCNC: 5.1 NG/ML/HR

## 2024-05-28 DIAGNOSIS — K21.9 GASTROESOPHAGEAL REFLUX DISEASE WITHOUT ESOPHAGITIS: Primary | ICD-10-CM

## 2024-06-24 ENCOUNTER — TELEPHONE (OUTPATIENT)
Dept: FAMILY MEDICINE | Facility: CLINIC | Age: 79
End: 2024-06-24
Payer: MEDICARE

## 2024-06-24 DIAGNOSIS — I10 BENIGN ESSENTIAL HYPERTENSION: ICD-10-CM

## 2024-06-24 DIAGNOSIS — I48.0 PAROXYSMAL ATRIAL FIBRILLATION (H): ICD-10-CM

## 2024-06-24 NOTE — TELEPHONE ENCOUNTER
There meds are managed by cardiology.  Also, it looks like doses have changed.  Please forward to prescribing physician/cardiology.

## 2024-06-24 NOTE — TELEPHONE ENCOUNTER
Medication Question or Refill        What medication are you calling about (include dose and sig)?: lisinopril (ZESTRIL) 20 MG tablet, diltiazem (CARDIZEM) 30 MG tablet     Preferred Pharmacy:     [id number] - [place], [place] - [address] [facility] [address]  [address]  [place] [address]  Phone: [phone] Fax: [phone]      Controlled Substance Agreement on file:   CSA -- Patient Level:    CSA: None found at the patient level.       Who prescribed the medication?: Dr. Steven Wilks, Manjinder Grant.    Do you need a refill? Yes    When did you use the medication last? Out of medication refill.    Patient offered an appointment? No    Do you have any questions or concerns?  No      Okay to leave a detailed message?: Yes at Cell number on file:    Telephone Information:   Mobile [phone]

## 2024-06-25 NOTE — TELEPHONE ENCOUNTER
Did cardiology decline to fill these meds? They are the ones who are advising that she takes these meds and so they should continue to manage.    Last outpatient with cardiology was 2/28/24  They instructed to continue diltiazem and changed her lisinopril rx.      Please forward to cardiology.

## 2024-06-26 RX ORDER — LISINOPRIL 20 MG/1
20 TABLET ORAL DAILY
Qty: 30 TABLET | Refills: 3 | Status: SHIPPED | OUTPATIENT
Start: 2024-06-26

## 2024-06-26 RX ORDER — DILTIAZEM HYDROCHLORIDE 30 MG/1
30 TABLET, FILM COATED ORAL EVERY 12 HOURS
Qty: 60 TABLET | Refills: 3 | Status: SHIPPED | OUTPATIENT
Start: 2024-06-26

## 2024-06-26 NOTE — TELEPHONE ENCOUNTER
My clinic has only ever refilled diltiazem as a courtesy to cardiology/patient so that she wouldn't run out.  We are not the speciality managing her paroxysmal atrial fibrillation.  Diltiazem was started by cardiology in the hospital (Dr. Frederic Rivera 11/11/22) and pt was instructed to continue by outpatient cardiology (Dr. Steven Wilks 2/28/24).    I will refill as a courtesy now, but will cardiology clinic please reach out and help with whatever is needed? (It looks like CCTA was ordered, with possible medication switch to follow but this was never scheduled and no cardiology follow-up appointment is scheduled). You will need to call with an .    Sorry that I'm not sure which pool to reach out to in this regard, so I've picked a couple that seem like the best chance. Please forward as appropriate.

## 2024-06-26 NOTE — TELEPHONE ENCOUNTER
Lisinopril rx filled per Dr Wilks.    Will defer dilt to PCP, as PCP has been prescriber for some time, and pt hasn't scheduled CCTA to determine if can change to flecainide.  -ral

## 2024-06-27 NOTE — TELEPHONE ENCOUNTER
Good day Dr San, we have reached out and left a message. Isee patient has an upcoming appointment 7/17/24. If CTA and or follow-up with Dr Gutierrez not arranged, could you please contact while in clinic so we can gt arranged for both? Thank you we will continue to try to connect. CMM,Rn

## 2024-06-27 NOTE — TELEPHONE ENCOUNTER
Valentín Osborne,    Yes, I called and left her a message. Thanks!      You  Dayton Sanchez2 hours ago (11:56 AM)     ESTHELA Jeronimo could you please call this patient, to schedule the CTA. And also a follow-up with TS due in August. Thank you CMM,Rn

## 2024-07-08 NOTE — TELEPHONE ENCOUNTER
Patient now scheduled.  Future Appointments   Date Time Provider Department Center   7/17/2024  3:00 PM Barb Elias MD DAFMOB [facility]   8/6/2024  1:00 PM LIZA RN 1 RUPALI [facility]   8/13/2024  1:20 PM [facility] CT 2 [facility]   10/8/2024  8:20 AM [facility]

## 2024-07-09 ENCOUNTER — TELEPHONE (OUTPATIENT)
Dept: CARDIOLOGY | Facility: CLINIC | Age: 79
End: 2024-07-09
Payer: MEDICARE

## 2024-07-17 ENCOUNTER — OFFICE VISIT (OUTPATIENT)
Dept: FAMILY MEDICINE | Facility: CLINIC | Age: 79
End: 2024-07-17
Payer: MEDICARE

## 2024-07-17 VITALS
HEART RATE: 81 BPM | OXYGEN SATURATION: 98 % | WEIGHT: 126.12 LBS | TEMPERATURE: 97.2 F | HEIGHT: 57 IN | BODY MASS INDEX: 27.21 KG/M2 | DIASTOLIC BLOOD PRESSURE: 74 MMHG | SYSTOLIC BLOOD PRESSURE: 133 MMHG | RESPIRATION RATE: 16 BRPM

## 2024-07-17 DIAGNOSIS — I48.0 PAROXYSMAL ATRIAL FIBRILLATION (H): ICD-10-CM

## 2024-07-17 DIAGNOSIS — R06.2 WHEEZING: ICD-10-CM

## 2024-07-17 DIAGNOSIS — E78.00 HYPERCHOLESTEROLEMIA: ICD-10-CM

## 2024-07-17 DIAGNOSIS — D32.0 BENIGN NEOPLASM OF CEREBRAL MENINGES (H): ICD-10-CM

## 2024-07-17 DIAGNOSIS — I10 ESSENTIAL HYPERTENSION: Primary | ICD-10-CM

## 2024-07-17 DIAGNOSIS — R42 DIZZINESS: ICD-10-CM

## 2024-07-17 PROBLEM — I48.91 ATRIAL FIBRILLATION WITH RAPID VENTRICULAR RESPONSE (H): Status: ACTIVE | Noted: 2022-11-10

## 2024-07-17 PROCEDURE — 99214 OFFICE O/P EST MOD 30 MIN: CPT | Performed by: FAMILY MEDICINE

## 2024-07-17 PROCEDURE — G2211 COMPLEX E/M VISIT ADD ON: HCPCS | Performed by: FAMILY MEDICINE

## 2024-07-17 RX ORDER — ATORVASTATIN CALCIUM 80 MG/1
80 TABLET, FILM COATED ORAL AT BEDTIME
Qty: 30 TABLET | Refills: 11 | Status: SHIPPED | OUTPATIENT
Start: 2024-07-17

## 2024-07-17 RX ORDER — MECLIZINE HYDROCHLORIDE 25 MG/1
25 TABLET ORAL 3 TIMES DAILY PRN
Qty: 30 TABLET | Refills: 11 | Status: SHIPPED | OUTPATIENT
Start: 2024-07-17

## 2024-07-17 RX ORDER — RESPIRATORY SYNCYTIAL VIRUS VACCINE 120MCG/0.5
0.5 KIT INTRAMUSCULAR ONCE
Qty: 1 EACH | Refills: 0 | Status: CANCELLED | OUTPATIENT
Start: 2024-07-17 | End: 2024-07-17

## 2024-07-17 RX ORDER — ALBUTEROL SULFATE 90 UG/1
2 AEROSOL, METERED RESPIRATORY (INHALATION) EVERY 6 HOURS PRN
Qty: 18 G | Refills: 3 | Status: SHIPPED | OUTPATIENT
Start: 2024-07-17

## 2024-07-17 NOTE — PATIENT INSTRUCTIONS
I sent a prescription for a blood pressure monitor for you to [facility] at [facility] and [facility]:   They should call you, or you can Call them to see when you can pick it up:    You can pick it up:  [facility]  [address] [place] [address] [phone]

## 2024-07-17 NOTE — PROGRESS NOTES
Assessment & Plan     Jennifer was seen today for hypertension.    Diagnoses and all orders for this visit:    Essential hypertension  Blood pressure is adequately controlled on lisinopril 20 mg/day.  I have updated her medication list (old telling milligram lisinopril dose was still on the list), and prescribed her home blood pressure cuff to be picked up at [facility] in a few days.  -     Home Blood Pressure Monitor Order for DME - ONLY FOR DME    Wheezing  I do not believe she is ever been observed to have any wheezing on exam, but she reports that she occasionally has wheezing and would like albuterol as it has been helpful in the past.  I did go ahead and refill this.  -     albuterol (PROAIR HFA/PROVENTIL HFA/VENTOLIN HFA) 108 (90 Base) MCG/ACT inhaler; Inhale 2 puffs into the lungs every 6 hours as needed for shortness of breath or wheezing    Hypercholesterolemia  Refilled statin.  Will plan to check lipid profile at her next visit if not checked by cardiology prior to that.  -     atorvastatin (LIPITOR) 80 MG tablet; Take 1 tablet (80 mg) by mouth at bedtime    Dizziness  Meclizine refilled, as this has been helpful to her in the past.  -     meclizine (ANTIVERT) 25 MG tablet; Take 1 tablet (25 mg) by mouth 3 times daily as needed for dizziness    Paroxysmal atrial fibrillation (H)  Managed by cardiology.  She was a bit unsure of her upcoming appointment so did give her printed off information in this regard.      Benign neoplasm of cerebral meninges (H)  Patient has had a meningioma since at least 2012.  Last saw neurosurgery on 7/10/2023.  They offered surgery versus observation and patient preferred observation.  They said they would order an MRI of the brain with and without contrast as well as MR venogram in 6 months.  This was done on 1/10/2024 and was unchanged.  I do not see any particular communication from neurosurgery to the patient, but given there has not been significant change in many  years would not plan further evaluation at this time.      Other orders  -     REVIEW OF HEALTH MAINTENANCE PROTOCOL ORDERS  -     PRIMARY CARE FOLLOW-UP SCHEDULING; Future                The longitudinal plan of care for the diagnosis(es)/condition(s) as documented were addressed during this visit. Due to the added complexity in care, I will continue to support Jennifer in the subsequent management and with ongoing continuity of care.          Subjective   Jennifer is a 79 year old, presenting for the following health issues:  Hypertension (Would like new BP machine )        7/17/2024     2:42 PM   Additional Questions   Roomed by DARIUS MORA MA   Accompanied by GRANDDAUGHTER     History of Present Illness       Hypertension: She presents for follow up of hypertension.  She does check blood pressure  regularly outside of the clinic. Outside blood pressures have been over 140/90. She follows a low salt diet.     She eats 4 or more servings of fruits and vegetables daily.She consumes 1 sweetened beverage(s) daily.She exercises with enough effort to increase her heart rate 20 to 29 minutes per day.  She exercises with enough effort to increase her heart rate 4 days per week.   She is taking medications regularly.     Here with her granddaughter.  History is from patient, granddaughter, and review of medical record.              Here for BP check.    I have not seen her since January.  A couple of weeks after I last saw her, she was hospitalized overnight for recurrence of atrial fibrillation with rapid ventricular response.  She had first of her presented with atrial fibrillation on 11/10/2022 in the setting of profound hypokalemia.  She converted to normal sinus rhythm after IV diltiazem.  She was discharged on apixaban and twice daily diltiazem.  She then presumably did well until presenting again to the emergency room on 1/27/2024 with what turned out to be atrial fibrillation.  Again, converted prior to to discharge on diltiazem  "infusion.  It sounds like there may have been some confusion on the diltiazem she might been taking once a day instead of twice per day.    She did not end up following up in our clinic after hospital discharge, but did see cardiology on 2/28/2024.  That note was reviewed and indicated that she was having frequent episodes of paroxysmal atrial fibrillation, they recommended a CCTA with consideration of starting flecainide, and they increased her lisinopril from 10 to 20 mg.  She has not been seen since that time and has not had any CT scan of the coronary arteries.    She reports she is doing generally well.  She notes that her blood pressure is better lately.  She does not have her own blood pressure cuff anymore, though, because it broke and has been borrowing 1.  She requests a new 1.    Sometimes heart racing.  Just happens sometimes and it is hard for her to describe, but sometimes will not happen for several weeks in a row.    Chest will feel heavy and hard to breath.  Will last about 1.5 hours; will go down when takes medicines (upon further clarification, she means that when she takes her regularly scheduled diltiazem; is not taking any extra diltiazem or is not taking it only if she has symptoms).  Will check heart rate during that and its been about 120.    Confirms taking meds except out of meclizine.        Objective    /74   Pulse 81   Temp 97.2  F (36.2  C) (Temporal)   Resp 16   Ht 1.444 m (4' 8.85\")   Wt 57.2 kg (126 lb 1.9 oz)   SpO2 98%   BMI 27.44 kg/m    Body mass index is 27.44 kg/m .  Physical Exam     Gen:  A&A, NAD  CV:  HRRR, no M/R/G  Resp:  CTAB  Ext:  W&D, no edema            Signed Electronically by: Barb Elias MD    "

## 2024-07-23 RX ORDER — DILTIAZEM HYDROCHLORIDE 30 MG/1
120 TABLET, FILM COATED ORAL
Status: DISCONTINUED | OUTPATIENT
Start: 2024-07-23 | End: 2024-08-14 | Stop reason: HOSPADM

## 2024-07-23 RX ORDER — METOPROLOL TARTRATE 25 MG/1
25-100 TABLET, FILM COATED ORAL
Status: DISCONTINUED | OUTPATIENT
Start: 2024-07-23 | End: 2024-08-14 | Stop reason: HOSPADM

## 2024-07-23 RX ORDER — DILTIAZEM HYDROCHLORIDE 5 MG/ML
10-15 INJECTION INTRAVENOUS
Status: DISCONTINUED | OUTPATIENT
Start: 2024-07-23 | End: 2024-08-14 | Stop reason: HOSPADM

## 2024-07-23 RX ORDER — METOPROLOL TARTRATE 1 MG/ML
5-15 INJECTION, SOLUTION INTRAVENOUS
Status: DISCONTINUED | OUTPATIENT
Start: 2024-07-23 | End: 2024-08-14 | Stop reason: HOSPADM

## 2024-07-23 RX ORDER — NITROGLYCERIN 0.4 MG/1
0.4 TABLET SUBLINGUAL
Status: DISCONTINUED | OUTPATIENT
Start: 2024-07-23 | End: 2024-08-14 | Stop reason: HOSPADM

## 2024-07-28 DIAGNOSIS — E55.9 VITAMIN D DEFICIENCY: ICD-10-CM

## 2024-07-28 DIAGNOSIS — E78.00 HYPERCHOLESTEROLEMIA: ICD-10-CM

## 2024-07-28 RX ORDER — CHOLECALCIFEROL (VITAMIN D3) 50 MCG
1 TABLET ORAL DAILY
Qty: 30 TABLET | Refills: 10 | Status: SHIPPED | OUTPATIENT
Start: 2024-07-28

## 2024-08-01 RX ORDER — ATORVASTATIN CALCIUM 80 MG/1
80 TABLET, FILM COATED ORAL AT BEDTIME
Qty: 30 TABLET | Refills: 11 | OUTPATIENT
Start: 2024-08-01

## 2024-08-01 NOTE — TELEPHONE ENCOUNTER
Per chart review, Rx was sent on 7/17/24 with 11 refills to [facility]'s pharmacy. Called pt and she stated she recently changed pharmacy to [facility]'s and to disregard request from [facility] pharmacy.      Isaiah Koch, BSN RN  [facility]          Kenna Mckeon RN   to [facility]       7/28/24  3:14 PM  Clinic RN: Please contact patient because we need confirmation this is a pharmacy change. Pend correct pharmacy and route to corresponding refill pool.

## 2024-08-06 ENCOUNTER — ALLIED HEALTH/NURSE VISIT (OUTPATIENT)
Dept: FAMILY MEDICINE | Facility: CLINIC | Age: 79
End: 2024-08-06
Payer: MEDICARE

## 2024-08-06 DIAGNOSIS — E55.9 VITAMIN D DEFICIENCY: Primary | ICD-10-CM

## 2024-08-06 DIAGNOSIS — M81.0 AGE-RELATED OSTEOPOROSIS WITHOUT CURRENT PATHOLOGICAL FRACTURE: ICD-10-CM

## 2024-08-06 PROCEDURE — 99207 PR NO CHARGE NURSE ONLY: CPT

## 2024-08-06 PROCEDURE — 96372 THER/PROPH/DIAG INJ SC/IM: CPT | Performed by: FAMILY MEDICINE

## 2024-08-13 ENCOUNTER — HOSPITAL ENCOUNTER (OUTPATIENT)
Dept: CT IMAGING | Facility: CLINIC | Age: 79
Discharge: HOME | End: 2024-08-13
Attending: STUDENT IN AN ORGANIZED HEALTH CARE EDUCATION/TRAINING PROGRAM | Admitting: STUDENT IN AN ORGANIZED HEALTH CARE EDUCATION/TRAINING PROGRAM
Payer: MEDICARE

## 2024-08-13 VITALS
HEIGHT: 57 IN | WEIGHT: 120 LBS | DIASTOLIC BLOOD PRESSURE: 70 MMHG | BODY MASS INDEX: 25.89 KG/M2 | SYSTOLIC BLOOD PRESSURE: 132 MMHG

## 2024-08-13 DIAGNOSIS — R93.1 ABNORMAL FINDINGS ON DIAGNOSTIC IMAGING OF HEART AND CORONARY CIRCULATION: ICD-10-CM

## 2024-08-13 DIAGNOSIS — I48.0 PAROXYSMAL ATRIAL FIBRILLATION (H): ICD-10-CM

## 2024-08-13 LAB
CREAT BLD-MCNC: 1 MG/DL (ref 0.6–1.1)
EGFRCR SERPLBLD CKD-EPI 2021: 57 ML/MIN/1.73M2

## 2024-08-13 PROCEDURE — 250N000013 HC RX MED GY IP 250 OP 250 PS 637: Performed by: STUDENT IN AN ORGANIZED HEALTH CARE EDUCATION/TRAINING PROGRAM

## 2024-08-13 PROCEDURE — 250N000011 HC RX IP 250 OP 636: Performed by: STUDENT IN AN ORGANIZED HEALTH CARE EDUCATION/TRAINING PROGRAM

## 2024-08-13 PROCEDURE — 250N000009 HC RX 250: Performed by: STUDENT IN AN ORGANIZED HEALTH CARE EDUCATION/TRAINING PROGRAM

## 2024-08-13 PROCEDURE — 75580 N-INVAS EST C FFR SW ALY CTA: CPT

## 2024-08-13 PROCEDURE — G1010 CDSM STANSON: HCPCS | Performed by: INTERNAL MEDICINE

## 2024-08-13 PROCEDURE — 75574 CT ANGIO HRT W/3D IMAGE: CPT | Mod: MG

## 2024-08-13 PROCEDURE — 75580 N-INVAS EST C FFR SW ALY CTA: CPT | Mod: 26 | Performed by: INTERNAL MEDICINE

## 2024-08-13 PROCEDURE — 82565 ASSAY OF CREATININE: CPT

## 2024-08-13 PROCEDURE — 75574 CT ANGIO HRT W/3D IMAGE: CPT | Mod: 26 | Performed by: INTERNAL MEDICINE

## 2024-08-13 RX ORDER — IOPAMIDOL 755 MG/ML
100 INJECTION, SOLUTION INTRAVASCULAR ONCE
Status: COMPLETED | OUTPATIENT
Start: 2024-08-13 | End: 2024-08-13

## 2024-08-13 RX ADMIN — IOPAMIDOL 100 ML: 755 INJECTION, SOLUTION INTRAVENOUS at 13:44

## 2024-08-13 RX ADMIN — NITROGLYCERIN 0.4 MG: 0.4 TABLET SUBLINGUAL at 13:44

## 2024-08-13 RX ADMIN — METOPROLOL TARTRATE 5 MG: 1 INJECTION, SOLUTION INTRAVENOUS at 13:40

## 2024-08-14 LAB
BSA FOR ECHO PROCEDURE: 0 M2
BSA FOR ECHO PROCEDURE: 0 M2
CV CALCIUM SCORE AGATSTON LM: 0
CV CALCIUM SCORING AGATSON LAD: 378
CV CALCIUM SCORING AGATSTON CX: 13
CV CALCIUM SCORING AGATSTON RCA: 51
CV CALCIUM SCORING AGATSTON TOTAL: 442

## 2024-08-16 ENCOUNTER — HOSPITAL ENCOUNTER (EMERGENCY)
Facility: HOSPITAL | Age: 79
Discharge: HOME | End: 2024-08-16
Attending: FAMILY MEDICINE | Admitting: FAMILY MEDICINE
Payer: MEDICARE

## 2024-08-16 ENCOUNTER — APPOINTMENT (OUTPATIENT)
Dept: RADIOLOGY | Facility: HOSPITAL | Age: 79
End: 2024-08-16
Attending: FAMILY MEDICINE
Payer: MEDICARE

## 2024-08-16 VITALS
OXYGEN SATURATION: 97 % | BODY MASS INDEX: 25.88 KG/M2 | WEIGHT: 128.4 LBS | SYSTOLIC BLOOD PRESSURE: 145 MMHG | HEIGHT: 59 IN | TEMPERATURE: 98.4 F | RESPIRATION RATE: 23 BRPM | DIASTOLIC BLOOD PRESSURE: 70 MMHG | HEART RATE: 85 BPM

## 2024-08-16 DIAGNOSIS — I10 ESSENTIAL HYPERTENSION: ICD-10-CM

## 2024-08-16 DIAGNOSIS — I48.0 PAROXYSMAL ATRIAL FIBRILLATION (H): ICD-10-CM

## 2024-08-16 DIAGNOSIS — I25.10 CORONARY ARTERY DISEASE DUE TO CALCIFIED CORONARY LESION: ICD-10-CM

## 2024-08-16 DIAGNOSIS — R06.00 DYSPNEA, UNSPECIFIED TYPE: ICD-10-CM

## 2024-08-16 DIAGNOSIS — I25.84 CORONARY ARTERY DISEASE DUE TO CALCIFIED CORONARY LESION: ICD-10-CM

## 2024-08-16 LAB
ANION GAP SERPL CALCULATED.3IONS-SCNC: 13 MMOL/L (ref 7–15)
ATRIAL RATE - MUSE: 72 BPM
BASE EXCESS BLDV CALC-SCNC: 1.5 MMOL/L (ref -3–3)
BASOPHILS # BLD AUTO: 0 10E3/UL (ref 0–0.2)
BASOPHILS NFR BLD AUTO: 0 %
BUN SERPL-MCNC: 12 MG/DL (ref 8–23)
CALCIUM SERPL-MCNC: 9.3 MG/DL (ref 8.8–10.4)
CHLORIDE SERPL-SCNC: 107 MMOL/L (ref 98–107)
CREAT SERPL-MCNC: 0.94 MG/DL (ref 0.51–0.95)
DIASTOLIC BLOOD PRESSURE - MUSE: 75 MMHG
EGFRCR SERPLBLD CKD-EPI 2021: 62 ML/MIN/1.73M2
EOSINOPHIL # BLD AUTO: 0.1 10E3/UL (ref 0–0.7)
EOSINOPHIL NFR BLD AUTO: 1 %
ERYTHROCYTE [DISTWIDTH] IN BLOOD BY AUTOMATED COUNT: 13.2 % (ref 10–15)
GLUCOSE SERPL-MCNC: 142 MG/DL (ref 70–99)
HCO3 BLDV-SCNC: 26 MMOL/L (ref 21–28)
HCO3 SERPL-SCNC: 23 MMOL/L (ref 22–29)
HCT VFR BLD AUTO: 40.6 % (ref 35–47)
HGB BLD-MCNC: 13.4 G/DL (ref 11.7–15.7)
IMM GRANULOCYTES # BLD: 0 10E3/UL
IMM GRANULOCYTES NFR BLD: 0 %
INTERPRETATION ECG - MUSE: NORMAL
LYMPHOCYTES # BLD AUTO: 1 10E3/UL (ref 0.8–5.3)
LYMPHOCYTES NFR BLD AUTO: 12 %
MAGNESIUM SERPL-MCNC: 2.1 MG/DL (ref 1.7–2.3)
MCH RBC QN AUTO: 31.2 PG (ref 26.5–33)
MCHC RBC AUTO-ENTMCNC: 33 G/DL (ref 31.5–36.5)
MCV RBC AUTO: 94 FL (ref 78–100)
MONOCYTES # BLD AUTO: 0.3 10E3/UL (ref 0–1.3)
MONOCYTES NFR BLD AUTO: 3 %
NEUTROPHILS # BLD AUTO: 6.8 10E3/UL (ref 1.6–8.3)
NEUTROPHILS NFR BLD AUTO: 83 %
NRBC # BLD AUTO: 0 10E3/UL
NRBC BLD AUTO-RTO: 0 /100
NT-PROBNP SERPL-MCNC: 58 PG/ML (ref 0–1800)
O2/TOTAL GAS SETTING VFR VENT: 21 %
OXYHGB MFR BLDV: 69 % (ref 70–75)
P AXIS - MUSE: 54 DEGREES
PCO2 BLDV: 40 MM HG (ref 40–50)
PH BLDV: 7.42 [PH] (ref 7.32–7.43)
PLATELET # BLD AUTO: 297 10E3/UL (ref 150–450)
PO2 BLDV: 35 MM HG (ref 25–47)
POTASSIUM SERPL-SCNC: 4.4 MMOL/L (ref 3.4–5.3)
PR INTERVAL - MUSE: 152 MS
QRS DURATION - MUSE: 66 MS
QT - MUSE: 396 MS
QTC - MUSE: 433 MS
R AXIS - MUSE: 49 DEGREES
RBC # BLD AUTO: 4.3 10E6/UL (ref 3.8–5.2)
SAO2 % BLDV: 69.7 % (ref 70–75)
SODIUM SERPL-SCNC: 143 MMOL/L (ref 135–145)
SYSTOLIC BLOOD PRESSURE - MUSE: 158 MMHG
T AXIS - MUSE: 65 DEGREES
TROPONIN T SERPL HS-MCNC: 9 NG/L
VENTRICULAR RATE- MUSE: 72 BPM
WBC # BLD AUTO: 8.1 10E3/UL (ref 4–11)

## 2024-08-16 PROCEDURE — 250N000013 HC RX MED GY IP 250 OP 250 PS 637: Performed by: FAMILY MEDICINE

## 2024-08-16 PROCEDURE — 82374 ASSAY BLOOD CARBON DIOXIDE: CPT | Performed by: FAMILY MEDICINE

## 2024-08-16 PROCEDURE — 83880 ASSAY OF NATRIURETIC PEPTIDE: CPT | Performed by: FAMILY MEDICINE

## 2024-08-16 PROCEDURE — 83735 ASSAY OF MAGNESIUM: CPT | Performed by: FAMILY MEDICINE

## 2024-08-16 PROCEDURE — 250N000009 HC RX 250: Performed by: FAMILY MEDICINE

## 2024-08-16 PROCEDURE — 71045 X-RAY EXAM CHEST 1 VIEW: CPT

## 2024-08-16 PROCEDURE — 82805 BLOOD GASES W/O2 SATURATION: CPT | Performed by: FAMILY MEDICINE

## 2024-08-16 PROCEDURE — 84484 ASSAY OF TROPONIN QUANT: CPT | Performed by: FAMILY MEDICINE

## 2024-08-16 PROCEDURE — 94640 AIRWAY INHALATION TREATMENT: CPT

## 2024-08-16 PROCEDURE — 99285 EMERGENCY DEPT VISIT HI MDM: CPT | Mod: 25

## 2024-08-16 PROCEDURE — 93005 ELECTROCARDIOGRAM TRACING: CPT | Performed by: FAMILY MEDICINE

## 2024-08-16 PROCEDURE — 85025 COMPLETE CBC W/AUTO DIFF WBC: CPT | Performed by: FAMILY MEDICINE

## 2024-08-16 PROCEDURE — 36415 COLL VENOUS BLD VENIPUNCTURE: CPT | Performed by: FAMILY MEDICINE

## 2024-08-16 RX ORDER — IPRATROPIUM BROMIDE AND ALBUTEROL SULFATE 2.5; .5 MG/3ML; MG/3ML
3 SOLUTION RESPIRATORY (INHALATION) ONCE
Status: COMPLETED | OUTPATIENT
Start: 2024-08-16 | End: 2024-08-16

## 2024-08-16 RX ORDER — NITROGLYCERIN 0.4 MG/1
0.4 TABLET SUBLINGUAL EVERY 5 MIN PRN
Status: DISCONTINUED | OUTPATIENT
Start: 2024-08-16 | End: 2024-08-17 | Stop reason: HOSPADM

## 2024-08-16 RX ADMIN — IPRATROPIUM BROMIDE AND ALBUTEROL SULFATE 3 ML: .5; 3 SOLUTION RESPIRATORY (INHALATION) at 20:40

## 2024-08-16 RX ADMIN — APIXABAN 5 MG: 5 TABLET, FILM COATED ORAL at 22:06

## 2024-08-16 ASSESSMENT — ACTIVITIES OF DAILY LIVING (ADL): ADLS_ACUITY_SCORE: 38

## 2024-08-16 ASSESSMENT — COLUMBIA-SUICIDE SEVERITY RATING SCALE - C-SSRS
2. HAVE YOU ACTUALLY HAD ANY THOUGHTS OF KILLING YOURSELF IN THE PAST MONTH?: NO
1. IN THE PAST MONTH, HAVE YOU WISHED YOU WERE DEAD OR WISHED YOU COULD GO TO SLEEP AND NOT WAKE UP?: NO
6. HAVE YOU EVER DONE ANYTHING, STARTED TO DO ANYTHING, OR PREPARED TO DO ANYTHING TO END YOUR LIFE?: NO

## 2024-08-17 NOTE — ED PROVIDER NOTES
EMERGENCY DEPARTMENT ENCOUNTER      NAME: Jennifer Huggins  AGE: 79 year old female  YOB: 1945  MRN: [id number]  EVALUATION DATE & TIME: No admission date for patient encounter.    PCP: Barb Elias    ED PROVIDER: Nando La M.D.    Chief Complaint   Patient presents with    Hypertension       FINAL IMPRESSION:  1. Paroxysmal atrial fibrillation (H)    2. Essential hypertension    3. Dyspnea, unspecified type    4. Coronary artery disease due to calcified coronary lesion        ED COURSE & MEDICAL DECISION MAKING:    Pertinent Labs & Imaging studies independently interpreted by me. (See chart for details)  Reviewed most recent primary care office note from July 17 which was follow-up for hypertension, also paroxysmal atrial fibrillation wheezing.  Patient had coronary CTA done on August 13 with calcium score 442, severe stenosis of the LAD.    ED Course as of 08/16/24 2115   Fri Aug 16, 2024   2016 On exam here, blood pressure is elevated, no hypoxia, mild tachypnea, no feverPatient seen and examined, presents with heavy breathing today.  Also complains of chest heaviness.  No cough, no orthopnea, no lower extremity swelling, no nausea or vomiting.  Checks blood pressure at home and felt it was too high, she has been taking her blood pressure medications.  Granddaughter who provides interpretive services and some history thinks she is out of one of her medications but is not sure which, it may be Eliquis.  On exam here, patient has hypertension, no fever, no tachycardia, no hypoxia.  Lungs are clear, no abdominal tenderness and no lower extremity swelling.  Consider asthma exacerbation, pulmonary edema, dysrhythmia, electrolyte disturbance, DKA.  In reviewing patient's chart, she has a elevated coronary calcium score as well as severe stenosis of her LAD, cannot exclude dyspnea as an anginal equivalent.  Chest x-ray ordered along with nitroglycerin and DuoNeb   2052 Chest x-ray independently  interpreted by me as negative for acute intrathoracic findings   2059 Labs ordered and independently interpreted by me with normal venous blood gas, normal CBC, normal basic panel, normal magnesium   2108 Labs ordered and independently interpreted by me with troponin of 9, this is negative based on time of onset of symptoms.   2110 Patient rechecked, she feels better, blood pressure improved.  She will be discharged with outpatient follow-up.  No definite etiology for symptoms today is found, may be related to high blood pressure in setting of known coronary disease.  Should follow-up with cardiology to discuss outpatient angiogram and possible stenting to address her LAD lesion.  Confirmed with patient and granddaughter that she is out of her Eliquis, they have a refill at the pharmacy for Monday but will be given a dose here and enough to get through the weekend.  Patient describes some palpitations and fast heart rate earlier, she has been in normal sinus rhythm while she has been in the emergency department does have a history of paroxysmal atrial fibrillation, symptoms today could be from atrial fibrillation with RVR although no record of this     At the conclusion of the encounter I discussed the results of all of the tests and the disposition. The questions were answered. The patient or family acknowledged understanding and was agreeable with the care plan.     Medical Decision Making  Obtained supplemental history:Supplemental history obtained?: Family Member/Significant Other  Reviewed external records: External records reviewed?: Outpatient Record: 7/17 PCP visit  Care impacted by chronic illness:Anticoagulated State, Heart Disease, Hyperlipidemia, and Hypertension  Care significantly affected by social determinants of health:Access to Affordable Health Care  Did you consider but not order tests?: Work up considered but not performed and documented in chart, if applicable  Did you interpret images  independently?: Independent interpretation of ECG and images noted in documentation, when applicable.  Consultation discussion with other provider:Did you involve another provider (consultant, MH, pharmacy, etc.)?: No  Discharge. I prescribed additional prescription strength medication(s) as charted. I considered admission, but discharged patient after significant clinical improvement.    MEDICATIONS GIVEN IN THE EMERGENCY:  Medications   nitroGLYcerin (NITROSTAT) sublingual tablet 0.4 mg (has no administration in time range)   apixaban ANTICOAGULANT (ELIQUIS) tablet 5 mg (has no administration in time range)   ipratropium - albuterol 0.5 mg/2.5 mg/3 mL (DUONEB) neb solution 3 mL (3 mLs Nebulization $Given 8/16/24 2040)       NEW PRESCRIPTIONS STARTED AT TODAY'S ER VISIT  Current Discharge Medication List        START taking these medications    Details   !! apixaban ANTICOAGULANT (ELIQUIS) 5 MG tablet Take 1 tablet (5 mg) by mouth 2 times daily for 2 days  Qty: 4 tablet, Refills: 0       !! - Potential duplicate medications found. Please discuss with provider.        =================================================================    HPI    Patient information was obtained from: patient through granddaughter translating      Jennifer Huggins is a 79 year old female with a pertinent history of A-fib and hypertension who presents to this ED via walk-in for evaluation of hypertension.    Patient endorses tachycardia and chest heaviness for the past 2 days. The symptoms have worsened today. She has been breathing heavily, worse laying down or with active. She denies any cough, vomiting, or lower extremity edema. The chest pain heaviness is located to her right side. Granddaughter notes she ran out of her Eliquis about 2 to 3 weeks ago and has not been able to refill it yet.    REVIEW OF SYSTEMS   Review of Systems   All other systems reviewed and negative    PAST MEDICAL HISTORY:  Past Medical History:   Diagnosis Date     Benign adenomatous polyp of large intestine     Biliary colic     Cervicalgia     Cholelithiasis     Chronic allergic conjunctivitis     Cystitis     Dyspepsia     Gastritis     GERD (gastroesophageal reflux disease)     HLD (hyperlipidemia)     Hypertension     Hypokalemia     Meningioma (H)     Multiple lung nodules on CT     CT ABD 11/23/1013, mult nodules < 5mm in size, per Fleischner Society recommendations  repeat CT in 12 months CT Chest 2/20/17:  Stable pulmonary nodules suggesting incidental findings given long-term stability.     Osteoporosis     Positional vertigo     Pulmonary nodules     Recurrent UTI     Transaminitis 10/29/2015    Urinary incontinence     Xerosis cutis        PAST SURGICAL HISTORY:  Past Surgical History:   Procedure Laterality Date    CHOLECYSTECTOMY  08/21/2015       CURRENT MEDICATIONS:    Current Facility-Administered Medications   Medication Dose Route Frequency Provider Last Rate Last Admin    apixaban ANTICOAGULANT (ELIQUIS) tablet 5 mg  5 mg Oral Once Nando La MD        denosumab (PROLIA) injection 60 mg  60 mg Subcutaneous Q6 Months Barb Elias MD   60 mg at 08/06/24 1327    nitroGLYcerin (NITROSTAT) sublingual tablet 0.4 mg  0.4 mg Sublingual Q5 Min PRN Nando La MD         Current Outpatient Medications   Medication Sig Dispense Refill    apixaban ANTICOAGULANT (ELIQUIS) 5 MG tablet Take 1 tablet (5 mg) by mouth 2 times daily for 2 days 4 tablet 0    albuterol (PROAIR HFA/PROVENTIL HFA/VENTOLIN HFA) 108 (90 Base) MCG/ACT inhaler Inhale 2 puffs into the lungs every 6 hours as needed for shortness of breath or wheezing 18 g 3    artificial tears (GENTEAL) 0.1-0.2-0.3 % ophthalmic solution Place 2 drops into both eyes 4 times daily as needed (dry or irritated eyes) 15 mL 11    atorvastatin (LIPITOR) 80 MG tablet Take 1 tablet (80 mg) by mouth at bedtime 30 tablet 11    diltiazem (CARDIZEM) 30 MG tablet Take 1 tablet (30 mg) by mouth every 12 hours 60  tablet 3    ELIQUIS ANTICOAGULANT 5 MG tablet TAKE 1 TABLET (5 MG) BY MOUTH 2 TIMES DAILY 60 tablet 11    lisinopril (ZESTRIL) 20 MG tablet Take 1 tablet (20 mg) by mouth daily 30 tablet 3    meclizine (ANTIVERT) 25 MG tablet Take 1 tablet (25 mg) by mouth 3 times daily as needed for dizziness 30 tablet 11    omeprazole (PRILOSEC) 20 MG DR capsule TAKE 1 CAPSULE BY MOUTH DAILY BEFORE BREAKFAST 30 capsule 2    vitamin D3 (CHOLECALCIFEROL) 50 mcg (2000 units) tablet TAKE 1 TABLET (50 MCG) BY MOUTH DAILY 30 tablet 10       ALLERGIES:  Allergies   Allergen Reactions    Ibandronate [Ibandronic Acid] Headache and Shortness Of Breath    Septra [Sulfamethoxazole-Trimethoprim] Other (See Comments)     Acute hepatitis        FAMILY HISTORY:  Family History   Problem Relation Age of Onset    No Known Problems Mother     No Known Problems Father        SOCIAL HISTORY:   Social History     Socioeconomic History    Marital status:    Tobacco Use    Smoking status: Never     Passive exposure: Never    Smokeless tobacco: Never   Vaping Use    Vaping status: Never Used   Substance and Sexual Activity    Alcohol use: No    Drug use: No    Sexual activity: Never     Birth control/protection: Post-menopausal     Social Determinants of Health     Financial Resource Strain: Low Risk  (1/23/2024)    Financial Resource Strain     Within the past 12 months, have you or your family members you live with been unable to get utilities (heat, electricity) when it was really needed?: No   Food Insecurity: Low Risk  (1/23/2024)    Food Insecurity     Within the past 12 months, did you worry that your food would run out before you got money to buy more?: No     Within the past 12 months, did the food you bought just not last and you didn t have money to get more?: No   Transportation Needs: Low Risk  (1/23/2024)    Transportation Needs     Within the past 12 months, has lack of transportation kept you from medical appointments, getting your  "medicines, non-medical meetings or appointments, work, or from getting things that you need?: No   Interpersonal Safety: Unknown (7/17/2024)    Interpersonal Safety     Do you feel physically and emotionally safe where you currently live?: Patient unable to answer     Within the past 12 months, have you been hit, slapped, kicked or otherwise physically hurt by someone?: Patient unable to answer     Within the past 12 months, have you been humiliated or emotionally abused in other ways by your partner or ex-partner?: Patient unable to answer   Housing Stability: Low Risk  (1/23/2024)    Housing Stability     Do you have housing? : Yes     Are you worried about losing your housing?: No       VITALS:  /58   Pulse 79   Temp 98.4  F (36.9  C) (Oral)   Resp 27   Ht 1.499 m (4' 11\")   Wt 58.2 kg (128 lb 6.4 oz)   SpO2 100%   BMI 25.93 kg/m      PHYSICAL EXAM:  Physical Exam  Vitals and nursing note reviewed.   Constitutional:       Appearance: Normal appearance.   HENT:      Head: Normocephalic and atraumatic.      Right Ear: External ear normal.      Left Ear: External ear normal.      Nose: Nose normal.      Mouth/Throat:      Mouth: Mucous membranes are moist.   Eyes:      Extraocular Movements: Extraocular movements intact.      Conjunctiva/sclera: Conjunctivae normal.      Pupils: Pupils are equal, round, and reactive to light.   Cardiovascular:      Rate and Rhythm: Normal rate and regular rhythm.   Pulmonary:      Effort: Pulmonary effort is normal.      Breath sounds: Normal breath sounds. No wheezing or rales.   Abdominal:      General: Abdomen is flat. There is no distension.      Palpations: Abdomen is soft.      Tenderness: There is no abdominal tenderness. There is no guarding.   Musculoskeletal:         General: Normal range of motion.      Cervical back: Normal range of motion and neck supple.      Right lower leg: No edema.      Left lower leg: No edema.   Lymphadenopathy:      Cervical: No " cervical adenopathy.   Skin:     General: Skin is warm and dry.   Neurological:      General: No focal deficit present.      Mental Status: She is alert and oriented to person, place, and time. Mental status is at baseline.      Comments: No gross focal neurologic deficits   Psychiatric:         Mood and Affect: Mood normal.         Behavior: Behavior normal.         Thought Content: Thought content normal.          LAB:  All pertinent labs reviewed and interpreted.  Results for orders placed or performed during the hospital encounter of 08/16/24   XR Chest Port 1 View    Impression    IMPRESSION: No focal consolidation, pleural effusion or pneumothorax. Similar cardiomediastinal silhouette.   Basic metabolic panel   Result Value Ref Range    Sodium 143 135 - 145 mmol/L    Potassium 4.4 3.4 - 5.3 mmol/L    Chloride 107 98 - 107 mmol/L    Carbon Dioxide (CO2) 23 22 - 29 mmol/L    Anion Gap 13 7 - 15 mmol/L    Urea Nitrogen 12.0 8.0 - 23.0 mg/dL    Creatinine 0.94 0.51 - 0.95 mg/dL    GFR Estimate 62 >60 mL/min/1.73m2    Calcium 9.3 8.8 - 10.4 mg/dL    Glucose 142 (H) 70 - 99 mg/dL   Result Value Ref Range    Troponin T, High Sensitivity 9 <=14 ng/L   Result Value Ref Range    Magnesium 2.1 1.7 - 2.3 mg/dL   Nt probnp inpatient (BNP)   Result Value Ref Range    N terminal Pro BNP Inpatient 58 0 - 1,800 pg/mL   Blood gas venous   Result Value Ref Range    pH Venous 7.42 7.32 - 7.43    pCO2 Venous 40 40 - 50 mm Hg    pO2 Venous 35 25 - 47 mm Hg    Bicarbonate Venous 26 21 - 28 mmol/L    Base Excess/Deficit Venous 1.5 -3.0 - 3.0 mmol/L    FIO2 21     Oxyhemoglobin Venous 69 (L) 70 - 75 %    O2 Sat, Venous 69.7 (L) 70.0 - 75.0 %   CBC with platelets and differential   Result Value Ref Range    WBC Count 8.1 4.0 - 11.0 10e3/uL    RBC Count 4.30 3.80 - 5.20 10e6/uL    Hemoglobin 13.4 11.7 - 15.7 g/dL    Hematocrit 40.6 35.0 - 47.0 %    MCV 94 78 - 100 fL    MCH 31.2 26.5 - 33.0 pg    MCHC 33.0 31.5 - 36.5 g/dL    RDW 13.2  10.0 - 15.0 %    Platelet Count 297 150 - 450 10e3/uL    % Neutrophils 83 %    % Lymphocytes 12 %    % Monocytes 3 %    % Eosinophils 1 %    % Basophils 0 %    % Immature Granulocytes 0 %    NRBCs per 100 WBC 0 <1 /100    Absolute Neutrophils 6.8 1.6 - 8.3 10e3/uL    Absolute Lymphocytes 1.0 0.8 - 5.3 10e3/uL    Absolute Monocytes 0.3 0.0 - 1.3 10e3/uL    Absolute Eosinophils 0.1 0.0 - 0.7 10e3/uL    Absolute Basophils 0.0 0.0 - 0.2 10e3/uL    Absolute Immature Granulocytes 0.0 <=0.4 10e3/uL    Absolute NRBCs 0.0 10e3/uL   ECG 12-LEAD WITH MUSE (LHE)   Result Value Ref Range    Systolic Blood Pressure 158 mmHg    Diastolic Blood Pressure 75 mmHg    Ventricular Rate 72 BPM    Atrial Rate 72 BPM    OH Interval 152 ms    QRS Duration 66 ms     ms    QTc 433 ms    P Axis 54 degrees    R AXIS 49 degrees    T Axis 65 degrees    Interpretation ECG       Sinus rhythm  Nonspecific T wave abnormality  Abnormal ECG  When compared with ECG of 27-Jan-2024 03:25,  No significant change was found  Confirmed by SEE ED PROVIDER NOTE FOR, ECG INTERPRETATION ([id number]),  EDITH CHAVEZ ([id number]) on 8/16/2024 9:12:14 PM         RADIOLOGY:  Reviewed all pertinent imaging. Please see official radiology report.  XR Chest Port 1 View   Final Result   IMPRESSION: No focal consolidation, pleural effusion or pneumothorax. Similar cardiomediastinal silhouette.        Nando La M.D.  Emergency Medicine  [facility] EMERGENCY DEPARTMENT  [facility] [facility] [id number] [phone]  Dept: [phone]       Nando La MD  08/16/24 0498

## 2024-08-17 NOTE — ED TRIAGE NOTES
"Pt ambulatory to triage with family c/o \"heavy breathing\" and a high BP reading today. Pt normally checks her BP daily and it is usually 130s systolic, today she obtained a reading of 156/82 and felt that was too high. Pt denies CP, headache, n/v/d or recent illness. Pt did take her BP medication at 1700.      Triage Assessment (Adult)       Row Name 08/16/24 1948          Triage Assessment    Airway WDL WDL        Respiratory WDL    Respiratory WDL X;rhythm/pattern     Rhythm/Pattern, Respiratory shortness of breath        Cardiac WDL    Cardiac WDL WDL        Cognitive/Neuro/Behavioral WDL    Cognitive/Neuro/Behavioral WDL WDL                     "

## 2024-08-17 NOTE — ED NOTES
Pt presents NAD. SKIN: NWD.   HEENT: normocephalic, PERRL, clear x 3.   CHEST: symmetrical rise, CEBBS, no CP or SOB.  ABD: NTND, no N&V or diarrhea.  EXT: TRINIDAD x 4  Rest of exam unremarkable.

## 2024-08-17 NOTE — DISCHARGE INSTRUCTIONS
Call cardiology and your primary care doctor to discuss follow-up    Continue your current medication

## 2024-08-19 ENCOUNTER — PATIENT OUTREACH (OUTPATIENT)
Dept: GERIATRIC MEDICINE | Facility: CLINIC | Age: 79
End: 2024-08-19
Payer: MEDICARE

## 2024-08-21 ENCOUNTER — APPOINTMENT (OUTPATIENT)
Dept: INTERPRETER SERVICES | Facility: CLINIC | Age: 79
End: 2024-08-21
Payer: MEDICARE

## 2024-08-21 ENCOUNTER — TELEPHONE (OUTPATIENT)
Dept: CARDIOLOGY | Facility: CLINIC | Age: 79
End: 2024-08-21
Payer: MEDICARE

## 2024-08-21 DIAGNOSIS — R93.1 ABNORMAL FINDINGS ON DIAGNOSTIC IMAGING OF HEART AND CORONARY CIRCULATION: ICD-10-CM

## 2024-08-21 DIAGNOSIS — R07.9 CHEST PAIN, UNSPECIFIED TYPE: Primary | ICD-10-CM

## 2024-08-21 NOTE — TELEPHONE ENCOUNTER
With the help of Kelli  PC to number on file. Daughter Aldo Po on the line (C2C) to discuss results. Review of results. Would like to talk with patient, unsure if will agree to procedure. Will discuss with mother. Agreeable to move OV sooner, for discussion of procedure. Appt moved forward to 9/6/24. Will update TS to inform. Daughter will discuss with her mother results and recommendations for procedure. ANKITA,RN

## 2024-08-21 NOTE — TELEPHONE ENCOUNTER
----- Message from Steven Wilks sent at 8/14/2024  3:20 PM CDT -----  Valentín Rodriguez,     Your cardiac CT showed that there is plaque in one of the arteries of the heart which is causing a blockage.  We should get additional evaluation for this and possibly fix it with a stent.  We will arrange a left heart catheterization/coronary angiogram and possible PCI.     Dr. Wilks

## 2024-08-26 NOTE — TELEPHONE ENCOUNTER
===View-only below this line===  ----- Message -----  From: Steven Wilks DO  Sent: 8/21/2024   3:40 PM CDT  To: Biju Means RN    Can start imdur 30mg qdaily.  Then can see me in the clinic to discuss further.

## 2024-08-27 NOTE — PROGRESS NOTES
[facility] Care Coordination Contact  CC received notification of Emergency Room visit.  ER visit occurred on 8/16/2024 at [facility] with Dx of HTN.    CC contacted adult daughter Aldo  and left a message requesting a return call.  Member has a follow-up appointment with PCP: No: Left message to return call. CC will offer to help schedule appointment. Cardiology appointment scheduled for 9/6 for a previous appt.   PCP notified of ED visit via EMR.    Vita Chandra RN, BSN, PHN  [facility]  Phone: [phone]

## 2024-08-28 NOTE — PROGRESS NOTES
[facility] Care Coordination Contact  CC received notification of Emergency Room visit.  ER visit occurred on 8/16/2024 at [facility] with Dx of HTN.    CC contacted adult daughter Aldo Boucher  and reviewed discharge summary.  Member has a follow-up appointment with PCP: Yes: scheduled on 9/6/2024  Member has had a change in condition: No  New referrals placed: No  Home Visit Needed: No  Support Plan reviewed and updated.  PCP notified of ED visit via EMR.      [facility] Care Coordination Contact      [facility] Six-Month Telephone Assessment    6 month telephone assessment completed on 8/28/2024.    ER visits: Yes -  [facility]  Hospitalizations: No  TCU stays: No  Significant health status changes: No changes.  Falls/Injuries: No  ADL/IADL changes: No  Changes in services: No    Caregiver Assessment follow up:  Daughter continues to be primary caregiver.     Goals: See Support Plan for goal progress documentation.      Will see member in 6 months for an annual health risk assessment.   Encouraged member to call CC with any questions or concerns in the meantime.     .  Vita Chandra RN, BSN, PHN  [facility]  Phone: [phone]

## 2024-09-03 RX ORDER — ISOSORBIDE MONONITRATE 30 MG/1
30 TABLET, EXTENDED RELEASE ORAL DAILY
Qty: 30 TABLET | Refills: 1 | Status: SHIPPED | OUTPATIENT
Start: 2024-09-03

## 2024-09-03 NOTE — TELEPHONE ENCOUNTER
With the help of Kelli  ID [id number]. PC to daughter Ck Boucher. Discussion of recommendations. Reports mother would be agreeable to take the medication. Rx sent to verified pharmacy location. Discussion to monitor for a mild to mod headache, use Tylenol if occurs. Rationale for how the medication will help. Encouraged to start prior to OV this Friday. Daughter verbalized understanding and stated will  today. ANKITA,Rn    As asking if any further questions, call was disconnected. Re-attempted call, no answer. Second recall spoke with daughter no further questions. Reviewed upcoming appt on 9/6, time and location. ANKITA,RN

## 2024-09-06 ENCOUNTER — DOCUMENTATION ONLY (OUTPATIENT)
Dept: CARDIOLOGY | Facility: CLINIC | Age: 79
End: 2024-09-06

## 2024-09-06 ENCOUNTER — PREP FOR PROCEDURE (OUTPATIENT)
Dept: CARDIOLOGY | Facility: CLINIC | Age: 79
End: 2024-09-06

## 2024-09-06 ENCOUNTER — OFFICE VISIT (OUTPATIENT)
Dept: CARDIOLOGY | Facility: CLINIC | Age: 79
End: 2024-09-06
Attending: STUDENT IN AN ORGANIZED HEALTH CARE EDUCATION/TRAINING PROGRAM
Payer: MEDICARE

## 2024-09-06 VITALS
SYSTOLIC BLOOD PRESSURE: 124 MMHG | OXYGEN SATURATION: 97 % | BODY MASS INDEX: 27.61 KG/M2 | DIASTOLIC BLOOD PRESSURE: 66 MMHG | HEIGHT: 57 IN | WEIGHT: 128 LBS | RESPIRATION RATE: 18 BRPM | HEART RATE: 75 BPM

## 2024-09-06 DIAGNOSIS — R93.1 ABNORMAL CARDIAC CT ANGIOGRAPHY: Primary | ICD-10-CM

## 2024-09-06 DIAGNOSIS — I10 BENIGN ESSENTIAL HYPERTENSION: ICD-10-CM

## 2024-09-06 DIAGNOSIS — R93.1 ABNORMAL CARDIAC CT ANGIOGRAPHY: ICD-10-CM

## 2024-09-06 DIAGNOSIS — R07.89 CHEST PRESSURE: ICD-10-CM

## 2024-09-06 DIAGNOSIS — I48.0 PAROXYSMAL ATRIAL FIBRILLATION (H): ICD-10-CM

## 2024-09-06 DIAGNOSIS — E78.2 MIXED HYPERLIPIDEMIA: ICD-10-CM

## 2024-09-06 DIAGNOSIS — I10 BENIGN ESSENTIAL HYPERTENSION: Primary | ICD-10-CM

## 2024-09-06 PROCEDURE — G2211 COMPLEX E/M VISIT ADD ON: HCPCS | Performed by: STUDENT IN AN ORGANIZED HEALTH CARE EDUCATION/TRAINING PROGRAM

## 2024-09-06 PROCEDURE — 99214 OFFICE O/P EST MOD 30 MIN: CPT | Performed by: STUDENT IN AN ORGANIZED HEALTH CARE EDUCATION/TRAINING PROGRAM

## 2024-09-06 RX ORDER — ICOSAPENT ETHYL 1 G/1
2 CAPSULE ORAL 2 TIMES DAILY WITH MEALS
Qty: 360 CAPSULE | Refills: 3 | Status: SHIPPED | OUTPATIENT
Start: 2024-09-06 | End: 2024-09-11

## 2024-09-06 RX ORDER — EZETIMIBE 10 MG/1
10 TABLET ORAL DAILY
Qty: 90 TABLET | Refills: 3 | Status: SHIPPED | OUTPATIENT
Start: 2024-09-06

## 2024-09-06 RX ORDER — FENTANYL CITRATE 50 UG/ML
25 INJECTION, SOLUTION INTRAMUSCULAR; INTRAVENOUS
Status: CANCELLED | OUTPATIENT
Start: 2024-09-10

## 2024-09-06 RX ORDER — ASPIRIN 325 MG
325 TABLET ORAL DAILY
Qty: 1 TABLET | Refills: 0 | Status: ON HOLD | OUTPATIENT
Start: 2024-09-06 | End: 2024-09-20

## 2024-09-06 RX ORDER — ASPIRIN 81 MG/1
243 TABLET, CHEWABLE ORAL ONCE
Status: CANCELLED | OUTPATIENT
Start: 2024-09-10

## 2024-09-06 RX ORDER — SODIUM CHLORIDE 9 MG/ML
INJECTION, SOLUTION INTRAVENOUS CONTINUOUS
Status: CANCELLED | OUTPATIENT
Start: 2024-09-10

## 2024-09-06 RX ORDER — ASPIRIN 325 MG
325 TABLET ORAL ONCE
Status: CANCELLED | OUTPATIENT
Start: 2024-09-10 | End: 2024-09-06

## 2024-09-06 RX ORDER — LIDOCAINE 40 MG/G
CREAM TOPICAL
Status: CANCELLED | OUTPATIENT
Start: 2024-09-06

## 2024-09-06 NOTE — H&P (VIEW-ONLY)
[facility] HEART [facility]ARE   [address]  [place] [address]   [email or web address]   OFFICE: [phone]     CARDIOLOGY CLINIC NOTE     Thank you, Barb Ram, for asking the [facility] Heart Care team to see Ms. Jennifer Huggins to evaluate Follow Up          History of Present Illness   Ms. Jennifer Huggins is a 79 year old female with a significant past history of paroxysmal afib, HTN, HLD, hypokalemia, CAD with abnormal CCTA and ctFFR, who presents for follow up.  The patient underwent a cardiac CT for evaluation of candidacy of 1c antiarrhythmic as well as for evaluation of chest pain which had been attributed to afib.  CT showed LAD plaque with high likelihood of ischemia based on Ct-FFR.  Since then, the patient had a ED visit for high blood pressure, dyspnea, and chest heaviness.  She denies any recent episodes of palpitations, though they sounded quite frequent at our last visit in February.           Medications  Allergies   Current Outpatient Medications   Medication Sig Dispense Refill    albuterol (PROAIR HFA/PROVENTIL HFA/VENTOLIN HFA) 108 (90 Base) MCG/ACT inhaler Inhale 2 puffs into the lungs every 6 hours as needed for shortness of breath or wheezing 18 g 3    artificial tears (GENTEAL) 0.1-0.2-0.3 % ophthalmic solution Place 2 drops into both eyes 4 times daily as needed (dry or irritated eyes) 15 mL 11    atorvastatin (LIPITOR) 80 MG tablet Take 1 tablet (80 mg) by mouth at bedtime 30 tablet 11    diltiazem (CARDIZEM) 30 MG tablet Take 1 tablet (30 mg) by mouth every 12 hours 60 tablet 3    ELIQUIS ANTICOAGULANT 5 MG tablet TAKE 1 TABLET (5 MG) BY MOUTH 2 TIMES DAILY 60 tablet 11    isosorbide mononitrate (IMDUR) 30 MG 24 hr tablet Take 1 tablet (30 mg) by mouth daily. 30 tablet 1    lisinopril (ZESTRIL) 20 MG tablet Take 1 tablet (20 mg) by mouth daily 30 tablet 3    meclizine (ANTIVERT) 25 MG tablet Take 1 tablet (25 mg) by mouth 3 times daily as needed  for dizziness 30 tablet 11    omeprazole (PRILOSEC) 20 MG DR capsule TAKE 1 CAPSULE BY MOUTH DAILY BEFORE BREAKFAST 30 capsule 2    vitamin D3 (CHOLECALCIFEROL) 50 mcg (2000 units) tablet TAKE 1 TABLET (50 MCG) BY MOUTH DAILY 30 tablet 10      Allergies   Allergen Reactions    Ibandronate [Ibandronic Acid] Headache and Shortness Of Breath    Septra [Sulfamethoxazole-Trimethoprim] Other (See Comments)     Acute hepatitis         Physical Examination Review of Systems   Vitals: There were no vitals taken for this visit.  BMI= There is no height or weight on file to calculate BMI.  Wt Readings from Last 3 Encounters:   08/16/24 58.2 kg (128 lb 6.4 oz)   08/13/24 54.4 kg (120 lb)   07/17/24 57.2 kg (126 lb 1.9 oz)       General: pleasant female. No acute distress.   Neck: No JVD  Lungs: clear to auscultation  COR:  regular rate and rhythm, No murmurs, rubs, or gallops  Extrem: No edema        Please refer above for cardiac ROS details.       Past History     Family History:   Family History   Problem Relation Age of Onset    No Known Problems Mother     No Known Problems Father         Social History:   Social History     Socioeconomic History    Marital status:      Spouse name: Not on file    Number of children: Not on file    Years of education: Not on file    Highest education level: Not on file   Occupational History    Not on file   Tobacco Use    Smoking status: Never     Passive exposure: Never    Smokeless tobacco: Never   Vaping Use    Vaping status: Never Used   Substance and Sexual Activity    Alcohol use: No    Drug use: No    Sexual activity: Never     Birth control/protection: Post-menopausal   Other Topics Concern    Not on file   Social History Narrative    Not on file     Social Determinants of Health     Financial Resource Strain: Low Risk  (1/23/2024)    Financial Resource Strain     Within the past 12 months, have you or your family members you live with been unable to get utilities (heat,  electricity) when it was really needed?: No   Food Insecurity: Low Risk  (1/23/2024)    Food Insecurity     Within the past 12 months, did you worry that your food would run out before you got money to buy more?: No     Within the past 12 months, did the food you bought just not last and you didn t have money to get more?: No   Transportation Needs: Low Risk  (1/23/2024)    Transportation Needs     Within the past 12 months, has lack of transportation kept you from medical appointments, getting your medicines, non-medical meetings or appointments, work, or from getting things that you need?: No   Physical Activity: Not on file   Stress: Not on file   Social Connections: Not on file   Interpersonal Safety: Unknown (7/17/2024)    Interpersonal Safety     Do you feel physically and emotionally safe where you currently live?: Patient unable to answer     Within the past 12 months, have you been hit, slapped, kicked or otherwise physically hurt by someone?: Patient unable to answer     Within the past 12 months, have you been humiliated or emotionally abused in other ways by your partner or ex-partner?: Patient unable to answer   Housing Stability: Low Risk  (1/23/2024)    Housing Stability     Do you have housing? : Yes     Are you worried about losing your housing?: No            Lab Results    Chemistry/lipid CBC Cardiac Enzymes/BNP/TSH/INR   Lab Results   Component Value Date    CHOL 215 (H) 01/23/2024    HDL 55 01/23/2024    TRIG 178 (H) 01/23/2024    BUN 12.0 08/16/2024     08/16/2024    CO2 23 08/16/2024    Lab Results   Component Value Date    WBC 8.1 08/16/2024    HGB 13.4 08/16/2024    HCT 40.6 08/16/2024    MCV 94 08/16/2024     08/16/2024    Lab Results   Component Value Date    TSH 2.23 01/26/2024    INR 1.21 (H) 01/26/2024          Cardiac Problems and Cardiac Diagnostics     Most Recent Cardiac testing:  ECG Personal interpretation  8/16/2024  NSR  Nonspecific T wave    ECHO  1/27/2024  Interpretation Summary     Left ventricular size, wall motion and function are normal. The ejection  fraction is 60-65%.  Normal right ventricle size and systolic function.  The left atrium is borderline dilated.  No hemodynamically significant valvular disease.     When compared previous stidy, there is no significant interval change.    Cardiac CT 8/13/2024:    Calcium Scoring: The total Agatston score is 442. A calcium score in this range places the individual in the 100th percentile when compared to an age and gender matched control group and implies a very high risk of cardiac events in the next ten years.    Left main: patent    Left anterior descending artery: Severe calcified stenosis extending from the proximal to mid segment. The 70-99 % stenosis in the left anterior descending coronary artery proximal to mid segment has a high likelihood of lesion specific ischemia with an FFRct value of 0.79.    Ramus intermediate: mild calcified stenosis in the proximal segment.    Left circumflex artery: patent    Right coronary artery: mild diffuse disease.         Assessment/Recommendations   Assessment:    Ms. Jennifer Huggins is a 79 year old female with a significant past history of paroxysmal afib, HTN, HLD, hypokalemia, CAD with abnormal CCTA and ctFFR, who presents for follow up.     CAD    - Abnormal CCTA with LAD plaque, CT-FFR 0.79   - Plan on LHC/CA/possible PCI for further evaluation   - Cont imdur 30mg qdaily   - Cont atorvastatin 80mg.  Last LDL suboptimal at 124 in January.  Add on zetia.  Tg also elevated can start Vascepa 2gm BID.  Check FLP in 2 months    HTN   - Well controlled   - Continue current management    Paroxysmal afib   - Episodes seem to have slowed down.  Not having significant symptoms currently.  In the past pt had a preference for antiarrhythmics over catheter ablation   - Given improvement in symptoms will hold off on rhythm control at this time.  Possibly plan to evaluate  burden with ziopatch after [facility].     - Cont dilt for now  - CHADS-Vasc 4.  Cont eliquis 5mg BID.  No bleeding issues    The longitudinal plan of care for CAD was addressed during this visit. Due to the added complexity in care, I will continue to support Jennifer Huggins  in the subsequent management of this condition(s) and with the ongoing continuity of care of this condition(s).    RTC 1-2 months after [facility]         Steven Wilks DO [facility]  Non-invasive Cardiologist  [facility]

## 2024-09-06 NOTE — PATIENT INSTRUCTIONS
It was a pleasure meeting you today    In summary:    We will get an angiogram to further evaluate the heart.  We will start zetia and vascepa for the cholesterol.    Please call my nurse Biju Means at [phone] if you have any questions or issues.    We will schedule a follow up visit in  1-2 months      Steven Wilks DO [facility]  Non-invasive Cardiologist  [facility]

## 2024-09-06 NOTE — PROGRESS NOTES
From: Tahira Matta  Sent: 9/6/2024  10:00 AM CDT  To: Shelley Farrell  Subject: TS ordering                                      Case type: CORS POSS PCI, LHC    Procedure Physician(s): ISMAEL/SARAH    Procedure Date and Patient Arrival Time: Tuesday 9/10, with arrival time of 0900    H&P: Completed on 9/6 [facility]    Pre-Procedure Lab Appt: ON ADMISSION    Alerts/Important Info: ANTICOAG HOLD FOR 48 HOURS, INTERP NEEDED     Interpretor Requested: JAMEY MARCANO REQUESTED 9/6     Thanks,   Stephania MORA Joseluis  [address] [place] [address]  [phone] (home)     PCP:  Barb Elias  H&P completed by:  Dr. Wilks  Admit date 09/10 Arrival time:  0900  Anticoagulation:  apixaban (ELIQUIS)  Previous PCI: No  Bypass Grafts: No  Renal Issues: No  Diabetic?: No  Device?: No  Type:    Ambulation status: independent w/cane    Reason for Visit:  Patient seen for pre-procedure education in preparation for: Left Heart Catheterization and Coronary Angiogram with Possible PCI    Procedure Prep:  EKG results obtained, dated: To be completed on day of cath lab procedure  Hemogram results obtained: To be completed on day of cath lab procedure  Basic Metabolic Panel results obtained: To be completed on day of cath lab procedure  Pertinent cardiac test results:     Patient Education    Your arrival time is 0900.  [place] [address] - Main Entrance of the Hospital  Please plan on being at the hospital all day.  At any time, emergencies and/or urgent cases may come up which could delay the start of your procedure.    COVID Testing Instructions  *Mandatory COVID Testing: ALL Patients will need to complete a COVID test no sooner than 4 days prior to their procedure (regardless of vaccination status).    To schedule COVID testing Please call [phone]  If you want to complete this at an outside facility please call them to find out if they will have the results  within the appropriate time frame and their fax number.  You will need to provide us with that information so we can send the order.  The facility completing the test will need to fax the results to [phone]  If you are running into and issues please call us.     Pre-procedure instructions  Take your temperature in the morning prior to coming in.  If your temperature is 100 F please call [facility] [phone] and notify them.  If you do not have access to a thermometer at home, please come in for testing.  If you are running a temperature your procedure may be rescheduled.  Patient instructed to not Eat or Drink after midnight.  Patient instructed to shower the evening before or the morning of the procedure.  Patient instructed to arrange for transportation home following procedure from a responsible family member or friend. No driving for at least 24 hours.  Patient instructed to have a responsible adult with them for 24 hours post-procedure.  Post-procedure follow up process.  Conscious sedation discussed.      Pre-procedure medication instructions.  Continue medications as scheduled, with a small amount of water on the day of the procedure unless indicated. (NO Diabetic Medications or Blood Thinners)  Pt instructed not to consume Alcohol, Tobacco, Caffeine, or Carbonated beverages 12 hours prior to procedure.  Patient instructed to take 325 mg of Aspirin the morning of procedure: Yes  Anticoagulation Medication Instructions     apixaban (ELIQUIS) - Hold 48 hours prior to procedure    Patient states understanding of procedure and agrees to proceed.    *PATIENTS RECORDS AVAILABLE IN [facility] UNLESS OTHERWISE INDICATED*      Patient Active Problem List   Diagnosis    Neuritis    Meningioma    Joint Pain, Localized In The Shoulder    Cervicalgia    Hypercholesterolemia    Chronic allergic conjunctivitis    Osteoporosis    Dyspepsia    Benign paroxysmal positional vertigo    Essential hypertension    Hypokalemia     Urinary incontinence    Benign Adenomatous Polyp Of The Large Intestine    Xerosis Cutis    Recurrent UTI    GERD (gastroesophageal reflux disease)    Constipation    Hepatitis (non-viral, sporadic elevation of LFT's)    Dizziness    Osteoarthritis of multiple joints    Paroxysmal atrial fibrillation (H)       Current Outpatient Medications   Medication Sig Dispense Refill    albuterol (PROAIR HFA/PROVENTIL HFA/VENTOLIN HFA) 108 (90 Base) MCG/ACT inhaler Inhale 2 puffs into the lungs every 6 hours as needed for shortness of breath or wheezing 18 g 3    artificial tears (GENTEAL) 0.1-0.2-0.3 % ophthalmic solution Place 2 drops into both eyes 4 times daily as needed (dry or irritated eyes) 15 mL 11    atorvastatin (LIPITOR) 80 MG tablet Take 1 tablet (80 mg) by mouth at bedtime 30 tablet 11    diltiazem (CARDIZEM) 30 MG tablet Take 1 tablet (30 mg) by mouth every 12 hours 60 tablet 3    ELIQUIS ANTICOAGULANT 5 MG tablet TAKE 1 TABLET (5 MG) BY MOUTH 2 TIMES DAILY 60 tablet 11    ezetimibe (ZETIA) 10 MG tablet Take 1 tablet (10 mg) by mouth daily. 90 tablet 3    icosapent ethyl (VASCEPA) 1 g CAPS capsule Take 2 g by mouth 2 times daily (with meals). 360 capsule 3    isosorbide mononitrate (IMDUR) 30 MG 24 hr tablet Take 1 tablet (30 mg) by mouth daily. 30 tablet 1    lisinopril (ZESTRIL) 20 MG tablet Take 1 tablet (20 mg) by mouth daily 30 tablet 3    meclizine (ANTIVERT) 25 MG tablet Take 1 tablet (25 mg) by mouth 3 times daily as needed for dizziness 30 tablet 11    omeprazole (PRILOSEC) 20 MG DR capsule TAKE 1 CAPSULE BY MOUTH DAILY BEFORE BREAKFAST 30 capsule 2    vitamin D3 (CHOLECALCIFEROL) 50 mcg (2000 units) tablet TAKE 1 TABLET (50 MCG) BY MOUTH DAILY (Patient not taking: Reported on 9/6/2024) 30 tablet 10       Allergies   Allergen Reactions    Ibandronate [Ibandronic Acid] Headache and Shortness Of Breath    Septra [Sulfamethoxazole-Trimethoprim] Other (See Comments)     Acute hepatitis        Shelley Farrell  on 9/6/2024 at 11:48 AM

## 2024-09-06 NOTE — LETTER
9/6/2024    aBrb Elias MD  [address] [place] [address] [address]  [place] [address]    RE: Jennifer Huggins       Dear Colleague,     I had the pleasure of seeing Jennifer Huggins in the [facility].    [facility]   [address]  [place] [address]   [email or web address]   OFFICE: [phone]     CARDIOLOGY CLINIC NOTE     Thank you, Barb Ram, for asking the [facility] team to see Ms. Jennifer Huggins to evaluate Follow Up          History of Present Illness   Ms. Jennifer Huggins is a 79 year old female with a significant past history of paroxysmal afib, HTN, HLD, hypokalemia, CAD with abnormal CCTA and ctFFR, who presents for follow up.  The patient underwent a cardiac CT for evaluation of candidacy of 1c antiarrhythmic as well as for evaluation of chest pain which had been attributed to afib.  CT showed LAD plaque with high likelihood of ischemia based on Ct-FFR.  Since then, the patient had a ED visit for high blood pressure, dyspnea, and chest heaviness.  She denies any recent episodes of palpitations, though they sounded quite frequent at our last visit in February.           Medications  Allergies   Current Outpatient Medications   Medication Sig Dispense Refill     albuterol (PROAIR HFA/PROVENTIL HFA/VENTOLIN HFA) 108 (90 Base) MCG/ACT inhaler Inhale 2 puffs into the lungs every 6 hours as needed for shortness of breath or wheezing 18 g 3     artificial tears (GENTEAL) 0.1-0.2-0.3 % ophthalmic solution Place 2 drops into both eyes 4 times daily as needed (dry or irritated eyes) 15 mL 11     atorvastatin (LIPITOR) 80 MG tablet Take 1 tablet (80 mg) by mouth at bedtime 30 tablet 11     diltiazem (CARDIZEM) 30 MG tablet Take 1 tablet (30 mg) by mouth every 12 hours 60 tablet 3     ELIQUIS ANTICOAGULANT 5 MG tablet TAKE 1 TABLET (5 MG) BY MOUTH 2 TIMES DAILY 60 tablet 11     isosorbide mononitrate (IMDUR) 30 MG 24 hr tablet Take 1 tablet (30 mg) by  mouth daily. 30 tablet 1     lisinopril (ZESTRIL) 20 MG tablet Take 1 tablet (20 mg) by mouth daily 30 tablet 3     meclizine (ANTIVERT) 25 MG tablet Take 1 tablet (25 mg) by mouth 3 times daily as needed for dizziness 30 tablet 11     omeprazole (PRILOSEC) 20 MG DR capsule TAKE 1 CAPSULE BY MOUTH DAILY BEFORE BREAKFAST 30 capsule 2     vitamin D3 (CHOLECALCIFEROL) 50 mcg (2000 units) tablet TAKE 1 TABLET (50 MCG) BY MOUTH DAILY 30 tablet 10      Allergies   Allergen Reactions     Ibandronate [Ibandronic Acid] Headache and Shortness Of Breath     Septra [Sulfamethoxazole-Trimethoprim] Other (See Comments)     Acute hepatitis         Physical Examination Review of Systems   Vitals: There were no vitals taken for this visit.  BMI= There is no height or weight on file to calculate BMI.  Wt Readings from Last 3 Encounters:   08/16/24 58.2 kg (128 lb 6.4 oz)   08/13/24 54.4 kg (120 lb)   07/17/24 57.2 kg (126 lb 1.9 oz)       General: pleasant female. No acute distress.   Neck: No JVD  Lungs: clear to auscultation  COR:  regular rate and rhythm, No murmurs, rubs, or gallops  Extrem: No edema        Please refer above for cardiac ROS details.       Past History     Family History:   Family History   Problem Relation Age of Onset     No Known Problems Mother      No Known Problems Father         Social History:   Social History     Socioeconomic History     Marital status:      Spouse name: Not on file     Number of children: Not on file     Years of education: Not on file     Highest education level: Not on file   Occupational History     Not on file   Tobacco Use     Smoking status: Never     Passive exposure: Never     Smokeless tobacco: Never   Vaping Use     Vaping status: Never Used   Substance and Sexual Activity     Alcohol use: No     Drug use: No     Sexual activity: Never     Birth control/protection: Post-menopausal   Other Topics Concern     Not on file   Social History Narrative     Not on file      Social Determinants of Health     Financial Resource Strain: Low Risk  (1/23/2024)    Financial Resource Strain      Within the past 12 months, have you or your family members you live with been unable to get utilities (heat, electricity) when it was really needed?: No   Food Insecurity: Low Risk  (1/23/2024)    Food Insecurity      Within the past 12 months, did you worry that your food would run out before you got money to buy more?: No      Within the past 12 months, did the food you bought just not last and you didn t have money to get more?: No   Transportation Needs: Low Risk  (1/23/2024)    Transportation Needs      Within the past 12 months, has lack of transportation kept you from medical appointments, getting your medicines, non-medical meetings or appointments, work, or from getting things that you need?: No   Physical Activity: Not on file   Stress: Not on file   Social Connections: Not on file   Interpersonal Safety: Unknown (7/17/2024)    Interpersonal Safety      Do you feel physically and emotionally safe where you currently live?: Patient unable to answer      Within the past 12 months, have you been hit, slapped, kicked or otherwise physically hurt by someone?: Patient unable to answer      Within the past 12 months, have you been humiliated or emotionally abused in other ways by your partner or ex-partner?: Patient unable to answer   Housing Stability: Low Risk  (1/23/2024)    Housing Stability      Do you have housing? : Yes      Are you worried about losing your housing?: No            Lab Results    Chemistry/lipid CBC Cardiac Enzymes/BNP/TSH/INR   Lab Results   Component Value Date    CHOL 215 (H) 01/23/2024    HDL 55 01/23/2024    TRIG 178 (H) 01/23/2024    BUN 12.0 08/16/2024     08/16/2024    CO2 23 08/16/2024    Lab Results   Component Value Date    WBC 8.1 08/16/2024    HGB 13.4 08/16/2024    HCT 40.6 08/16/2024    MCV 94 08/16/2024     08/16/2024    Lab Results    Component Value Date    TSH 2.23 01/26/2024    INR 1.21 (H) 01/26/2024          Cardiac Problems and Cardiac Diagnostics     Most Recent Cardiac testing:  ECG Personal interpretation  8/16/2024  NSR  Nonspecific T wave    ECHO 1/27/2024  Interpretation Summary     Left ventricular size, wall motion and function are normal. The ejection  fraction is 60-65%.  Normal right ventricle size and systolic function.  The left atrium is borderline dilated.  No hemodynamically significant valvular disease.     When compared previous stidy, there is no significant interval change.    Cardiac CT 8/13/2024:     Calcium Scoring: The total Agatston score is 442. A calcium score in this range places the individual in the 100th percentile when compared to an age and gender matched control group and implies a very high risk of cardiac events in the next ten years.     Left main: patent     Left anterior descending artery: Severe calcified stenosis extending from the proximal to mid segment. The 70-99 % stenosis in the left anterior descending coronary artery proximal to mid segment has a high likelihood of lesion specific ischemia with an FFRct value of 0.79.     Ramus intermediate: mild calcified stenosis in the proximal segment.     Left circumflex artery: patent     Right coronary artery: mild diffuse disease.         Assessment/Recommendations   Assessment:    Ms. Jennifer MORA Joseluis is a 79 year old female with a significant past history of paroxysmal afib, HTN, HLD, hypokalemia, CAD with abnormal CCTA and ctFFR, who presents for follow up.     CAD    - Abnormal CCTA with LAD plaque, CT-FFR 0.79   - Plan on [facility]/CA/possible PCI for further evaluation   - Cont imdur 30mg qdaily   - Cont atorvastatin 80mg.  Last LDL suboptimal at 124 in January.  Add on zetia.  Tg also elevated can start Vascepa 2gm BID.  Check FLP in 2 months    HTN   - Well controlled   - Continue current management    Paroxysmal afib   - Episodes seem to have slowed  down.  Not having significant symptoms currently.  In the past pt had a preference for antiarrhythmics over catheter ablation   - Given improvement in symptoms will hold off on rhythm control at this time.  Possibly plan to evaluate burden with naldo after [facility].     - Cont dilt for now  - CHADS-Vasc 4.  Cont eliquis 5mg BID.  No bleeding issues    The longitudinal plan of care for CAD was addressed during this visit. Due to the added complexity in care, I will continue to support Jennifer Huggins  in the subsequent management of this condition(s) and with the ongoing continuity of care of this condition(s).    RTC 1-2 months after [facility]         Steven Wilks DO [facility]  Non-invasive Cardiologist  [facility]         Thank you for allowing me to participate in the care of your patient.      Sincerely,     Steven Wilks DO     [facility]  cc:   Steven Wilks DO  [address]  [place] [address]

## 2024-09-06 NOTE — PROGRESS NOTES
[facility]   [address]  [place] [address]   [email or web address]   OFFICE: [phone]     CARDIOLOGY CLINIC NOTE     Thank you, Barb Ram, for asking the [facility] team to see Ms. Jennifer Huggins to evaluate Follow Up          History of Present Illness   Ms. Jennifer Huggins is a 79 year old female with a significant past history of paroxysmal afib, HTN, HLD, hypokalemia, CAD with abnormal CCTA and ctFFR, who presents for follow up.  The patient underwent a cardiac CT for evaluation of candidacy of 1c antiarrhythmic as well as for evaluation of chest pain which had been attributed to afib.  CT showed LAD plaque with high likelihood of ischemia based on Ct-FFR.  Since then, the patient had a ED visit for high blood pressure, dyspnea, and chest heaviness.  She denies any recent episodes of palpitations, though they sounded quite frequent at our last visit in February.           Medications  Allergies   Current Outpatient Medications   Medication Sig Dispense Refill    albuterol (PROAIR HFA/PROVENTIL HFA/VENTOLIN HFA) 108 (90 Base) MCG/ACT inhaler Inhale 2 puffs into the lungs every 6 hours as needed for shortness of breath or wheezing 18 g 3    artificial tears (GENTEAL) 0.1-0.2-0.3 % ophthalmic solution Place 2 drops into both eyes 4 times daily as needed (dry or irritated eyes) 15 mL 11    atorvastatin (LIPITOR) 80 MG tablet Take 1 tablet (80 mg) by mouth at bedtime 30 tablet 11    diltiazem (CARDIZEM) 30 MG tablet Take 1 tablet (30 mg) by mouth every 12 hours 60 tablet 3    ELIQUIS ANTICOAGULANT 5 MG tablet TAKE 1 TABLET (5 MG) BY MOUTH 2 TIMES DAILY 60 tablet 11    isosorbide mononitrate (IMDUR) 30 MG 24 hr tablet Take 1 tablet (30 mg) by mouth daily. 30 tablet 1    lisinopril (ZESTRIL) 20 MG tablet Take 1 tablet (20 mg) by mouth daily 30 tablet 3    meclizine (ANTIVERT) 25 MG tablet Take 1 tablet (25 mg) by mouth 3 times daily as needed  for dizziness 30 tablet 11    omeprazole (PRILOSEC) 20 MG DR capsule TAKE 1 CAPSULE BY MOUTH DAILY BEFORE BREAKFAST 30 capsule 2    vitamin D3 (CHOLECALCIFEROL) 50 mcg (2000 units) tablet TAKE 1 TABLET (50 MCG) BY MOUTH DAILY 30 tablet 10      Allergies   Allergen Reactions    Ibandronate [Ibandronic Acid] Headache and Shortness Of Breath    Septra [Sulfamethoxazole-Trimethoprim] Other (See Comments)     Acute hepatitis         Physical Examination Review of Systems   Vitals: There were no vitals taken for this visit.  BMI= There is no height or weight on file to calculate BMI.  Wt Readings from Last 3 Encounters:   08/16/24 58.2 kg (128 lb 6.4 oz)   08/13/24 54.4 kg (120 lb)   07/17/24 57.2 kg (126 lb 1.9 oz)       General: pleasant female. No acute distress.   Neck: No JVD  Lungs: clear to auscultation  COR:  regular rate and rhythm, No murmurs, rubs, or gallops  Extrem: No edema        Please refer above for cardiac ROS details.       Past History     Family History:   Family History   Problem Relation Age of Onset    No Known Problems Mother     No Known Problems Father         Social History:   Social History     Socioeconomic History    Marital status:      Spouse name: Not on file    Number of children: Not on file    Years of education: Not on file    Highest education level: Not on file   Occupational History    Not on file   Tobacco Use    Smoking status: Never     Passive exposure: Never    Smokeless tobacco: Never   Vaping Use    Vaping status: Never Used   Substance and Sexual Activity    Alcohol use: No    Drug use: No    Sexual activity: Never     Birth control/protection: Post-menopausal   Other Topics Concern    Not on file   Social History Narrative    Not on file     Social Determinants of Health     Financial Resource Strain: Low Risk  (1/23/2024)    Financial Resource Strain     Within the past 12 months, have you or your family members you live with been unable to get utilities (heat,  electricity) when it was really needed?: No   Food Insecurity: Low Risk  (1/23/2024)    Food Insecurity     Within the past 12 months, did you worry that your food would run out before you got money to buy more?: No     Within the past 12 months, did the food you bought just not last and you didn t have money to get more?: No   Transportation Needs: Low Risk  (1/23/2024)    Transportation Needs     Within the past 12 months, has lack of transportation kept you from medical appointments, getting your medicines, non-medical meetings or appointments, work, or from getting things that you need?: No   Physical Activity: Not on file   Stress: Not on file   Social Connections: Not on file   Interpersonal Safety: Unknown (7/17/2024)    Interpersonal Safety     Do you feel physically and emotionally safe where you currently live?: Patient unable to answer     Within the past 12 months, have you been hit, slapped, kicked or otherwise physically hurt by someone?: Patient unable to answer     Within the past 12 months, have you been humiliated or emotionally abused in other ways by your partner or ex-partner?: Patient unable to answer   Housing Stability: Low Risk  (1/23/2024)    Housing Stability     Do you have housing? : Yes     Are you worried about losing your housing?: No            Lab Results    Chemistry/lipid CBC Cardiac Enzymes/BNP/TSH/INR   Lab Results   Component Value Date    CHOL 215 (H) 01/23/2024    HDL 55 01/23/2024    TRIG 178 (H) 01/23/2024    BUN 12.0 08/16/2024     08/16/2024    CO2 23 08/16/2024    Lab Results   Component Value Date    WBC 8.1 08/16/2024    HGB 13.4 08/16/2024    HCT 40.6 08/16/2024    MCV 94 08/16/2024     08/16/2024    Lab Results   Component Value Date    TSH 2.23 01/26/2024    INR 1.21 (H) 01/26/2024          Cardiac Problems and Cardiac Diagnostics     Most Recent Cardiac testing:  ECG Personal interpretation  8/16/2024  NSR  Nonspecific T wave    ECHO  1/27/2024  Interpretation Summary     Left ventricular size, wall motion and function are normal. The ejection  fraction is 60-65%.  Normal right ventricle size and systolic function.  The left atrium is borderline dilated.  No hemodynamically significant valvular disease.     When compared previous stidy, there is no significant interval change.    Cardiac CT 8/13/2024:    Calcium Scoring: The total Agatston score is 442. A calcium score in this range places the individual in the 100th percentile when compared to an age and gender matched control group and implies a very high risk of cardiac events in the next ten years.    Left main: patent    Left anterior descending artery: Severe calcified stenosis extending from the proximal to mid segment. The 70-99 % stenosis in the left anterior descending coronary artery proximal to mid segment has a high likelihood of lesion specific ischemia with an FFRct value of 0.79.    Ramus intermediate: mild calcified stenosis in the proximal segment.    Left circumflex artery: patent    Right coronary artery: mild diffuse disease.         Assessment/Recommendations   Assessment:    Ms. Jennifer Huggins is a 79 year old female with a significant past history of paroxysmal afib, HTN, HLD, hypokalemia, CAD with abnormal CCTA and ctFFR, who presents for follow up.     CAD    - Abnormal CCTA with LAD plaque, CT-FFR 0.79   - Plan on LHC/CA/possible PCI for further evaluation   - Cont imdur 30mg qdaily   - Cont atorvastatin 80mg.  Last LDL suboptimal at 124 in January.  Add on zetia.  Tg also elevated can start Vascepa 2gm BID.  Check FLP in 2 months    HTN   - Well controlled   - Continue current management    Paroxysmal afib   - Episodes seem to have slowed down.  Not having significant symptoms currently.  In the past pt had a preference for antiarrhythmics over catheter ablation   - Given improvement in symptoms will hold off on rhythm control at this time.  Possibly plan to evaluate  burden with ziopatch after [facility].     - Cont dilt for now  - CHADS-Vasc 4.  Cont eliquis 5mg BID.  No bleeding issues    The longitudinal plan of care for CAD was addressed during this visit. Due to the added complexity in care, I will continue to support Jennifer Huggins  in the subsequent management of this condition(s) and with the ongoing continuity of care of this condition(s).    RTC 1-2 months after [facility]         Steven Wilks DO [facility]  Non-invasive Cardiologist  [facility]

## 2024-09-10 ENCOUNTER — TELEPHONE (OUTPATIENT)
Dept: CARDIOLOGY | Facility: CLINIC | Age: 79
End: 2024-09-10

## 2024-09-10 ENCOUNTER — HOSPITAL ENCOUNTER (OUTPATIENT)
Facility: HOSPITAL | Age: 79
Discharge: HOME | End: 2024-09-10
Attending: INTERNAL MEDICINE | Admitting: INTERNAL MEDICINE
Payer: MEDICARE

## 2024-09-10 VITALS
HEIGHT: 57 IN | HEART RATE: 61 BPM | BODY MASS INDEX: 27.61 KG/M2 | TEMPERATURE: 98.1 F | WEIGHT: 128 LBS | SYSTOLIC BLOOD PRESSURE: 142 MMHG | DIASTOLIC BLOOD PRESSURE: 72 MMHG | OXYGEN SATURATION: 98 % | RESPIRATION RATE: 21 BRPM

## 2024-09-10 DIAGNOSIS — R07.89 CHEST PRESSURE: ICD-10-CM

## 2024-09-10 DIAGNOSIS — I10 BENIGN ESSENTIAL HYPERTENSION: ICD-10-CM

## 2024-09-10 DIAGNOSIS — I10 BENIGN ESSENTIAL HYPERTENSION: Primary | ICD-10-CM

## 2024-09-10 DIAGNOSIS — I48.0 PAROXYSMAL ATRIAL FIBRILLATION (H): ICD-10-CM

## 2024-09-10 DIAGNOSIS — R93.1 ABNORMAL CARDIAC CT ANGIOGRAPHY: ICD-10-CM

## 2024-09-10 DIAGNOSIS — R93.1 ABNORMAL FINDINGS ON DIAGNOSTIC IMAGING OF HEART AND CORONARY CIRCULATION: ICD-10-CM

## 2024-09-10 RX ORDER — SODIUM CHLORIDE 9 MG/ML
INJECTION, SOLUTION INTRAVENOUS CONTINUOUS
Status: DISCONTINUED | OUTPATIENT
Start: 2024-09-10 | End: 2024-09-10 | Stop reason: HOSPADM

## 2024-09-10 RX ORDER — FENTANYL CITRATE 50 UG/ML
25 INJECTION, SOLUTION INTRAMUSCULAR; INTRAVENOUS
Status: DISCONTINUED | OUTPATIENT
Start: 2024-09-10 | End: 2024-09-10 | Stop reason: HOSPADM

## 2024-09-10 RX ORDER — ASPIRIN 81 MG/1
243 TABLET, CHEWABLE ORAL ONCE
Status: DISCONTINUED | OUTPATIENT
Start: 2024-09-10 | End: 2024-09-10 | Stop reason: HOSPADM

## 2024-09-10 RX ORDER — ASPIRIN 325 MG
325 TABLET ORAL ONCE
Status: DISCONTINUED | OUTPATIENT
Start: 2024-09-10 | End: 2024-09-10 | Stop reason: HOSPADM

## 2024-09-10 RX ORDER — LIDOCAINE 40 MG/G
CREAM TOPICAL
Status: DISCONTINUED | OUTPATIENT
Start: 2024-09-10 | End: 2024-09-10 | Stop reason: HOSPADM

## 2024-09-10 ASSESSMENT — ACTIVITIES OF DAILY LIVING (ADL)
ADLS_ACUITY_SCORE: 38

## 2024-09-10 NOTE — PROGRESS NOTES
Brief CV progress note:    Patient was scheduled for coronary angiogram today in evaluation of chest pressure with multiple risk factors for CAD and recent abnormal coronary CTA suggestive of LAD disease. Patient unfortunately did not stop her NOAC prior to procedure and due to increased risk of bleeding with an invasive procedure we will reschedule for a later date. Cath lab staff to reach out to clinic for rescheduling as appropriate. Patient to hold NOAC for 48h prior to procedure.

## 2024-09-10 NOTE — TELEPHONE ENCOUNTER
Utilizing Kelli  caro on Pt's cell phone and called home number. Spoke with sister Aldo whom confirmed Pt did not take Eliquis and understood the instructions to also not take tomorrow.  No further questions or concerns.    Mailed an additional teach-liv    From: Kinga Mar  Sent: 9/11/2024   1:42 PM CDT  To: Shelley Farrell  Subject: RE: angiogram needing to be rescheduled          Case type: CA Poss PCI, C  Procedure Physician(s): SARAH/JAIRO  Procedure Date and Patient Arrival Time: Friday 9/20, with arrival time of 0700  H&P: Completed on 9/6 ST  Pre-Procedure Lab Appt: on admission - Please place lab orders if you haven't already!  Alerts/Important Info: on anticoagulation medication 48 hr hold, Kelli    Interpretor Requested: yes, requested on 9/11    Thank you,  Kinga  ----- Message -----  From: Shelley Farrell  Sent: 9/10/2024  11:05 AM CDT  To: Kinga Mar  Subject: RE: angiogram needing to be rescheduled          Hi Kinga,    A new case has been placed.     Thanks!    ----- Message from Kinga DUNLAP sent at 9/10/2024 10:28 AM CDT -----  Regarding: FW: angiogram needing to be rescheduled    ----- Message -----  From: Tahira Jennings RN  Sent: 9/10/2024  10:05 AM CDT  To: Celestine Peacock RN; Deepa James RN; #  Subject: angiogram needing to be rescheduled              Hello,     This patient is being rescheduled because he took his Eliquis.  I will cancel the case request.  It will need to be re-entered and then readmitted.  If someone could reach out to him to reschedule at a later date.     Thank you!    Tahira Jennings RN 9/10/2024    Jennifer L Joseluis  [address] [place] [address]  [phone] (home)     PCP:  Barb Elias  H&P completed by:  Dr. Wilks  Admit date 09/20 Arrival time:  0700  Anticoagulation:  apixaban (ELIQUIS)  Previous PCI: No  Bypass Grafts: No  Renal Issues: No  Diabetic?: No  Device?: No  Type:    Ambulation status: independent  w/cane    Reason for Visit:  Patient seen for pre-procedure education in preparation for: Left Heart Catheterization and Coronary Angiogram with Possible PCI    Patient previously seen in clinic and mailed another teach.    Procedure Prep:  EKG results obtained, dated: To be completed on day of cath lab procedure  Hemogram results obtained: To be completed on day of cath lab procedure  Basic Metabolic Panel results obtained: To be completed on day of cath lab procedure    Patient Education    Your arrival time is 0700.  [place] [address] - Main Entrance of the Hospital  Please plan on being at the hospital all day.  At any time, emergencies and/or urgent cases may come up which could delay the start of your procedure.    COVID Testing Instructions  *Mandatory COVID Testing: ALL Patients will need to complete a COVID test no sooner than 4 days prior to their procedure (regardless of vaccination status).    To schedule COVID testing Please call [phone]  If you want to complete this at an outside facility please call them to find out if they will have the results within the appropriate time frame and their fax number.  You will need to provide us with that information so we can send the order.  The facility completing the test will need to fax the results to [phone]  If you are running into and issues please call us.     Pre-procedure instructions  Take your temperature in the morning prior to coming in.  If your temperature is 100 F please call  [facility] [phone] and notify them.  If you do not have access to a thermometer at home, please come in for testing.  If you are running a temperature your procedure may be rescheduled.  Patient instructed to not Eat or Drink after midnight.  Patient instructed to shower the evening before or the morning of the procedure.  Patient instructed to arrange for transportation home following  procedure from a responsible family member or friend. No driving for at least 24 hours.  Patient instructed to have a responsible adult with them for 24 hours post-procedure.  Post-procedure follow up process.  Conscious sedation discussed.      Pre-procedure medication instructions.  Continue medications as scheduled, with a small amount of water on the day of the procedure unless indicated. (NO Diabetic Medications or Blood Thinners)  Pt instructed not to consume Alcohol, Tobacco, Caffeine, or Carbonated beverages 12 hours prior to procedure.  Patient instructed to take 325 mg of Aspirin the  morning of procedure:                 Anticoagulation Medication Instructions     apixaban (ELIQUIS) - Hold 48 hours prior to procedure    Patient states understanding of procedure and agrees to proceed.    *PATIENTS RECORDS AVAILABLE IN [facility] UNLESS OTHERWISE INDICATED*      Patient Active Problem List   Diagnosis    Neuritis    Meningioma    Joint Pain, Localized In The Shoulder    Cervicalgia    Hypercholesterolemia    Chronic allergic conjunctivitis    Osteoporosis    Dyspepsia    Benign paroxysmal positional vertigo    Essential hypertension    Hypokalemia    Urinary incontinence    Benign Adenomatous Polyp Of The Large Intestine    Xerosis Cutis    Recurrent UTI    GERD (gastroesophageal reflux disease)    Constipation    Hepatitis (non-viral, sporadic elevation of LFT's)    Dizziness    Osteoarthritis of multiple joints    Paroxysmal atrial fibrillation (H)       Current Outpatient Medications   Medication Sig Dispense Refill    albuterol (PROAIR HFA/PROVENTIL HFA/VENTOLIN HFA) 108 (90 Base) MCG/ACT inhaler Inhale 2 puffs into the lungs every 6 hours as needed for shortness of breath or wheezing 18 g 3    artificial tears (GENTEAL) 0.1-0.2-0.3 % ophthalmic solution Place 2 drops into both eyes 4 times daily as needed (dry or irritated eyes) 15 mL 11    aspirin (ASA) 325 MG tablet Take 1 tablet (325 mg) by mouth daily. 1  tablet 0    atorvastatin (LIPITOR) 80 MG tablet Take 1 tablet (80 mg) by mouth at bedtime 30 tablet 11    diltiazem (CARDIZEM) 30 MG tablet Take 1 tablet (30 mg) by mouth every 12 hours 60 tablet 3    ELIQUIS ANTICOAGULANT 5 MG tablet TAKE 1 TABLET (5 MG) BY MOUTH 2 TIMES DAILY 60 tablet 11    ezetimibe (ZETIA) 10 MG tablet Take 1 tablet (10 mg) by mouth daily. 90 tablet 3    icosapent ethyl (VASCEPA) 1 g CAPS capsule Take 2 g by mouth 2 times daily (with meals). 360 capsule 0    isosorbide mononitrate (IMDUR) 30 MG 24 hr tablet Take 1 tablet (30 mg) by mouth daily. 30 tablet 1    lisinopril (ZESTRIL) 20 MG tablet Take 1 tablet (20 mg) by mouth daily 30 tablet 3    meclizine (ANTIVERT) 25 MG tablet Take 1 tablet (25 mg) by mouth 3 times daily as needed for dizziness 30 tablet 11    omeprazole (PRILOSEC) 20 MG DR capsule TAKE 1 CAPSULE BY MOUTH DAILY BEFORE BREAKFAST 30 capsule 2    vitamin D3 (CHOLECALCIFEROL) 50 mcg (2000 units) tablet TAKE 1 TABLET (50 MCG) BY MOUTH DAILY (Patient not taking: Reported on 9/6/2024) 30 tablet 10       Allergies   Allergen Reactions    Ibandronate [Ibandronic Acid] Headache and Shortness Of Breath    Septra [Sulfamethoxazole-Trimethoprim] Other (See Comments)     Acute hepatitis        Shelley Farrell on 9/12/2024 at 8:56 AM

## 2024-09-10 NOTE — PROGRESS NOTES
Patient had a schedule coronary angiogram today but it had to be canceled because pt took Eliquis this morning. Education was provided and will have another schedule made. Verbalized no concerns. Belongings returned. Discharged in stable condition.

## 2024-09-11 ENCOUNTER — TELEPHONE (OUTPATIENT)
Dept: CARDIOLOGY | Facility: CLINIC | Age: 79
End: 2024-09-11
Payer: MEDICARE

## 2024-09-11 DIAGNOSIS — E78.2 MIXED HYPERLIPIDEMIA: Primary | ICD-10-CM

## 2024-09-11 RX ORDER — ICOSAPENT ETHYL 1 G/1
2 CAPSULE ORAL 2 TIMES DAILY WITH MEALS
Qty: 360 CAPSULE | Refills: 0 | Status: SHIPPED | OUTPATIENT
Start: 2024-09-11

## 2024-09-11 NOTE — TELEPHONE ENCOUNTER
Rx changed to brand name Vascepa and prescription sent to pharmacy. Other order discontinued. belinda

## 2024-09-11 NOTE — TELEPHONE ENCOUNTER
Fax received from [facility] requesting drug change from generic Icosapent Ethyl to brand name Vascepa.     Are you okay if I make this drug change? Thank you. belinda

## 2024-09-12 ENCOUNTER — PREP FOR PROCEDURE (OUTPATIENT)
Dept: CARDIOLOGY | Facility: CLINIC | Age: 79
End: 2024-09-12
Payer: MEDICARE

## 2024-09-12 DIAGNOSIS — R93.1 ABNORMAL CARDIAC CT ANGIOGRAPHY: ICD-10-CM

## 2024-09-12 DIAGNOSIS — R07.9 CHEST PAIN, UNSPECIFIED TYPE: ICD-10-CM

## 2024-09-12 DIAGNOSIS — R93.1 ABNORMAL FINDINGS ON DIAGNOSTIC IMAGING OF HEART AND CORONARY CIRCULATION: ICD-10-CM

## 2024-09-12 DIAGNOSIS — I10 BENIGN ESSENTIAL HYPERTENSION: Primary | ICD-10-CM

## 2024-09-12 DIAGNOSIS — R07.89 CHEST PRESSURE: ICD-10-CM

## 2024-09-12 RX ORDER — ASPIRIN 81 MG/1
243 TABLET, CHEWABLE ORAL ONCE
Status: CANCELLED | OUTPATIENT
Start: 2024-09-20

## 2024-09-12 RX ORDER — ASPIRIN 325 MG
325 TABLET ORAL DAILY
Qty: 1 TABLET | Refills: 0 | Status: ON HOLD | OUTPATIENT
Start: 2024-09-12 | End: 2024-09-20

## 2024-09-12 RX ORDER — LIDOCAINE 40 MG/G
CREAM TOPICAL
Status: CANCELLED | OUTPATIENT
Start: 2024-09-12

## 2024-09-12 RX ORDER — SODIUM CHLORIDE 9 MG/ML
INJECTION, SOLUTION INTRAVENOUS CONTINUOUS
Status: CANCELLED | OUTPATIENT
Start: 2024-09-20

## 2024-09-12 RX ORDER — ASPIRIN 325 MG
325 TABLET ORAL ONCE
Status: CANCELLED | OUTPATIENT
Start: 2024-09-20 | End: 2024-09-12

## 2024-09-12 RX ORDER — FENTANYL CITRATE 50 UG/ML
25 INJECTION, SOLUTION INTRAMUSCULAR; INTRAVENOUS
Status: CANCELLED | OUTPATIENT
Start: 2024-09-20

## 2024-09-15 DIAGNOSIS — K21.9 GASTROESOPHAGEAL REFLUX DISEASE WITHOUT ESOPHAGITIS: ICD-10-CM

## 2024-09-20 ENCOUNTER — HOSPITAL ENCOUNTER (OUTPATIENT)
Facility: HOSPITAL | Age: 79
Discharge: HOME | End: 2024-09-20
Attending: STUDENT IN AN ORGANIZED HEALTH CARE EDUCATION/TRAINING PROGRAM | Admitting: STUDENT IN AN ORGANIZED HEALTH CARE EDUCATION/TRAINING PROGRAM
Payer: MEDICARE

## 2024-09-20 VITALS
RESPIRATION RATE: 18 BRPM | SYSTOLIC BLOOD PRESSURE: 143 MMHG | TEMPERATURE: 98.2 F | HEART RATE: 74 BPM | DIASTOLIC BLOOD PRESSURE: 62 MMHG | OXYGEN SATURATION: 99 %

## 2024-09-20 DIAGNOSIS — Z95.5 STATUS POST INSERTION OF DRUG-ELUTING STENT INTO LEFT ANTERIOR DESCENDING (LAD) ARTERY: Primary | ICD-10-CM

## 2024-09-20 DIAGNOSIS — R93.1 ABNORMAL FINDINGS ON DIAGNOSTIC IMAGING OF HEART AND CORONARY CIRCULATION: ICD-10-CM

## 2024-09-20 DIAGNOSIS — R07.89 CHEST PRESSURE: ICD-10-CM

## 2024-09-20 DIAGNOSIS — I10 BENIGN ESSENTIAL HYPERTENSION: ICD-10-CM

## 2024-09-20 DIAGNOSIS — R93.1 ABNORMAL CARDIAC CT ANGIOGRAPHY: ICD-10-CM

## 2024-09-20 DIAGNOSIS — R07.9 CHEST PAIN, UNSPECIFIED TYPE: ICD-10-CM

## 2024-09-20 DIAGNOSIS — I48.0 PAROXYSMAL ATRIAL FIBRILLATION (H): ICD-10-CM

## 2024-09-20 PROBLEM — Z98.890 STATUS POST CORONARY ANGIOGRAM: Status: ACTIVE | Noted: 2024-09-20

## 2024-09-20 LAB
ABO/RH(D): NORMAL
ACT BLD: 241 SECONDS (ref 74–150)
ACT BLD: 257 SECONDS (ref 74–150)
ANION GAP SERPL CALCULATED.3IONS-SCNC: 9 MMOL/L (ref 7–15)
ANTIBODY SCREEN: NEGATIVE
ATRIAL RATE - MUSE: 64 BPM
ATRIAL RATE - MUSE: 66 BPM
BUN SERPL-MCNC: 11.5 MG/DL (ref 8–23)
CALCIUM SERPL-MCNC: 9 MG/DL (ref 8.8–10.4)
CHLORIDE SERPL-SCNC: 107 MMOL/L (ref 98–107)
CHOLEST SERPL-MCNC: 192 MG/DL
CREAT SERPL-MCNC: 0.9 MG/DL (ref 0.51–0.95)
DIASTOLIC BLOOD PRESSURE - MUSE: NORMAL MMHG
DIASTOLIC BLOOD PRESSURE - MUSE: NORMAL MMHG
EGFRCR SERPLBLD CKD-EPI 2021: 65 ML/MIN/1.73M2
ERYTHROCYTE [DISTWIDTH] IN BLOOD BY AUTOMATED COUNT: 13.3 % (ref 10–15)
FASTING STATUS PATIENT QL REPORTED: YES
FASTING STATUS PATIENT QL REPORTED: YES
GLUCOSE SERPL-MCNC: 103 MG/DL (ref 70–99)
HCO3 SERPL-SCNC: 27 MMOL/L (ref 22–29)
HCT VFR BLD AUTO: 44 % (ref 35–47)
HDLC SERPL-MCNC: 64 MG/DL
HGB BLD-MCNC: 14 G/DL (ref 11.7–15.7)
INTERPRETATION ECG - MUSE: NORMAL
INTERPRETATION ECG - MUSE: NORMAL
LDLC SERPL CALC-MCNC: 109 MG/DL
MCH RBC QN AUTO: 30.8 PG (ref 26.5–33)
MCHC RBC AUTO-ENTMCNC: 31.8 G/DL (ref 31.5–36.5)
MCV RBC AUTO: 97 FL (ref 78–100)
NONHDLC SERPL-MCNC: 128 MG/DL
P AXIS - MUSE: 35 DEGREES
P AXIS - MUSE: 38 DEGREES
PLATELET # BLD AUTO: 286 10E3/UL (ref 150–450)
POTASSIUM SERPL-SCNC: 4.4 MMOL/L (ref 3.4–5.3)
PR INTERVAL - MUSE: 158 MS
PR INTERVAL - MUSE: 168 MS
QRS DURATION - MUSE: 70 MS
QRS DURATION - MUSE: 70 MS
QT - MUSE: 418 MS
QT - MUSE: 456 MS
QTC - MUSE: 438 MS
QTC - MUSE: 470 MS
R AXIS - MUSE: 2 DEGREES
R AXIS - MUSE: 6 DEGREES
RBC # BLD AUTO: 4.55 10E6/UL (ref 3.8–5.2)
SODIUM SERPL-SCNC: 143 MMOL/L (ref 135–145)
SPECIMEN EXPIRATION DATE: NORMAL
SYSTOLIC BLOOD PRESSURE - MUSE: NORMAL MMHG
SYSTOLIC BLOOD PRESSURE - MUSE: NORMAL MMHG
T AXIS - MUSE: 21 DEGREES
T AXIS - MUSE: 40 DEGREES
TRIGL SERPL-MCNC: 93 MG/DL
VENTRICULAR RATE- MUSE: 64 BPM
VENTRICULAR RATE- MUSE: 66 BPM
WBC # BLD AUTO: 8.9 10E3/UL (ref 4–11)

## 2024-09-20 PROCEDURE — 93010 ELECTROCARDIOGRAM REPORT: CPT | Mod: HOP | Performed by: STUDENT IN AN ORGANIZED HEALTH CARE EDUCATION/TRAINING PROGRAM

## 2024-09-20 PROCEDURE — 80048 BASIC METABOLIC PNL TOTAL CA: CPT | Performed by: STUDENT IN AN ORGANIZED HEALTH CARE EDUCATION/TRAINING PROGRAM

## 2024-09-20 PROCEDURE — 255N000002 HC RX 255 OP 636: Performed by: STUDENT IN AN ORGANIZED HEALTH CARE EDUCATION/TRAINING PROGRAM

## 2024-09-20 PROCEDURE — 92972 PERQ TRLUML CORONRY LITHOTRP: CPT | Performed by: STUDENT IN AN ORGANIZED HEALTH CARE EDUCATION/TRAINING PROGRAM

## 2024-09-20 PROCEDURE — 93458 L HRT ARTERY/VENTRICLE ANGIO: CPT | Performed by: STUDENT IN AN ORGANIZED HEALTH CARE EDUCATION/TRAINING PROGRAM

## 2024-09-20 PROCEDURE — C1874 STENT, COATED/COV W/DEL SYS: HCPCS | Performed by: STUDENT IN AN ORGANIZED HEALTH CARE EDUCATION/TRAINING PROGRAM

## 2024-09-20 PROCEDURE — 80061 LIPID PANEL: CPT | Performed by: STUDENT IN AN ORGANIZED HEALTH CARE EDUCATION/TRAINING PROGRAM

## 2024-09-20 PROCEDURE — 999N000054 HC STATISTIC EKG NON-CHARGEABLE

## 2024-09-20 PROCEDURE — 36415 COLL VENOUS BLD VENIPUNCTURE: CPT | Performed by: STUDENT IN AN ORGANIZED HEALTH CARE EDUCATION/TRAINING PROGRAM

## 2024-09-20 PROCEDURE — C1887 CATHETER, GUIDING: HCPCS | Performed by: STUDENT IN AN ORGANIZED HEALTH CARE EDUCATION/TRAINING PROGRAM

## 2024-09-20 PROCEDURE — 93010 ELECTROCARDIOGRAM REPORT: CPT | Mod: HOP | Performed by: INTERNAL MEDICINE

## 2024-09-20 PROCEDURE — C9600 PERC DRUG-EL COR STENT SING: HCPCS | Performed by: STUDENT IN AN ORGANIZED HEALTH CARE EDUCATION/TRAINING PROGRAM

## 2024-09-20 PROCEDURE — 92978 ENDOLUMINL IVUS OCT C 1ST: CPT | Mod: 26 | Performed by: STUDENT IN AN ORGANIZED HEALTH CARE EDUCATION/TRAINING PROGRAM

## 2024-09-20 PROCEDURE — 92972 PERQ TRLUML CORONRY LITHOTRP: CPT | Mod: LD | Performed by: STUDENT IN AN ORGANIZED HEALTH CARE EDUCATION/TRAINING PROGRAM

## 2024-09-20 PROCEDURE — 92978 ENDOLUMINL IVUS OCT C 1ST: CPT | Performed by: STUDENT IN AN ORGANIZED HEALTH CARE EDUCATION/TRAINING PROGRAM

## 2024-09-20 PROCEDURE — C1769 GUIDE WIRE: HCPCS | Performed by: STUDENT IN AN ORGANIZED HEALTH CARE EDUCATION/TRAINING PROGRAM

## 2024-09-20 PROCEDURE — 99152 MOD SED SAME PHYS/QHP 5/>YRS: CPT | Performed by: STUDENT IN AN ORGANIZED HEALTH CARE EDUCATION/TRAINING PROGRAM

## 2024-09-20 PROCEDURE — 258N000003 HC RX IP 258 OP 636: Performed by: STUDENT IN AN ORGANIZED HEALTH CARE EDUCATION/TRAINING PROGRAM

## 2024-09-20 PROCEDURE — 93005 ELECTROCARDIOGRAM TRACING: CPT

## 2024-09-20 PROCEDURE — C1753 CATH, INTRAVAS ULTRASOUND: HCPCS | Performed by: STUDENT IN AN ORGANIZED HEALTH CARE EDUCATION/TRAINING PROGRAM

## 2024-09-20 PROCEDURE — 250N000013 HC RX MED GY IP 250 OP 250 PS 637: Performed by: STUDENT IN AN ORGANIZED HEALTH CARE EDUCATION/TRAINING PROGRAM

## 2024-09-20 PROCEDURE — 85041 AUTOMATED RBC COUNT: CPT | Performed by: STUDENT IN AN ORGANIZED HEALTH CARE EDUCATION/TRAINING PROGRAM

## 2024-09-20 PROCEDURE — 86900 BLOOD TYPING SEROLOGIC ABO: CPT | Performed by: STUDENT IN AN ORGANIZED HEALTH CARE EDUCATION/TRAINING PROGRAM

## 2024-09-20 PROCEDURE — 85014 HEMATOCRIT: CPT | Performed by: STUDENT IN AN ORGANIZED HEALTH CARE EDUCATION/TRAINING PROGRAM

## 2024-09-20 PROCEDURE — 99153 MOD SED SAME PHYS/QHP EA: CPT | Performed by: STUDENT IN AN ORGANIZED HEALTH CARE EDUCATION/TRAINING PROGRAM

## 2024-09-20 PROCEDURE — 85347 COAGULATION TIME ACTIVATED: CPT

## 2024-09-20 PROCEDURE — 250N000011 HC RX IP 250 OP 636: Performed by: STUDENT IN AN ORGANIZED HEALTH CARE EDUCATION/TRAINING PROGRAM

## 2024-09-20 PROCEDURE — C1894 INTRO/SHEATH, NON-LASER: HCPCS | Performed by: STUDENT IN AN ORGANIZED HEALTH CARE EDUCATION/TRAINING PROGRAM

## 2024-09-20 PROCEDURE — 250N000009 HC RX 250: Performed by: STUDENT IN AN ORGANIZED HEALTH CARE EDUCATION/TRAINING PROGRAM

## 2024-09-20 PROCEDURE — C1761 HC OR CATH, TRANS INTRA LITHO/CORONARY: HCPCS | Performed by: STUDENT IN AN ORGANIZED HEALTH CARE EDUCATION/TRAINING PROGRAM

## 2024-09-20 PROCEDURE — 272N000001 HC OR GENERAL SUPPLY STERILE: Performed by: STUDENT IN AN ORGANIZED HEALTH CARE EDUCATION/TRAINING PROGRAM

## 2024-09-20 PROCEDURE — C1725 CATH, TRANSLUMIN NON-LASER: HCPCS | Performed by: STUDENT IN AN ORGANIZED HEALTH CARE EDUCATION/TRAINING PROGRAM

## 2024-09-20 PROCEDURE — 92928 PRQ TCAT PLMT NTRAC ST 1 LES: CPT | Mod: LD | Performed by: STUDENT IN AN ORGANIZED HEALTH CARE EDUCATION/TRAINING PROGRAM

## 2024-09-20 PROCEDURE — 93458 L HRT ARTERY/VENTRICLE ANGIO: CPT | Mod: 26 | Performed by: STUDENT IN AN ORGANIZED HEALTH CARE EDUCATION/TRAINING PROGRAM

## 2024-09-20 DEVICE — STENT CORONARY DES SYNERGY XD MR US 3.00X24MM H7493941824300: Type: IMPLANTABLE DEVICE | Site: CORONARY | Status: FUNCTIONAL

## 2024-09-20 RX ORDER — ONDANSETRON 2 MG/ML
4 INJECTION INTRAMUSCULAR; INTRAVENOUS EVERY 6 HOURS PRN
Status: DISCONTINUED | OUTPATIENT
Start: 2024-09-20 | End: 2024-09-20 | Stop reason: HOSPADM

## 2024-09-20 RX ORDER — FLUMAZENIL 0.1 MG/ML
0.2 INJECTION, SOLUTION INTRAVENOUS
Status: DISCONTINUED | OUTPATIENT
Start: 2024-09-20 | End: 2024-09-20 | Stop reason: HOSPADM

## 2024-09-20 RX ORDER — HEPARIN SODIUM 1000 [USP'U]/ML
INJECTION, SOLUTION INTRAVENOUS; SUBCUTANEOUS
Status: DISCONTINUED | OUTPATIENT
Start: 2024-09-20 | End: 2024-09-20 | Stop reason: HOSPADM

## 2024-09-20 RX ORDER — NALOXONE HYDROCHLORIDE 0.4 MG/ML
0.4 INJECTION, SOLUTION INTRAMUSCULAR; INTRAVENOUS; SUBCUTANEOUS
Status: DISCONTINUED | OUTPATIENT
Start: 2024-09-20 | End: 2024-09-20 | Stop reason: HOSPADM

## 2024-09-20 RX ORDER — ASPIRIN 81 MG/1
243 TABLET, CHEWABLE ORAL ONCE
Status: COMPLETED | OUTPATIENT
Start: 2024-09-20 | End: 2024-09-20

## 2024-09-20 RX ORDER — CLOPIDOGREL BISULFATE 75 MG/1
TABLET ORAL
Status: DISCONTINUED | OUTPATIENT
Start: 2024-09-20 | End: 2024-09-20 | Stop reason: HOSPADM

## 2024-09-20 RX ORDER — ACETAMINOPHEN 325 MG/1
650 TABLET ORAL EVERY 4 HOURS PRN
Status: DISCONTINUED | OUTPATIENT
Start: 2024-09-20 | End: 2024-09-20 | Stop reason: HOSPADM

## 2024-09-20 RX ORDER — METOPROLOL TARTRATE 1 MG/ML
5 INJECTION, SOLUTION INTRAVENOUS
Status: DISCONTINUED | OUTPATIENT
Start: 2024-09-20 | End: 2024-09-20 | Stop reason: HOSPADM

## 2024-09-20 RX ORDER — SODIUM CHLORIDE 9 MG/ML
INJECTION, SOLUTION INTRAVENOUS CONTINUOUS
Status: DISCONTINUED | OUTPATIENT
Start: 2024-09-20 | End: 2024-09-20 | Stop reason: HOSPADM

## 2024-09-20 RX ORDER — ATROPINE SULFATE 0.1 MG/ML
0.5 INJECTION INTRAVENOUS
Status: DISCONTINUED | OUTPATIENT
Start: 2024-09-20 | End: 2024-09-20 | Stop reason: HOSPADM

## 2024-09-20 RX ORDER — IODIXANOL 320 MG/ML
INJECTION, SOLUTION INTRAVASCULAR
Status: DISCONTINUED | OUTPATIENT
Start: 2024-09-20 | End: 2024-09-20 | Stop reason: HOSPADM

## 2024-09-20 RX ORDER — DIAZEPAM 5 MG
5 TABLET ORAL ONCE
Status: DISCONTINUED | OUTPATIENT
Start: 2024-09-20 | End: 2024-09-20 | Stop reason: HOSPADM

## 2024-09-20 RX ORDER — HYDRALAZINE HYDROCHLORIDE 20 MG/ML
10 INJECTION INTRAMUSCULAR; INTRAVENOUS EVERY 4 HOURS PRN
Status: DISCONTINUED | OUTPATIENT
Start: 2024-09-20 | End: 2024-09-20 | Stop reason: HOSPADM

## 2024-09-20 RX ORDER — NALOXONE HYDROCHLORIDE 0.4 MG/ML
0.2 INJECTION, SOLUTION INTRAMUSCULAR; INTRAVENOUS; SUBCUTANEOUS
Status: DISCONTINUED | OUTPATIENT
Start: 2024-09-20 | End: 2024-09-20 | Stop reason: HOSPADM

## 2024-09-20 RX ORDER — NITROGLYCERIN 5 MG/ML
VIAL (ML) INTRAVENOUS
Status: DISCONTINUED | OUTPATIENT
Start: 2024-09-20 | End: 2024-09-20 | Stop reason: HOSPADM

## 2024-09-20 RX ORDER — HEPARIN SODIUM 200 [USP'U]/100ML
INJECTION, SOLUTION INTRAVENOUS
Status: DISCONTINUED | OUTPATIENT
Start: 2024-09-20 | End: 2024-09-20 | Stop reason: HOSPADM

## 2024-09-20 RX ORDER — CLOPIDOGREL BISULFATE 75 MG/1
75 TABLET ORAL DAILY
Status: DISCONTINUED | OUTPATIENT
Start: 2024-09-21 | End: 2024-09-20 | Stop reason: HOSPADM

## 2024-09-20 RX ORDER — NITROGLYCERIN 0.4 MG/1
0.4 TABLET SUBLINGUAL EVERY 5 MIN PRN
Status: DISCONTINUED | OUTPATIENT
Start: 2024-09-20 | End: 2024-09-20 | Stop reason: HOSPADM

## 2024-09-20 RX ORDER — LIDOCAINE 40 MG/G
CREAM TOPICAL
Status: DISCONTINUED | OUTPATIENT
Start: 2024-09-20 | End: 2024-09-20 | Stop reason: HOSPADM

## 2024-09-20 RX ORDER — ONDANSETRON 4 MG/1
4 TABLET, ORALLY DISINTEGRATING ORAL EVERY 6 HOURS PRN
Status: DISCONTINUED | OUTPATIENT
Start: 2024-09-20 | End: 2024-09-20 | Stop reason: HOSPADM

## 2024-09-20 RX ORDER — PANTOPRAZOLE SODIUM 20 MG/1
20 TABLET, DELAYED RELEASE ORAL DAILY
Qty: 90 TABLET | Refills: 3 | Status: SHIPPED | OUTPATIENT
Start: 2024-09-20

## 2024-09-20 RX ORDER — OXYCODONE HYDROCHLORIDE 5 MG/1
10 TABLET ORAL EVERY 4 HOURS PRN
Status: DISCONTINUED | OUTPATIENT
Start: 2024-09-20 | End: 2024-09-20 | Stop reason: HOSPADM

## 2024-09-20 RX ORDER — FENTANYL CITRATE 50 UG/ML
INJECTION, SOLUTION INTRAMUSCULAR; INTRAVENOUS
Status: DISCONTINUED | OUTPATIENT
Start: 2024-09-20 | End: 2024-09-20 | Stop reason: HOSPADM

## 2024-09-20 RX ORDER — FENTANYL CITRATE 50 UG/ML
25 INJECTION, SOLUTION INTRAMUSCULAR; INTRAVENOUS
Status: DISCONTINUED | OUTPATIENT
Start: 2024-09-20 | End: 2024-09-20 | Stop reason: HOSPADM

## 2024-09-20 RX ORDER — ASPIRIN 325 MG
325 TABLET ORAL ONCE
Status: COMPLETED | OUTPATIENT
Start: 2024-09-20 | End: 2024-09-20

## 2024-09-20 RX ORDER — OXYCODONE HYDROCHLORIDE 5 MG/1
5 TABLET ORAL EVERY 4 HOURS PRN
Status: DISCONTINUED | OUTPATIENT
Start: 2024-09-20 | End: 2024-09-20 | Stop reason: HOSPADM

## 2024-09-20 RX ORDER — ASPIRIN 81 MG/1
81 TABLET ORAL DAILY
Status: DISCONTINUED | OUTPATIENT
Start: 2024-09-21 | End: 2024-09-20 | Stop reason: HOSPADM

## 2024-09-20 RX ORDER — CLOPIDOGREL BISULFATE 75 MG/1
75 TABLET ORAL DAILY
Qty: 90 TABLET | Refills: 3 | Status: SHIPPED | OUTPATIENT
Start: 2024-09-21

## 2024-09-20 RX ADMIN — SODIUM CHLORIDE: 9 INJECTION, SOLUTION INTRAVENOUS at 08:06

## 2024-09-20 RX ADMIN — ASPIRIN 325 MG ORAL TABLET 325 MG: 325 PILL ORAL at 08:01

## 2024-09-20 RX ADMIN — ACETAMINOPHEN 650 MG: 325 TABLET ORAL at 12:31

## 2024-09-20 ASSESSMENT — ACTIVITIES OF DAILY LIVING (ADL)
ADLS_ACUITY_SCORE: 37

## 2024-09-20 NOTE — Clinical Note
The first balloon was inserted into the left anterior descending and middle left anterior descending.Max pressure = 18 akiko. Total duration = 9 seconds.     Max pressure = 18 akiko. Total duration = 7 seconds.    Balloon reinflated a second time: Max pressure = 18 akiko. Total duration = 7 seconds.

## 2024-09-20 NOTE — PRE-PROCEDURE
GENERAL PRE-PROCEDURE:   Procedure:  Coronary angiogram, possible percutaneous coronary intervention, left heart catheterization  Date/Time:  9/20/2024 7:48 AM    Written consent obtained?: Yes    Risks and benefits: Risks, benefits and alternatives were discussed    Consent given by:  Patient (The patient is Kelli speaking and her consent was obtained with the assistance of a live Kelli )  Patient states understanding of procedure being performed: Yes    Patient's understanding of procedure matches consent: Yes    Procedure consent matches procedure scheduled: Yes    Expected level of sedation:  Moderate  Appropriately NPO:  Yes  ASA Class:  3 (Coronary calcification on CTA, HTN, HLD, PAF; on NOAC, hypokalemia)  Mallampati  :  Grade 3- soft palate visible, posterior pharyngeal wall not visible  Lungs:  Lungs clear with good breath sounds bilaterally  Heart:  Normal heart sounds and rate  History & Physical reviewed:  History and physical reviewed and updates made (see comment)  H&P Comments:  Clinically Significant Risk Factors Present on Admission    Cardiovascular : Coronary calcification on CTA, HTN, HLD, PAF; on NOAC    Fluid & Electrolyte Disorders : Not present on admission    Gastroenterology : Not present on admission    Hematology/Oncology : Not present on admission    Nephrology : Not present on admission    Neurology : Not present on admission    Pulmonology : Not present on admission    Systemic : Not present on admission    Statement of review:  I have reviewed the lab findings, diagnostic data, medications, and the plan for sedation

## 2024-09-20 NOTE — INTERVAL H&P NOTE
"I have reviewed the surgical (or preoperative) H&P that is linked to this encounter, and examined the patient. There are no significant changes    Clinical Conditions Present on Arrival:  Clinically Significant Risk Factors Present on Admission                # Drug Induced Coagulation Defect: home medication list includes an anticoagulant medication  # Drug Induced Platelet Defect: home medication list includes an antiplatelet medication      # Overweight: Estimated body mass index is 28.19 kg/m  as calculated from the following:    Height as of 9/10/24: 1.435 m (4' 8.5\").    Weight as of 9/10/24: 58.1 kg (128 lb).       "

## 2024-09-20 NOTE — Clinical Note
The first balloon was inserted into the left anterior descending and middle left anterior descending.Max pressure = 4 akiko. Total duration = 10 seconds.     x6.

## 2024-09-20 NOTE — Clinical Note
The first balloon was inserted into the left anterior descending and middle left anterior descending.Max pressure = 16 akiko. Total duration = 6 seconds.     Max pressure = 16 akiko. Total duration = 6 seconds.    Balloon reinflated a second time: Max pressure = 16 akiko. Total duration = 6 seconds.  Balloon reinflated a third time: Max pressure = 16 akiko. Total duration = 3 seconds.

## 2024-09-20 NOTE — DISCHARGE INSTRUCTIONS

## 2024-09-20 NOTE — Clinical Note
Stent deployed in the middle left anterior descending. Max pressure = 12 akiko. Total duration = 10 seconds.

## 2024-09-20 NOTE — Clinical Note
The first balloon was inserted into the left anterior descending and middle left anterior descending.Max pressure = 10 akiko. Total duration = 6 seconds.     Max pressure = 16 akiko. Total duration = 9 seconds.    Balloon reinflated a second time: Max pressure = 16 akiko. Total duration = 9 seconds.  Balloon reinflated a third time: Max pressure = 16 akiko. Total duration = 10 seconds.  Balloon reinflated a fourth time: Max pressure = 16 akiko. T otal duration = 13 seconds.

## 2024-10-03 NOTE — PROGRESS NOTES
Assessment/Recommendations   Assessment:    1.  Coronary artery disease: Status post PCI with intravascular lithotripsy and TOD x 1 to the LAD on 9/20/2024    -Plavix 75 mg daily for at least 6 months, 1 year if tolerated in addition to Eliquis 5 mg twice daily.  - Cardiac rehab has been scheduled, the patient prefers not to go.  - Reviewed most recent BMP, Hgb, platelet- stable.    2.  Dyslipidemia with LDL goal <70: Jennifer Huggins is on high intensity statin therapy with atorvastatin 80 mg daily and Zetia 10 mg daily. Most recent LDL is 109.      3.  Hypertension: Her blood pressure is controlled.     4.  Paroxysmal atrial fibrillation - on chronic anticoagulation with Eliquis      Plan:  - We discussed the importance of antiplatelet therapy and talking with her cardiologist prior to stopping these medications for any reason.  We discussed about utilization of as needed nitroglycerin.   - Encouraged to seek medical attention if recurrent chest pain or shortness of breath.  - Continue Eliquis 5 g twice daily  - Continue Imdur 30 mg daily  - Continue lisinopril 20 mg daily, diltiazem 30 mg twice daily  - Continue current hypertension regimen    - Continue hyperlipidemia regimen. Fasting lipid profile check in 4-8 weeks. Order placed    -Recommended for patient to participate in a regular exercise regimen    - We discussed a diet low in saturated fat, weight loss, and exercise along with medication for better control of cholesterol.  Highly encouraged to participate in nutrition class in cardiac rehab.  - Risk factor modification and lifestyle management topics were discussed including managing comorbidities, weight loss, heart healthy diet, exercise, and stress reduction.        Follow up with Dr. Wilks November 5, or sooner if needed      History of Present Illness/Subjective    Ms. Jennifer Huggins is a 79 year old female with a past medical history of paroxysmal atrial fibrillation, hypertension, HLD, CAD who is  seen at [facility] for post coronary intervention follow up.  She is accompanied by her granddaughter today who is assisting with interpretation    Recent CT coronary angiogram showed an elevated calcium score of 442 and severe stenosis of the LAD.  It was recommended for her to undergo coronary angiogram and she is now status post PCI with intravascular lithotripsy and TOD x 1 to the LAD on 9/20/2024.  Prior to the procedure she was noticing heavy breathing and chest pain.  Since the procedure she has noticed an improvement in her chest pain and it does not feel as extreme and is random.  She denies having shortness of breath, leg swelling, orthopnea, PND.  Overall she has been feeling okay since the procedure.  She did have some swelling initially of her right radial site but this is now improved.  She denies pain, numbness, tingling.  She is currently taking Eliquis and Plavix and denies current bleeding issues.  She reports some dizziness with going from sitting to standing.  She denies having any falls.  She does not want to attend cardiac rehab.  She walks around her home and works in her garden frequently.        Coronary Angiogram 9/20/2024 reviewed:  Interventional procedure:     The left main coronary artery was engaged with an EBU 3.5, 6 [place]ian guide.  Systemic anticoagulation was achieved with intravenous heparin.  The left anterior descending artery was wired with a Nisha blue wire and the lesion was dilated with a 2.5 x 12 mm NC balloon at high pressure.  IVUS was then completed which showed a severely calcified vessel with 360 calcification, a distal reference diameter of 2.75 mm and a proximal reference diameter of 3.5 mm.  The lesion was then further dilated with a 3.0 x 12 mm shockwave balloon for 60 pulses.  The lesion was then stented with a 3.0 x 24 mm Synergy stent and postdilated with a 3.0 x 12 mm NC and a 3.5 x 12 mm NC balloon.  An excellent angiographic  result was achieved.  There was MANAN-3 flow in the diagonal branch despite some plaque shift so this was left alone.  Proximally there was a area that appeared to be a dissection so I performed IVUS again which showed a well apposed stent with no edge dissection and a proximal reference diameter of 7.4 mm .     Conclusions:      1.  Normal left heart filling pressures.  2.  Single-vessel obstructive coronary artery disease of the left anterior descending artery.  3.  Successful intravascular lithotripsy and stenting of the left anterior descending artery with one everolimus eluting stent utilizing IVUS guidance.     Recommendations:      1.  Medical therapy to include antiplatelet therapy with Plavix 75 mg daily for at least 6 months, 1 year if tolerated in addition to apixaban 5 mg twice daily.  May restart apixaban this evening.    ECHO 1/27/2024 Reviewed:   Interpretation Summary     Left ventricular size, wall motion and function are normal. The ejection  fraction is 60-65%.  Normal right ventricle size and systolic function.  The left atrium is borderline dilated.  No hemodynamically significant valvular disease.     When compared previous stidy, there is no significant interval change.          CTA Coronary Angiogram 8/13/2024 reviewed    Calcium Scoring: The total Agatston score is 442. A calcium score in this range places the individual in the 100th percentile when compared to an age and gender matched control group and implies a very high risk of cardiac events in the next ten years.    Left main: patent    Left anterior descending artery: Severe calcified stenosis extending from the proximal to mid segment. The 70-99 % stenosis in the left anterior descending coronary artery proximal to mid segment has a high likelihood of lesion specific ischemia with an FFRct value of 0.79.    Ramus intermediate: mild calcified stenosis in the proximal segment.    Left circumflex artery: patent    Right coronary artery:  mild diffuse disease.       Physical Examination Review of Systems   /62 (BP Location: Right arm, Patient Position: Sitting, Cuff Size: Adult Regular)   Pulse 73   Resp 18   Wt 58.1 kg (128 lb)   SpO2 97%   BMI 28.19 kg/m    Body mass index is 28.19 kg/m .  Wt Readings from Last 3 Encounters:   10/04/24 58.1 kg (128 lb)   09/10/24 58.1 kg (128 lb)   09/06/24 58.1 kg (128 lb)     General Appearance:   no distress, normal body habitus   ENT/Mouth: membranes moist, no oral lesions or bleeding gums.      EYES:  no scleral icterus, normal conjunctivae   Neck: no carotid bruits or thyromegaly   Chest/Lungs:   lungs are clear to auscultation, no rales or wheezing, equal chest wall expansion    Cardiovascular:   Regular. Normal first and second heart sounds with no murmurs, rubs, or gallops; the carotid, radial and posterior tibial pulses are intact,  no edema bilaterally    Abdomen:  no organomegaly, masses, bruits, or tenderness; bowel sounds are present   Extremities  Puncture Site: no cyanosis or clubbing  Right radial site is soft with no bruising.  Radial pulses intact and symmetrical.  CMS intact.   Skin: no xanthelasma, warm.    Neurologic: normal  bilateral, no tremors     Psychiatric: alert and oriented x3, calm                                                        Negative unless noted in HPI     Medical History  Surgical History Family History Social History   Past Medical History:   Diagnosis Date    Benign adenomatous polyp of large intestine     Biliary colic     Cervicalgia     Cholelithiasis     Chronic allergic conjunctivitis     Cystitis     Dyspepsia     Gastritis     GERD (gastroesophageal reflux disease)     HLD (hyperlipidemia)     Hypertension     Hypokalemia     Meningioma (H)     Multiple lung nodules on CT     CT ABD 11/23/1013, mult nodules < 5mm in size, per Fleischner Society recommendations  repeat CT in 12 months CT Chest 2/20/17:  Stable pulmonary nodules suggesting  incidental findings given long-term stability.     Osteoporosis     Positional vertigo     Pulmonary nodules     Recurrent UTI     Transaminitis 10/29/2015    Urinary incontinence     Xerosis cutis     Past Surgical History:   Procedure Laterality Date    CHOLECYSTECTOMY  08/21/2015    CV CORONARY ANGIOGRAM N/A 9/20/2024    Procedure: Coronary Angiogram;  Surgeon: Jeison Amato MD;  Location: [facility] CV    CV CORONARY LITHOTRIPSY PCI N/A 9/20/2024    Procedure: Percutaneous Coronary Intervention - Lithotripsy;  Surgeon: Jeison Amato MD;  Location: [facility] CV    CV INTRAVASULAR ULTRASOUND N/A 9/20/2024    Procedure: Intravascular Ultrasound;  Surgeon: Jeison Amato MD;  Location: [facility] CATH [facility]    CV LEFT HEART CATH N/A 9/20/2024    Procedure: Left Heart Catheterization;  Surgeon: Jeison Amato MD;  Location: [facility] CV    CV PCI STENT DRUG ELUTING N/A 9/20/2024    Procedure: Percutaneous Coronary Intervention Stent;  Surgeon: Jeison Amato MD;  Location: [facility] CATH LAB CV    Family History   Problem Relation Age of Onset    No Known Problems Mother     No Known Problems Father     Social History     Socioeconomic History    Marital status:      Spouse name: Not on file    Number of children: Not on file    Years of education: Not on file    Highest education level: Not on file   Occupational History    Not on file   Tobacco Use    Smoking status: Never     Passive exposure: Never    Smokeless tobacco: Never   Vaping Use    Vaping status: Never Used   Substance and Sexual Activity    Alcohol use: No    Drug use: No    Sexual activity: Never     Birth control/protection: Post-menopausal   Other Topics Concern    Not on file   Social History Narrative    Not on file     Social Determinants of Health     Financial Resource Strain: Low Risk  (1/23/2024)    Financial Resource Strain     Within the past 12 months, have you or your family members you live with been unable  to get utilities (heat, electricity) when it was really needed?: No   Food Insecurity: Low Risk  (1/23/2024)    Food Insecurity     Within the past 12 months, did you worry that your food would run out before you got money to buy more?: No     Within the past 12 months, did the food you bought just not last and you didn t have money to get more?: No   Transportation Needs: Low Risk  (1/23/2024)    Transportation Needs     Within the past 12 months, has lack of transportation kept you from medical appointments, getting your medicines, non-medical meetings or appointments, work, or from getting things that you need?: No   Physical Activity: Not on file   Stress: Not on file   Social Connections: Not on file   Interpersonal Safety: Low Risk  (9/20/2024)    Interpersonal Safety     Do you feel physically and emotionally safe where you currently live?: Yes     Within the past 12 months, have you been hit, slapped, kicked or otherwise physically hurt by someone?: No     Within the past 12 months, have you been humiliated or emotionally abused in other ways by your partner or ex-partner?: No   Housing Stability: Low Risk  (1/23/2024)    Housing Stability     Do you have housing? : Yes     Are you worried about losing your housing?: No          Medications  Allergies   Current Outpatient Medications   Medication Sig Dispense Refill    albuterol (PROAIR HFA/PROVENTIL HFA/VENTOLIN HFA) 108 (90 Base) MCG/ACT inhaler Inhale 2 puffs into the lungs every 6 hours as needed for shortness of breath or wheezing 18 g 3    artificial tears (GENTEAL) 0.1-0.2-0.3 % ophthalmic solution Place 2 drops into both eyes 4 times daily as needed (dry or irritated eyes) 15 mL 11    atorvastatin (LIPITOR) 80 MG tablet Take 1 tablet (80 mg) by mouth at bedtime 30 tablet 11    clopidogrel (PLAVIX) 75 MG tablet Take 1 tablet (75 mg) by mouth daily. 90 tablet 3    diltiazem (CARDIZEM) 30 MG tablet Take 1 tablet (30 mg) by mouth every 12 hours 60 tablet  3    ELIQUIS ANTICOAGULANT 5 MG tablet TAKE 1 TABLET (5 MG) BY MOUTH 2 TIMES DAILY 60 tablet 11    ezetimibe (ZETIA) 10 MG tablet Take 1 tablet (10 mg) by mouth daily. 90 tablet 3    icosapent ethyl (VASCEPA) 1 g CAPS capsule Take 2 g by mouth 2 times daily (with meals). 360 capsule 0    isosorbide mononitrate (IMDUR) 30 MG 24 hr tablet Take 1 tablet (30 mg) by mouth daily. 30 tablet 1    lisinopril (ZESTRIL) 20 MG tablet Take 1 tablet (20 mg) by mouth daily 30 tablet 3    pantoprazole (PROTONIX) 20 MG EC tablet Take 1 tablet (20 mg) by mouth daily. 90 tablet 3    meclizine (ANTIVERT) 25 MG tablet Take 1 tablet (25 mg) by mouth 3 times daily as needed for dizziness (Patient not taking: Reported on 10/4/2024) 30 tablet 11    vitamin D3 (CHOLECALCIFEROL) 50 mcg (2000 units) tablet TAKE 1 TABLET (50 MCG) BY MOUTH DAILY (Patient not taking: Reported on 9/6/2024) 30 tablet 10    Allergies   Allergen Reactions    Ibandronate [Ibandronic Acid] Headache and Shortness Of Breath    Septra [Sulfamethoxazole-Trimethoprim] Other (See Comments)     Acute hepatitis          Lab Results    Chemistry/lipid CBC Cardiac Enzymes/BNP/TSH/INR   Lab Results   Component Value Date    CHOL 192 09/20/2024    HDL 64 09/20/2024    TRIG 93 09/20/2024    BUN 11.5 09/20/2024     09/20/2024    CO2 27 09/20/2024    Lab Results   Component Value Date    WBC 8.9 09/20/2024    HGB 14.0 09/20/2024    HCT 44.0 09/20/2024    MCV 97 09/20/2024     09/20/2024    Lab Results   Component Value Date    TSH 2.23 01/26/2024    INR 1.21 (H) 01/26/2024        25 minutes spent on the date of encounter doing chart review, review of outside records, review of test results, interpretation with above tests, patient visit, documentation, and discussion with family.        This note has been dictated using voice recognition software. Any grammatical, typographical, or context distortions are unintentional and inherent to the software    Radha Live PA-C

## 2024-10-04 ENCOUNTER — OFFICE VISIT (OUTPATIENT)
Dept: CARDIOLOGY | Facility: CLINIC | Age: 79
End: 2024-10-04
Payer: MEDICARE

## 2024-10-04 VITALS
DIASTOLIC BLOOD PRESSURE: 62 MMHG | BODY MASS INDEX: 28.19 KG/M2 | SYSTOLIC BLOOD PRESSURE: 116 MMHG | WEIGHT: 128 LBS | OXYGEN SATURATION: 97 % | HEART RATE: 73 BPM | RESPIRATION RATE: 18 BRPM

## 2024-10-04 DIAGNOSIS — I10 BENIGN ESSENTIAL HYPERTENSION: ICD-10-CM

## 2024-10-04 DIAGNOSIS — I48.0 PAROXYSMAL ATRIAL FIBRILLATION (H): ICD-10-CM

## 2024-10-04 DIAGNOSIS — E78.00 HYPERCHOLESTEROLEMIA: ICD-10-CM

## 2024-10-04 DIAGNOSIS — Z95.5 STATUS POST INSERTION OF DRUG-ELUTING STENT INTO LEFT ANTERIOR DESCENDING (LAD) ARTERY: Primary | ICD-10-CM

## 2024-10-04 PROCEDURE — 99213 OFFICE O/P EST LOW 20 MIN: CPT | Performed by: PHYSICIAN ASSISTANT

## 2024-10-04 NOTE — PATIENT INSTRUCTIONS
Jennifer Huggins,    It was a pleasure to see you today in the clinic regarding your follow-up after stent placement.     My recommendations after this visit include:     - Continue Eliquis and Plavix - do not stop these medications without speaking to your cardiologist   - no medication changes today   - follow a low sodium, heart healthy diet low in trans/saturated fats   - participate in regular exercise program    You should followup with Dr. Wilks in 1 month      If you have questions or concerns, please call using the numbers below:    After Hours/Scheduling  [phone]      Radha Live PA-C  Structural Heart Program  [facility]

## 2024-10-04 NOTE — LETTER
10/4/2024    Barb Elias MD  [address] [place] [address] 1  [place] [address]    RE: Jeninfer Huggins       Dear Colleague,     I had the pleasure of seeing [facility].          Assessment/Recommendations   Assessment:    1.  Coronary artery disease: Status post PCI with intravascular lithotripsy and TOD x 1 to the LAD on 9/20/2024    -Plavix 75 mg daily for at least 6 months, 1 year if tolerated in addition to Eliquis 5 mg twice daily.  - Cardiac rehab has been scheduled, the patient prefers not to go.  - Reviewed most recent BMP, Hgb, platelet- stable.    2.  Dyslipidemia with LDL goal <70: Jennifer Huggins is on high intensity statin therapy with atorvastatin 80 mg daily and Zetia 10 mg daily. Most recent LDL is 109.      3.  Hypertension: Her blood pressure is controlled.     4.  Paroxysmal atrial fibrillation - on chronic anticoagulation with Eliquis      Plan:  - We discussed the importance of antiplatelet therapy and talking with her cardiologist prior to stopping these medications for any reason.  We discussed about utilization of as needed nitroglycerin.   - Encouraged to seek medical attention if recurrent chest pain or shortness of breath.  - Continue Eliquis 5 g twice daily  - Continue Imdur 30 mg daily  - Continue lisinopril 20 mg daily, diltiazem 30 mg twice daily  - Continue current hypertension regimen    - Continue hyperlipidemia regimen. Fasting lipid profile check in 4-8 weeks. Order placed    -Recommended for patient to participate in a regular exercise regimen    - We discussed a diet low in saturated fat, weight loss, and exercise along with medication for better control of cholesterol.  Highly encouraged to participate in nutrition class in cardiac rehab.  - Risk factor modification and lifestyle management topics were discussed including managing comorbidities, weight loss, heart healthy diet, exercise, and stress reduction.        Follow up with Dr. Wilks November 5,  or sooner if needed      History of Present Illness/Subjective    MsIris Huggins is a 79 year old female with a past medical history of paroxysmal atrial fibrillation, hypertension, HLD, CAD who is seen at [facility] for post coronary intervention follow up.  She is accompanied by her granddaughter today who is assisting with interpretation    Recent CT coronary angiogram showed an elevated calcium score of 442 and severe stenosis of the LAD.  It was recommended for her to undergo coronary angiogram and she is now status post PCI with intravascular lithotripsy and TOD x 1 to the LAD on 9/20/2024.  Prior to the procedure she was noticing heavy breathing and chest pain.  Since the procedure she has noticed an improvement in her chest pain and it does not feel as extreme and is random.  She denies having shortness of breath, leg swelling, orthopnea, PND.  Overall she has been feeling okay since the procedure.  She did have some swelling initially of her right radial site but this is now improved.  She denies pain, numbness, tingling.  She is currently taking Eliquis and Plavix and denies current bleeding issues.  She reports some dizziness with going from sitting to standing.  She denies having any falls.  She does not want to attend cardiac rehab.  She walks around her home and works in her garden frequently.        Coronary Angiogram 9/20/2024 reviewed:  Interventional procedure:     The left main coronary artery was engaged with an EBU 3.5, 6 Croatian guide.  Systemic anticoagulation was achieved with intravenous heparin.  The left anterior descending artery was wired with a Nisha blue wire and the lesion was dilated with a 2.5 x 12 mm NC balloon at high pressure.  IVUS was then completed which showed a severely calcified vessel with 360 calcification, a distal reference diameter of 2.75 mm and a proximal reference diameter of 3.5 mm.  The lesion was then further dilated with a 3.0  x 12 mm shockwave balloon for 60 pulses.  The lesion was then stented with a 3.0 x 24 mm Synergy stent and postdilated with a 3.0 x 12 mm NC and a 3.5 x 12 mm NC balloon.  An excellent angiographic result was achieved.  There was MANAN-3 flow in the diagonal branch despite some plaque shift so this was left alone.  Proximally there was a area that appeared to be a dissection so I performed IVUS again which showed a well apposed stent with no edge dissection and a proximal reference diameter of 7.4 mm .     Conclusions:      1.  Normal left heart filling pressures.  2.  Single-vessel obstructive coronary artery disease of the left anterior descending artery.  3.  Successful intravascular lithotripsy and stenting of the left anterior descending artery with one everolimus eluting stent utilizing IVUS guidance.     Recommendations:      1.  Medical therapy to include antiplatelet therapy with Plavix 75 mg daily for at least 6 months, 1 year if tolerated in addition to apixaban 5 mg twice daily.  May restart apixaban this evening.    ECHO 1/27/2024 Reviewed:   Interpretation Summary     Left ventricular size, wall motion and function are normal. The ejection  fraction is 60-65%.  Normal right ventricle size and systolic function.  The left atrium is borderline dilated.  No hemodynamically significant valvular disease.     When compared previous stidy, there is no significant interval change.          CTA Coronary Angiogram 8/13/2024 reviewed     Calcium Scoring: The total Agatston score is 442. A calcium score in this range places the individual in the 100th percentile when compared to an age and gender matched control group and implies a very high risk of cardiac events in the next ten years.     Left main: patent     Left anterior descending artery: Severe calcified stenosis extending from the proximal to mid segment. The 70-99 % stenosis in the left anterior descending coronary artery proximal to mid segment has a high  likelihood of lesion specific ischemia with an FFRct value of 0.79.     Ramus intermediate: mild calcified stenosis in the proximal segment.     Left circumflex artery: patent     Right coronary artery: mild diffuse disease.       Physical Examination Review of Systems   /62 (BP Location: Right arm, Patient Position: Sitting, Cuff Size: Adult Regular)   Pulse 73   Resp 18   Wt 58.1 kg (128 lb)   SpO2 97%   BMI 28.19 kg/m    Body mass index is 28.19 kg/m .  Wt Readings from Last 3 Encounters:   10/04/24 58.1 kg (128 lb)   09/10/24 58.1 kg (128 lb)   09/06/24 58.1 kg (128 lb)     General Appearance:   no distress, normal body habitus   ENT/Mouth: membranes moist, no oral lesions or bleeding gums.      EYES:  no scleral icterus, normal conjunctivae   Neck: no carotid bruits or thyromegaly   Chest/Lungs:   lungs are clear to auscultation, no rales or wheezing, equal chest wall expansion    Cardiovascular:   Regular. Normal first and second heart sounds with no murmurs, rubs, or gallops; the carotid, radial and posterior tibial pulses are intact,  no edema bilaterally    Abdomen:  no organomegaly, masses, bruits, or tenderness; bowel sounds are present   Extremities  Puncture Site: no cyanosis or clubbing  Right radial site is soft with no bruising.  Radial pulses intact and symmetrical.  CMS intact.   Skin: no xanthelasma, warm.    Neurologic: normal  bilateral, no tremors     Psychiatric: alert and oriented x3, calm                                                        Negative unless noted in HPI     Medical History  Surgical History Family History Social History   Past Medical History:   Diagnosis Date     Benign adenomatous polyp of large intestine      Biliary colic      Cervicalgia      Cholelithiasis      Chronic allergic conjunctivitis      Cystitis      Dyspepsia      Gastritis      GERD (gastroesophageal reflux disease)      HLD (hyperlipidemia)      Hypertension      Hypokalemia       Meningioma (H)      Multiple lung nodules on CT     CT ABD 11/23/1013, mult nodules < 5mm in size, per Fleischner Society recommendations  repeat CT in 12 months CT Chest 2/20/17:  Stable pulmonary nodules suggesting incidental findings given long-term stability.      Osteoporosis      Positional vertigo      Pulmonary nodules      Recurrent UTI      Transaminitis 10/29/2015     Urinary incontinence      Xerosis cutis     Past Surgical History:   Procedure Laterality Date     CHOLECYSTECTOMY  08/21/2015     CV CORONARY ANGIOGRAM N/A 9/20/2024    Procedure: Coronary Angiogram;  Surgeon: Jeison Amato MD;  Location: [facility]     CV CORONARY LITHOTRIPSY PCI N/A 9/20/2024    Procedure: Percutaneous Coronary Intervention - Lithotripsy;  Surgeon: Jeison Amato MD;  Location: [facility]     CV INTRAVASULAR ULTRASOUND N/A 9/20/2024    Procedure: Intravascular Ultrasound;  Surgeon: Jeison Amato MD;  Location: [facility]     CV LEFT HEART CATH N/A 9/20/2024    Procedure: Left Heart Catheterization;  Surgeon: Jeison Amato MD;  Location: [facility]     CV PCI STENT DRUG ELUTING N/A 9/20/2024    Procedure: Percutaneous Coronary Intervention Stent;  Surgeon: Jeison Amato MD;  Location: [facility] CV    Family History   Problem Relation Age of Onset     No Known Problems Mother      No Known Problems Father     Social History     Socioeconomic History     Marital status:      Spouse name: Not on file     Number of children: Not on file     Years of education: Not on file     Highest education level: Not on file   Occupational History     Not on file   Tobacco Use     Smoking status: Never     Passive exposure: Never     Smokeless tobacco: Never   Vaping Use     Vaping status: Never Used   Substance and Sexual Activity     Alcohol use: No     Drug use: No     Sexual activity: Never     Birth control/protection: Post-menopausal   Other Topics Concern     Not on file    Social History Narrative     Not on file     Social Determinants of Health     Financial Resource Strain: Low Risk  (1/23/2024)    Financial Resource Strain      Within the past 12 months, have you or your family members you live with been unable to get utilities (heat, electricity) when it was really needed?: No   Food Insecurity: Low Risk  (1/23/2024)    Food Insecurity      Within the past 12 months, did you worry that your food would run out before you got money to buy more?: No      Within the past 12 months, did the food you bought just not last and you didn t have money to get more?: No   Transportation Needs: Low Risk  (1/23/2024)    Transportation Needs      Within the past 12 months, has lack of transportation kept you from medical appointments, getting your medicines, non-medical meetings or appointments, work, or from getting things that you need?: No   Physical Activity: Not on file   Stress: Not on file   Social Connections: Not on file   Interpersonal Safety: Low Risk  (9/20/2024)    Interpersonal Safety      Do you feel physically and emotionally safe where you currently live?: Yes      Within the past 12 months, have you been hit, slapped, kicked or otherwise physically hurt by someone?: No      Within the past 12 months, have you been humiliated or emotionally abused in other ways by your partner or ex-partner?: No   Housing Stability: Low Risk  (1/23/2024)    Housing Stability      Do you have housing? : Yes      Are you worried about losing your housing?: No          Medications  Allergies   Current Outpatient Medications   Medication Sig Dispense Refill     albuterol (PROAIR HFA/PROVENTIL HFA/VENTOLIN HFA) 108 (90 Base) MCG/ACT inhaler Inhale 2 puffs into the lungs every 6 hours as needed for shortness of breath or wheezing 18 g 3     artificial tears (GENTEAL) 0.1-0.2-0.3 % ophthalmic solution Place 2 drops into both eyes 4 times daily as needed (dry or irritated eyes) 15 mL 11      atorvastatin (LIPITOR) 80 MG tablet Take 1 tablet (80 mg) by mouth at bedtime 30 tablet 11     clopidogrel (PLAVIX) 75 MG tablet Take 1 tablet (75 mg) by mouth daily. 90 tablet 3     diltiazem (CARDIZEM) 30 MG tablet Take 1 tablet (30 mg) by mouth every 12 hours 60 tablet 3     ELIQUIS ANTICOAGULANT 5 MG tablet TAKE 1 TABLET (5 MG) BY MOUTH 2 TIMES DAILY 60 tablet 11     ezetimibe (ZETIA) 10 MG tablet Take 1 tablet (10 mg) by mouth daily. 90 tablet 3     icosapent ethyl (VASCEPA) 1 g CAPS capsule Take 2 g by mouth 2 times daily (with meals). 360 capsule 0     isosorbide mononitrate (IMDUR) 30 MG 24 hr tablet Take 1 tablet (30 mg) by mouth daily. 30 tablet 1     lisinopril (ZESTRIL) 20 MG tablet Take 1 tablet (20 mg) by mouth daily 30 tablet 3     pantoprazole (PROTONIX) 20 MG EC tablet Take 1 tablet (20 mg) by mouth daily. 90 tablet 3     meclizine (ANTIVERT) 25 MG tablet Take 1 tablet (25 mg) by mouth 3 times daily as needed for dizziness (Patient not taking: Reported on 10/4/2024) 30 tablet 11     vitamin D3 (CHOLECALCIFEROL) 50 mcg (2000 units) tablet TAKE 1 TABLET (50 MCG) BY MOUTH DAILY (Patient not taking: Reported on 9/6/2024) 30 tablet 10    Allergies   Allergen Reactions     Ibandronate [Ibandronic Acid] Headache and Shortness Of Breath     Septra [Sulfamethoxazole-Trimethoprim] Other (See Comments)     Acute hepatitis          Lab Results    Chemistry/lipid CBC Cardiac Enzymes/BNP/TSH/INR   Lab Results   Component Value Date    CHOL 192 09/20/2024    HDL 64 09/20/2024    TRIG 93 09/20/2024    BUN 11.5 09/20/2024     09/20/2024    CO2 27 09/20/2024    Lab Results   Component Value Date    WBC 8.9 09/20/2024    HGB 14.0 09/20/2024    HCT 44.0 09/20/2024    MCV 97 09/20/2024     09/20/2024    Lab Results   Component Value Date    TSH 2.23 01/26/2024    INR 1.21 (H) 01/26/2024        25 minutes spent on the date of encounter doing chart review, review of outside records, review of test results,  interpretation with above tests, patient visit, documentation, and discussion with family.        This note has been dictated using voice recognition software. Any grammatical, typographical, or context distortions are unintentional and inherent to the software    Radha Live PA-C       Thank you for allowing me to participate in the care of your patient.      Sincerely,     Radha Live PA-C     [facility]  cc:   Jeison Amato MD  [address] [place] [id number]

## 2024-10-14 ENCOUNTER — HOSPITAL ENCOUNTER (EMERGENCY)
Facility: HOSPITAL | Age: 79
Discharge: HOME | End: 2024-10-14
Attending: EMERGENCY MEDICINE | Admitting: EMERGENCY MEDICINE
Payer: MEDICARE

## 2024-10-14 ENCOUNTER — APPOINTMENT (OUTPATIENT)
Dept: CT IMAGING | Facility: HOSPITAL | Age: 79
End: 2024-10-14
Attending: EMERGENCY MEDICINE
Payer: MEDICARE

## 2024-10-14 VITALS
WEIGHT: 127.2 LBS | RESPIRATION RATE: 22 BRPM | TEMPERATURE: 98.2 F | BODY MASS INDEX: 25.64 KG/M2 | HEIGHT: 59 IN | SYSTOLIC BLOOD PRESSURE: 113 MMHG | HEART RATE: 79 BPM | OXYGEN SATURATION: 97 % | DIASTOLIC BLOOD PRESSURE: 71 MMHG

## 2024-10-14 DIAGNOSIS — R07.89 CHEST WALL PAIN: ICD-10-CM

## 2024-10-14 LAB
ALBUMIN SERPL BCG-MCNC: 4 G/DL (ref 3.5–5.2)
ALP SERPL-CCNC: 44 U/L (ref 40–150)
ALT SERPL W P-5'-P-CCNC: 36 U/L (ref 0–50)
ANION GAP SERPL CALCULATED.3IONS-SCNC: 11 MMOL/L (ref 7–15)
AST SERPL W P-5'-P-CCNC: 21 U/L (ref 0–45)
BASOPHILS # BLD AUTO: 0.1 10E3/UL (ref 0–0.2)
BASOPHILS NFR BLD AUTO: 1 %
BILIRUB DIRECT SERPL-MCNC: <0.2 MG/DL (ref 0–0.3)
BILIRUB SERPL-MCNC: 0.4 MG/DL
BUN SERPL-MCNC: 14 MG/DL (ref 8–23)
CALCIUM SERPL-MCNC: 8.8 MG/DL (ref 8.8–10.4)
CHLORIDE SERPL-SCNC: 106 MMOL/L (ref 98–107)
CREAT SERPL-MCNC: 0.91 MG/DL (ref 0.51–0.95)
EGFRCR SERPLBLD CKD-EPI 2021: 64 ML/MIN/1.73M2
EOSINOPHIL # BLD AUTO: 0.2 10E3/UL (ref 0–0.7)
EOSINOPHIL NFR BLD AUTO: 2 %
ERYTHROCYTE [DISTWIDTH] IN BLOOD BY AUTOMATED COUNT: 12.9 % (ref 10–15)
GLUCOSE SERPL-MCNC: 127 MG/DL (ref 70–99)
HCO3 SERPL-SCNC: 25 MMOL/L (ref 22–29)
HCT VFR BLD AUTO: 42.2 % (ref 35–47)
HGB BLD-MCNC: 14.1 G/DL (ref 11.7–15.7)
IMM GRANULOCYTES # BLD: 0 10E3/UL
IMM GRANULOCYTES NFR BLD: 0 %
LIPASE SERPL-CCNC: 33 U/L (ref 13–60)
LYMPHOCYTES # BLD AUTO: 1.7 10E3/UL (ref 0.8–5.3)
LYMPHOCYTES NFR BLD AUTO: 17 %
MCH RBC QN AUTO: 31.4 PG (ref 26.5–33)
MCHC RBC AUTO-ENTMCNC: 33.4 G/DL (ref 31.5–36.5)
MCV RBC AUTO: 94 FL (ref 78–100)
MONOCYTES # BLD AUTO: 0.6 10E3/UL (ref 0–1.3)
MONOCYTES NFR BLD AUTO: 6 %
NEUTROPHILS # BLD AUTO: 7.5 10E3/UL (ref 1.6–8.3)
NEUTROPHILS NFR BLD AUTO: 75 %
NRBC # BLD AUTO: 0 10E3/UL
NRBC BLD AUTO-RTO: 0 /100
PLATELET # BLD AUTO: 297 10E3/UL (ref 150–450)
POTASSIUM SERPL-SCNC: 3.6 MMOL/L (ref 3.4–5.3)
PROT SERPL-MCNC: 7.2 G/DL (ref 6.4–8.3)
RBC # BLD AUTO: 4.49 10E6/UL (ref 3.8–5.2)
SODIUM SERPL-SCNC: 142 MMOL/L (ref 135–145)
TROPONIN T SERPL HS-MCNC: 9 NG/L
WBC # BLD AUTO: 10.1 10E3/UL (ref 4–11)

## 2024-10-14 PROCEDURE — 250N000013 HC RX MED GY IP 250 OP 250 PS 637: Performed by: EMERGENCY MEDICINE

## 2024-10-14 PROCEDURE — 250N000011 HC RX IP 250 OP 636: Performed by: EMERGENCY MEDICINE

## 2024-10-14 PROCEDURE — G1010 CDSM STANSON: HCPCS

## 2024-10-14 PROCEDURE — 80048 BASIC METABOLIC PNL TOTAL CA: CPT | Performed by: EMERGENCY MEDICINE

## 2024-10-14 PROCEDURE — 93005 ELECTROCARDIOGRAM TRACING: CPT | Performed by: EMERGENCY MEDICINE

## 2024-10-14 PROCEDURE — 83690 ASSAY OF LIPASE: CPT | Performed by: EMERGENCY MEDICINE

## 2024-10-14 PROCEDURE — 82248 BILIRUBIN DIRECT: CPT | Performed by: EMERGENCY MEDICINE

## 2024-10-14 PROCEDURE — 99285 EMERGENCY DEPT VISIT HI MDM: CPT | Mod: 25 | Performed by: EMERGENCY MEDICINE

## 2024-10-14 PROCEDURE — 36415 COLL VENOUS BLD VENIPUNCTURE: CPT | Performed by: EMERGENCY MEDICINE

## 2024-10-14 PROCEDURE — 84484 ASSAY OF TROPONIN QUANT: CPT | Performed by: EMERGENCY MEDICINE

## 2024-10-14 PROCEDURE — 85025 COMPLETE CBC W/AUTO DIFF WBC: CPT | Performed by: EMERGENCY MEDICINE

## 2024-10-14 RX ORDER — ASPIRIN 81 MG/1
324 TABLET, CHEWABLE ORAL ONCE
Status: COMPLETED | OUTPATIENT
Start: 2024-10-14 | End: 2024-10-14

## 2024-10-14 RX ORDER — ACETAMINOPHEN 500 MG
1000 TABLET ORAL EVERY 6 HOURS
Qty: 56 TABLET | Refills: 0 | Status: SHIPPED | OUTPATIENT
Start: 2024-10-14 | End: 2024-10-21

## 2024-10-14 RX ORDER — OXYCODONE HYDROCHLORIDE 5 MG/1
5 TABLET ORAL ONCE
Status: COMPLETED | OUTPATIENT
Start: 2024-10-14 | End: 2024-10-14

## 2024-10-14 RX ORDER — IOPAMIDOL 755 MG/ML
90 INJECTION, SOLUTION INTRAVASCULAR ONCE
Status: COMPLETED | OUTPATIENT
Start: 2024-10-14 | End: 2024-10-14

## 2024-10-14 RX ADMIN — ASPIRIN 81 MG CHEWABLE TABLET 324 MG: 81 TABLET CHEWABLE at 12:34

## 2024-10-14 RX ADMIN — OXYCODONE HYDROCHLORIDE 5 MG: 5 TABLET ORAL at 12:34

## 2024-10-14 RX ADMIN — IOPAMIDOL 90 ML: 755 INJECTION, SOLUTION INTRAVENOUS at 13:37

## 2024-10-14 ASSESSMENT — COLUMBIA-SUICIDE SEVERITY RATING SCALE - C-SSRS
6. HAVE YOU EVER DONE ANYTHING, STARTED TO DO ANYTHING, OR PREPARED TO DO ANYTHING TO END YOUR LIFE?: NO
2. HAVE YOU ACTUALLY HAD ANY THOUGHTS OF KILLING YOURSELF IN THE PAST MONTH?: NO
1. IN THE PAST MONTH, HAVE YOU WISHED YOU WERE DEAD OR WISHED YOU COULD GO TO SLEEP AND NOT WAKE UP?: NO

## 2024-10-14 ASSESSMENT — ACTIVITIES OF DAILY LIVING (ADL)
ADLS_ACUITY_SCORE: 37
ADLS_ACUITY_SCORE: 37

## 2024-10-14 NOTE — DISCHARGE INSTRUCTIONS
Your EKG, blood work and CT scan today are reassuring.  Pain is reproducible with pressing on the chest wall and I do think the pain is related to chest wall pain itself.  Continue your home medications as prescribed.  Tylenol 1000 mg 4 times daily as needed for pain.

## 2024-10-14 NOTE — ED PROVIDER NOTES
EMERGENCY DEPARTMENT ENCOUNTER      NAME: Jennifer Huggins  AGE: 79 year old female  YOB: 1945  MRN: [id number]  EVALUATION DATE & TIME: 10/14/2024 12:22 PM    PCP: Barb Elias    ED PROVIDER: Fatmata León MD    Chief Complaint   Patient presents with    Chest Pain         FINAL IMPRESSION:  1. Chest wall pain          ED COURSE & MEDICAL DECISION MAKING:    Pertinent Labs & Imaging studies reviewed. (See chart for details)  79 year old female with history of HTN, paroxysmal A-fib on Eliquis, CAD with recent PCI to the LAD who presents to the Emergency Department for evaluation of 2 to 3 days of constant chest pressure, reproducible tenderness palpation of the right chest wall/sternal margin on examination.  Strongly favor this to be musculoskeletal etiology.  Differential includes ACS, in-stent thrombus, atypical chest pain, GERD, PE, pneumonia, pneumothorax, effusion, pleurisy.    Patient placed on monitor, IV established and blood obtained.  Twelve-lead EKG shows sinus tachycardia.  Patient given dose of aspirin, oral oxycodone.  CBC, BMP, LFTs, lipase, troponin Gobel.  Days of pain with troponin of 9 METS gender normal and does not warrant repeat.  CT PE study negative.  Not only no PE, but no visualized pneumonia, pneumothorax, effusion.  Will discharge to home with Tylenol as needed for pain continue other home medications.      ED Course as of 10/14/24 1423   Mon Oct 14, 2024   1302 Troponin T, High Sensitivity: 9  Gender normal w days of pain   1340 CT Chest Pulmonary Embolism w Contrast  CT independently interpreted by myself without visualized PE       Medical Decision Making  Obtained supplemental history:Supplemental history obtained?: Family Member/Significant Other  Reviewed external records: External records reviewed?: Outpatient Record: 9/20/2024 coronary angiogram, previous EKG  Care impacted by chronic illness:Anticoagulated State, Heart Disease, and Hypertension  Did you consider but  not order tests?: Work up considered but not performed and documented in chart, if applicable  Did you interpret images independently?: Independent interpretation of ECG and images noted in documentation, when applicable.  Consultation discussion with other provider:Did you involve another provider (consultant, , pharmacy, etc.)?: No  Discharge. I recommended the patient continue their current prescription strength medication(s): Eliquis, Plavix. See documentation for any additional details.    MIPS: CT Pulmonary Angiogram:CT PA was ordered for reason(s) other than PE.      At the conclusion of the encounter I discussed the results of all of the tests and the disposition. The questions were answered. The patient or family acknowledged understanding and was agreeable with the care plan.    MEDICATIONS GIVEN IN THE EMERGENCY:  Medications   aspirin (ASA) chewable tablet 324 mg (324 mg Oral $Given 10/14/24 1234)   oxyCODONE (ROXICODONE) tablet 5 mg (5 mg Oral $Given 10/14/24 1234)   iopamidol (ISOVUE-370) solution 90 mL (90 mLs Intravenous $Given 10/14/24 1337)       NEW PRESCRIPTIONS STARTED AT TODAY'S ER VISIT  New Prescriptions    ACETAMINOPHEN (TYLENOL) 500 MG TABLET    Take 2 tablets (1,000 mg) by mouth every 6 hours for 7 days.          =================================================================    HPI    Patient information was obtained from: Patient, family    Use of Intrepreter: Kelli MORA Joseluis is a 79 year old female with pertinent medical history of HTN, paroxysmal A-fib anticoagulated on Eliquis, CAD with recent PCI to the LAD who presents chest pain fairly constant x 2 to 3 days.  Pain is worse with some movement.  She has some constant lightheadedness which is a chronic issue for her and this is not worse.  Denies any new shortness of breath, cough, chest congestion.  Recently was prescribed pantoprazole and has been taking this as prescribed.  Has been compliant with all home medications.   Has not taken anything in addition to her home medications specifically for the pain.      PAST MEDICAL HISTORY:  Past Medical History:   Diagnosis Date    Benign adenomatous polyp of large intestine     Biliary colic     Cervicalgia     Cholelithiasis     Chronic allergic conjunctivitis     Cystitis     Dyspepsia     Gastritis     GERD (gastroesophageal reflux disease)     HLD (hyperlipidemia)     Hypertension     Hypokalemia     Meningioma (H)     Multiple lung nodules on CT     CT ABD 11/23/1013, mult nodules < 5mm in size, per Fleischner Society recommendations  repeat CT in 12 months CT Chest 2/20/17:  Stable pulmonary nodules suggesting incidental findings given long-term stability.     Osteoporosis     Positional vertigo     Pulmonary nodules     Recurrent UTI     Transaminitis 10/29/2015    Urinary incontinence     Xerosis cutis        PAST SURGICAL HISTORY:  Past Surgical History:   Procedure Laterality Date    CHOLECYSTECTOMY  08/21/2015    CV CORONARY ANGIOGRAM N/A 9/20/2024    Procedure: Coronary Angiogram;  Surgeon: Jeison Amato MD;  Location: [facility]    CV CORONARY LITHOTRIPSY PCI N/A 9/20/2024    Procedure: Percutaneous Coronary Intervention - Lithotripsy;  Surgeon: Jeison Amato MD;  Location: [facility] CV    CV INTRAVASULAR ULTRASOUND N/A 9/20/2024    Procedure: Intravascular Ultrasound;  Surgeon: Jeison Amato MD;  Location: [facility] LAB     CV LEFT HEART CATH N/A 9/20/2024    Procedure: Left Heart Catheterization;  Surgeon: Jeison Amato MD;  Location: [facility]    CV PCI STENT DRUG ELUTING N/A 9/20/2024    Procedure: Percutaneous Coronary Intervention Stent;  Surgeon: Jeison Amato MD;  Location: [facility]       CURRENT MEDICATIONS:    Prior to Admission Medications   Prescriptions Last Dose Informant Patient Reported? Taking?   ELIQUIS ANTICOAGULANT 5 MG tablet   No No   Sig: TAKE 1 TABLET (5 MG) BY MOUTH 2 TIMES DAILY   albuterol (PROAIR  HFA/PROVENTIL HFA/VENTOLIN HFA) 108 (90 Base) MCG/ACT inhaler   No No   Sig: Inhale 2 puffs into the lungs every 6 hours as needed for shortness of breath or wheezing   artificial tears (GENTEAL) 0.1-0.2-0.3 % ophthalmic solution   No No   Sig: Place 2 drops into both eyes 4 times daily as needed (dry or irritated eyes)   atorvastatin (LIPITOR) 80 MG tablet   No No   Sig: Take 1 tablet (80 mg) by mouth at bedtime   clopidogrel (PLAVIX) 75 MG tablet   No No   Sig: Take 1 tablet (75 mg) by mouth daily.   diltiazem (CARDIZEM) 30 MG tablet   No No   Sig: Take 1 tablet (30 mg) by mouth every 12 hours   ezetimibe (ZETIA) 10 MG tablet   No No   Sig: Take 1 tablet (10 mg) by mouth daily.   icosapent ethyl (VASCEPA) 1 g CAPS capsule   No No   Sig: Take 2 g by mouth 2 times daily (with meals).   isosorbide mononitrate (IMDUR) 30 MG 24 hr tablet   No No   Sig: Take 1 tablet (30 mg) by mouth daily.   lisinopril (ZESTRIL) 20 MG tablet   No No   Sig: Take 1 tablet (20 mg) by mouth daily   meclizine (ANTIVERT) 25 MG tablet   No No   Sig: Take 1 tablet (25 mg) by mouth 3 times daily as needed for dizziness   Patient not taking: Reported on 10/4/2024   pantoprazole (PROTONIX) 20 MG EC tablet   No No   Sig: Take 1 tablet (20 mg) by mouth daily.   vitamin D3 (CHOLECALCIFEROL) 50 mcg (2000 units) tablet   No No   Sig: TAKE 1 TABLET (50 MCG) BY MOUTH DAILY   Patient not taking: Reported on 9/6/2024      Facility-Administered Medications Last Administration Doses Remaining   denosumab (PROLIA) injection 60 mg 8/6/2024  1:27 PM 1          ALLERGIES:  Allergies   Allergen Reactions    Ibandronate [Ibandronic Acid] Headache and Shortness Of Breath    Septra [Sulfamethoxazole-Trimethoprim] Other (See Comments)     Acute hepatitis        FAMILY HISTORY:  Family History   Problem Relation Age of Onset    No Known Problems Mother     No Known Problems Father        SOCIAL HISTORY:  Social History     Tobacco Use    Smoking status: Never      "Passive exposure: Never    Smokeless tobacco: Never   Vaping Use    Vaping status: Never Used   Substance Use Topics    Alcohol use: No    Drug use: No        VITALS:  Patient Vitals for the past 24 hrs:   BP Temp Temp src Pulse Resp SpO2 Height Weight   10/14/24 1415 -- -- -- 79 22 97 % -- --   10/14/24 1400 -- -- -- 81 17 97 % -- --   10/14/24 1335 -- -- -- 83 21 98 % -- --   10/14/24 1300 113/71 -- -- 86 19 95 % -- --   10/14/24 1245 -- -- -- 92 18 95 % -- --   10/14/24 1230 (!) 146/77 -- -- 96 24 -- -- --   10/14/24 1228 (!) 151/85 98.2  F (36.8  C) Oral -- -- -- -- --   10/14/24 1220 (!) 137/90 -- -- 104 20 96 % 1.499 m (4' 11\") 57.7 kg (127 lb 3.2 oz)       PHYSICAL EXAM    General Appearance: Well-appearing, well-nourished, no acute distress  ENT:  membranes are moist without pallor  Neck:  Supple, symmetrical, trachea midline, no adenopathy  Chest: Reproducible tenderness palpation of the right sternal margin  Cardio:  Regular rate and rhythm, no murmur/gallop/rub, 2+ pulses symmetric in all extremities  Pulm:  No respiratory distress, clear to auscultation bilaterally  Back:  No CVA tenderness, normal ROM  Abdomen:  Soft, non-tender, non distended,no rebound or guarding.  Extremities: Moves extremities normally, normal gait.  No peripheral edema  Skin:  Skin warm, dry, no rashes  Neuro:  Alert and oriented ×3     RADIOLOGY/LABS:  Reviewed all pertinent imaging. Please see official radiology report. All pertinent labs reviewed and interpreted.    Results for orders placed or performed during the hospital encounter of 10/14/24   CT Chest Pulmonary Embolism w Contrast    Impression    IMPRESSION:    1.  No pulmonary embolism or other acute intrathoracic abnormality.   Basic metabolic panel   Result Value Ref Range    Sodium 142 135 - 145 mmol/L    Potassium 3.6 3.4 - 5.3 mmol/L    Chloride 106 98 - 107 mmol/L    Carbon Dioxide (CO2) 25 22 - 29 mmol/L    Anion Gap 11 7 - 15 mmol/L    Urea Nitrogen 14.0 8.0 - " 23.0 mg/dL    Creatinine 0.91 0.51 - 0.95 mg/dL    GFR Estimate 64 >60 mL/min/1.73m2    Calcium 8.8 8.8 - 10.4 mg/dL    Glucose 127 (H) 70 - 99 mg/dL   Result Value Ref Range    Troponin T, High Sensitivity 9 <=14 ng/L   Hepatic panel   Result Value Ref Range    Protein Total 7.2 6.4 - 8.3 g/dL    Albumin 4.0 3.5 - 5.2 g/dL    Bilirubin Total 0.4 <=1.2 mg/dL    Alkaline Phosphatase 44 40 - 150 U/L    AST 21 0 - 45 U/L    ALT 36 0 - 50 U/L    Bilirubin Direct <0.20 0.00 - 0.30 mg/dL   Result Value Ref Range    Lipase 33 13 - 60 U/L   CBC with platelets and differential   Result Value Ref Range    WBC Count 10.1 4.0 - 11.0 10e3/uL    RBC Count 4.49 3.80 - 5.20 10e6/uL    Hemoglobin 14.1 11.7 - 15.7 g/dL    Hematocrit 42.2 35.0 - 47.0 %    MCV 94 78 - 100 fL    MCH 31.4 26.5 - 33.0 pg    MCHC 33.4 31.5 - 36.5 g/dL    RDW 12.9 10.0 - 15.0 %    Platelet Count 297 150 - 450 10e3/uL    % Neutrophils 75 %    % Lymphocytes 17 %    % Monocytes 6 %    % Eosinophils 2 %    % Basophils 1 %    % Immature Granulocytes 0 %    NRBCs per 100 WBC 0 <1 /100    Absolute Neutrophils 7.5 1.6 - 8.3 10e3/uL    Absolute Lymphocytes 1.7 0.8 - 5.3 10e3/uL    Absolute Monocytes 0.6 0.0 - 1.3 10e3/uL    Absolute Eosinophils 0.2 0.0 - 0.7 10e3/uL    Absolute Basophils 0.1 0.0 - 0.2 10e3/uL    Absolute Immature Granulocytes 0.0 <=0.4 10e3/uL    Absolute NRBCs 0.0 10e3/uL       EKG:  Sinus tachycardia rate 102.  No notable ST or T wave abnormalities.  QRS 72 QTc 461.  Compared to previous EKG from 9/20/2024 anterior T wave inversions have since resolved.  I have independently reviewed and interpreted the EKG(s) documented above.        Fatmata León MD  Emergency Medicine  [facility] EMERGENCY DEPARTMENT  [facility] [id number] [phone]  Dept: [phone]     Fatmata León MD  10/14/24 1424

## 2024-10-16 ENCOUNTER — PATIENT OUTREACH (OUTPATIENT)
Dept: GERIATRIC MEDICINE | Facility: CLINIC | Age: 79
End: 2024-10-16
Payer: MEDICARE

## 2024-10-16 NOTE — Clinical Note
Hi Dr. Elias,   Just an FYI.   Thanks,  Vita Chandra, RN, BSN, PHN [facility] Phone: [phone]

## 2024-10-16 NOTE — PROGRESS NOTES
[facility] Care Coordination Contact  CC received notification of Emergency Room visit.  ER visit occurred on 10/14/2024 at [facility] with Dx of Chest Wall Pain.    CC contacted adult daughter Aldo Boucher  and left a message requesting a return call.  Member has a follow-up appointment with PCP: Yes, 11/19, but she does have a cardiology appointment on 1/17/2024.  Member has had a change in condition: No  New referrals placed: No  Home Visit Needed: No  Support Plan reviewed and updated.  PCP notified of ED visit via EMR.    Vita Chandra RN, BSN, PHN  [facility]  Phone: [phone]

## 2024-10-22 LAB
ATRIAL RATE - MUSE: 102 BPM
DIASTOLIC BLOOD PRESSURE - MUSE: NORMAL MMHG
INTERPRETATION ECG - MUSE: NORMAL
P AXIS - MUSE: 40 DEGREES
PR INTERVAL - MUSE: 152 MS
QRS DURATION - MUSE: 72 MS
QT - MUSE: 354 MS
QTC - MUSE: 461 MS
R AXIS - MUSE: 27 DEGREES
SYSTOLIC BLOOD PRESSURE - MUSE: NORMAL MMHG
T AXIS - MUSE: 58 DEGREES
VENTRICULAR RATE- MUSE: 102 BPM

## 2024-10-24 DIAGNOSIS — I48.0 PAROXYSMAL ATRIAL FIBRILLATION (H): ICD-10-CM

## 2024-10-24 DIAGNOSIS — R06.2 WHEEZING: ICD-10-CM

## 2024-10-24 RX ORDER — DILTIAZEM HYDROCHLORIDE 30 MG/1
TABLET, FILM COATED ORAL
Qty: 180 TABLET | Refills: 1 | Status: SHIPPED | OUTPATIENT
Start: 2024-10-24

## 2024-10-24 RX ORDER — ALBUTEROL SULFATE 90 UG/1
2 INHALANT RESPIRATORY (INHALATION) EVERY 6 HOURS PRN
Qty: 18 G | Refills: 5 | Status: SHIPPED | OUTPATIENT
Start: 2024-10-24

## 2024-10-29 DIAGNOSIS — I10 BENIGN ESSENTIAL HYPERTENSION: ICD-10-CM

## 2024-10-29 RX ORDER — LISINOPRIL 20 MG/1
20 TABLET ORAL DAILY
Qty: 90 TABLET | Refills: 1 | Status: SHIPPED | OUTPATIENT
Start: 2024-10-29

## 2024-11-01 DIAGNOSIS — R93.1 ABNORMAL FINDINGS ON DIAGNOSTIC IMAGING OF HEART AND CORONARY CIRCULATION: ICD-10-CM

## 2024-11-01 DIAGNOSIS — R07.9 CHEST PAIN, UNSPECIFIED TYPE: ICD-10-CM

## 2024-11-01 RX ORDER — ISOSORBIDE MONONITRATE 30 MG/1
30 TABLET, EXTENDED RELEASE ORAL DAILY
Qty: 90 TABLET | Refills: 1 | Status: SHIPPED | OUTPATIENT
Start: 2024-11-01

## 2024-11-01 NOTE — TELEPHONE ENCOUNTER
Pt last saw TLS on 9/6/24 and has follow-up scheduled on 11/5/24. Refill request approved. belinda

## 2024-11-05 ENCOUNTER — OFFICE VISIT (OUTPATIENT)
Dept: CARDIOLOGY | Facility: CLINIC | Age: 79
End: 2024-11-05
Payer: MEDICARE

## 2024-11-05 VITALS
WEIGHT: 129 LBS | BODY MASS INDEX: 26.05 KG/M2 | DIASTOLIC BLOOD PRESSURE: 74 MMHG | SYSTOLIC BLOOD PRESSURE: 119 MMHG | RESPIRATION RATE: 14 BRPM | HEART RATE: 90 BPM

## 2024-11-05 DIAGNOSIS — Z95.5 STATUS POST INSERTION OF DRUG-ELUTING STENT INTO LEFT ANTERIOR DESCENDING (LAD) ARTERY: ICD-10-CM

## 2024-11-05 DIAGNOSIS — E78.2 MIXED HYPERLIPIDEMIA: ICD-10-CM

## 2024-11-05 DIAGNOSIS — I10 ESSENTIAL HYPERTENSION: ICD-10-CM

## 2024-11-05 DIAGNOSIS — I48.0 PAROXYSMAL ATRIAL FIBRILLATION (H): Primary | ICD-10-CM

## 2024-11-05 DIAGNOSIS — I25.10 CORONARY ARTERY DISEASE INVOLVING NATIVE CORONARY ARTERY OF NATIVE HEART WITHOUT ANGINA PECTORIS: ICD-10-CM

## 2024-11-05 PROCEDURE — 93246 EXT ECG>7D<15D RECORDING: CPT | Performed by: STUDENT IN AN ORGANIZED HEALTH CARE EDUCATION/TRAINING PROGRAM

## 2024-11-05 PROCEDURE — G2211 COMPLEX E/M VISIT ADD ON: HCPCS | Performed by: STUDENT IN AN ORGANIZED HEALTH CARE EDUCATION/TRAINING PROGRAM

## 2024-11-05 PROCEDURE — 99214 OFFICE O/P EST MOD 30 MIN: CPT | Performed by: STUDENT IN AN ORGANIZED HEALTH CARE EDUCATION/TRAINING PROGRAM

## 2024-11-05 PROCEDURE — T1013 SIGN LANG/ORAL INTERPRETER: HCPCS | Mod: U4

## 2024-11-05 RX ORDER — EZETIMIBE 10 MG/1
10 TABLET ORAL DAILY
Qty: 90 TABLET | Refills: 3 | Status: SHIPPED | OUTPATIENT
Start: 2024-11-05

## 2024-11-05 RX ORDER — CLOPIDOGREL BISULFATE 75 MG/1
75 TABLET ORAL DAILY
Qty: 90 TABLET | Refills: 3 | Status: SHIPPED | OUTPATIENT
Start: 2024-11-05

## 2024-11-05 RX ORDER — ICOSAPENT ETHYL 1 G/1
2 CAPSULE ORAL 2 TIMES DAILY WITH MEALS
Qty: 360 CAPSULE | Refills: 3 | Status: SHIPPED | OUTPATIENT
Start: 2024-11-05 | End: 2024-11-13

## 2024-11-05 NOTE — NURSING NOTE
Jennifer Huggins arrived here on 11/5/2024 11:30 AM for 8-14 Days  Zio monitor placement per ordering provider Dr Wilks for the diagnosis Paroxysmal atrial fibrillation (H) [I48.0].  Patient s skin was prepped per protocol. Dr. Wilks is the supervising MD.  Zio monitor was placed.  Instructions were reviewed with and given to the patient.  Patient verbalized understanding of wear, troubleshooting and monitor return instructions.

## 2024-11-05 NOTE — PROGRESS NOTES
[facility] HEART CARE   [address]  [place] [address]   [email or web address]   OFFICE: [phone]     CARDIOLOGY CLINIC NOTE     Thank you, Barb Ram, for asking the [facility] Heart Care team to see Ms. Jennifer Huggins to evaluate Follow Up        History of Present Illness   Ms. Jennifer Huggins is a 79 year old female with a significant past history of paroxysmal afib, HTN, HLD, hypokalemia, CAD s/p TOD to LAD 9/2024, who presents for follow up.  The patient notes her R sided chest pains have improved since her stent placement.  She did have a recent ED visit for the same and the pain was felt to be MSK.  She apparently has not been taking plavix (not in her medicine bag) so this was re-ordered.  She now notes more frequent episodes of palpitations again. These happen every few days.             Medications  Allergies   Current Outpatient Medications   Medication Sig Dispense Refill    albuterol (PROAIR HFA/PROVENTIL HFA/VENTOLIN HFA) 108 (90 Base) MCG/ACT inhaler INHALE 2 PUFFS INTO THE LUNGS EVERY 6 HOURS AS NEEDED FOR SHORTNESS OF BREATH OR WHEEZING 18 g 5    artificial tears (GENTEAL) 0.1-0.2-0.3 % ophthalmic solution Place 2 drops into both eyes 4 times daily as needed (dry or irritated eyes) 15 mL 11    atorvastatin (LIPITOR) 80 MG tablet Take 1 tablet (80 mg) by mouth at bedtime 30 tablet 11    clopidogrel (PLAVIX) 75 MG tablet Take 1 tablet (75 mg) by mouth daily. 90 tablet 3    diltiazem (CARDIZEM) 30 MG tablet TAKE 1 TABLET(30 MG) BY MOUTH EVERY 12 HOURS 180 tablet 1    ELIQUIS ANTICOAGULANT 5 MG tablet TAKE 1 TABLET (5 MG) BY MOUTH 2 TIMES DAILY 60 tablet 11    ezetimibe (ZETIA) 10 MG tablet Take 1 tablet (10 mg) by mouth daily. 90 tablet 3    icosapent ethyl (VASCEPA) 1 g CAPS capsule Take 2 g by mouth 2 times daily (with meals). 360 capsule 0    isosorbide mononitrate (IMDUR) 30 MG 24 hr tablet Take 1 tablet (30 mg) by mouth daily. 90 tablet 1     lisinopril (ZESTRIL) 20 MG tablet Take 1 tablet (20 mg) by mouth daily. 90 tablet 1    meclizine (ANTIVERT) 25 MG tablet Take 1 tablet (25 mg) by mouth 3 times daily as needed for dizziness (Patient not taking: Reported on 10/4/2024) 30 tablet 11    pantoprazole (PROTONIX) 20 MG EC tablet Take 1 tablet (20 mg) by mouth daily. 90 tablet 3    vitamin D3 (CHOLECALCIFEROL) 50 mcg (2000 units) tablet TAKE 1 TABLET (50 MCG) BY MOUTH DAILY (Patient not taking: Reported on 9/6/2024) 30 tablet 10      Allergies   Allergen Reactions    Ibandronate [Ibandronic Acid] Headache and Shortness Of Breath    Septra [Sulfamethoxazole-Trimethoprim] Other (See Comments)     Acute hepatitis         Physical Examination Review of Systems   Vitals: There were no vitals taken for this visit.  BMI= There is no height or weight on file to calculate BMI.  Wt Readings from Last 3 Encounters:   10/14/24 57.7 kg (127 lb 3.2 oz)   10/04/24 58.1 kg (128 lb)   09/10/24 58.1 kg (128 lb)       General: pleasant female. No acute distress.   Neck: No JVD  Lungs: clear to auscultation  COR:  regular rate and rhythm, No murmurs, rubs, or gallops  Extrem: No edema        Please refer above for cardiac ROS details.       Past History     Family History:   Family History   Problem Relation Age of Onset    No Known Problems Mother     No Known Problems Father         Social History:   Social History     Socioeconomic History    Marital status:      Spouse name: Not on file    Number of children: Not on file    Years of education: Not on file    Highest education level: Not on file   Occupational History    Not on file   Tobacco Use    Smoking status: Never     Passive exposure: Never    Smokeless tobacco: Never   Vaping Use    Vaping status: Never Used   Substance and Sexual Activity    Alcohol use: No    Drug use: No    Sexual activity: Never     Birth control/protection: Post-menopausal   Other Topics Concern    Not on file   Social History Narrative     Not on file     Social Drivers of Health     Financial Resource Strain: Low Risk  (1/23/2024)    Financial Resource Strain     Within the past 12 months, have you or your family members you live with been unable to get utilities (heat, electricity) when it was really needed?: No   Food Insecurity: Low Risk  (1/23/2024)    Food Insecurity     Within the past 12 months, did you worry that your food would run out before you got money to buy more?: No     Within the past 12 months, did the food you bought just not last and you didn t have money to get more?: No   Transportation Needs: Low Risk  (1/23/2024)    Transportation Needs     Within the past 12 months, has lack of transportation kept you from medical appointments, getting your medicines, non-medical meetings or appointments, work, or from getting things that you need?: No   Physical Activity: Not on file   Stress: Not on file   Social Connections: Not on file   Interpersonal Safety: Low Risk  (9/20/2024)    Interpersonal Safety     Do you feel physically and emotionally safe where you currently live?: Yes     Within the past 12 months, have you been hit, slapped, kicked or otherwise physically hurt by someone?: No     Within the past 12 months, have you been humiliated or emotionally abused in other ways by your partner or ex-partner?: No   Housing Stability: Low Risk  (1/23/2024)    Housing Stability     Do you have housing? : Yes     Are you worried about losing your housing?: No            Lab Results    Chemistry/lipid CBC Cardiac Enzymes/BNP/TSH/INR   Lab Results   Component Value Date    CHOL 192 09/20/2024    HDL 64 09/20/2024    TRIG 93 09/20/2024    BUN 14.0 10/14/2024     10/14/2024    CO2 25 10/14/2024    Lab Results   Component Value Date    WBC 10.1 10/14/2024    HGB 14.1 10/14/2024    HCT 42.2 10/14/2024    MCV 94 10/14/2024     10/14/2024    Lab Results   Component Value Date    TSH 2.23 01/26/2024    INR 1.21 (H) 01/26/2024           Cardiac Problems and Cardiac Diagnostics     Most Recent Cardiac testing:  ECG Personal interpretation  10/14/2024  ST  LAE  Nonspecific T wave    ECHO 1/27/2024  Interpretation Summary     Left ventricular size, wall motion and function are normal. The ejection  fraction is 60-65%.  Normal right ventricle size and systolic function.  The left atrium is borderline dilated.  No hemodynamically significant valvular disease.     When compared previous stidy, there is no significant interval change.    Cardiac CT 8/13/2024:    Calcium Scoring: The total Agatston score is 442. A calcium score in this range places the individual in the 100th percentile when compared to an age and gender matched control group and implies a very high risk of cardiac events in the next ten years.    Left main: patent    Left anterior descending artery: Severe calcified stenosis extending from the proximal to mid segment. The 70-99 % stenosis in the left anterior descending coronary artery proximal to mid segment has a high likelihood of lesion specific ischemia with an FFRct value of 0.79.    Ramus intermediate: mild calcified stenosis in the proximal segment.    Left circumflex artery: patent    Right coronary artery: mild diffuse disease.    Cardiac cath 9/20/2024:  Coronary Angiography:     LM: 20%  LAD:              P: 40%              M: 90%, calcified              D: 20%  D1: 40%  D2: 40%  Ramus: 40%  Lcx:              P: 20%              M: 20%                          D: 20%  OM1: 20%  RCA:              P: 20%              M: 20%              D: 40%  PL: 70%  PDA: 40%              Assessment/Recommendations   Assessment:    Ms. Jennifer Huggins is a 79 year old female with a significant past history of paroxysmal afib, HTN, HLD, hypokalemia, CAD s/p TOD to LAD 9/2024, who presents for follow up.     CAD    - Abnormal CCTA with LAD plaque, CT-FFR 0.79, done initially to assess candidacy for 1c antiarrhythmic, although also having very  atypical chest pain.     - s/p TOD to LAD   - Re-order plavix, plan on 12 months of therapy along with eliquis.   - Cont imdur 30mg qdaily   - Cont atorvastatin 80mg.  Last LDL suboptimal at 124 in January.  Add on zetia.  Tg also elevated start Vascepa 2gm BID.  Check FLP in 2 months     HTN   - Well controlled   - Continue current management     Paroxysmal afib   - Increased palpitations of late.  Will plan on evaluation of afib burden and correlation with symptoms with ziopatch.     - In the past pt had a preference for antiarrhythmics over catheter ablation   - Cont dilt for now  - CHADS-Vasc 4.  Cont eliquis 5mg BID.  No bleeding issues    RTC 6 months    The longitudinal plan of care for the diagnosis(es)/condition(s) as documented were addressed during this visit. Due to the added complexity in care, I will continue to support Da in the subsequent management and with ongoing continuity of care.         Steven Wilks DO [facility]  Non-invasive Cardiologist  [facility]

## 2024-11-05 NOTE — LETTER
11/5/2024    Barb Elias MD  [address] [place] [address] [address]  [place] [address]    RE: Jennifer Huggins       Dear Colleague,     I had the pleasure of seeing Jennifer Huggins in the [facility].    [facility]   [address]  [place] [address]   [email or web address]   OFFICE: [phone]     CARDIOLOGY CLINIC NOTE     Thank you, Barb Ram, for asking the [facility] team to see Ms. Jennifer Huggins to evaluate Follow Up        History of Present Illness   Ms. Jennifer Huggins is a 79 year old female with a significant past history of paroxysmal afib, HTN, HLD, hypokalemia, CAD s/p TOD to LAD 9/2024, who presents for follow up.  The patient notes her R sided chest pains have improved since her stent placement.  She did have a recent ED visit for the same and the pain was felt to be MSK.  She apparently has not been taking plavix (not in her medicine bag) so this was re-ordered.  She now notes more frequent episodes of palpitations again. These happen every few days.             Medications  Allergies   Current Outpatient Medications   Medication Sig Dispense Refill     albuterol (PROAIR HFA/PROVENTIL HFA/VENTOLIN HFA) 108 (90 Base) MCG/ACT inhaler INHALE 2 PUFFS INTO THE LUNGS EVERY 6 HOURS AS NEEDED FOR SHORTNESS OF BREATH OR WHEEZING 18 g 5     artificial tears (GENTEAL) 0.1-0.2-0.3 % ophthalmic solution Place 2 drops into both eyes 4 times daily as needed (dry or irritated eyes) 15 mL 11     atorvastatin (LIPITOR) 80 MG tablet Take 1 tablet (80 mg) by mouth at bedtime 30 tablet 11     clopidogrel (PLAVIX) 75 MG tablet Take 1 tablet (75 mg) by mouth daily. 90 tablet 3     diltiazem (CARDIZEM) 30 MG tablet TAKE 1 TABLET(30 MG) BY MOUTH EVERY 12 HOURS 180 tablet 1     ELIQUIS ANTICOAGULANT 5 MG tablet TAKE 1 TABLET (5 MG) BY MOUTH 2 TIMES DAILY 60 tablet 11     ezetimibe (ZETIA) 10 MG tablet Take 1 tablet (10 mg) by mouth daily. 90 tablet 3      icosapent ethyl (VASCEPA) 1 g CAPS capsule Take 2 g by mouth 2 times daily (with meals). 360 capsule 0     isosorbide mononitrate (IMDUR) 30 MG 24 hr tablet Take 1 tablet (30 mg) by mouth daily. 90 tablet 1     lisinopril (ZESTRIL) 20 MG tablet Take 1 tablet (20 mg) by mouth daily. 90 tablet 1     meclizine (ANTIVERT) 25 MG tablet Take 1 tablet (25 mg) by mouth 3 times daily as needed for dizziness (Patient not taking: Reported on 10/4/2024) 30 tablet 11     pantoprazole (PROTONIX) 20 MG EC tablet Take 1 tablet (20 mg) by mouth daily. 90 tablet 3     vitamin D3 (CHOLECALCIFEROL) 50 mcg (2000 units) tablet TAKE 1 TABLET (50 MCG) BY MOUTH DAILY (Patient not taking: Reported on 9/6/2024) 30 tablet 10      Allergies   Allergen Reactions     Ibandronate [Ibandronic Acid] Headache and Shortness Of Breath     Septra [Sulfamethoxazole-Trimethoprim] Other (See Comments)     Acute hepatitis         Physical Examination Review of Systems   Vitals: There were no vitals taken for this visit.  BMI= There is no height or weight on file to calculate BMI.  Wt Readings from Last 3 Encounters:   10/14/24 57.7 kg (127 lb 3.2 oz)   10/04/24 58.1 kg (128 lb)   09/10/24 58.1 kg (128 lb)       General: pleasant female. No acute distress.   Neck: No JVD  Lungs: clear to auscultation  COR:  regular rate and rhythm, No murmurs, rubs, or gallops  Extrem: No edema        Please refer above for cardiac ROS details.       Past History     Family History:   Family History   Problem Relation Age of Onset     No Known Problems Mother      No Known Problems Father         Social History:   Social History     Socioeconomic History     Marital status:      Spouse name: Not on file     Number of children: Not on file     Years of education: Not on file     Highest education level: Not on file   Occupational History     Not on file   Tobacco Use     Smoking status: Never     Passive exposure: Never     Smokeless tobacco: Never   Vaping Use      Vaping status: Never Used   Substance and Sexual Activity     Alcohol use: No     Drug use: No     Sexual activity: Never     Birth control/protection: Post-menopausal   Other Topics Concern     Not on file   Social History Narrative     Not on file     Social Drivers of Health     Financial Resource Strain: Low Risk  (1/23/2024)    Financial Resource Strain      Within the past 12 months, have you or your family members you live with been unable to get utilities (heat, electricity) when it was really needed?: No   Food Insecurity: Low Risk  (1/23/2024)    Food Insecurity      Within the past 12 months, did you worry that your food would run out before you got money to buy more?: No      Within the past 12 months, did the food you bought just not last and you didn t have money to get more?: No   Transportation Needs: Low Risk  (1/23/2024)    Transportation Needs      Within the past 12 months, has lack of transportation kept you from medical appointments, getting your medicines, non-medical meetings or appointments, work, or from getting things that you need?: No   Physical Activity: Not on file   Stress: Not on file   Social Connections: Not on file   Interpersonal Safety: Low Risk  (9/20/2024)    Interpersonal Safety      Do you feel physically and emotionally safe where you currently live?: Yes      Within the past 12 months, have you been hit, slapped, kicked or otherwise physically hurt by someone?: No      Within the past 12 months, have you been humiliated or emotionally abused in other ways by your partner or ex-partner?: No   Housing Stability: Low Risk  (1/23/2024)    Housing Stability      Do you have housing? : Yes      Are you worried about losing your housing?: No            Lab Results    Chemistry/lipid CBC Cardiac Enzymes/BNP/TSH/INR   Lab Results   Component Value Date    CHOL 192 09/20/2024    HDL 64 09/20/2024    TRIG 93 09/20/2024    BUN 14.0 10/14/2024     10/14/2024    CO2 25 10/14/2024     Lab Results   Component Value Date    WBC 10.1 10/14/2024    HGB 14.1 10/14/2024    HCT 42.2 10/14/2024    MCV 94 10/14/2024     10/14/2024    Lab Results   Component Value Date    TSH 2.23 01/26/2024    INR 1.21 (H) 01/26/2024          Cardiac Problems and Cardiac Diagnostics     Most Recent Cardiac testing:  ECG Personal interpretation  10/14/2024  ST  LAE  Nonspecific T wave    ECHO 1/27/2024  Interpretation Summary     Left ventricular size, wall motion and function are normal. The ejection  fraction is 60-65%.  Normal right ventricle size and systolic function.  The left atrium is borderline dilated.  No hemodynamically significant valvular disease.     When compared previous stidy, there is no significant interval change.    Cardiac CT 8/13/2024:     Calcium Scoring: The total Agatston score is 442. A calcium score in this range places the individual in the 100th percentile when compared to an age and gender matched control group and implies a very high risk of cardiac events in the next ten years.     Left main: patent     Left anterior descending artery: Severe calcified stenosis extending from the proximal to mid segment. The 70-99 % stenosis in the left anterior descending coronary artery proximal to mid segment has a high likelihood of lesion specific ischemia with an FFRct value of 0.79.     Ramus intermediate: mild calcified stenosis in the proximal segment.     Left circumflex artery: patent     Right coronary artery: mild diffuse disease.    Cardiac cath 9/20/2024:  Coronary Angiography:     LM: 20%  LAD:              P: 40%              M: 90%, calcified              D: 20%  D1: 40%  D2: 40%  Ramus: 40%  Lcx:              P: 20%              M: 20%                          D: 20%  OM1: 20%  RCA:              P: 20%              M: 20%              D: 40%  PL: 70%  PDA: 40%              Assessment/Recommendations   Assessment:    Ms. Jennifer Huggins is a 79 year old female with a significant past  history of paroxysmal afib, HTN, HLD, hypokalemia, CAD s/p TOD to LAD 9/2024, who presents for follow up.     CAD    - Abnormal CCTA with LAD plaque, CT-FFR 0.79, done initially to assess candidacy for 1c antiarrhythmic, although also having very atypical chest pain.     - s/p TOD to LAD   - Re-order plavix, plan on 12 months of therapy along with eliquis.   - Cont imdur 30mg qdaily   - Cont atorvastatin 80mg.  Last LDL suboptimal at 124 in January.  Add on zetia.  Tg also elevated start Vascepa 2gm BID.  Check FLP in 2 months     HTN   - Well controlled   - Continue current management     Paroxysmal afib   - Increased palpitations of late.  Will plan on evaluation of afib burden and correlation with symptoms with ziopatch.     - In the past pt had a preference for antiarrhythmics over catheter ablation   - Cont dilt for now  - CHADS-Vasc 4.  Cont eliquis 5mg BID.  No bleeding issues    RTC 6 months    The longitudinal plan of care for the diagnosis(es)/condition(s) as documented were addressed during this visit. Due to the added complexity in care, I will continue to support Da in the subsequent management and with ongoing continuity of care.         Steven Wilks DO [facility]  Non-invasive Cardiologist  [facility]         Thank you for allowing me to participate in the care of your patient.      Sincerely,     Steven Wikls DO     [facility]  cc:   Jeison Amato MD  [address] [place] [address]

## 2024-11-05 NOTE — PATIENT INSTRUCTIONS
It was a pleasure meeting you today    In summary:    I have sent prescriptions for plavix, vascepa, and zetia to the pharmacy.  We will have you wear a ziopatch monitor for 2 weeks to understand better your atrial fibrillation.    Please call my nurse Biju Means at [phone] if you have any questions or issues.    We will schedule a follow up visit in  6 months      Steven Wilks DO [facility]  Non-invasive Cardiologist  [facility]

## 2024-11-13 DIAGNOSIS — E78.2 MIXED HYPERLIPIDEMIA: ICD-10-CM

## 2024-11-13 DIAGNOSIS — I25.10 CORONARY ARTERY DISEASE INVOLVING NATIVE CORONARY ARTERY OF NATIVE HEART WITHOUT ANGINA PECTORIS: ICD-10-CM

## 2024-11-13 DIAGNOSIS — E78.00 HYPERCHOLESTEROLEMIA: ICD-10-CM

## 2024-11-13 DIAGNOSIS — I48.0 PAROXYSMAL ATRIAL FIBRILLATION (H): Primary | ICD-10-CM

## 2024-11-13 RX ORDER — ICOSAPENT ETHYL 1 G/1
2 CAPSULE ORAL 2 TIMES DAILY WITH MEALS
Qty: 360 CAPSULE | Refills: 2 | Status: SHIPPED | OUTPATIENT
Start: 2024-11-13

## 2024-11-19 ENCOUNTER — TELEPHONE (OUTPATIENT)
Dept: FAMILY MEDICINE | Facility: CLINIC | Age: 79
End: 2024-11-19

## 2024-11-19 ENCOUNTER — OFFICE VISIT (OUTPATIENT)
Dept: FAMILY MEDICINE | Facility: CLINIC | Age: 79
End: 2024-11-19
Attending: FAMILY MEDICINE
Payer: MEDICARE

## 2024-11-19 VITALS
TEMPERATURE: 98.7 F | DIASTOLIC BLOOD PRESSURE: 68 MMHG | WEIGHT: 129.25 LBS | RESPIRATION RATE: 16 BRPM | OXYGEN SATURATION: 99 % | SYSTOLIC BLOOD PRESSURE: 124 MMHG | HEIGHT: 59 IN | HEART RATE: 90 BPM | BODY MASS INDEX: 26.06 KG/M2

## 2024-11-19 DIAGNOSIS — Z95.5 STATUS POST INSERTION OF DRUG-ELUTING STENT INTO LEFT ANTERIOR DESCENDING (LAD) ARTERY: ICD-10-CM

## 2024-11-19 DIAGNOSIS — R41.3 AGE-RELATED MEMORY DISORDER: ICD-10-CM

## 2024-11-19 DIAGNOSIS — Z00.00 ENCOUNTER FOR MEDICARE ANNUAL WELLNESS EXAM: Primary | ICD-10-CM

## 2024-11-19 DIAGNOSIS — R42 DIZZINESS: ICD-10-CM

## 2024-11-19 DIAGNOSIS — Z71.89 OTHER SPECIFIED COUNSELING: Primary | Chronic | ICD-10-CM

## 2024-11-19 PROCEDURE — G0439 PPPS, SUBSEQ VISIT: HCPCS | Performed by: FAMILY MEDICINE

## 2024-11-19 PROCEDURE — G0008 ADMIN INFLUENZA VIRUS VAC: HCPCS | Performed by: FAMILY MEDICINE

## 2024-11-19 PROCEDURE — 90662 IIV NO PRSV INCREASED AG IM: CPT | Performed by: FAMILY MEDICINE

## 2024-11-19 RX ORDER — MECLIZINE HYDROCHLORIDE 25 MG/1
25 TABLET ORAL 3 TIMES DAILY PRN
Qty: 30 TABLET | Refills: 11 | Status: SHIPPED | OUTPATIENT
Start: 2024-11-19

## 2024-11-19 NOTE — TELEPHONE ENCOUNTER
RASHMI Gorman,   Please see incoming message from [facility]  staff. Please follow up.     Thank you,  Ainsley

## 2024-11-19 NOTE — TELEPHONE ENCOUNTER
Patient want to see SW regarding health insurance medicare part a can you please give pt a call back at [phone] thanks

## 2024-11-19 NOTE — PROGRESS NOTES
"Preventive Care Visit  [facility] KARISSA Elias MD, Family Medicine  Nov 19, 2024      Assessment & Plan     Encounter for Medicare annual wellness exam  See below    Status post insertion of drug-eluting stent into left anterior descending (LAD) artery  Cardiology records reviewed.    Doesn't have plavix or vascepa with her.  She/daugher are pretty sure that they have plavix; will check  However, Vascepa was $1,265, so they didn't  that one. I\"ll alert cardiologist in case there is something different they would want to do or apply for prior auth.    Dizziness  Med refilled  - meclizine (ANTIVERT) 25 MG tablet  Dispense: 30 tablet; Refill: 11    Age related memory loss  has memory problems, hard to quantify given language barrier.  Indicators of secondary causes negative (neg brain MRI and TSH in January).  I don't see that she's ever had B12 checked, so will put in future order for that.      *Of note, she might be travelling to [facility] in February. If so, she should see me about 1 month before departure for travel counseling/meds/immunizations.                BMI  Estimated body mass index is 26.11 kg/m  as calculated from the following:    Height as of this encounter: 1.499 m (4' 11\").    Weight as of this encounter: 58.6 kg (129 lb 4 oz).       Counseling  Appropriate preventive services were addressed with this patient via screening, questionnaire, or discussion as appropriate for fall prevention, nutrition, physical activity, Tobacco-use cessation, social engagement, weight loss and cognition.  Checklist reviewing preventive services available has been given to the patient.  Reviewed patient's diet, addressing concerns and/or questions.   The patient was instructed to see the dentist every 6 months.   Updated plan of care.  Patient reported difficulty with activities of daily living were addressed today.            Shelli Rodriguez is a 79 year old, presenting for the " following:  Wellness Visit        11/19/2024     2:04 PM   Additional Questions   Roomed by Lea OKEEFE   Accompanied by Granddaughter           HPI    Uses inhaler occasionally     Doesn't have plavix or vascepa with her.  She/daugher are pretty sure that they have plavix; will check  However, Vascepa was $1,265, so they didn't  that one.    Brings up that probably traveling to [facility] in February.    Currently wearing zio patch and reports that it's the last day                11/19/2024   General Health   How would you rate your overall physical health? Good   Feel stress (tense, anxious, or unable to sleep) Only a little      (!) STRESS CONCERN      11/19/2024   Nutrition   Diet: I don't know            9/22/2021   Exercise   Frequency of exercise: 4-5 days/week            11/19/2024   Social Factors   Worry food won't last until get money to buy more No   Food not last or not have enough money for food? No   Do you have housing? (Housing is defined as stable permanent housing and does not include staying ouside in a car, in a tent, in an abandoned building, in an overnight shelter, or couch-surfing.) Yes   Are you worried about losing your housing? No   Lack of transportation? No   Unable to get utilities (heat,electricity)? No            11/19/2024   Fall Risk   Fallen 2 or more times in the past year? No    Trouble with walking or balance? No        Patient-reported          11/19/2024   Activities of Daily Living- Home Safety   Needs help with the following daily activites Transportation    Shopping    Housework    Bathing    Laundry    Medication administration    Money management   Safety concerns in the home None of the above       Multiple values from one day are sorted in reverse-chronological order         11/19/2024   Dental   Dentist two times every year? (!) NO            11/19/2024   Hearing Screening   Hearing concerns? None of the above            11/19/2024   Driving Risk Screening    Patient/family members have concerns about driving No            11/19/2024   General Alertness/Fatigue Screening   Have you been more tired than usual lately? No            11/19/2024   Urinary Incontinence Screening   Bothered by leaking urine in past 6 months No            11/19/2024   TB Screening   Were you born outside of the [place]? Yes              Today's PHQ-2 Score:       1/23/2024    10:33 AM   PHQ-2 ( 1999 [employer])   Q1: Little interest or pleasure in doing things 0    Q2: Feeling down, depressed or hopeless 0    PHQ-2 Score 0   Q1: Little interest or pleasure in doing things Not at all   Q2: Feeling down, depressed or hopeless Not at all   PHQ-2 Score 0       Patient-reported         11/19/2024   Substance Use   Alcohol more than 3/day or more than 7/wk Not Applicable   Do you have a current opioid prescription? No   How severe/bad is pain from 1 to 10? 7/10   Do you use any other substances recreationally? No        Social History     Tobacco Use    Smoking status: Never     Passive exposure: Never    Smokeless tobacco: Never   Vaping Use    Vaping status: Never Used   Substance Use Topics    Alcohol use: No    Drug use: No          Mammogram Screening - After age 74- determine frequency with patient based on health status, life expectancy and patient goals    ASCVD Risk   The 10-year ASCVD risk score (Shelbie BRANDON, et al., 2019) is: 27.3%    Values used to calculate the score:      Age: 79 years      Sex: Female      Is Non- : No      Diabetic: No      Tobacco smoker: No      Systolic Blood Pressure: 124 mmHg      Is BP treated: Yes      HDL Cholesterol: 64 mg/dL      Total Cholesterol: 192 mg/dL            Reviewed and updated as needed this visit by Provider                    Past Medical History:   Diagnosis Date    Benign adenomatous polyp of large intestine     Biliary colic     Cervicalgia     Cholelithiasis     Chronic allergic conjunctivitis     Cystitis      Dyspepsia     Gastritis     GERD (gastroesophageal reflux disease)     HLD (hyperlipidemia)     Hypertension     Hypokalemia     Meningioma (H)     Multiple lung nodules on CT     CT ABD 1013, mult nodules < 5mm in size, per Fleischner Society recommendations  repeat CT in 12 months CT Chest 17:  Stable pulmonary nodules suggesting incidental findings given long-term stability.     Osteoporosis     Positional vertigo     Pulmonary nodules     Recurrent UTI     Transaminitis 10/29/2015    Urinary incontinence     Xerosis cutis      Past Surgical History:   Procedure Laterality Date    CHOLECYSTECTOMY  2015    CV CORONARY ANGIOGRAM N/A 2024    Procedure: Coronary Angiogram;  Surgeon: Jeison Amato MD;  Location: [facility]    CV CORONARY LITHOTRIPSY PCI N/A 2024    Procedure: Percutaneous Coronary Intervention - Lithotripsy;  Surgeon: Jeison Amato MD;  Location: [facility] CV    CV INTRAVASULAR ULTRASOUND N/A 2024    Procedure: Intravascular Ultrasound;  Surgeon: Jeison Amato MD;  Location: [facility] LAB     CV LEFT HEART CATH N/A 2024    Procedure: Left Heart Catheterization;  Surgeon: Jeison Amato MD;  Location: [facility]    CV PCI STENT DRUG ELUTING N/A 2024    Procedure: Percutaneous Coronary Intervention Stent;  Surgeon: Jeison Amato MD;  Location: [facility] CATH [facility] CV     OB History    Para Term  AB Living   6 6 6 0 0 0   SAB IAB Ectopic Multiple Live Births   0 0 0 0 0      # Outcome Date GA Lbr Christos/2nd Weight Sex Type Anes PTL Lv   6 Term            5 Term            4 Term            3 Term            2 Term            1 Term              BP Readings from Last 3 Encounters:   24 124/68   24 119/74   10/14/24 113/71    Wt Readings from Last 3 Encounters:   24 58.6 kg (129 lb 4 oz)   24 58.5 kg (129 lb)   10/14/24 57.7 kg (127 lb 3.2 oz)                  Patient Active Problem List    Diagnosis    Neuritis    Meningioma    Joint Pain, Localized In The Shoulder    Cervicalgia    Hypercholesterolemia    Chronic allergic conjunctivitis    Osteoporosis    Dyspepsia    Benign paroxysmal positional vertigo    Essential hypertension    Hypokalemia    Urinary incontinence    Benign Adenomatous Polyp Of The Large Intestine    Xerosis Cutis    Recurrent UTI    GERD (gastroesophageal reflux disease)    Constipation    Hepatitis (non-viral, sporadic elevation of LFT's)    Dizziness    Osteoarthritis of multiple joints    Paroxysmal atrial fibrillation (H)    Benign essential hypertension    Chest pressure    Status post coronary angiogram    Abnormal cardiac CT angiography    Abnormal findings on diagnostic imaging of heart and coronary circulation    Chest pain, unspecified type    Status post insertion of drug-eluting stent into left anterior descending (LAD) artery     Past Surgical History:   Procedure Laterality Date    CHOLECYSTECTOMY  08/21/2015    CV CORONARY ANGIOGRAM N/A 9/20/2024    Procedure: Coronary Angiogram;  Surgeon: Jeison Amato MD;  Location: [facility]    CV CORONARY LITHOTRIPSY PCI N/A 9/20/2024    Procedure: Percutaneous Coronary Intervention - Lithotripsy;  Surgeon: Jeison Amato MD;  Location: [facility]    CV INTRAVASULAR ULTRASOUND N/A 9/20/2024    Procedure: Intravascular Ultrasound;  Surgeon: Jeison Amato MD;  Location: [facility]    CV LEFT HEART CATH N/A 9/20/2024    Procedure: Left Heart Catheterization;  Surgeon: Jeison Amato MD;  Location: [facility]    CV PCI STENT DRUG ELUTING N/A 9/20/2024    Procedure: Percutaneous Coronary Intervention Stent;  Surgeon: Jeison Amato MD;  Location: [facility]       Social History     Tobacco Use    Smoking status: Never     Passive exposure: Never    Smokeless tobacco: Never   Substance Use Topics    Alcohol use: No     Family History   Problem Relation Age of Onset    No Known  Problems Mother     No Known Problems Father          Current Outpatient Medications   Medication Sig Dispense Refill    albuterol (PROAIR HFA/PROVENTIL HFA/VENTOLIN HFA) 108 (90 Base) MCG/ACT inhaler INHALE 2 PUFFS INTO THE LUNGS EVERY 6 HOURS AS NEEDED FOR SHORTNESS OF BREATH OR WHEEZING 18 g 5    artificial tears (GENTEAL) 0.1-0.2-0.3 % ophthalmic solution Place 2 drops into both eyes 4 times daily as needed (dry or irritated eyes) 15 mL 11    atorvastatin (LIPITOR) 80 MG tablet Take 1 tablet (80 mg) by mouth at bedtime 30 tablet 11    diltiazem (CARDIZEM) 30 MG tablet TAKE 1 TABLET(30 MG) BY MOUTH EVERY 12 HOURS 180 tablet 1    ELIQUIS ANTICOAGULANT 5 MG tablet Take 1 tablet (5 mg) by mouth 2 times daily. 60 tablet 11    ezetimibe (ZETIA) 10 MG tablet Take 1 tablet (10 mg) by mouth daily. 90 tablet 3    isosorbide mononitrate (IMDUR) 30 MG 24 hr tablet Take 1 tablet (30 mg) by mouth daily. 90 tablet 1    lisinopril (ZESTRIL) 20 MG tablet Take 1 tablet (20 mg) by mouth daily. 90 tablet 1    meclizine (ANTIVERT) 25 MG tablet Take 1 tablet (25 mg) by mouth 3 times daily as needed for dizziness. 30 tablet 11    clopidogrel (PLAVIX) 75 MG tablet Take 1 tablet (75 mg) by mouth daily. 90 tablet 3    icosapent ethyl (VASCEPA) 1 g CAPS capsule Take 2 g by mouth 2 times daily (with meals). 360 capsule 2    vitamin D3 (CHOLECALCIFEROL) 50 mcg (2000 units) tablet TAKE 1 TABLET (50 MCG) BY MOUTH DAILY (Patient not taking: Reported on 9/6/2024) 30 tablet 10     Allergies   Allergen Reactions    Ibandronate [Ibandronic Acid] Headache and Shortness Of Breath    Septra [Sulfamethoxazole-Trimethoprim] Other (See Comments)     Acute hepatitis      Current providers sharing in care for this patient include:  Patient Care Team:  Barb Elias MD as PCP - General (Family Medicine)  Vita Chandra, RN as Lead Care Coordinator (Primary Care - CC)  Yoni Guillory MD as Assigned Neuroscience Provider  Barb Elias MD as Assigned  "PCP  Steven Wilks DO as Physician (Cardiovascular Disease)  Steven Wilks DO as Assigned Heart and Vascular Provider  Alexandre Paniagua CMA as Financial Resource Worker    The following health maintenance items are reviewed in Epic and correct as of today:  Health Maintenance   Topic Date Due    RSV VACCINE (1 - 1-dose 75+ series) Never done    COVID-19 Vaccine (3 - 2024-25 season) 09/01/2024    ANNUAL REVIEW OF HM ORDERS  07/17/2025    LIPID  09/20/2025    BMP  10/14/2025    MEDICARE ANNUAL WELLNESS VISIT  11/19/2025    FALL RISK ASSESSMENT  11/19/2025    GLUCOSE  10/14/2027    DTAP/TDAP/TD IMMUNIZATION (4 - Td or Tdap) 11/14/2027    ADVANCE CARE PLANNING  10/23/2028    DEXA  01/10/2039    HEPATITIS C SCREENING  Completed    PHQ-2 (once per calendar year)  Completed    INFLUENZA VACCINE  Completed    Pneumococcal Vaccine: 65+ Years  Completed    ZOSTER IMMUNIZATION  Completed    HPV IMMUNIZATION  Aged Out    MENINGITIS IMMUNIZATION  Aged Out    RSV MONOCLONAL ANTIBODY  Aged Out    MAMMO SCREENING  Discontinued    COLORECTAL CANCER SCREENING  Discontinued            Objective    Exam  /68   Pulse 90   Temp 98.7  F (37.1  C) (Oral)   Resp 16   Ht 1.499 m (4' 11\")   Wt 58.6 kg (129 lb 4 oz)   SpO2 99%   BMI 26.11 kg/m     Estimated body mass index is 26.11 kg/m  as calculated from the following:    Height as of this encounter: 1.499 m (4' 11\").    Weight as of this encounter: 58.6 kg (129 lb 4 oz).    Physical Exam  GENERAL: alert and no distress  EYES: Eyes grossly normal to inspection, PERRL and conjunctivae and sclerae normal  HENT: ear canals and TM's normal, nose and mouth without ulcers or lesions  NECK: no adenopathy, no asymmetry, masses, or scars  RESP: lungs clear to auscultation - no rales, rhonchi or wheezes  CV: regular rate and rhythm, normal S1 S2, no S3 or S4, no murmur, click or rub, no peripheral edema  ABDOMEN: soft, nontender, no hepatosplenomegaly, no masses and bowel sounds " normal  MS: no gross musculoskeletal defects noted, no edema  SKIN: no suspicious lesions or rashes  NEURO: Normal strength and tone, mentation intact and speech normal  PSYCH: mentation appears normal, affect normal/bright        11/19/2024   Mini Cog   Mini-Cog Not Completed (choose reason) Language barrier and no  present   Clock Draw Score 0 Abnormal   3 Item Recall 0 objects recalled   Mini Cog Total Score 0                 Signed Electronically by: Barb Elias MD

## 2024-11-19 NOTE — PATIENT INSTRUCTIONS
Patient Education     I recommend getting the RSV vaccine at the pharmacy (it's not covered to be given at clinic.    Please make sure that you have the medication clopidogrel/Plavix at home. If you don't please call our clinic.    I'll let your cardiologist know that the one new medication (Vascepa ) was too expensive. Hopefully someone will call you about it.    --------------------      You have been provided the Preventive Care Advice document.    Additional copies can be found here: [email or web address]  Preventive Care Advice   This is general advice given by our system to help you stay healthy. However, your care team may have specific advice just for you. Please talk to your care team about your preventive care needs.  Nutrition  Eat 5 or more servings of fruits and vegetables each day.  Try wheat bread, brown rice and whole grain pasta (instead of white bread, rice, and pasta).  Get enough calcium and vitamin D. Check the label on foods and aim for 100% of the RDA (recommended daily allowance).  Lifestyle  Exercise at least 150 minutes each week  (30 minutes a day, 5 days a week).  Do muscle strengthening activities 2 days a week. These help control your weight and prevent disease.  No smoking.  Wear sunscreen to prevent skin cancer.  Have a dental exam and cleaning every 6 months.  Yearly exams  See your health care team every year to talk about:  Any changes in your health.  Any medicines your care team has prescribed.  Preventive care, family planning, and ways to prevent chronic diseases.  Shots (vaccines)   HPV shots (up to age 26), if you've never had them before.  Hepatitis B shots (up to age 59), if you've never had them before.  COVID-19 shot: Get this shot when it's due.  Flu shot: Get a flu shot every year.  Tetanus shot: Get a tetanus shot every 10 years.  Pneumococcal, hepatitis A, and RSV shots: Ask your care team if you need these based on your risk.  Shingles shot (for age 50 and  up)  General health tests  Diabetes screening:  Starting at age 35, Get screened for diabetes at least every 3 years.  If you are younger than age 35, ask your care team if you should be screened for diabetes.  Cholesterol test: At age 39, start having a cholesterol test every 5 years, or more often if advised.  Bone density scan (DEXA): At age 50, ask your care team if you should have this scan for osteoporosis (brittle bones).  Hepatitis C: Get tested at least once in your life.  STIs (sexually transmitted infections)  Before age 24: Ask your care team if you should be screened for STIs.  After age 24: Get screened for STIs if you're at risk. You are at risk for STIs (including HIV) if:  You are sexually active with more than one person.  You don't use condoms every time.  You or a partner was diagnosed with a sexually transmitted infection.  If you are at risk for HIV, ask about PrEP medicine to prevent HIV.  Get tested for HIV at least once in your life, whether you are at risk for HIV or not.  Cancer screening tests  Cervical cancer screening: If you have a cervix, begin getting regular cervical cancer screening tests starting at age 21.  Breast cancer scan (mammogram): If you've ever had breasts, begin having regular mammograms starting at age 40. This is a scan to check for breast cancer.  Colon cancer screening: It is important to start screening for colon cancer at age 45.  Have a colonoscopy test every 10 years (or more often if you're at risk) Or, ask your provider about stool tests like a FIT test every year or Cologuard test every 3 years.  To learn more about your testing options, visit:   .  For help making a decision, visit:   https://bit.ly/hj11266.  Prostate cancer screening test: If you have a prostate, ask your care team if a prostate cancer screening test (PSA) at age 55 is right for you.  Lung cancer screening: If you are a current or former smoker ages 50 to 80, ask your care team if ongoing  lung cancer screenings are right for you.  For informational purposes only. Not to replace the advice of your health care provider. Copyright   2023 [facility]. All rights reserved. Clinically reviewed by the [facility] Transitions Program. [employer] [id number] - REV 01/24.  Learning About Activities of Daily Living  What are activities of daily living?     Activities of daily living (ADLs) are the basic self-care tasks you do every day. These include eating, bathing, dressing, and moving around.  As you age, and if you have health problems, you may find that it's harder to do some of these tasks. If so, your doctor can suggest ideas that may help.  To measure what kind of help you may need, your doctor will ask how well you are able to do ADLs. Let your doctor know if there are any tasks that you are having trouble doing. This is an important first step to getting help. And when you have the help you need, you can stay as independent as possible.  How will a doctor assess your ADLs?  Asking about ADLs is part of a routine health checkup your doctor will likely do as you age. Your health check might be done in a doctor's office, in your home, or at a hospital. The goal is to find out if you are having any problems that could make it hard to care for yourself or that make it unsafe for you to be on your own.  To measure your ADLs, your doctor will ask how hard it is for you to do routine tasks. Your doctor may also want to know if you have changed the way you do a task because of a health problem. Your doctor may watch how you:  Walk back and forth.  Keep your balance while you stand or walk.  Move from sitting to standing or from a bed to a chair.  Button or unbutton a shirt or sweater.  Remove and put on your shoes.  It's common to feel a little worried or anxious if you find you can't do all the things you used to be able to do. Talking with your doctor about ADLs is a way to make sure you're as  safe as possible and able to care for yourself as well as you can. You may want to bring a caregiver, friend, or family member to your checkup. They can help you talk to your doctor.  Follow-up care is a key part of your treatment and safety. Be sure to make and go to all appointments, and call your doctor if you are having problems. It's also a good idea to know your test results and keep a list of the medicines you take.  Current as of: October 24, 2023  Content Version: 14.2    2024 [facility] [employer].   Care instructions adapted under license by your healthcare professional. If you have questions about a medical condition or this instruction, always ask your healthcare professional. [employer] disclaims any warranty or liability for your use of this information.

## 2024-11-20 ENCOUNTER — APPOINTMENT (OUTPATIENT)
Dept: INTERPRETER SERVICES | Facility: CLINIC | Age: 79
End: 2024-11-20
Payer: MEDICARE

## 2024-11-20 ENCOUNTER — PATIENT OUTREACH (OUTPATIENT)
Dept: CARE COORDINATION | Facility: CLINIC | Age: 79
End: 2024-11-20
Payer: MEDICARE

## 2024-11-20 NOTE — TELEPHONE ENCOUNTER
Valentín Gorman,   Patient's MA also termed on 9/30/24. I placed FRW referral today. Can you please follow up with FRW and patient on this. I will not enroll patient to clinic care coordination because patient already enrolled with [facility].     Please reach out if you have questions.    Thank you,  Ainsley

## 2024-11-25 ENCOUNTER — TELEPHONE (OUTPATIENT)
Dept: FAMILY MEDICINE | Facility: CLINIC | Age: 79
End: 2024-11-25
Payer: MEDICARE

## 2024-11-25 NOTE — TELEPHONE ENCOUNTER
I called Iwona today to clarify patient's meds (see concern in office note 11/19/24).    She has picked up for clopidogrel/Plavix (9/20/24, 90 day supple) and icosapent ethyl (Vascepa) (11/20/24).  Will assume she is now on all proper meds and no need to notify prescribing cardiologist of previous concern about not being covered.

## 2024-11-26 LAB — CV ZIO PRELIM RESULTS: NORMAL

## 2024-11-27 ENCOUNTER — PATIENT OUTREACH (OUTPATIENT)
Dept: CARE COORDINATION | Facility: CLINIC | Age: 79
End: 2024-11-27
Payer: MEDICARE

## 2024-11-27 NOTE — PROGRESS NOTES
FRW UPDATE :  11/27/24 - Patient was on MA. Due for renewal. Insurance ended 9/30/24. Family did turn in renewal but [facility] still processing renewal. Appointment scheduled to assist with calling [facility] to check on application.

## 2024-12-04 ENCOUNTER — PATIENT OUTREACH (OUTPATIENT)
Dept: CARE COORDINATION | Facility: CLINIC | Age: 79
End: 2024-12-04
Payer: MEDICARE

## 2024-12-04 NOTE — PROGRESS NOTES
FRW UPDATE :  12/4/24 - FRW called county today. Patient was due for renewal for SNAP and [place]. They received renewal but only SNAP was reinstated. [facility] worker was not sure why MA was not reinstated. [facility] stated that they did also complete the AVS and turned into the county. Family had already called county worker worker and LVM. She will call [facility] worker if [facility] worker does not contact patient within the next week. FRW will follow up within 30 days.

## 2024-12-11 DIAGNOSIS — Z92.29 HISTORY OF BISPHOSPHONATE THERAPY: ICD-10-CM

## 2024-12-11 DIAGNOSIS — M81.0 AGE-RELATED OSTEOPOROSIS WITHOUT CURRENT PATHOLOGICAL FRACTURE: ICD-10-CM

## (undated) DEVICE — CATH BALLOON NC EMERGE 3.50X12MM H7493926712350

## (undated) DEVICE — ELECTRODE DEFIB CADENCE 22550R

## (undated) DEVICE — CATH LAUNCHER 6FR EBU 3.5 LA6EBU35

## (undated) DEVICE — CATH IVUS OPTICROSS HD 3FR 1MM 135CML H74939352040

## (undated) DEVICE — CATH 6FR X 100CM, OPTITORQUE C

## (undated) DEVICE — CATH ANGIO JUDKINS JL3.5 6FRX100CM INFINITI 534618T

## (undated) DEVICE — SYR ANGIOGRAPHY MULTIUSE KIT ACIST 014612

## (undated) DEVICE — EXCHANGE WIRE .035 260 STAR/JFC/035/260/ M001491681

## (undated) DEVICE — CATH BALLO0N SHOCKWAVE C2+ 3.0 X12MM CORONARY C2PIVL3012

## (undated) DEVICE — SHTH INTRO 0.021IN ID 6FR DIA

## (undated) DEVICE — MANIFOLD KIT ANGIO AUTOMATED 014613

## (undated) DEVICE — CATH BALLOON NC EMERGE 2.50X12MM H7493926712250

## (undated) DEVICE — VALVE HEMOSTASIS .096" COPILOT MECH 1003331

## (undated) DEVICE — KIT HAND CONTROL ACIST 014644 AR-P54

## (undated) DEVICE — SLEEVE TR BAND RADIAL COMPRESSION DEVICE 24CM TRB24-REG

## (undated) DEVICE — GUIDEWIRE VASC 0.014"X190CML 3CML TIP AHW14R104S

## (undated) DEVICE — CATH BALLOON NC EMERGE 3.00X12MM H7493926712300

## (undated) DEVICE — CATH ANGIO JUDKINS R4 6FRX100CM INFINITI 534621T

## (undated) DEVICE — CUSTOM PACK CORONARY SAN5BCRHEA

## (undated) DEVICE — DEVICE INFLATION SYR W/ HEMOSTASIS VALVE 12IN EXT IN4904

## (undated) RX ORDER — ACETAMINOPHEN 325 MG/1
TABLET ORAL
Status: DISPENSED
Start: 2024-09-20

## (undated) RX ORDER — CLOPIDOGREL 300 MG/1
TABLET, FILM COATED ORAL
Status: DISPENSED
Start: 2024-09-20

## (undated) RX ORDER — FENTANYL CITRATE 50 UG/ML
INJECTION, SOLUTION INTRAMUSCULAR; INTRAVENOUS
Status: DISPENSED
Start: 2024-09-20

## (undated) RX ORDER — ASPIRIN 325 MG
TABLET ORAL
Status: DISPENSED
Start: 2024-09-20